# Patient Record
Sex: MALE | Race: BLACK OR AFRICAN AMERICAN | NOT HISPANIC OR LATINO | Employment: OTHER | URBAN - METROPOLITAN AREA
[De-identification: names, ages, dates, MRNs, and addresses within clinical notes are randomized per-mention and may not be internally consistent; named-entity substitution may affect disease eponyms.]

---

## 2017-02-13 ENCOUNTER — LAB VISIT (OUTPATIENT)
Dept: LAB | Facility: HOSPITAL | Age: 82
End: 2017-02-13
Attending: NURSE PRACTITIONER
Payer: MEDICARE

## 2017-02-13 ENCOUNTER — OFFICE VISIT (OUTPATIENT)
Dept: OPTOMETRY | Facility: CLINIC | Age: 82
End: 2017-02-13
Payer: MEDICARE

## 2017-02-13 DIAGNOSIS — H40.1132 PRIMARY OPEN ANGLE GLAUCOMA OF BOTH EYES, MODERATE STAGE: Primary | ICD-10-CM

## 2017-02-13 DIAGNOSIS — C61 PROSTATE CANCER: ICD-10-CM

## 2017-02-13 DIAGNOSIS — Z96.1 PSEUDOPHAKIA OF BOTH EYES: ICD-10-CM

## 2017-02-13 DIAGNOSIS — H35.62 RETINAL HEMORRHAGE OF LEFT EYE: ICD-10-CM

## 2017-02-13 LAB — COMPLEXED PSA SERPL-MCNC: 4 NG/ML

## 2017-02-13 PROCEDURE — 99499 UNLISTED E&M SERVICE: CPT | Mod: S$GLB,,, | Performed by: OPTOMETRIST

## 2017-02-13 PROCEDURE — 84153 ASSAY OF PSA TOTAL: CPT

## 2017-02-13 PROCEDURE — 92014 COMPRE OPH EXAM EST PT 1/>: CPT | Mod: S$GLB,,, | Performed by: OPTOMETRIST

## 2017-02-13 PROCEDURE — 99999 PR PBB SHADOW E&M-EST. PATIENT-LVL III: CPT | Mod: PBBFAC,,, | Performed by: OPTOMETRIST

## 2017-02-13 PROCEDURE — 36415 COLL VENOUS BLD VENIPUNCTURE: CPT

## 2017-02-13 PROCEDURE — 92250 FUNDUS PHOTOGRAPHY W/I&R: CPT | Mod: S$GLB,,, | Performed by: OPTOMETRIST

## 2017-02-13 RX ORDER — HYDROCODONE BITARTRATE AND ACETAMINOPHEN 7.5; 325 MG/1; MG/1
TABLET ORAL
COMMUNITY
Start: 2017-02-01 | End: 2017-08-21

## 2017-02-13 RX ORDER — LANCETS 33 GAUGE
EACH MISCELLANEOUS
COMMUNITY
Start: 2017-01-12 | End: 2018-04-03

## 2017-02-13 NOTE — PROGRESS NOTES
HPI     Glaucoma    Additional comments: 4 month IOP ck/DFE           Comments   Last eye exam was 10/14/16 with Dr. Jiménez.  Patient states no changes since last exam. Happy with current glasses.  Patient denies diplopia, headaches, flashes/floaters, and pain.    Latanoprost qHS OU       Last edited by Padmini Walker on 2/13/2017  8:22 AM. (History)        ROS     Negative for: Constitutional, Gastrointestinal, Neurological, Skin,   Genitourinary, Musculoskeletal, HENT, Endocrine, Cardiovascular, Eyes,   Respiratory, Psychiatric, Allergic/Imm, Heme/Lymph    Last edited by Leydi Jiménez, OD on 2/13/2017 10:23 AM. (History)        Assessment /Plan     For exam results, see Encounter Report.    Primary open angle glaucoma of both eyes, moderate stage  -     Color Fundus Photography - OU - Both Eyes    Retinal hemorrhage of left eye    Pseudophakia of both eyes            1.  Continue Latanoprost QHS OU.    DFE: 2/13/17  Photos: 2/13/17  HVF: 6/16/16  OCT: 6/16/16  Gonio:   Pachy: 526 OD, 512 OS  Initial IOPs: 22 OD, 22 OS  MHx: DM, HTN  FHx: none  2.  Diabetic retinopathy OU w/ large retinal hemorrhage inferior to optic nerve OS.  Appointment scheduled with Dr. Hall.  3.   Continue w/ current rx        RTC 4 months for iop check, hvf, and oct.

## 2017-02-20 ENCOUNTER — TELEPHONE (OUTPATIENT)
Dept: HEMATOLOGY/ONCOLOGY | Facility: CLINIC | Age: 82
End: 2017-02-20

## 2017-02-20 ENCOUNTER — OFFICE VISIT (OUTPATIENT)
Dept: UROLOGY | Facility: CLINIC | Age: 82
End: 2017-02-20
Payer: MEDICARE

## 2017-02-20 VITALS
SYSTOLIC BLOOD PRESSURE: 134 MMHG | HEART RATE: 60 BPM | DIASTOLIC BLOOD PRESSURE: 68 MMHG | BODY MASS INDEX: 26.3 KG/M2 | HEIGHT: 68 IN | WEIGHT: 173.5 LBS

## 2017-02-20 DIAGNOSIS — R97.20 ELEVATED PSA: ICD-10-CM

## 2017-02-20 DIAGNOSIS — C61 PROSTATE CANCER: Primary | ICD-10-CM

## 2017-02-20 PROCEDURE — 1159F MED LIST DOCD IN RCRD: CPT | Mod: S$GLB,,, | Performed by: NURSE PRACTITIONER

## 2017-02-20 PROCEDURE — 96402 CHEMO HORMON ANTINEOPL SQ/IM: CPT | Mod: S$GLB,,, | Performed by: NURSE PRACTITIONER

## 2017-02-20 PROCEDURE — 1126F AMNT PAIN NOTED NONE PRSNT: CPT | Mod: S$GLB,,, | Performed by: NURSE PRACTITIONER

## 2017-02-20 PROCEDURE — 99213 OFFICE O/P EST LOW 20 MIN: CPT | Mod: 25,S$GLB,, | Performed by: NURSE PRACTITIONER

## 2017-02-20 PROCEDURE — 1157F ADVNC CARE PLAN IN RCRD: CPT | Mod: S$GLB,,, | Performed by: NURSE PRACTITIONER

## 2017-02-20 PROCEDURE — 99999 PR PBB SHADOW E&M-EST. PATIENT-LVL IV: CPT | Mod: PBBFAC,,, | Performed by: NURSE PRACTITIONER

## 2017-02-20 PROCEDURE — 99499 UNLISTED E&M SERVICE: CPT | Mod: S$GLB,,, | Performed by: NURSE PRACTITIONER

## 2017-02-20 RX ORDER — BICALUTAMIDE 50 MG/1
50 TABLET, FILM COATED ORAL DAILY
Qty: 90 TABLET | Refills: 3 | Status: SHIPPED | OUTPATIENT
Start: 2017-02-20 | End: 2017-05-21

## 2017-02-20 NOTE — MR AVS SNAPSHOT
[facility] 4th Floor  [address]  [facility] [address]  Phone: [phone]                  Jeniffer Taylor   2017 8:20 AM   Office Visit    Description:  Male : 9/15/1928   Provider:  Jessa Ulloa NP   Department:  [facility] - Urolog 4th Floor           Reason for Visit     Prostate Cancer           Diagnoses this Visit        Comments    Prostate cancer    -  Primary     Elevated PSA                To Do List           Future Appointments        Provider Department Dept Phone    3/21/2017 8:30 AM GLADIS Hall MD [facility] - Ophthalmology [phone]    2017 8:15 AM Shari Chauhan MD [facility] - Internal Medicine [phone]      Goals (5 Years of Data)     None      Follow-Up and Disposition     Return in about 6 months (around 2017) for PSA.       These Medications        Disp Refills Start End    bicalutamide (CASODEX) 50 MG Tab 90 tablet 3 2017    Take 1 tablet (50 mg total) by mouth once daily. - Oral    Pharmacy: [facility] - [place] [id number] [facility] Ph #: [phone]         [facility] On Call     [facility] On Call Nurse Care Line -  Assistance  Registered nurses in the [facility] provide clinical advisement, health education, appointment booking, and other advisory services.  Call for this free service at [phone].             Medications           Message regarding Medications     Verify the changes and/or additions to your medication regime listed below are the same as discussed with your clinician today.  If any of these changes or additions are incorrect, please notify your healthcare provider.        These medications were administered today        Dose Freq    triptorelin pamoate Syrg 22.5 mg 22.5 mg Clinic/HOD 1 time    Sig: Inject 22.5 mg into the muscle one time.    Class: Normal    Route: Intramuscular           Verify that the below list of medications is an accurate  "representation of the medications you are currently taking.  If none reported, the list may be blank. If incorrect, please contact your healthcare provider. Carry this list with you in case of emergency.           Current Medications     acetaminophen-codeine 300-30mg (TYLENOL #3) 300-30 mg Tab 1 po TID prn    amlodipine (NORVASC) 10 MG tablet Take 1 tablet (10 mg total) by mouth once daily.    aspirin (ECOTRIN) 81 MG EC tablet Take 81 mg by mouth. 1 Tablet, Delayed Release (E.C.) Oral Every morning    atorvastatin (LIPITOR) 80 MG tablet Take 1 tablet (80 mg total) by mouth once daily.    BD ALCOHOL SWABS PadM     furosemide (LASIX) 40 MG tablet Take 1 tablet (40 mg total) by mouth 2 (two) times daily.    garlic 1,000 mg Cap Take 2 tablets by mouth once daily.    glipiZIDE (GLUCOTROL) 2.5 MG TR24 Take 1 tablet (2.5 mg total) by mouth once daily.    hydrocodone-acetaminophen 7.5-325mg (NORCO) 7.5-325 mg per tablet     latanoprost 0.005 % ophthalmic solution Place 1 drop into both eyes every evening.    lisinopril (PRINIVIL,ZESTRIL) 40 MG tablet Take 1 tablet (40 mg total) by mouth once daily.    metoprolol succinate (TOPROL-XL) 50 MG 24 hr tablet Take 1 tablet (50 mg total) by mouth once daily.    bicalutamide (CASODEX) 50 MG Tab Take 1 tablet (50 mg total) by mouth once daily.    blood sugar diagnostic Strp Use daily    blood-glucose meter kit Use as instructed    lancets (LANCETS,ULTRA THIN) Misc Use daily    leuprolide (ELIGARD) 45 mg injection 45 mg. 1 Syringe Subcutaneous Q 6 months    nitroGLYCERIN (NITROSTAT) 0.4 MG SL tablet Place 1 tablet (0.4 mg total) under the tongue every 5 (five) minutes as needed for Chest pain.    TRUEPLUS LANCETS 33 gauge [facility]            Clinical Reference Information           Your Vitals Were     BP Pulse Height Weight BMI    134/68 60 5' 8" (1.727 m) 78.7 kg (173 lb 8 oz) 26.38 kg/m2      Blood Pressure          Most Recent Value    BP  134/68      Allergies as of 2/20/2017     No " Known Allergies      Immunizations Administered on Date of Encounter - 2/20/2017     None      Orders Placed During Today's Visit      Normal Orders This Visit    Ambulatory consult to Hematology / Oncology     Medication Pre-Authorization     Future Labs/Procedures Expected by Expires    Prostate Specific Antigen, Diagnostic  8/20/2017 (Approximate) 3/27/2018      [facility] Sign-Up     Activating your [facility] account is as easy as 1-2-3!     1) Visit [email or web address], select Sign Up Now, enter this activation code and your date of birth, then select Next.  [email or web address]  Expires: 4/6/2017  8:25 AM      2) Create a username and password to use when you visit [facility] in the future and select a security question in case you lose your password and select Next.    3) Enter your e-mail address and click Sign Up!    Additional Information  If you have questions, please e-mail [email or web address] or call [phone] to talk to our [facility] staff. Remember, [facility] is NOT to be used for urgent needs. For medical emergencies, dial 911.         Language Assistance Services     ATTENTION: Language assistance services are available, free of charge. Please call [phone].      ATENCIÓN: Si habla giovanni, tiene a brandt disposición servicios gratuitos de asistencia lingüística. Llame al [phone].     MEKA Ý: N?u b?n nói Ti?ng Vi?t, có các d?ch v? h? tr? ngôn ng? mi?n phí dành cho b?n. G?i s? [phone].         [facility] 4th Floor complies with applicable Federal civil rights laws and does not discriminate on the basis of race, color, national origin, age, disability, or sex.

## 2017-02-20 NOTE — PROGRESS NOTES
Subjective:       Patient ID: Jeniffer Taylor is a 88 y.o. male.    Chief Complaint: Prostate Cancer    HPI Comments: Jeniffer Taylor is a 88 y.o. Male with prostate cancer.  He was has been initially treated with Dr. Bee since 1999 with Su 4.  He had been on active surveillance.  He began LHRH agonist therapy 02/07/2007.    He is currently on Casodex and Trelstar with last office visit and injection 07/01/2016.  He is here today for 6 month check.  He did not get his PSA today.  He voices no urinary complaints;   No abdominal or bone pain; some left knee discomfort.  No family history of PCa.                             PSA                  4.0                 02/13/2017                 PSA                  2.0                 07/01/2016       PSA                  1.2                 12/28/2015                 PSA                  0.71                06/15/2015                 PSA                  0.20                06/11/2014                 PSA                    0.42                12/17/2014        PSA                  0.38                12/12/2013           PSA                      0.36                06/03/2013                 PSA                      0.27                11/28/2012                 PSA                      0.22                05/25/2012                 PSA                      0.18                11/21/2011                 PSA                      0.14                05/16/2011                 PSA                      0.11                11/16/2010                 PSA                      0.12                05/27/2010                 PSA                      0.25                11/27/2009                 PSA                      0.11                08/21/2009                      Past Medical History:    Diabetes mellitus                                             Hypertension                                                  CHF (congestive heart failure)                                 Arthritis                                                     Spinal stenosis                                               Cancer                                                          Comment:Prostate    Glaucoma                                                      Cataract                                                      High cholesterol                                              PVD (peripheral vascular disease)                             Diabetic retinopathy                                          Encounter for blood transfusion                               Coronary artery disease                                       Prostate cancer                                               Anemia                                                        Chronic kidney disease                                        Past Surgical History:    HERNIA REPAIR                                                  CARDIAC CATHETERIZATION                                          Comment:15 to 20 years ago    EYE SURGERY                                                    CATARACT EXTRACTION                             Right                 Comment:Dr. Ferguson    Review of patient's family history indicates:    Diabetes                       Neg Hx                    Heart disease                  Neg Hx                    Heart attack                   Neg Hx                    Amblyopia                      Neg Hx                    Blindness                      Neg Hx                    Cataracts                      Neg Hx                    Glaucoma                       Neg Hx                    Macular degeneration           Neg Hx                    Retinal detachment             Neg Hx                    Strabismus                     Neg Hx                    Stroke                         Neg Hx                    Thyroid disease                Neg Hx                    Hypertension                   Son                        Cancer                         Father                    Breast cancer                  Sister                    Cancer                         Brother                   No Known Problems              Mother                    No Known Problems              Maternal Aunt             No Known Problems              Maternal Uncle            No Known Problems              Paternal Aunt             No Known Problems              Paternal Uncle            No Known Problems              Maternal Grandmother      No Known Problems              Maternal Grandfather      No Known Problems              Paternal Grandmother      No Known Problems              Paternal Grandfather        Social History    Marital Status:              Spouse Name:                       Years of Education:                 Number of children:               Occupational History    None on file    Social History Main Topics    Smoking Status: Never Smoker                      Smokeless Status: Former User                         Quit date: 01/18/1973    Alcohol Use: No              Drug Use: No              Sexual Activity: No                   Other Topics            Concern    None on file    Social History Narrative    None on file        Allergies:  Review of patient's allergies indicates no known allergies.    Medications:  Current outpatient prescriptions:   acetaminophen-codeine 300-30mg (TYLENOL #3) 300-30 mg Tab, 1 po TID prn, Disp: 30 tablet, Rfl: 1  amlodipine (NORVASC) 10 MG tablet, Take 1 tablet (10 mg total) by mouth once daily., Disp: 90 tablet, Rfl: 3  aspirin (ECOTRIN) 81 MG EC tablet, Take 81 mg by mouth. 1 Tablet, Delayed Release (E.C.) Oral Every morning, Disp: , Rfl:   atorvastatin (LIPITOR) 80 MG tablet, Take 1 tablet (80 mg total) by mouth once daily., Disp: 90 tablet, Rfl: 1  clopidogrel (PLAVIX) 75 mg tablet, Take 1 tablet (75 mg total) by mouth once daily., Disp: 90 tablet, Rfl: 1  furosemide  (LASIX) 40 MG tablet, Take 1 tablet (40 mg total) by mouth 2 (two) times daily., Disp: 180 tablet, Rfl: 1  garlic 1,000 mg Cap, Take 2 tablets by mouth once daily., Disp: , Rfl:   glipiZIDE (GLUCOTROL) 2.5 MG TR24, Take 1 tablet (2.5 mg total) by mouth once daily., Disp: 90 tablet, Rfl: 1  leuprolide (ELIGARD) 45 mg injection, 45 mg. 1 Syringe Subcutaneous Q 6 months, Disp: , Rfl:   lisinopril (PRINIVIL,ZESTRIL) 40 MG tablet, Take 1 tablet (40 mg total) by mouth once daily., Disp: 90 tablet, Rfl: 1  metoprolol succinate (TOPROL-XL) 50 MG 24 hr tablet, Take 1 tablet (50 mg total) by mouth once daily., Disp: 90 tablet, Rfl: 1  nitroGLYCERIN (NITROSTAT) 0.4 MG SL tablet, Place 1 tablet (0.4 mg total) under the tongue every 5 (five) minutes as needed for Chest pain., Disp: 100 tablet, Rfl: 5  Current facility-administered medications:   triptorelin pamoate Syrg 22.5 mg, 22.5 mg, Intramuscular, 1 time in Clinic/HOD, Jessa Ulloa NP          Review of Systems   Constitutional: Negative.  Negative for activity change, appetite change and fever.   HENT: Negative.  Negative for facial swelling and trouble swallowing.    Eyes: Negative.    Respiratory: Negative.  Negative for shortness of breath.    Cardiovascular: Negative.  Negative for chest pain and palpitations.   Gastrointestinal: Negative for abdominal pain, constipation, diarrhea, nausea and vomiting.   Genitourinary: Positive for nocturia. Negative for difficulty urinating, dysuria, enuresis, flank pain, frequency, genital sores, hematuria, penile pain, scrotal swelling, testicular pain and urgency.        FOS is good;  Pleased with how he urinates.     Musculoskeletal: Negative for back pain, gait problem and neck stiffness.   Skin: Negative.  Negative for wound.   Neurological: Negative for dizziness, tremors, seizures, syncope, speech difficulty, light-headedness and headaches.   Hematological: Does not bruise/bleed easily.   Psychiatric/Behavioral:  Negative for confusion and hallucinations. The patient is not nervous/anxious.        Objective:      Physical Exam   Nursing note and vitals reviewed.  Constitutional: He is oriented to person, place, and time. Vital signs are normal. He appears well-developed and well-nourished. He is active and cooperative.  Non-toxic appearance. He does not have a sickly appearance.   HENT:   Head: Normocephalic and atraumatic.   Right Ear: External ear normal.   Left Ear: External ear normal.   Nose: Nose normal.   Mouth/Throat: Mucous membranes are normal.   Eyes: Conjunctivae and lids are normal. No scleral icterus.   Neck: Trachea normal, normal range of motion and full passive range of motion without pain. Neck supple. No JVD present. No tracheal deviation present.   Cardiovascular: Normal rate, regular rhythm, S1 normal and S2 normal.    Pulmonary/Chest: Effort normal and breath sounds normal. No respiratory distress. He exhibits no tenderness.   Abdominal: Soft. Normal appearance and bowel sounds are normal. There is no hepatosplenomegaly. There is no tenderness. There is no rebound, no guarding and no CVA tenderness.   Genitourinary: Rectum normal, testes normal and penis normal. Prostate is enlarged. Prostate is not tender. Right testis shows no swelling and no tenderness. Left testis shows no swelling and no tenderness. No penile tenderness.       Musculoskeletal: Normal range of motion.   Neurological: He is alert and oriented to person, place, and time. He has normal strength.   Skin: Skin is warm, dry and intact.     Psychiatric: He has a normal mood and affect. His behavior is normal. Judgment and thought content normal.       Assessment:       1. Prostate cancer    2. Elevated PSA        Plan:         I spent 20 minutes with the patient of which more than half was spent in direct consultation with the patient in regards to our treatment and plan.    Education and recommendations of today's plan of care including  home remedies.  Discussed findings.  He late with injection; unsure if still taking Casodex.  Consult Heme/onc but he wants to wait, since late; making sure taking Casodex.  PSA in 6 month!

## 2017-02-21 ENCOUNTER — TELEPHONE (OUTPATIENT)
Dept: HEMATOLOGY/ONCOLOGY | Facility: CLINIC | Age: 82
End: 2017-02-21

## 2017-03-21 ENCOUNTER — OFFICE VISIT (OUTPATIENT)
Dept: OPHTHALMOLOGY | Facility: CLINIC | Age: 82
End: 2017-03-21
Payer: MEDICARE

## 2017-03-21 VITALS — HEART RATE: 60 BPM | DIASTOLIC BLOOD PRESSURE: 83 MMHG | SYSTOLIC BLOOD PRESSURE: 158 MMHG

## 2017-03-21 DIAGNOSIS — H35.033 HYPERTENSIVE RETINOPATHY OF BOTH EYES: ICD-10-CM

## 2017-03-21 DIAGNOSIS — E11.3213 BOTH EYES AFFECTED BY MILD NONPROLIFERATIVE DIABETIC RETINOPATHY WITH MACULAR EDEMA, ASSOCIATED WITH TYPE 2 DIABETES MELLITUS: Primary | ICD-10-CM

## 2017-03-21 PROCEDURE — 92014 COMPRE OPH EXAM EST PT 1/>: CPT | Mod: S$GLB,,, | Performed by: OPHTHALMOLOGY

## 2017-03-21 PROCEDURE — 92235 FLUORESCEIN ANGRPH MLTIFRAME: CPT | Mod: S$GLB,,, | Performed by: OPHTHALMOLOGY

## 2017-03-21 PROCEDURE — 99999 PR PBB SHADOW E&M-EST. PATIENT-LVL IV: CPT | Mod: PBBFAC,,, | Performed by: OPHTHALMOLOGY

## 2017-03-21 PROCEDURE — 92226 PR SPECIAL EYE EXAM, SUBSEQUENT: CPT | Mod: 50,S$GLB,, | Performed by: OPHTHALMOLOGY

## 2017-03-21 PROCEDURE — 99499 UNLISTED E&M SERVICE: CPT | Mod: S$GLB,,, | Performed by: OPHTHALMOLOGY

## 2017-03-21 PROCEDURE — 92134 CPTRZ OPH DX IMG PST SGM RTA: CPT | Mod: S$GLB,,, | Performed by: OPHTHALMOLOGY

## 2017-03-21 NOTE — PROGRESS NOTES
OCT - OD - No ME with small ERM  OS - No ME< small ERM    FA Today: MA's OU  GILLIAN OS - No NV OU    A/P    1. CSME OD  -Pt w/ 20/25 OU vision after cataract surgery  -Previous OCT with cystic spaces centrally & temporal to fovea; exudates temporal to mac  -S/p Avastin x 4 OD last 3/18/14  S/p focal 4/14  - mild edema  OD temporal to fovea, monitor today      2. Mild NPDR OU  -Most recent HbA1c (12/21/16): 6.8%  -Continue BS/BP/chol control.  -Monitor    3.POAG OU  -Pt using Xalatan QHS OU: hx of good response  -Being followed by Dr. Jiménez.     4. PCIOL OU  - had done with Dr Ferguson     5. GILLIAN OS  No macular threatening  SRH improving      12 months OCT

## 2017-03-21 NOTE — MR AVS SNAPSHOT
[facility] - Ophthalmology  [address]  [facility] [address]  Phone: [phone]  Fax: [phone]                  Jeniffer Taylor   3/21/2017 8:30 AM   Office Visit    Description:  Male : 9/15/1928   Provider:  GLADIS Hall MD   Department:  [facility]           Reason for Visit     Eye Problem           Diagnoses this Visit        Comments    Both eyes affected by mild nonproliferative diabetic retinopathy with macular edema, associated with type 2 diabetes mellitus    -  Primary     Hypertensive retinopathy of both eyes                To Do List           Future Appointments        Provider Department Dept Phone    2017 8:15 AM Shari Chauhan MD [facility] - Internal Medicine [phone]      Goals (5 Years of Data)     None      Follow-Up and Disposition     Return in about 1 year (around 3/21/2018).      [facility]     [facility] Care [facility] - 24/7 Assistance  Registered nurses in the [facility] provide clinical advisement, health education, appointment booking, and other advisory services.  Call for this free service at [phone].             Medications           Message regarding Medications     Verify the changes and/or additions to your medication regime listed below are the same as discussed with your clinician today.  If any of these changes or additions are incorrect, please notify your healthcare provider.             Verify that the below list of medications is an accurate representation of the medications you are currently taking.  If none reported, the list may be blank. If incorrect, please contact your healthcare provider. Carry this list with you in case of emergency.           Current Medications     acetaminophen-codeine 300-30mg (TYLENOL #3) 300-30 mg Tab 1 po TID prn    amlodipine (NORVASC) 10 MG tablet Take 1 tablet (10 mg total) by mouth once daily.    aspirin (ECOTRIN) 81 MG EC tablet Take 81 mg by mouth. 1  Tablet, Delayed Release (E.C.) Oral Every morning    atorvastatin (LIPITOR) 80 MG tablet Take 1 tablet (80 mg total) by mouth once daily.    BD ALCOHOL SWABS PadM     bicalutamide (CASODEX) 50 MG Tab Take 1 tablet (50 mg total) by mouth once daily.    blood sugar diagnostic Strp Use daily    blood-glucose meter kit Use as instructed    furosemide (LASIX) 40 MG tablet Take 1 tablet (40 mg total) by mouth 2 (two) times daily.    garlic 1,000 mg Cap Take 2 tablets by mouth once daily.    glipiZIDE (GLUCOTROL) 2.5 MG TR24 Take 1 tablet (2.5 mg total) by mouth once daily.    hydrocodone-acetaminophen 7.5-325mg (NORCO) 7.5-325 mg per tablet     lancets (LANCETS,ULTRA THIN) Misc Use daily    latanoprost 0.005 % ophthalmic solution Place 1 drop into both eyes every evening.    leuprolide (ELIGARD) 45 mg injection 45 mg. 1 Syringe Subcutaneous Q 6 months    lisinopril (PRINIVIL,ZESTRIL) 40 MG tablet Take 1 tablet (40 mg total) by mouth once daily.    metoprolol succinate (TOPROL-XL) 50 MG 24 hr tablet Take 1 tablet (50 mg total) by mouth once daily.    TRUEPLUS LANCETS 33 gauge Misc     nitroGLYCERIN (NITROSTAT) 0.4 MG SL tablet Place 1 tablet (0.4 mg total) under the tongue every 5 (five) minutes as needed for Chest pain.           Clinical Reference Information           Your Vitals Were     BP Pulse                158/83 (BP Location: Right arm, Patient Position: Sitting, BP Method: Automatic) 60          Blood Pressure          Most Recent Value    BP  (!)  158/83      Allergies as of 3/21/2017     No Known Allergies      Immunizations Administered on Date of Encounter - 3/21/2017     None      Orders Placed During Today's Visit      Normal Orders This Visit    Fluorescein Angiography - OU - Both Eyes     Posterior Segment OCT Retina-Both eyes     Future Labs/Procedures Expected by Expires    Fluorescein Angiography - OU - Both Eyes  As directed 3/21/2018      [facility] Sign-Up     Activating your [facility] account is as  easy as 1-2-3!     1) Visit [email or web address], select Sign Up Now, enter this activation code and your date of birth, then select Next.  [email or web address]  Expires: 4/6/2017  9:25 AM      2) Create a username and password to use when you visit [facility] in the future and select a security question in case you lose your password and select Next.    3) Enter your e-mail address and click Sign Up!    Additional Information  If you have questions, please e-mail [email or web address] [email or web address] or call [phone] to talk to our [facility] staff. Remember, [facility] is NOT to be used for urgent needs. For medical emergencies, dial 911.         Instructions      Fluorescein Angiogram procedure explained to pt and FA handout attached to AVS below.      Fluorescein Angiography  Fluorescein angiography is an eye test. It is done to look at the back of your eye, including:  · The blood vessels in your eye  · The layer of tissue at the back of your eye (the retina)  · The center of your retina (the macula)  · The optic nerve  This test can diagnose diseases found in these areas. It can also diagnose other conditions that affect these areas. To do this test, a dye called fluorescein is shot (injected) into your arm. The dye goes into your bloodstream and up into the blood vessels in your eyes. A special camera is then used to take images (angiograms) of your eyes.     A fluorescein angiogram of the retina   Getting ready for your test  Tell your healthcare provider if you:  · Are pregnant or think you may be pregnant  · Are breastfeeding  · Have a history of severe allergic reactions, including to X-ray dye or other medicines  · Have kidney problems  Tell your provider about any medicines you are taking. You may need to stop taking all or some of these before the test. This includes:  · All prescription medicines  · Over-the-counter medicines such as aspirin or ibuprofen  · Street drugs  · Herbs, vitamins, and other supplements  You should  arrange for an adult family member or friend to drive you home after your test. Your vision will be blurry for up to 12 hours.  Follow any other instructions from your healthcare provider.  During your test  · You are given eye drops to enlarge (dilate) your pupils.  · You then sit in front of a special camera. You place your chin on the chin rest and look into the camera.  · Images are taken of your eyes, one eye at a time.  · Fluorescein dye is then injected into your arm. The lights in the room are turned off. You may have mild nausea. You may have a warm feeling in your arm or upper body. Tell your provider if your skin feels itchy or if you are having trouble breathing. If so, you could be having an allergic reaction to the dye.  · More pictures of your eyes are taken over 15 to 30 minutes. The camera shines a bright light into your eyes. Try to keep your head still and your eyes open.  · When enough images have been taken, the test is over.  After your test  Your vision will be blurry for up to 4 to 12 hours. This is because of your dilated pupils. Your eye will be more sensitive to light for up to 12 hours. You may want to wear sunglasses during this time. Do not drive if your vision is very blurry. You may also find it uncomfortable to read. Your skin may look yellow for a few hours. This is from the dye. Your urine will be bright yellow or orange for 24 to 48 hours after the test.     Risks and possible complications  All procedures have some risks. Possible risks of fluorescein angiography include:  · Upset stomach (nausea) and vomiting  · Leaking dye around the injection site that causes pain and swelling  · Metallic taste in your mouth  · Infection at injection site  · Allergic reaction to the dye  · Dry mouth or too much saliva  · Faster heart rate  · Sweating  · Lower back pain   © 0775-8663 [employer]. [address] [place] [address], [place] [address]. All rights reserved. This information is  not intended as a substitute for professional medical care. Always follow your healthcare professional's instructions.               Language Assistance Services     ATTENTION: Language assistance services are available, free of charge. Please call [phone].      ATENCIÓN: Si femi davila, tiene a brandt disposición servicios gratuitos de asistencia lingüística. Llame al [phone].     [facility] Ý: N?u b?n nói Ti?ng Vi?t, có các d?ch v? h? tr? ngôn ng? mi?n phí dành cho b?n. G?i s? [phone].         [facility] complies with applicable Federal civil rights laws and does not discriminate on the basis of race, color, national origin, age, disability, or sex.

## 2017-03-21 NOTE — PATIENT INSTRUCTIONS
Fluorescein Angiogram procedure explained to pt and FA handout attached to AVS below.      Fluorescein Angiography  Fluorescein angiography is an eye test. It is done to look at the back of your eye, including:  · The blood vessels in your eye  · The layer of tissue at the back of your eye (the retina)  · The center of your retina (the macula)  · The optic nerve  This test can diagnose diseases found in these areas. It can also diagnose other conditions that affect these areas. To do this test, a dye called fluorescein is shot (injected) into your arm. The dye goes into your bloodstream and up into the blood vessels in your eyes. A special camera is then used to take images (angiograms) of your eyes.     A fluorescein angiogram of the retina   Getting ready for your test  Tell your healthcare provider if you:  · Are pregnant or think you may be pregnant  · Are breastfeeding  · Have a history of severe allergic reactions, including to X-ray dye or other medicines  · Have kidney problems  Tell your provider about any medicines you are taking. You may need to stop taking all or some of these before the test. This includes:  · All prescription medicines  · Over-the-counter medicines such as aspirin or ibuprofen  · Street drugs  · Herbs, vitamins, and other supplements  You should arrange for an adult family member or friend to drive you home after your test. Your vision will be blurry for up to 12 hours.  Follow any other instructions from your healthcare provider.  During your test  · You are given eye drops to enlarge (dilate) your pupils.  · You then sit in front of a special camera. You place your chin on the chin rest and look into the camera.  · Images are taken of your eyes, one eye at a time.  · Fluorescein dye is then injected into your arm. The lights in the room are turned off. You may have mild nausea. You may have a warm feeling in your arm or upper body. Tell your provider if your skin feels itchy or if  you are having trouble breathing. If so, you could be having an allergic reaction to the dye.  · More pictures of your eyes are taken over 15 to 30 minutes. The camera shines a bright light into your eyes. Try to keep your head still and your eyes open.  · When enough images have been taken, the test is over.  After your test  Your vision will be blurry for up to 4 to 12 hours. This is because of your dilated pupils. Your eye will be more sensitive to light for up to 12 hours. You may want to wear sunglasses during this time. Do not drive if your vision is very blurry. You may also find it uncomfortable to read. Your skin may look yellow for a few hours. This is from the dye. Your urine will be bright yellow or orange for 24 to 48 hours after the test.     Risks and possible complications  All procedures have some risks. Possible risks of fluorescein angiography include:  · Upset stomach (nausea) and vomiting  · Leaking dye around the injection site that causes pain and swelling  · Metallic taste in your mouth  · Infection at injection site  · Allergic reaction to the dye  · Dry mouth or too much saliva  · Faster heart rate  · Sweating  · Lower back pain   © 3207-4808 [employer]. [address] [place] [address], [place] [address]. All rights reserved. This information is not intended as a substitute for professional medical care. Always follow your healthcare professional's instructions.

## 2017-04-19 ENCOUNTER — OFFICE VISIT (OUTPATIENT)
Dept: INTERNAL MEDICINE | Facility: CLINIC | Age: 82
End: 2017-04-19
Payer: MEDICARE

## 2017-04-19 ENCOUNTER — TELEPHONE (OUTPATIENT)
Dept: HEMATOLOGY/ONCOLOGY | Facility: CLINIC | Age: 82
End: 2017-04-19

## 2017-04-19 DIAGNOSIS — I11.0 HYPERTENSIVE HEART DISEASE WITH CONGESTIVE HEART FAILURE: ICD-10-CM

## 2017-04-19 DIAGNOSIS — I25.5 ISCHEMIC CARDIOMYOPATHY: Chronic | ICD-10-CM

## 2017-04-19 DIAGNOSIS — E11.49 TYPE II DIABETES MELLITUS WITH NEUROLOGICAL MANIFESTATIONS: ICD-10-CM

## 2017-04-19 DIAGNOSIS — C61 PROSTATE CANCER: Primary | ICD-10-CM

## 2017-04-19 DIAGNOSIS — E11.9 TYPE 2 DIABETES MELLITUS WITHOUT COMPLICATION, WITHOUT LONG-TERM CURRENT USE OF INSULIN: ICD-10-CM

## 2017-04-19 PROCEDURE — 1159F MED LIST DOCD IN RCRD: CPT | Mod: S$GLB,,, | Performed by: INTERNAL MEDICINE

## 2017-04-19 PROCEDURE — 99215 OFFICE O/P EST HI 40 MIN: CPT | Mod: S$GLB,,, | Performed by: INTERNAL MEDICINE

## 2017-04-19 PROCEDURE — 99999 PR PBB SHADOW E&M-EST. PATIENT-LVL IV: CPT | Mod: PBBFAC,,, | Performed by: INTERNAL MEDICINE

## 2017-04-19 PROCEDURE — 1126F AMNT PAIN NOTED NONE PRSNT: CPT | Mod: S$GLB,,, | Performed by: INTERNAL MEDICINE

## 2017-04-19 PROCEDURE — 99499 UNLISTED E&M SERVICE: CPT | Mod: S$GLB,,, | Performed by: INTERNAL MEDICINE

## 2017-04-19 PROCEDURE — 1160F RVW MEDS BY RX/DR IN RCRD: CPT | Mod: S$GLB,,, | Performed by: INTERNAL MEDICINE

## 2017-04-19 RX ORDER — LISINOPRIL 40 MG/1
40 TABLET ORAL DAILY
Qty: 90 TABLET | Refills: 1 | Status: SHIPPED | OUTPATIENT
Start: 2017-04-19 | End: 2017-08-21 | Stop reason: SDUPTHER

## 2017-04-19 RX ORDER — GLIPIZIDE 2.5 MG/1
2.5 TABLET, EXTENDED RELEASE ORAL DAILY
Qty: 90 TABLET | Refills: 1 | Status: SHIPPED | OUTPATIENT
Start: 2017-04-19 | End: 2017-08-21 | Stop reason: SDUPTHER

## 2017-04-19 RX ORDER — ATORVASTATIN CALCIUM 80 MG/1
80 TABLET, FILM COATED ORAL DAILY
Qty: 90 TABLET | Refills: 1 | Status: SHIPPED | OUTPATIENT
Start: 2017-04-19 | End: 2017-08-21 | Stop reason: SDUPTHER

## 2017-04-19 RX ORDER — METOPROLOL SUCCINATE 50 MG/1
50 TABLET, EXTENDED RELEASE ORAL DAILY
Qty: 90 TABLET | Refills: 1 | Status: SHIPPED | OUTPATIENT
Start: 2017-04-19 | End: 2017-08-21 | Stop reason: SDUPTHER

## 2017-04-19 RX ORDER — AMLODIPINE BESYLATE 10 MG/1
10 TABLET ORAL DAILY
Qty: 90 TABLET | Refills: 1 | Status: SHIPPED | OUTPATIENT
Start: 2017-04-19 | End: 2017-08-21 | Stop reason: SDUPTHER

## 2017-04-19 RX ORDER — FUROSEMIDE 40 MG/1
40 TABLET ORAL 2 TIMES DAILY
Qty: 180 TABLET | Refills: 1 | Status: SHIPPED | OUTPATIENT
Start: 2017-04-19 | End: 2017-08-21 | Stop reason: SDUPTHER

## 2017-04-19 NOTE — MR AVS SNAPSHOT
[facility]  [address]  [facility] [address]  Phone: [phone]  Fax: [phone]                  Jeniffer Taylor   2017 8:15 AM   Office Visit    Description:  Male : 9/15/1928   Provider:  Shari Chauhan MD   Department:  [facility] - Internal Medicine           Reason for Visit     Follow-up           Diagnoses this Visit        Comments    Prostate cancer    -  Primary     Ischemic cardiomyopathy         Type 2 diabetes mellitus without complication, without long-term current use of insulin                To Do List           Goals (5 Years of Data)     None      Follow-Up and Disposition     Return for EPP 4 months.       These Medications        Disp Refills Start End    glipiZIDE (GLUCOTROL) 2.5 MG TR24 90 tablet 1 2017     Take 1 tablet (2.5 mg total) by mouth once daily. - Oral    Pharmacy: [facility] - [place] [id number] [facility] Ph #: [phone]       lisinopril (PRINIVIL,ZESTRIL) 40 MG tablet 90 tablet 1 2017    Take 1 tablet (40 mg total) by mouth once daily. - Oral    [facility] [place] [id number] [facility] Ph #: [phone]       atorvastatin (LIPITOR) 80 MG tablet 90 tablet 1 2017     Take 1 tablet (80 mg total) by mouth once daily. - Oral    [facility] [address] [facility] Ph #: [phone]       furosemide (LASIX) 40 MG tablet 180 tablet 1 2017     Take 1 tablet (40 mg total) by mouth 2 (two) times daily. - Oral    Pharmacy: [facility] - [place] [id number] [facility] Ph #: [phone]       metoprolol succinate (TOPROL-XL) 50 MG 24 hr tablet 90 tablet 1 2017     Take 1 tablet (50 mg total) by mouth once daily. - Oral    Pharmacy: [facility] - [place] [id number] [facility] Ph #: [phone]       amlodipine (NORVASC) 10 MG tablet 90 tablet 1  4/19/2017 4/19/2018    Take 1 tablet (10 mg total) by mouth once daily. - Oral    Pharmacy: [facility] - [place], [place] - [address]  Ph #: [phone]         [facility]     [facility] On [facility] Nurse Care Line - 24/7 Assistance  Unless otherwise directed by your provider, please contact [facility], our nurse care line that is available for 24/7 assistance.     Registered nurses in the [facility] provide: appointment scheduling, clinical advisement, health education, and other advisory services.  Call: [phone] (toll free)               Medications           Message regarding Medications     Verify the changes and/or additions to your medication regime listed below are the same as discussed with your clinician today.  If any of these changes or additions are incorrect, please notify your healthcare provider.             Verify that the below list of medications is an accurate representation of the medications you are currently taking.  If none reported, the list may be blank. If incorrect, please contact your healthcare provider. Carry this list with you in case of emergency.           Current Medications     acetaminophen-codeine 300-30mg (TYLENOL #3) 300-30 mg Tab 1 po TID prn    amlodipine (NORVASC) 10 MG tablet Take 1 tablet (10 mg total) by mouth once daily.    aspirin (ECOTRIN) 81 MG EC tablet Take 81 mg by mouth. 1 Tablet, Delayed Release (E.C.) Oral Every morning    atorvastatin (LIPITOR) 80 MG tablet Take 1 tablet (80 mg total) by mouth once daily.    BD ALCOHOL SWABS PadM     bicalutamide (CASODEX) 50 MG Tab Take 1 tablet (50 mg total) by mouth once daily.    blood sugar diagnostic Strp Use daily    furosemide (LASIX) 40 MG tablet Take 1 tablet (40 mg total) by mouth 2 (two) times daily.    garlic 1,000 mg Cap Take 2 tablets by mouth once daily.    glipiZIDE (GLUCOTROL) 2.5 MG TR24 Take 1 tablet (2.5 mg total) by mouth once daily.     "hydrocodone-acetaminophen 7.5-325mg (NORCO) 7.5-325 mg per tablet     lancets (LANCETS,ULTRA THIN) Misc Use daily    latanoprost 0.005 % ophthalmic solution Place 1 drop into both eyes every evening.    leuprolide (ELIGARD) 45 mg injection 45 mg. 1 Syringe Subcutaneous Q 6 months    lisinopril (PRINIVIL,ZESTRIL) 40 MG tablet Take 1 tablet (40 mg total) by mouth once daily.    metoprolol succinate (TOPROL-XL) 50 MG 24 hr tablet Take 1 tablet (50 mg total) by mouth once daily.    TRUEPLUS LANCETS 33 gauge Misc     blood-glucose meter kit Use as instructed    nitroGLYCERIN (NITROSTAT) 0.4 MG SL tablet Place 1 tablet (0.4 mg total) under the tongue every 5 (five) minutes as needed for Chest pain.           Clinical Reference Information           Your Vitals Were     BP Pulse Temp Height Weight SpO2    110/60 (BP Location: Right arm, Patient Position: Sitting, BP Method: Manual) 49 97.8 °F (36.6 °C) (Oral) 5' 8" (1.727 m) 75 kg (165 lb 4.8 oz) 98%    BMI                25.13 kg/m2          Blood Pressure          Most Recent Value    BP  110/60      Allergies as of 4/19/2017     No Known Allergies      Immunizations Administered on Date of Encounter - 4/19/2017     None      Orders Placed During Today's Visit      Normal Orders This Visit    Ambulatory Referral to Hematology / Oncology     Future Labs/Procedures Expected by Expires    Comprehensive metabolic panel  4/19/2017 4/19/2018    Hemoglobin A1c  4/19/2017 4/19/2018      [facility] Sign-Up     Activating your [facility] account is as easy as 1-2-3!     1) Visit [email or web address], select Sign Up Now, enter this activation code and your date of birth, then select Next.  [email or web address]  Expires: 6/3/2017  9:06 AM      2) Create a username and password to use when you visit [facility] in the future and select a security question in case you lose your password and select Next.    3) Enter your e-mail address and click Sign Up!    Additional Information  If you have " questions, please e-mail [email or web address] or call [phone] to talk to our [facility] staff. Remember, [facility] is NOT to be used for urgent needs. For medical emergencies, dial 911.         Language Assistance Services     ATTENTION: Language assistance services are available, free of charge. Please call [phone].      ATENCIÓN: Si habla español, tiene a brandt disposición servicios gratuitos de asistencia lingüística. Llame al [phone].     CHÚ Ý: N?u b?n nói Ti?ng Vi?t, có các d?ch v? h? tr? ngôn ng? mi?n phí dành cho b?n. G?i s? [phone].         [facility] complies with applicable Federal civil rights laws and does not discriminate on the basis of race, color, national origin, age, disability, or sex.

## 2017-04-23 VITALS
TEMPERATURE: 98 F | HEART RATE: 62 BPM | SYSTOLIC BLOOD PRESSURE: 110 MMHG | WEIGHT: 165.31 LBS | OXYGEN SATURATION: 98 % | DIASTOLIC BLOOD PRESSURE: 60 MMHG | HEIGHT: 68 IN | BODY MASS INDEX: 25.05 KG/M2

## 2017-04-23 NOTE — PROGRESS NOTES
Subjective:       Patient ID: Jeniffer Taylor is a 88 y.o. male.    Chief Complaint: Diabetes    HPI: He has diabetes.  Denies polyuria, polydipsia.  He has congestive heart failure.  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 He had a colonoscopy February 2011     MEDICATIONS: GlucotrolXL 2.5 mg daily,lisinopril 40mg daily, Eligard ,Lipitor 80 mg daily,xalatan ,aspirin one by mouth daily, Lasix 40 mg twice a day, Toprol-XL 50 mg daily, Norvasc 10 mg daily     ALLERGIES: No known drug allergies.        Review of Systems   Constitutional: Negative for chills, fatigue, fever and unexpected weight change.   Respiratory: Negative for chest tightness and shortness of breath.    Cardiovascular: Negative for chest pain and palpitations.   Gastrointestinal: Negative for abdominal pain and blood in stool.   Neurological: Negative for dizziness, syncope, numbness and headaches.       Objective:      Physical Exam   HENT:   Right Ear: External ear normal.   Left Ear: External ear normal.   Nose: Nose normal.   Mouth/Throat: Oropharynx is clear and moist.   Eyes: Pupils are equal, round, and reactive to light.   Neck: Normal range of motion.   Cardiovascular: Normal rate and regular rhythm.    Murmur heard.  Pulmonary/Chest: Breath sounds normal.   Abdominal: He exhibits no distension. There is no hepatosplenomegaly. There is no tenderness.   Lymphadenopathy:     He has no cervical adenopathy.     He has no axillary adenopathy.   Neurological: He has normal strength and normal reflexes. No cranial nerve deficit or sensory deficit.       Assessment:         assessment and plan: 1.  Diabetes: Check glycosylated hemoglobin.  Discussed podiatry appointment.  He refused  2.  Congestive heart  failure: Check CMP  3.  Prostate cancer: Follow up with oncology  4.  Discussed thyroid ultrasound.  He refused  Plan:       As above

## 2017-05-10 ENCOUNTER — TELEPHONE (OUTPATIENT)
Dept: INTERNAL MEDICINE | Facility: CLINIC | Age: 82
End: 2017-05-10

## 2017-05-10 NOTE — TELEPHONE ENCOUNTER
Spoke with pt daughter and she's wanting to know whether its a routine follow up or did Dr. Chauhan find anything new

## 2017-05-12 ENCOUNTER — LAB VISIT (OUTPATIENT)
Dept: LAB | Facility: HOSPITAL | Age: 82
End: 2017-05-12
Attending: INTERNAL MEDICINE
Payer: MEDICARE

## 2017-05-12 ENCOUNTER — INITIAL CONSULT (OUTPATIENT)
Dept: HEMATOLOGY/ONCOLOGY | Facility: CLINIC | Age: 82
End: 2017-05-12
Payer: MEDICARE

## 2017-05-12 VITALS
HEART RATE: 65 BPM | RESPIRATION RATE: 16 BRPM | DIASTOLIC BLOOD PRESSURE: 68 MMHG | BODY MASS INDEX: 25.46 KG/M2 | HEIGHT: 68 IN | OXYGEN SATURATION: 99 % | TEMPERATURE: 98 F | SYSTOLIC BLOOD PRESSURE: 130 MMHG | WEIGHT: 168 LBS

## 2017-05-12 DIAGNOSIS — C61 MALIGNANT NEOPLASM OF PROSTATE: ICD-10-CM

## 2017-05-12 DIAGNOSIS — C61 MALIGNANT NEOPLASM OF PROSTATE: Primary | ICD-10-CM

## 2017-05-12 LAB
ALBUMIN SERPL BCP-MCNC: 3.9 G/DL
ALP SERPL-CCNC: 116 U/L
ALT SERPL W/O P-5'-P-CCNC: 19 U/L
ANION GAP SERPL CALC-SCNC: 10 MMOL/L
AST SERPL-CCNC: 29 U/L
BILIRUB SERPL-MCNC: 0.7 MG/DL
BUN SERPL-MCNC: 27 MG/DL
CALCIUM SERPL-MCNC: 9.6 MG/DL
CHLORIDE SERPL-SCNC: 103 MMOL/L
CO2 SERPL-SCNC: 25 MMOL/L
COMPLEXED PSA SERPL-MCNC: 0.55 NG/ML
CREAT SERPL-MCNC: 1.4 MG/DL
ERYTHROCYTE [DISTWIDTH] IN BLOOD BY AUTOMATED COUNT: 15.5 %
EST. GFR  (AFRICAN AMERICAN): 51.5 ML/MIN/1.73 M^2
EST. GFR  (NON AFRICAN AMERICAN): 44.5 ML/MIN/1.73 M^2
GLUCOSE SERPL-MCNC: 99 MG/DL
HCT VFR BLD AUTO: 32.3 %
HGB BLD-MCNC: 11.6 G/DL
MCH RBC QN AUTO: 28 PG
MCHC RBC AUTO-ENTMCNC: 35.9 %
MCV RBC AUTO: 78 FL
NEUTROPHILS # BLD AUTO: 5 K/UL
PLATELET # BLD AUTO: 139 K/UL
PMV BLD AUTO: 11 FL
POTASSIUM SERPL-SCNC: 4.5 MMOL/L
PROT SERPL-MCNC: 7.4 G/DL
RBC # BLD AUTO: 4.15 M/UL
SODIUM SERPL-SCNC: 138 MMOL/L
TESTOST SERPL-MCNC: 23 NG/DL
WBC # BLD AUTO: 7.47 K/UL

## 2017-05-12 PROCEDURE — 1160F RVW MEDS BY RX/DR IN RCRD: CPT | Mod: S$GLB,,, | Performed by: INTERNAL MEDICINE

## 2017-05-12 PROCEDURE — 84403 ASSAY OF TOTAL TESTOSTERONE: CPT

## 2017-05-12 PROCEDURE — 99999 PR PBB SHADOW E&M-EST. PATIENT-LVL III: CPT | Mod: PBBFAC,,, | Performed by: INTERNAL MEDICINE

## 2017-05-12 PROCEDURE — 1126F AMNT PAIN NOTED NONE PRSNT: CPT | Mod: S$GLB,,, | Performed by: INTERNAL MEDICINE

## 2017-05-12 PROCEDURE — 85027 COMPLETE CBC AUTOMATED: CPT

## 2017-05-12 PROCEDURE — 80053 COMPREHEN METABOLIC PANEL: CPT

## 2017-05-12 PROCEDURE — 99499 UNLISTED E&M SERVICE: CPT | Mod: S$GLB,,, | Performed by: INTERNAL MEDICINE

## 2017-05-12 PROCEDURE — 36415 COLL VENOUS BLD VENIPUNCTURE: CPT

## 2017-05-12 PROCEDURE — 99204 OFFICE O/P NEW MOD 45 MIN: CPT | Mod: S$GLB,,, | Performed by: INTERNAL MEDICINE

## 2017-05-12 PROCEDURE — 84153 ASSAY OF PSA TOTAL: CPT

## 2017-05-12 PROCEDURE — 1159F MED LIST DOCD IN RCRD: CPT | Mod: S$GLB,,, | Performed by: INTERNAL MEDICINE

## 2017-05-12 NOTE — PROGRESS NOTES
Subjective:       Patient ID: Jeniffer Taylor is a 88 y.o. male.    Chief Complaint: Prostate Cancer    HPI   Jeniffer Taylor is a 88 y.o. Male, referred by Jessa Ulloa, for rising PSA with a history of prostate cancer. He was has been initially treated with Dr. Bee since 1999 with [place] 4. Pt states upon diagnosis he did not have a prostatectomy or radiation to the prostate. He has been on active surveillance. He began LHRH agonist therapy 02/07/2007. He is currently on Casodex and Trelstar with last Trelstar injection on 2/20/17. He denies any hot flashes or other side effects from treatment.    He denies any mouth sores, nausea, vomiting, diarrhea, constipation, abdominal pain, weight loss or loss of appetite, chest pain, shortness of breath, leg swelling, fatigue, pain, headache, dizziness, or mood changes.    His ECOG PS is zero and quality of life so far is excellent. He is accompanied alone to clinic. He lives with his son and his wife. He continues to drive himself.    Review of Systems   Constitutional: Negative for activity change, appetite change, fatigue, fever and unexpected weight change.   HENT: Negative for mouth sores, nosebleeds and trouble swallowing.    Eyes: Negative for discharge and visual disturbance.   Respiratory: Negative for cough, shortness of breath and wheezing.    Cardiovascular: Negative for chest pain and leg swelling.   Gastrointestinal: Negative for abdominal distention, abdominal pain, blood in stool, constipation, diarrhea, nausea and vomiting.   Genitourinary: Negative for decreased urine volume, difficulty urinating, frequency and hematuria.   Musculoskeletal: Negative for back pain.   Skin: Negative for color change and rash.   Neurological: Negative for weakness and headaches.   Hematological: Negative for adenopathy.   Psychiatric/Behavioral: Negative for sleep disturbance. The patient is not nervous/anxious.    All other systems reviewed and are negative.       Allergies:  Review of patient's allergies indicates:  No Known Allergies    Medications:  Current Outpatient Prescriptions   Medication Sig Dispense Refill    acetaminophen-codeine 300-30mg (TYLENOL #3) 300-30 mg Tab 1 po TID prn 30 tablet 1    amlodipine (NORVASC) 10 MG tablet Take 1 tablet (10 mg total) by mouth once daily. 90 tablet 1    aspirin (ECOTRIN) 81 MG EC tablet Take 81 mg by mouth. 1 Tablet, Delayed Release (E.C.) Oral Every morning      atorvastatin (LIPITOR) 80 MG tablet Take 1 tablet (80 mg total) by mouth once daily. 90 tablet 1    BD ALCOHOL SWABS PadM       bicalutamide (CASODEX) 50 MG Tab Take 1 tablet (50 mg total) by mouth once daily. 90 tablet 3    blood sugar diagnostic Strp Use daily 100 strip 12    blood-glucose meter kit Use as instructed 1 each 0    furosemide (LASIX) 40 MG tablet Take 1 tablet (40 mg total) by mouth 2 (two) times daily. 180 tablet 1    garlic 1,000 mg Cap Take 2 tablets by mouth once daily.      glipiZIDE (GLUCOTROL) 2.5 MG TR24 Take 1 tablet (2.5 mg total) by mouth once daily. 90 tablet 1    hydrocodone-acetaminophen 7.5-325mg (NORCO) 7.5-325 mg per tablet       lancets (LANCETS,ULTRA THIN) Misc Use daily 100 each 12    latanoprost 0.005 % ophthalmic solution Place 1 drop into both eyes every evening. 3 Bottle 3    leuprolide (ELIGARD) 45 mg injection 45 mg. 1 Syringe Subcutaneous Q 6 months      lisinopril (PRINIVIL,ZESTRIL) 40 MG tablet Take 1 tablet (40 mg total) by mouth once daily. 90 tablet 1    metoprolol succinate (TOPROL-XL) 50 MG 24 hr tablet Take 1 tablet (50 mg total) by mouth once daily. 90 tablet 1    nitroGLYCERIN (NITROSTAT) 0.4 MG SL tablet Place 1 tablet (0.4 mg total) under the tongue every 5 (five) minutes as needed for Chest pain. 100 tablet 5    TRUEPLUS LANCETS 33 gauge Misc        No current facility-administered medications for this visit.        PMH:  Past Medical History:   Diagnosis Date    Anemia     Arthritis      Cancer     Prostate    Cataract     CHF (congestive heart failure)     Chronic kidney disease     Coronary artery disease     Diabetes mellitus     Diabetic retinopathy     Disorder of kidney and ureter     ckd 2    Encounter for blood transfusion     Glaucoma     High cholesterol     Hypertension     Myocardial infarction     Prostate cancer     PVD (peripheral vascular disease)     Spinal stenosis     Status post cataract extraction and insertion of intraocular lens of left eye 11/13/2015       PSH:  Past Surgical History:   Procedure Laterality Date    CARDIAC CATHETERIZATION      15 to 20 years ago x1 stent and 4/7/2014 x1 stent    CATARACT EXTRACTION Bilateral     Dr. Ferguson    EYE SURGERY      HERNIA REPAIR         FamHx:  Family History   Problem Relation Age of Onset    Cancer Father     No Known Problems Mother     No Known Problems Son     Breast cancer Sister     Diabetes Sister     Hypertension Sister     No Known Problems Brother     No Known Problems Maternal Aunt     No Known Problems Maternal Uncle     No Known Problems Paternal Aunt     No Known Problems Paternal Uncle     No Known Problems Maternal Grandmother     No Known Problems Maternal Grandfather     No Known Problems Paternal Grandmother     No Known Problems Paternal Grandfather     No Known Problems Daughter     Diabetes Brother     Heart disease Neg Hx     Heart attack Neg Hx     Amblyopia Neg Hx     Blindness Neg Hx     Cataracts Neg Hx     Glaucoma Neg Hx     Macular degeneration Neg Hx     Retinal detachment Neg Hx     Strabismus Neg Hx     Stroke Neg Hx     Thyroid disease Neg Hx        SocHx:  Social History     Social History    Marital status:      Spouse name: N/A    Number of children: N/A    Years of education: N/A     Occupational History    Not on file.     Social History Main Topics    Smoking status: Never Smoker    Smokeless tobacco: Former User     Quit date:  1/18/1973    Alcohol use No    Drug use: No    Sexual activity: No     Other Topics Concern    Not on file     Social History Narrative       Objective:      Physical Exam   Constitutional: He is oriented to person, place, and time. He appears well-developed and well-nourished.   HENT:   Head: Normocephalic and atraumatic.   Mouth/Throat: Uvula is midline and oropharynx is clear and moist. No oropharyngeal exudate.   Eyes: Conjunctivae are normal. Pupils are equal, round, and reactive to light.   Neck: Normal range of motion. Neck supple. No tracheal deviation present.   Cardiovascular: Normal rate, regular rhythm, normal heart sounds and normal pulses.  Exam reveals no gallop and no friction rub.    Pulmonary/Chest: Effort normal and breath sounds normal. No respiratory distress. He has no wheezes. He has no rales.   Abdominal: Soft. Bowel sounds are normal. He exhibits no distension. There is no tenderness. There is no rebound and no guarding.   Musculoskeletal: He exhibits no edema or tenderness.   Lymphadenopathy:     He has no cervical adenopathy.   Neurological: He is alert and oriented to person, place, and time.   Skin: Skin is warm, dry and intact. No rash noted. He is not diaphoretic.   Psychiatric: He has a normal mood and affect. His speech is normal and behavior is normal.   Vitals reviewed.      Results for JENIFFER TERRAZAS (MRN [id number]) as of 5/12/2017 10:53   Ref. Range 12/12/2013 08:00 6/11/2014 07:10 6/15/2015 07:25 12/28/2015 09:15 7/1/2016 08:45 2/13/2017 10:06   PSA DIAGNOSTIC Latest Ref Range: 0.00 - 4.00 ng/mL 0.38 0.20 0.71 1.2 2.0 4.0     Assessment:       1. Malignant neoplasm of prostate        Plan:       1- Jeniffer Terrazas is a 88 y.o. Male, referred by Jessa Ulloa, for rising PSA with a history of prostate cancer. He was has been initially treated with Dr. Bee since 1999 with [place] 4. Pt states upon diagnosis he did not have a prostatectomy or radiation to the prostate. He  has been on active surveillance. He began LHRH agonist therapy 02/07/2007. He is currently on Casodex and Trelstar with last Trelstar injection on 2/20/17. Next due on 8/20/17. Last PSA 4.0 on 2/13/17. Will recheck PSA and testosterone today. If PSA continuing to rise, may need to get imaging to rule out metastatic disease. If not, may think about prostate radiation or clinical trial options.    Patient was also seen and examined by Dr. Mack. Patient is in agreement with the proposed treatment plan. All questions were answered to the patient's satisfaction. Pt knows to call clinic if anything is needed before the next clinic visit.    CHARLY Galarza  Hematology and Medical Oncology      I have reviewed the notes, assessments, and/or procedures performed by the nurse practitioner, as above.  I have personally interviewed the patient at the beside, and rounded with the nurse practitioner. I formulated the plan of care.  I concur with her documentation of Jeniffer Taylor.  More than 45 mins were spent during this encounter, greater than 50% was spent in direct counseling and/or coordination of care.     Foster Mack M.D., M.S., F.A.C.P.  Hematology/Oncology Attending  [facility]

## 2017-05-12 NOTE — LETTER
May 12, 2017      Shari Chauhan MD  [address]  [facility] [address]           [facility]ematology Oncology  [address]  [facility] [address]  Phone: [phone]          Patient: Jeniffer Taylor   MR Number: [id number]   YOB: 1928   Date of Visit: 5/12/2017       Dear Dr. Shari Chauhan:    Thank you for referring Jeniffer Taylor to me for evaluation. Attached you will find relevant portions of my assessment and plan of care.    If you have questions, please do not hesitate to call me. I look forward to following Jeniffer Taylor along with you.    Sincerely,    Foster Mack MD    Enclosure  CC:  No Recipients    If you would like to receive this communication electronically, please contact [email or web address] or [phone] to request more information on [employer] access.    For providers and/or their staff who would like to refer a patient to [facility], please contact us through our one-stop-shop provider referral line, [facility], at [phone].    If you feel you have received this communication in error or would no longer like to receive these types of communications, please e-mail [email or web address]

## 2017-05-12 NOTE — PROGRESS NOTES
Patient, Jeniffer Taylor (MRN #[id number]), presented with a recent Platelet count less than 150 K/uL consistent with the definition of thrombocytopenia (ICD10 - D69.6).    Platelets   Date Value Ref Range Status   05/12/2017 139 (L) 150 - 350 K/uL Final     The patient's thrombocytopenia was monitored, evaluated, addressed and/or treated. This addendum to the medical record is made on 05/12/2017.

## 2017-06-14 ENCOUNTER — TELEPHONE (OUTPATIENT)
Dept: ADMINISTRATIVE | Facility: OTHER | Age: 82
End: 2017-06-14

## 2017-06-14 NOTE — TELEPHONE ENCOUNTER
----- Message from Courtney Bullock NP sent at 6/9/2017  5:25 PM CDT -----  Pt did not show up for appt today. Please reschedule with labs (CBC,CMP,PSA) and to see Dr. chatman

## 2017-06-20 ENCOUNTER — TELEPHONE (OUTPATIENT)
Dept: HEMATOLOGY/ONCOLOGY | Facility: CLINIC | Age: 82
End: 2017-06-20

## 2017-06-20 NOTE — TELEPHONE ENCOUNTER
Attempted to call pt regarding confirming appointment no answer on neither phone and no vm to leave a msg.    --Felicitas Johnson

## 2017-07-13 ENCOUNTER — OFFICE VISIT (OUTPATIENT)
Dept: OPTOMETRY | Facility: CLINIC | Age: 82
End: 2017-07-13
Payer: MEDICARE

## 2017-07-13 ENCOUNTER — CLINICAL SUPPORT (OUTPATIENT)
Dept: OPHTHALMOLOGY | Facility: CLINIC | Age: 82
End: 2017-07-13
Payer: MEDICARE

## 2017-07-13 DIAGNOSIS — H40.1132 PRIMARY OPEN ANGLE GLAUCOMA OF BOTH EYES, MODERATE STAGE: ICD-10-CM

## 2017-07-13 DIAGNOSIS — H40.1131 PRIMARY OPEN ANGLE GLAUCOMA OF BOTH EYES, MILD STAGE: Primary | ICD-10-CM

## 2017-07-13 PROCEDURE — 92083 EXTENDED VISUAL FIELD XM: CPT | Mod: S$GLB,,, | Performed by: OPTOMETRIST

## 2017-07-13 PROCEDURE — 92012 INTRM OPH EXAM EST PATIENT: CPT | Mod: S$GLB,,, | Performed by: OPTOMETRIST

## 2017-07-13 PROCEDURE — 99999 PR PBB SHADOW E&M-EST. PATIENT-LVL II: CPT | Mod: PBBFAC,,, | Performed by: OPTOMETRIST

## 2017-07-13 PROCEDURE — 92133 CPTRZD OPH DX IMG PST SGM ON: CPT | Mod: S$GLB,,, | Performed by: OPTOMETRIST

## 2017-07-13 RX ORDER — BICALUTAMIDE 50 MG/1
TABLET, FILM COATED ORAL
COMMUNITY
Start: 2017-05-23 | End: 2017-08-25 | Stop reason: SDUPTHER

## 2017-07-13 NOTE — PROGRESS NOTES
HPI     Glaucoma    Additional comments: 4 month IOP ck/HVF/OCT           Comments   Last eye exam was 2/13/17 with Dr. Jiménez.  Patinet states no vision changes since last exam. May need refills on   drops.  Patient denies diplopia, headaches, flashes/floaters, and pain.    Latanoprost QHS OU       Last edited by Padmini Walker on 7/13/2017 10:37 AM. (History)        ROS     Positive for: Eyes    Negative for: Constitutional, Gastrointestinal, Neurological, Skin,   Genitourinary, Musculoskeletal, HENT, Endocrine, Cardiovascular,   Respiratory, Psychiatric, Allergic/Imm, Heme/Lymph    Last edited by Leydi Jiménez, OD on 7/13/2017 12:15 PM. (History)        Assessment /Plan     For exam results, see Encounter Report.    Primary open angle glaucoma of both eyes, mild stage  -     OCT - Optic Nerve            1.  Continue Latanoprost QHS OU.  All testing stable OU.    DFE: 2/13/17  Photos: 2/13/17  HVF: 7/13/17  OCT: 7/13/17  Gonio:   Pachy: 526 OD, 512 OS  Initial IOPs: 22 OD, 22 OS  MHx: DM, HTN  FHx: none          RTC 4 months for iop check.

## 2017-08-14 ENCOUNTER — OFFICE VISIT (OUTPATIENT)
Dept: INTERNAL MEDICINE | Facility: CLINIC | Age: 82
End: 2017-08-14
Payer: MEDICARE

## 2017-08-14 ENCOUNTER — LAB VISIT (OUTPATIENT)
Dept: LAB | Facility: HOSPITAL | Age: 82
End: 2017-08-14
Attending: INTERNAL MEDICINE
Payer: MEDICARE

## 2017-08-14 VITALS
SYSTOLIC BLOOD PRESSURE: 150 MMHG | HEIGHT: 68 IN | HEART RATE: 60 BPM | WEIGHT: 166.88 LBS | BODY MASS INDEX: 25.29 KG/M2 | DIASTOLIC BLOOD PRESSURE: 70 MMHG

## 2017-08-14 DIAGNOSIS — I25.5 ISCHEMIC CARDIOMYOPATHY: Chronic | ICD-10-CM

## 2017-08-14 DIAGNOSIS — N18.2 CKD (CHRONIC KIDNEY DISEASE), STAGE II: ICD-10-CM

## 2017-08-14 DIAGNOSIS — R97.20 ELEVATED PSA: ICD-10-CM

## 2017-08-14 DIAGNOSIS — E11.22 CONTROLLED TYPE 2 DIABETES MELLITUS WITH STAGE 2 CHRONIC KIDNEY DISEASE, WITHOUT LONG-TERM CURRENT USE OF INSULIN: ICD-10-CM

## 2017-08-14 DIAGNOSIS — I10 UNSPECIFIED ESSENTIAL HYPERTENSION: ICD-10-CM

## 2017-08-14 DIAGNOSIS — H35.033 HYPERTENSIVE RETINOPATHY OF BOTH EYES: ICD-10-CM

## 2017-08-14 DIAGNOSIS — E11.3213 BOTH EYES AFFECTED BY MILD NONPROLIFERATIVE DIABETIC RETINOPATHY WITH MACULAR EDEMA, ASSOCIATED WITH TYPE 2 DIABETES MELLITUS: ICD-10-CM

## 2017-08-14 DIAGNOSIS — E11.49 TYPE II DIABETES MELLITUS WITH NEUROLOGICAL MANIFESTATIONS: ICD-10-CM

## 2017-08-14 DIAGNOSIS — I05.0 MITRAL VALVE STENOSIS, UNSPECIFIED ETIOLOGY: ICD-10-CM

## 2017-08-14 DIAGNOSIS — E11.311 DIABETIC MACULAR EDEMA, RIGHT EYE: ICD-10-CM

## 2017-08-14 DIAGNOSIS — I11.0 HYPERTENSIVE HEART DISEASE WITH HEART FAILURE: ICD-10-CM

## 2017-08-14 DIAGNOSIS — H42 DIABETES MELLITUS WITH GLAUCOMA: ICD-10-CM

## 2017-08-14 DIAGNOSIS — E11.39 DIABETES MELLITUS WITH GLAUCOMA: ICD-10-CM

## 2017-08-14 DIAGNOSIS — E11.42 DIABETIC POLYNEUROPATHY ASSOCIATED WITH TYPE 2 DIABETES MELLITUS: ICD-10-CM

## 2017-08-14 DIAGNOSIS — N18.2 CONTROLLED TYPE 2 DIABETES MELLITUS WITH STAGE 2 CHRONIC KIDNEY DISEASE, WITHOUT LONG-TERM CURRENT USE OF INSULIN: ICD-10-CM

## 2017-08-14 DIAGNOSIS — I70.0 ATHEROSCLEROSIS OF AORTA: ICD-10-CM

## 2017-08-14 DIAGNOSIS — E78.5 DYSLIPIDEMIA: ICD-10-CM

## 2017-08-14 DIAGNOSIS — Z00.00 ENCOUNTER FOR PREVENTIVE HEALTH EXAMINATION: Primary | ICD-10-CM

## 2017-08-14 DIAGNOSIS — I25.10 CORONARY ARTERY DISEASE INVOLVING NATIVE CORONARY ARTERY OF NATIVE HEART WITHOUT ANGINA PECTORIS: ICD-10-CM

## 2017-08-14 DIAGNOSIS — C61 PROSTATE CANCER: ICD-10-CM

## 2017-08-14 DIAGNOSIS — I25.2 HISTORY OF MI (MYOCARDIAL INFARCTION): ICD-10-CM

## 2017-08-14 PROBLEM — E10.22: Status: ACTIVE | Noted: 2017-08-14

## 2017-08-14 LAB — COMPLEXED PSA SERPL-MCNC: 0.24 NG/ML

## 2017-08-14 PROCEDURE — 84153 ASSAY OF PSA TOTAL: CPT

## 2017-08-14 PROCEDURE — G0439 PPPS, SUBSEQ VISIT: HCPCS | Mod: S$GLB,,, | Performed by: NURSE PRACTITIONER

## 2017-08-14 PROCEDURE — 99999 PR PBB SHADOW E&M-EST. PATIENT-LVL V: CPT | Mod: PBBFAC,,, | Performed by: NURSE PRACTITIONER

## 2017-08-14 PROCEDURE — 36415 COLL VENOUS BLD VENIPUNCTURE: CPT

## 2017-08-14 PROCEDURE — 99499 UNLISTED E&M SERVICE: CPT | Mod: S$GLB,,, | Performed by: NURSE PRACTITIONER

## 2017-08-14 NOTE — PATIENT INSTRUCTIONS
Counseling and Referral of Other Preventative  (Italic type indicates deductible and co-insurance are waived)    Patient Name: Jeniffer Taylor  Today's Date: 8/14/2017      SERVICE LIMITATIONS RECOMMENDATION    Vaccines    · Pneumococcal (once after 65)    · Influenza (annually)    · Hepatitis B (if medium/high risk)    · Prevnar 13      Hepatitis B medium/high risk factors:       - End-stage renal disease       - Hemophiliacs who received Factor VII or         IX concentrates       - Clients of institutions for the mentally             retarded       - Persons who live in the same house as          a HepB carrier       - Homosexual men       - Illicit injectable drug abusers     Pneumococcal: Done, no repeat necessary     Influenza: Done, repeat in one year     Hepatitis B: N/A     Prevnar 13: Done, no repeat necessary    Prostate cancer screening (annually to age 75)     Prostate specific antigen (PSA) Shared decision making with Provider. Sometimes a co-pay may be required if the patient decides to have this test. The USPSTF no longer recommends prostate cancer screening routinely in medicine: every 1 year    Colorectal cancer screening (to age 75)    · Fecal occult blood test (annual)  · Flexible sigmoidoscopy (5y)  · Screening colonoscopy (10y)  · Barium enema   N/A    Diabetes self-management training (no USPSTF recommendations)  Requires referral by treating physician for patient with diabetes or renal disease. 10 hours of initial DSMT sessions of no less than 30 minutes each in a continuous 12-month period. 2 hours of follow-up DSMT in subsequent years.  N/A-declined well controlled    Glaucoma screening (no USPSTF recommendation)  Diabetes mellitus, family history   , age 50 or over    American, age 65 or over  Done this year, repeat every year    Medical nutrition therapy for diabetes or renal disease (no recommended schedule)  Requires referral by treating physician for patient  with diabetes or renal disease or kidney transplant within the past 3 years.  Can be provided in same year as diabetes self-management training (DSMT), and CMS recommends medical nutrition therapy take place after DSMT. Up to 3 hours for initial year and 2 hours in subsequent years.  N/A- declined well controlled    Cardiovascular screening blood tests (every 5 years)  · Fasting lipid panel  Order as a panel if possible  Done this year, repeat every year    Diabetes screening tests (at least every 3 years, Medicare covers annually or at 6-month intervals for prediabetic patients)  · Fasting blood sugar (FBS) or glucose tolerance test (GTT)  Patient must be diagnosed with one of the following:       - Hypertension       - Dyslipidemia       - Obesity (BMI 30kg/m2)       - Previous elevated impaired FBS or GTT       ... or any two of the following:       - Overweight (BMI 25 but <30)       - Family history of diabetes       - Age 65 or older       - History of gestational diabetes or birth of baby weighing more than 9 pounds  Done this year, repeat every year    Abdominal aortic aneurysm screening (once)  · Sonogram   Limited to patients who meet one of the following criteria:       - Men who are 65-75 years old and have smoked more than 100 cigarette in their lifetime       - Anyone with a family history of abdominal aortic aneurysm       - Anyone recommended for screening by the USPSTF  N/A    HIV screening (annually for increased risk patients)  · HIV-1 and HIV-2 by EIA, or KORINA, rapid antibody test or oral mucosa transudate  Patients must be at increased risk for HIV infection per USPSTF guidelines or pregnant. Tests covered annually for patient at increased risk or as requested by the patient. Pregnant patients may receive up to 3 tests during pregnancy.  Risks discussed, screening is not recommended    Smoking cessation counseling (up to 8 sessions per year)  Patients must be asymptomatic of tobacco-related  conditions to receive as a preventative service.  Non-smoker    Subsequent annual wellness visit  At least 12 months since last AWV  Return in one year     The following information is provided to all patients.  This information is to help you find resources for any of the problems found today that may be affecting your health:                Living healthy guide: www.[facility].[place].[facility]      Understanding Diabetes: www.diabetes.org      Eating healthy: www.cdc.gov/healthyweight      CDC home safety checklist: www.cdc.gov/steadi/patient.html      Agency on Aging: www.goea.[place].[facility]      Alcoholics anonymous (AA): www.aa.org      Physical Activity: www.cody.nih.gov/td1egvn      Tobacco use: www.quitwithusla.org

## 2017-08-14 NOTE — PROGRESS NOTES
"Jeniffer Taylor presented for a  Medicare AWV and comprehensive Health Risk Assessment today. The following components were reviewed and updated:    · Medical history  · Family History  · Social history  · Allergies and Current Medications  · Health Risk Assessment  · Health Maintenance  · Care Team     ** See Completed Assessments for Annual Wellness Visit within the encounter summary.**       The following assessments were completed:  · Living Situation  · CAGE  · Depression Screening  · Timed Get Up and Go  · Whisper Test  · Cognitive Function Screening  · Nutrition Screening  · ADL Screening  · PAQ Screening    Vitals:    08/14/17 0828   BP: (!) 150/70   Pulse: 60   Weight: 75.7 kg (166 lb 14.2 oz)   Height: 5' 8" (1.727 m)     Body mass index is 25.38 kg/m².  Physical Exam   Constitutional: He is oriented to person, place, and time. He appears well-developed and well-nourished.   HENT:   Head: Normocephalic and atraumatic.   Nose: Nose normal.   Eyes: Conjunctivae and EOM are normal.   Neck: Normal range of motion. Neck supple.   Cardiovascular: Normal rate, regular rhythm and intact distal pulses.    Murmur heard.  Pulmonary/Chest: Effort normal and breath sounds normal.   Abdominal: Soft. Bowel sounds are normal.   Musculoskeletal: Normal range of motion.   Neurological: He is alert and oriented to person, place, and time.   Skin: Skin is warm and dry.   Psychiatric: He has a normal mood and affect. His behavior is normal. Judgment and thought content normal.   Nursing note and vitals reviewed.        Diagnoses and health risks identified today and associated recommendations/orders:    1. Encounter for preventive health examination  Assessment performed. Health maintenance updated. Chart review completed.    2. Diabetic polyneuropathy associated with type 2 diabetes mellitus  Component      Latest Ref Rng & Units 4/19/2017   Hemoglobin A1C      4.5 - 6.2 % 6.7 (H)   Estimated Avg Glucose      68 - 131 mg/dL " 146 (H)   Controlled. Stable with current regimen. Followed by PCP.    3. Type II diabetes mellitus with neurological manifestations  Component      Latest Ref Rng & Units 4/19/2017   Hemoglobin A1C      4.5 - 6.2 % 6.7 (H)   Estimated Avg Glucose      68 - 131 mg/dL 146 (H)   Controlled. Stable with current regimen. Followed by PCP.    4. Both eyes affected by mild nonproliferative diabetic retinopathy with macular edema, associated with type 2 diabetes mellitus  Stable with current regimen. Followed by Optometry.    5. Diabetic macular edema, right eye  Stable with current regimen. Followed by Optometry.    6. Hypertensive retinopathy of both eyes  Blood pressure not at goal today. Patient did not take BP medication due to lab work today. Education provided on this. Followed by Optometry.    7. Diabetes mellitus with glaucoma  Stable with current regimen. Followed by Optometry.    8. Coronary artery disease involving native coronary artery of native heart without angina pectoris  Chronic. Stable with current regimen. Followed by Cardiology.    9. Hypertensive heart disease with heart failure  Chronic. Stable with current regimen. Followed by Cardiology.    10. Prostate cancer  Current treatment with injections. Stable. Followed by Urology.    11. Atherosclerosis of aorta  Stable. Noted on imaging. Followed by PCP.    12. Dyslipidemia  Chronic. Stable. Followed by PCP.    13. Unspecified essential hypertension  Chronic. Stable. Followed by PCP.    14. Ischemic cardiomyopathy  Chronic. Stable with current regimen. Followed by Cardiology.    15. History of MI (myocardial infarction)  Chronic. Stable. Followed by Cardiology.    16. CKD (chronic kidney disease), stage II  Chronic. Stable. Followed by PCP.    17. Controlled type 2 diabetes mellitus with stage 2 chronic kidney disease, without long-term current use of insulin  Last A1C 6.7. Followed by PCP.    18. Mitral valve stenosis, unspecified etiology  Chronic.  Stable with current regimen. Followed by Cardiology.    Provided Jeniffer with a 5-10 year written screening schedule and personal prevention plan. Recommendations were developed using the USPSTF age appropriate recommendations. Education, counseling, and referrals were provided as needed. After Visit Summary printed and given to patient which includes a list of additional screenings\tests needed.    Return for follow up with Primary Care Provider as instructed, ;sooner if problems, HRA in 1 year.    MARK Carvalho

## 2017-08-21 ENCOUNTER — LAB VISIT (OUTPATIENT)
Dept: LAB | Facility: HOSPITAL | Age: 82
End: 2017-08-21
Attending: INTERNAL MEDICINE
Payer: MEDICARE

## 2017-08-21 ENCOUNTER — OFFICE VISIT (OUTPATIENT)
Dept: INTERNAL MEDICINE | Facility: CLINIC | Age: 82
End: 2017-08-21
Payer: MEDICARE

## 2017-08-21 ENCOUNTER — TELEPHONE (OUTPATIENT)
Dept: HEMATOLOGY/ONCOLOGY | Facility: CLINIC | Age: 82
End: 2017-08-21

## 2017-08-21 DIAGNOSIS — C61 PROSTATE CANCER: ICD-10-CM

## 2017-08-21 DIAGNOSIS — E11.9 TYPE 2 DIABETES MELLITUS WITHOUT COMPLICATION, WITH LONG-TERM CURRENT USE OF INSULIN: ICD-10-CM

## 2017-08-21 DIAGNOSIS — I25.5 ISCHEMIC CARDIOMYOPATHY: Chronic | ICD-10-CM

## 2017-08-21 DIAGNOSIS — E11.9 TYPE 2 DIABETES MELLITUS WITHOUT COMPLICATION, WITH LONG-TERM CURRENT USE OF INSULIN: Primary | ICD-10-CM

## 2017-08-21 DIAGNOSIS — Z79.4 TYPE 2 DIABETES MELLITUS WITHOUT COMPLICATION, WITH LONG-TERM CURRENT USE OF INSULIN: ICD-10-CM

## 2017-08-21 DIAGNOSIS — Z79.4 TYPE 2 DIABETES MELLITUS WITHOUT COMPLICATION, WITH LONG-TERM CURRENT USE OF INSULIN: Primary | ICD-10-CM

## 2017-08-21 LAB
ALBUMIN SERPL BCP-MCNC: 4.1 G/DL
ALP SERPL-CCNC: 87 U/L
ALT SERPL W/O P-5'-P-CCNC: 22 U/L
ANION GAP SERPL CALC-SCNC: 11 MMOL/L
AST SERPL-CCNC: 32 U/L
BILIRUB SERPL-MCNC: 0.7 MG/DL
BUN SERPL-MCNC: 28 MG/DL
CALCIUM SERPL-MCNC: 9.4 MG/DL
CHLORIDE SERPL-SCNC: 105 MMOL/L
CO2 SERPL-SCNC: 26 MMOL/L
CREAT SERPL-MCNC: 1.4 MG/DL
EST. GFR  (AFRICAN AMERICAN): 51 ML/MIN/1.73 M^2
EST. GFR  (NON AFRICAN AMERICAN): 45 ML/MIN/1.73 M^2
ESTIMATED AVG GLUCOSE: 128 MG/DL
GLUCOSE SERPL-MCNC: 115 MG/DL
HBA1C MFR BLD HPLC: 6.1 %
POTASSIUM SERPL-SCNC: 4.3 MMOL/L
PROT SERPL-MCNC: 7.6 G/DL
SODIUM SERPL-SCNC: 142 MMOL/L

## 2017-08-21 PROCEDURE — 99215 OFFICE O/P EST HI 40 MIN: CPT | Mod: S$GLB,,, | Performed by: INTERNAL MEDICINE

## 2017-08-21 PROCEDURE — 83036 HEMOGLOBIN GLYCOSYLATED A1C: CPT

## 2017-08-21 PROCEDURE — 1126F AMNT PAIN NOTED NONE PRSNT: CPT | Mod: S$GLB,,, | Performed by: INTERNAL MEDICINE

## 2017-08-21 PROCEDURE — 3008F BODY MASS INDEX DOCD: CPT | Mod: S$GLB,,, | Performed by: INTERNAL MEDICINE

## 2017-08-21 PROCEDURE — 99499 UNLISTED E&M SERVICE: CPT | Mod: S$GLB,,, | Performed by: INTERNAL MEDICINE

## 2017-08-21 PROCEDURE — 1159F MED LIST DOCD IN RCRD: CPT | Mod: S$GLB,,, | Performed by: INTERNAL MEDICINE

## 2017-08-21 PROCEDURE — 99999 PR PBB SHADOW E&M-EST. PATIENT-LVL V: CPT | Mod: PBBFAC,,, | Performed by: INTERNAL MEDICINE

## 2017-08-21 PROCEDURE — 36415 COLL VENOUS BLD VENIPUNCTURE: CPT

## 2017-08-21 PROCEDURE — 80053 COMPREHEN METABOLIC PANEL: CPT

## 2017-08-21 RX ORDER — FUROSEMIDE 40 MG/1
40 TABLET ORAL 2 TIMES DAILY
Qty: 180 TABLET | Refills: 1 | Status: SHIPPED | OUTPATIENT
Start: 2017-08-21 | End: 2017-12-01 | Stop reason: SDUPTHER

## 2017-08-21 RX ORDER — ATORVASTATIN CALCIUM 80 MG/1
80 TABLET, FILM COATED ORAL DAILY
Qty: 90 TABLET | Refills: 1 | Status: SHIPPED | OUTPATIENT
Start: 2017-08-21 | End: 2017-12-01 | Stop reason: SDUPTHER

## 2017-08-21 RX ORDER — GLIPIZIDE 2.5 MG/1
2.5 TABLET, EXTENDED RELEASE ORAL DAILY
Qty: 90 TABLET | Refills: 1 | Status: SHIPPED | OUTPATIENT
Start: 2017-08-21 | End: 2017-12-01 | Stop reason: SDUPTHER

## 2017-08-21 RX ORDER — LANCETS
EACH MISCELLANEOUS
Qty: 100 EACH | Refills: 12 | Status: SHIPPED | OUTPATIENT
Start: 2017-08-21 | End: 2018-04-03 | Stop reason: SDUPTHER

## 2017-08-21 RX ORDER — AMLODIPINE BESYLATE 10 MG/1
10 TABLET ORAL DAILY
Qty: 90 TABLET | Refills: 1 | Status: SHIPPED | OUTPATIENT
Start: 2017-08-21 | End: 2017-12-01 | Stop reason: SDUPTHER

## 2017-08-21 RX ORDER — METOPROLOL SUCCINATE 50 MG/1
50 TABLET, EXTENDED RELEASE ORAL DAILY
Qty: 90 TABLET | Refills: 1 | Status: SHIPPED | OUTPATIENT
Start: 2017-08-21 | End: 2017-12-01 | Stop reason: SDUPTHER

## 2017-08-21 RX ORDER — LISINOPRIL 40 MG/1
40 TABLET ORAL DAILY
Qty: 90 TABLET | Refills: 1 | Status: SHIPPED | OUTPATIENT
Start: 2017-08-21 | End: 2017-12-01 | Stop reason: SDUPTHER

## 2017-08-21 NOTE — TELEPHONE ENCOUNTER
Attempted to call patient to schedule appointment. No answer. Patient needs to see Dr. Mack before December.

## 2017-08-22 ENCOUNTER — TELEPHONE (OUTPATIENT)
Dept: HEMATOLOGY/ONCOLOGY | Facility: CLINIC | Age: 82
End: 2017-08-22

## 2017-08-22 NOTE — TELEPHONE ENCOUNTER
----- Message from Barrie Moreno RN sent at 8/21/2017  5:02 PM CDT -----  Attempted to call patient to schedule appointment. No answer. Patient needs to see Dr. Mack before December

## 2017-08-22 NOTE — TELEPHONE ENCOUNTER
Called patient, no answer, unable to leave message, will try to call and schedule patient tomorrow

## 2017-08-23 ENCOUNTER — TELEPHONE (OUTPATIENT)
Dept: HEMATOLOGY/ONCOLOGY | Facility: CLINIC | Age: 82
End: 2017-08-23

## 2017-08-25 ENCOUNTER — OFFICE VISIT (OUTPATIENT)
Dept: UROLOGY | Facility: CLINIC | Age: 82
End: 2017-08-25
Payer: MEDICARE

## 2017-08-25 VITALS
DIASTOLIC BLOOD PRESSURE: 61 MMHG | BODY MASS INDEX: 25.14 KG/M2 | HEART RATE: 47 BPM | WEIGHT: 165.38 LBS | SYSTOLIC BLOOD PRESSURE: 148 MMHG

## 2017-08-25 DIAGNOSIS — C61 PROSTATE CANCER: Primary | ICD-10-CM

## 2017-08-25 PROCEDURE — 1126F AMNT PAIN NOTED NONE PRSNT: CPT | Mod: S$GLB,,, | Performed by: NURSE PRACTITIONER

## 2017-08-25 PROCEDURE — 99499 UNLISTED E&M SERVICE: CPT | Mod: S$GLB,,, | Performed by: NURSE PRACTITIONER

## 2017-08-25 PROCEDURE — 96402 CHEMO HORMON ANTINEOPL SQ/IM: CPT | Mod: S$GLB,,, | Performed by: NURSE PRACTITIONER

## 2017-08-25 PROCEDURE — 3008F BODY MASS INDEX DOCD: CPT | Mod: S$GLB,,, | Performed by: NURSE PRACTITIONER

## 2017-08-25 PROCEDURE — 99999 PR PBB SHADOW E&M-EST. PATIENT-LVL IV: CPT | Mod: PBBFAC,,, | Performed by: NURSE PRACTITIONER

## 2017-08-25 PROCEDURE — 99213 OFFICE O/P EST LOW 20 MIN: CPT | Mod: 25,S$GLB,, | Performed by: NURSE PRACTITIONER

## 2017-08-25 PROCEDURE — 1159F MED LIST DOCD IN RCRD: CPT | Mod: S$GLB,,, | Performed by: NURSE PRACTITIONER

## 2017-08-25 RX ORDER — BICALUTAMIDE 50 MG/1
50 TABLET, FILM COATED ORAL DAILY
Qty: 90 TABLET | Refills: 3 | Status: SHIPPED | OUTPATIENT
Start: 2017-08-25 | End: 2018-02-05 | Stop reason: SDUPTHER

## 2017-08-25 NOTE — PROGRESS NOTES
Subjective:       Patient ID: Jeniffer Taylor is a 88 y.o. male.    Chief Complaint: Prostate Cancer (6 month c psa and trlstar. no complaints. )    Jeniffer Taylor is a 88 y.o. Male with prostate cancer.  He was has been initially treated with Dr. Bee since 1999 with Su 4.  He had been on active surveillance.  He began LHRH agonist therapy 02/07/2007.    He is currently on Casodex and Trelstar with last office visit and injection 02/20/2017.  He is here today for 6 month check.    He voices no urinary complaints;   No abdominal or bone pain; some left knee discomfort.  No family history of PCa.                                          PSA                  0.24                08/14/2017                 PSA                  0.55                05/12/2017        PSA                  4.0                 02/13/2017                 PSA                  2.0                 07/01/2016       PSA                  1.2                 12/28/2015                 PSA                  0.71                06/15/2015                 PSA                  0.20                06/11/2014                 PSA                    0.42                12/17/2014        PSA                  0.38                12/12/2013           PSA                      0.36                06/03/2013                 PSA                      0.27                11/28/2012                 PSA                      0.22                05/25/2012                 PSA                      0.18                11/21/2011                 PSA                      0.14                05/16/2011                 PSA                      0.11                11/16/2010                 PSA                      0.12                05/27/2010                 PSA                      0.25                11/27/2009                 PSA                      0.11                08/21/2009                      Past Medical History:    Diabetes mellitus                                              Hypertension                                                  CHF (congestive heart failure)                                Arthritis                                                     Spinal stenosis                                               Cancer                                                          Comment:Prostate    Glaucoma                                                      Cataract                                                      High cholesterol                                              PVD (peripheral vascular disease)                             Diabetic retinopathy                                          Encounter for blood transfusion                               Coronary artery disease                                       Prostate cancer                                               Anemia                                                        Chronic kidney disease                                        Past Surgical History:    HERNIA REPAIR                                                  CARDIAC CATHETERIZATION                                          Comment:15 to 20 years ago    EYE SURGERY                                                    CATARACT EXTRACTION                             Right                 Comment:Dr. Ferguson    Review of patient's family history indicates:    Diabetes                       Neg Hx                    Heart disease                  Neg Hx                    Heart attack                   Neg Hx                    Amblyopia                      Neg Hx                    Blindness                      Neg Hx                    Cataracts                      Neg Hx                    Glaucoma                       Neg Hx                    Macular degeneration           Neg Hx                    Retinal detachment             Neg Hx                    Strabismus                     Neg Hx                    Stroke                          Neg Hx                    Thyroid disease                Neg Hx                    Hypertension                   Son                       Cancer                         Father                    Breast cancer                  Sister                    Cancer                         Brother                   No Known Problems              Mother                    No Known Problems              Maternal Aunt             No Known Problems              Maternal Uncle            No Known Problems              Paternal Aunt             No Known Problems              Paternal Uncle            No Known Problems              Maternal Grandmother      No Known Problems              Maternal Grandfather      No Known Problems              Paternal Grandmother      No Known Problems              Paternal Grandfather        Social History    Marital Status:              Spouse Name:                       Years of Education:                 Number of children:               Occupational History    None on file    Social History Main Topics    Smoking Status: Never Smoker                      Smokeless Status: Former User                         Quit date: 01/18/1973    Alcohol Use: No              Drug Use: No              Sexual Activity: No                   Other Topics            Concern    None on file    Social History Narrative    None on file        Allergies:  Review of patient's allergies indicates no known allergies.    Medications:  Current outpatient prescriptions:   acetaminophen-codeine 300-30mg (TYLENOL #3) 300-30 mg Tab, 1 po TID prn, Disp: 30 tablet, Rfl: 1  amlodipine (NORVASC) 10 MG tablet, Take 1 tablet (10 mg total) by mouth once daily., Disp: 90 tablet, Rfl: 3  aspirin (ECOTRIN) 81 MG EC tablet, Take 81 mg by mouth. 1 Tablet, Delayed Release (E.C.) Oral Every morning, Disp: , Rfl:   atorvastatin (LIPITOR) 80 MG tablet, Take 1 tablet (80 mg total) by mouth once daily., Disp: 90  tablet, Rfl: 1  clopidogrel (PLAVIX) 75 mg tablet, Take 1 tablet (75 mg total) by mouth once daily., Disp: 90 tablet, Rfl: 1  furosemide (LASIX) 40 MG tablet, Take 1 tablet (40 mg total) by mouth 2 (two) times daily., Disp: 180 tablet, Rfl: 1  garlic 1,000 mg Cap, Take 2 tablets by mouth once daily., Disp: , Rfl:   glipiZIDE (GLUCOTROL) 2.5 MG TR24, Take 1 tablet (2.5 mg total) by mouth once daily., Disp: 90 tablet, Rfl: 1  leuprolide (ELIGARD) 45 mg injection, 45 mg. 1 Syringe Subcutaneous Q 6 months, Disp: , Rfl:   lisinopril (PRINIVIL,ZESTRIL) 40 MG tablet, Take 1 tablet (40 mg total) by mouth once daily., Disp: 90 tablet, Rfl: 1  metoprolol succinate (TOPROL-XL) 50 MG 24 hr tablet, Take 1 tablet (50 mg total) by mouth once daily., Disp: 90 tablet, Rfl: 1  nitroGLYCERIN (NITROSTAT) 0.4 MG SL tablet, Place 1 tablet (0.4 mg total) under the tongue every 5 (five) minutes as needed for Chest pain., Disp: 100 tablet, Rfl: 5  Current facility-administered medications:   triptorelin pamoate Syrg 22.5 mg, 22.5 mg, Intramuscular, 1 time in Clinic/HOD, Jessa Ulloa NP            Review of Systems   Constitutional: Negative.  Negative for activity change, appetite change and fever.   HENT: Negative.  Negative for facial swelling and trouble swallowing.    Eyes: Negative.    Respiratory: Negative.  Negative for shortness of breath.    Cardiovascular: Negative.  Negative for chest pain and palpitations.   Gastrointestinal: Negative for abdominal pain, constipation, diarrhea, nausea and vomiting.   Genitourinary: Positive for frequency and nocturia. Negative for difficulty urinating, dysuria, enuresis, flank pain, genital sores, hematuria, penile pain, scrotal swelling, testicular pain and urgency.        FOS is good;  Nocturia 1-2x  Pleased with how he urinates     Musculoskeletal: Negative for back pain, gait problem and neck stiffness.   Skin: Negative.  Negative for wound.   Neurological: Negative for  dizziness, tremors, seizures, syncope, speech difficulty, light-headedness and headaches.   Hematological: Does not bruise/bleed easily.   Psychiatric/Behavioral: Negative for confusion and hallucinations. The patient is not nervous/anxious.        Objective:      Physical Exam   Nursing note and vitals reviewed.  Constitutional: He is oriented to person, place, and time. He appears well-developed and well-nourished.  Non-toxic appearance. He does not have a sickly appearance.   HENT:   Head: Normocephalic and atraumatic.   Right Ear: External ear normal.   Left Ear: External ear normal.   Nose: Nose normal.   Mouth/Throat: Mucous membranes are normal.   Eyes: Conjunctivae and lids are normal. No scleral icterus.   Neck: Trachea normal, normal range of motion and full passive range of motion without pain. Neck supple. No JVD present. No tracheal deviation present.   Cardiovascular: Normal rate, regular rhythm, S1 normal and S2 normal.    Pulmonary/Chest: Effort normal and breath sounds normal. No respiratory distress. He exhibits no tenderness.   Abdominal: Soft. Normal appearance and bowel sounds are normal. There is no hepatosplenomegaly. There is no tenderness. There is no rebound, no guarding and no CVA tenderness.   Genitourinary: Testes normal and penis normal.   Musculoskeletal: Normal range of motion.   Neurological: He is alert and oriented to person, place, and time. He has normal strength.   Skin: Skin is warm, dry and intact.     Psychiatric: He has a normal mood and affect. His behavior is normal. Judgment and thought content normal.       Assessment:       1. Prostate cancer        Plan:         I spent 20 minutes with the patient of which more than half was spent in direct consultation with the patient in regards to our treatment and plan.    Education and recommendations of today's plan of care including home remedies.  Diet modifications;   Recommend daily exercise.  Refill Casodex  RTC 6 months  with PSA and Trelstar

## 2017-08-25 NOTE — PATIENT INSTRUCTIONS
What Is Prostate Cancer?    Cancer is when cells in the body change and grow out of control. Cancer cells can form lumps of tissue called tumors. Cancer that starts in the prostate is called prostate cancer. It can grow and spread beyond the prostate. Cancer that spreads is harder to treat.  Understanding the prostate  The prostate is a gland in men about the size and shape of a walnut. It surrounds the upper part of the urethra. This is the tube that carries urine from the bladder. The prostate makes some of the fluid thats part of semen. During orgasm, semen leaves the body through the urethra.  When prostate cancer forms  As a man ages, the cells of his prostate may change to form tumors or other growths. The types of growths include:  · Noncancerous growths. As a man ages, the prostate may grow larger. This is called benign prostatic hyperplasia (BPH). With BPH, extra prostate tissue often squeezes the urethra, causing symptoms such as trouble urinating. But BPH is not cancer and does not lead to cancer.  · Atypical cells. These are also called prostatic intraepithelial neoplasia, or PIN. Some cells dont look like normal (typical) prostate cells. Although they are not cancer cells, they may be a sign that cancer is likely to form.  · Cancer. When abnormal prostate cells grow out of control and start to invade other tissues, they are called cancer cells. These cells may or may not lead to symptoms. Some tumors can be felt during a physical exam, and some cant.  · Metastatic cancer. Prostate cancer may grow into nearby organs or spread to nearby lymph nodes. Lymph nodes are small organs around the body that are part of the immune system. In some cases, the cancer spreads to bones or organs in distant parts of the body. This is called metastasis.  Diagnosing prostate cancer  Prostate cancer may not cause symptoms at first. Urinary problems are often not a sign of cancer, but of another condition, such as BPH.  To find out if you have prostate cancer, your health care provider must examine you and order tests. Tests help confirm a diagnosis of cancer. They also help give more information about a cancerous tumor. Tests might include:  · Prostate specific antigen (PSA) testing. PSA is a chemical made by prostate tissue. The amount of PSA in the blood (PSA level) is tested to assess a mans risk for prostate cancer. In general, a high or rising PSA level may mean an increased cancer risk. PSA testing is also used to assess the success of cancer treatments.  · Core needle biopsy. This test uses a hollow needle to take small pieces of tissue from the prostate. This helps give more information about the cells. Before the test, pain medicine is used to prevent pain. During the test, a small probe is inserted into the rectum. The probe sends an image of the prostate to a video monitor. With this image as a guide, the health care provider uses a thin, hollow needle to remove tiny tissue samples from the prostate.  Date Last Reviewed: 3/24/2015  © 3616-4757 [employer]. [address] [place] [address]. All rights reserved. This information is not intended as a substitute for professional medical care. Always follow your healthcare professional's instructions.        Hormone Therapy for Prostate Cancer  Androgens are male hormones. Androgens such as testosterone are made in the testicles. Prostate cancer cells need androgens to grow. Reducing the amount of androgens in the body or blocking prostate cancer cells from using them can help treat prostate cancer. This therapy does not cure the cancer, but it can help control it. It may be used alone. Or it may be used with radiation therapy to help make this treatment more effective. Read below to learn more about this treatment.  How the therapy is done  Hormone therapy can be done with:  · LHRH (GnRH) agonists or antagonists. These are medicines that stop the  testicles from making androgens. These are injected into a muscle or just under the skin. This is done every few weeks or months. Or they may be given with a small device put under the skin on the inside of the arm. This implant gives a steady dose of medicine over time. LHRH agonists and antagonists are often used with anti-androgens (see below).  · Anti-androgens. These are medicines that stop cancer cells from using androgens as a way to grow. These come in pill form and are taken by mouth. They are often used along with other forms of hormone therapy.  · CYP17 inhibitors. These slow the amount of hormones made in prostate cancer cells and other body cells. They are given as pills. They are often used along with other forms of hormone therapy.  · Other medicines. These may include corticosteroids, estrogens, or anti-fungal medicines. These can also help lower the levels of androgens in the body. They are used less often than the medicines listed above.  · Orchiectomy. This is surgery to remove the testicles. This stops the body from making most androgens. Artificial (prosthetic) testicles can be placed afterward to give the look of real testicles.  Possible side effects   Side effects are similar for most types of hormone therapy, but they can vary a bit between medicines. Possible side effects can include:  · Sudden increase in body heat (hot flashes)  · Tiredness  · Less interest in sex  · Inability to get or keep an erection  · Decrease in size of penis and testicles  · Changes in facial hair  · Mood changes, such as depression, irritability, or anxiety  · Trouble with memory and concentration  · Loss of muscle  · Diarrhea  · Nausea  · Weight gain  · Breast-area tenderness or growth  · Low red blood cell count (anemia)  · Hair loss  · Bone thinning (osteoporosis)  · Increased risk of heart disease, stroke, and diabetes  Coping with side effects  Some of the side effects are temporary. Others are more  long-lasting. This depends on the type of hormone therapy used, and how it affects your body. Most side effects of orchiectomy are permanent. Your health care provider can tell you more. To help cope with side effects, try the tips below.  · Talk to your health care provider about your symptoms. He or she may prescribe medicines that can help you feel better and reduce problems.  · If you have hot flashes, dont take hot showers. Dont use hot tubs or saunas.  · Dont eat spicy food or drink alcohol. Dont have caffeine.  · Get regular physical activity.  · Eat a healthy diet.  · Keep mentally active.  · Work with your partner to manage sexual changes.  · Try counseling or support groups.  Follow-up care  During the course of your treatment, youll have regular visits with your health care provider. You may also have tests. These let your health care provider check your health and see how well the treatment is working. After treatment ends, you and your health care provider will discuss the results. Youll also discuss whether you need additional cancer treatments.  Resources  For more information about cancer and its treatment, visit the websites listed below:  · American Cancer Society   · National [employer] [place]   · [employer]   Date Last Reviewed: 3/30/2015  © 6739-8905 [employer]. [address] [place] [address], [place] [address]. All rights reserved. This information is not intended as a substitute for professional medical care. Always follow your healthcare professional's instructions.        Triptorelin injection  What is this medicine?  TRIPTORELIN (TRIP toe rel in) decreases testosterone in men and estrogen in women. It is used to treat advanced prostate cancer and endometriosis.  How should I use this medicine?  This medicine is for injection into a muscle. It is given by a health care professional in a hospital or clinic setting.  Talk to your pediatrician regarding the use of this medicine in  children. This medicine is not approved for use in children.  What side effects may I notice from receiving this medicine?  Side effects that you should report to your doctor or health care professional as soon as possible:  · allergic reactions like skin rash, itching or hives, swelling of the face, lips, or tongue  · breast enlargement in both males and females  · breathing problems  · changes in vision  · confused, not alert, other mental change  · dark urine  · new or worsening pain  · pain, tingling, numbness in the hands or feet  · swelling of the ankles, feet, hands  · trouble passing urine or change in the amount of urine  · vomiting  · weakness or paralysis  Side effects that usually do not require medical attention (report to your doctor or health care professional if they continue or are bothersome):  · change in sex drive or performance  · constipation or diarrhea  · dizziness  · headache  · hot flashes  · nausea, stomach upset  · pain at site where injected  What may interact with this medicine?  Do not take this medicine with any of the following medications:  · chasteberry supplements  This medicine may also interact with the following medications:  · cimetidine  · herbal or dietary supplements, like black cohosh, DHEA  · female hormones, like estrogen  · male hormones, like testosterone  · medicines for depression, anxiety, or psychotic disturbances  · methyldopa  · metoclopramide  · phenothiazines like chlorpromazine, mesoridazine, prochlorperazine, thioridazine  · prasterone  · reserpine  What if I miss a dose?  It is important not to miss your dose. Call your doctor or health care professional if you are unable to keep an appointment.  Where should I keep my medicine?  This drug is given in a hospital or clinic and will not be stored at home.  What should I tell my health care provider before I take this medicine?  They need to know if you have any of these conditions:  · diabetes  · heart disease  or previous heart attack  · high blood pressure  · high cholesterol  · pain or difficulty passing urine  · spinal cord metastasis  · stroke  · tobacco smoker  · an unusual or allergic reaction to triptorelin, other medicines, foods, dyes, or preservatives  · pregnant or trying to get pregnant  · breast-feeding  What should I watch for while using this medicine?  Your condition will be monitored carefully while you are receiving this medicine. You will need important blood work done while you are taking this medicine.  During the first weeks of treatment your symptoms may get worse. These should get better as you continue your treatment. Tell your doctor or healthcare professional if your symptoms do not start to get better or if they continue to get worse.  Women should inform their doctor if they wish to become pregnant or think they might be pregnant. There is a potential for serious side effects to an unborn child. Talk to your health care professional or pharmacist for more information.  Date Last Reviewed:   NOTE:This sheet is a summary. It may not cover all possible information. If you have questions about this medicine, talk to your doctor, pharmacist, or health care provider. Copyright© 2016 [employer]

## 2017-08-26 VITALS
DIASTOLIC BLOOD PRESSURE: 80 MMHG | HEART RATE: 62 BPM | SYSTOLIC BLOOD PRESSURE: 124 MMHG | OXYGEN SATURATION: 98 % | WEIGHT: 167 LBS | HEIGHT: 68 IN | TEMPERATURE: 98 F | BODY MASS INDEX: 25.31 KG/M2

## 2017-08-26 NOTE — PROGRESS NOTES
Subjective:       Patient ID: Jeniffer Taylor is a 88 y.o. male.    Chief Complaint: Diabetes    HPI: He has diabetes.  Denies polyuria, polydipsia  Review of Systems   Constitutional: Negative for chills, fatigue, fever and unexpected weight change.   Respiratory: Negative for chest tightness and shortness of breath.    Cardiovascular: Negative for chest pain and palpitations.   Gastrointestinal: Negative for abdominal pain and blood in stool.   Neurological: Negative for dizziness, syncope, numbness and headaches.       Objective:      Physical Exam   HENT:   Right Ear: External ear normal.   Left Ear: External ear normal.   Nose: Nose normal.   Mouth/Throat: Oropharynx is clear and moist.   Eyes: Pupils are equal, round, and reactive to light.   Neck: Normal range of motion.   Cardiovascular: Normal rate and regular rhythm.    Murmur heard.  Pulmonary/Chest: Breath sounds normal.   Abdominal: He exhibits no distension. There is no hepatosplenomegaly. There is no tenderness.   Lymphadenopathy:     He has no cervical adenopathy.     He has no axillary adenopathy.   Neurological: He has normal strength and normal reflexes. No cranial nerve deficit or sensory deficit.       Assessment:         assessment and plan: Diabetes: Check CMP, glycosylated hemoglobin, urine microalbumin  Plan:       As above

## 2017-09-13 ENCOUNTER — OFFICE VISIT (OUTPATIENT)
Dept: CARDIOLOGY | Facility: CLINIC | Age: 82
End: 2017-09-13
Payer: MEDICARE

## 2017-09-13 VITALS
HEIGHT: 68 IN | WEIGHT: 164.69 LBS | SYSTOLIC BLOOD PRESSURE: 117 MMHG | BODY MASS INDEX: 24.96 KG/M2 | HEART RATE: 64 BPM | DIASTOLIC BLOOD PRESSURE: 62 MMHG

## 2017-09-13 DIAGNOSIS — I25.5 ISCHEMIC CARDIOMYOPATHY: Primary | Chronic | ICD-10-CM

## 2017-09-13 DIAGNOSIS — E78.5 DYSLIPIDEMIA: ICD-10-CM

## 2017-09-13 DIAGNOSIS — I25.10 CORONARY ARTERY DISEASE INVOLVING NATIVE CORONARY ARTERY OF NATIVE HEART WITHOUT ANGINA PECTORIS: ICD-10-CM

## 2017-09-13 DIAGNOSIS — I35.0 NONRHEUMATIC AORTIC VALVE STENOSIS: ICD-10-CM

## 2017-09-13 DIAGNOSIS — E11.49 TYPE II DIABETES MELLITUS WITH NEUROLOGICAL MANIFESTATIONS: ICD-10-CM

## 2017-09-13 DIAGNOSIS — I11.0 HYPERTENSIVE HEART DISEASE WITH HEART FAILURE: ICD-10-CM

## 2017-09-13 PROCEDURE — 3008F BODY MASS INDEX DOCD: CPT | Mod: S$GLB,,, | Performed by: INTERNAL MEDICINE

## 2017-09-13 PROCEDURE — 1126F AMNT PAIN NOTED NONE PRSNT: CPT | Mod: S$GLB,,, | Performed by: INTERNAL MEDICINE

## 2017-09-13 PROCEDURE — 99999 PR PBB SHADOW E&M-EST. PATIENT-LVL III: CPT | Mod: PBBFAC,,, | Performed by: INTERNAL MEDICINE

## 2017-09-13 PROCEDURE — 1159F MED LIST DOCD IN RCRD: CPT | Mod: S$GLB,,, | Performed by: INTERNAL MEDICINE

## 2017-09-13 PROCEDURE — 99499 UNLISTED E&M SERVICE: CPT | Mod: S$GLB,,, | Performed by: INTERNAL MEDICINE

## 2017-09-13 PROCEDURE — 99214 OFFICE O/P EST MOD 30 MIN: CPT | Mod: S$GLB,,, | Performed by: INTERNAL MEDICINE

## 2017-09-13 NOTE — PROGRESS NOTES
Subjective:   Patient ID:  Jeniffer Taylor is a 88 y.o. male who presents for follow up of Annual Exam      HPI: Back for routine f/u and doing very well.  Doesn't have a complaint in the world, which appears to be stable since May 2016.  Same level of dyspnea with climbing stairs.    No angina or lightheadedness/syncope.  No PND/orthopnea.  No claudication, stroke symptoms, or bleeding.    May 2016 HPI: Back for yearly f/u and doing well.  Not a complaint in the world.  He denies chest discomfort, new PRATT, palpitations, PND/orthopnea, lightheadedness and syncope.  He's out of breath at the top of a flight of stairs and this is stable for him.     No claudication.  No stroke symptoms.     He states that he doesn't take Plavix (and can't say why he stopped).    Patient Active Problem List   Diagnosis    Diabetic polyneuropathy associated with type 2 diabetes mellitus    Unspecified essential hypertension    Dyslipidemia    CAD (coronary artery disease)    Corns and callosities    Prostate cancer    Hypertensive heart disease with heart failure    Type II diabetes mellitus with neurological manifestations    Ischemic cardiomyopathy    Diabetic macular edema, right eye    Aortic stenosis    MS (mitral stenosis)    Anemia    Senile nuclear sclerosis    History of MI (myocardial infarction)    Both eyes affected by mild nonproliferative diabetic retinopathy with macular edema, associated with type 2 diabetes mellitus    Hypertensive retinopathy of both eyes    CKD (chronic kidney disease), stage II    Controlled type 2 diabetes mellitus with stage 2 chronic kidney disease, without long-term current use of insulin    Diabetes mellitus with glaucoma       Current Outpatient Prescriptions   Medication Sig    acetaminophen-codeine 300-30mg (TYLENOL #3) 300-30 mg Tab 1 po TID prn    amlodipine (NORVASC) 10 MG tablet Take 1 tablet (10 mg total) by mouth once daily.    aspirin (ECOTRIN) 81 MG EC tablet  Take 81 mg by mouth. 1 Tablet, Delayed Release (E.C.) Oral Every morning    atorvastatin (LIPITOR) 80 MG tablet Take 1 tablet (80 mg total) by mouth once daily.    BD ALCOHOL SWABS PadM     bicalutamide (CASODEX) 50 MG Tab Take 1 tablet (50 mg total) by mouth once daily.    blood sugar diagnostic Strp Use daily    furosemide (LASIX) 40 MG tablet Take 1 tablet (40 mg total) by mouth 2 (two) times daily.    garlic 1,000 mg Cap Take 2 tablets by mouth once daily.    glipiZIDE (GLUCOTROL) 2.5 MG TR24 Take 1 tablet (2.5 mg total) by mouth once daily.    lancets (LANCETS,ULTRA THIN) Misc Use daily    latanoprost 0.005 % ophthalmic solution Place 1 drop into both eyes every evening.    leuprolide (ELIGARD) 45 mg injection 45 mg. 1 Syringe Subcutaneous Q 6 months    lisinopril (PRINIVIL,ZESTRIL) 40 MG tablet Take 1 tablet (40 mg total) by mouth once daily.    metoprolol succinate (TOPROL-XL) 50 MG 24 hr tablet Take 1 tablet (50 mg total) by mouth once daily.    TRUEPLUS LANCETS 33 gauge Misc     blood-glucose meter kit Use as instructed    nitroGLYCERIN (NITROSTAT) 0.4 MG SL tablet Place 1 tablet (0.4 mg total) under the tongue every 5 (five) minutes as needed for Chest pain.     No current facility-administered medications for this visit.        Review of Systems   Constitution: Negative.   HENT: Negative.    Eyes: Negative.    Cardiovascular: Negative.  Negative for chest pain, dyspnea on exertion, near-syncope, orthopnea and palpitations.   Respiratory: Negative.  Negative for cough, hemoptysis and shortness of breath.    Endocrine: Negative.    Hematologic/Lymphatic: Negative.    Skin: Negative.    Musculoskeletal: Negative.    Gastrointestinal: Negative.    Genitourinary: Negative.    Neurological: Negative.    Psychiatric/Behavioral: Negative.      Objective:   Physical Exam   Constitutional: He is oriented to person, place, and time. He appears well-developed and well-nourished.   HENT:   Head:  Normocephalic and atraumatic.   Mouth/Throat: Oropharynx is clear and moist.   Eyes: Conjunctivae and EOM are normal. No scleral icterus.   Neck: Normal range of motion. Neck supple. No JVD present.   Cardiovascular: Normal rate, regular rhythm and intact distal pulses.  Exam reveals no gallop and no friction rub.    Murmur (II/VI early systolic murmur at the base) heard.  Pulmonary/Chest: Effort normal and breath sounds normal. He has no wheezes. He has no rales.   Abdominal: Soft. Bowel sounds are normal. He exhibits no distension. There is no tenderness.   Musculoskeletal: Normal range of motion. He exhibits no edema.   Neurological: He is alert and oriented to person, place, and time.   Skin: Skin is warm and dry. No rash noted. No erythema.   Psychiatric: He has a normal mood and affect. His behavior is normal. Judgment and thought content normal.   Vitals reviewed.      Lab Results   Component Value Date    WBC 7.47 05/12/2017    HGB 11.6 (L) 05/12/2017    HCT 32.3 (L) 05/12/2017    MCV 78 (L) 05/12/2017     (L) 05/12/2017     Lab Results   Component Value Date    IRON 68 12/21/2016    TIBC 268 12/21/2016    FERRITIN 215 12/21/2016           Chemistry        Component Value Date/Time     08/21/2017 0907    K 4.3 08/21/2017 0907     08/21/2017 0907    CO2 26 08/21/2017 0907    BUN 28 (H) 08/21/2017 0907    CREATININE 1.4 08/21/2017 0907     (H) 08/21/2017 0907        Component Value Date/Time    CALCIUM 9.4 08/21/2017 0907    ALKPHOS 87 08/21/2017 0907    AST 32 08/21/2017 0907    ALT 22 08/21/2017 0907    BILITOT 0.7 08/21/2017 0907    ESTGFRAFRICA 51 (A) 08/21/2017 0907    EGFRNONAA 45 (A) 08/21/2017 0907            Lab Results   Component Value Date    CHOL 143 12/21/2016    CHOL 147 08/18/2015    CHOL 125 04/17/2014     Lab Results   Component Value Date    HDL 43 12/21/2016    HDL 42 08/18/2015    HDL 40 04/17/2014     Lab Results   Component Value Date    LDLCALC 81.4 12/21/2016     LDLCALC 82.2 08/18/2015    LDLCALC 71.4 04/17/2014     Lab Results   Component Value Date    TRIG 93 12/21/2016    TRIG 114 08/18/2015    TRIG 68 04/17/2014     Lab Results   Component Value Date    CHOLHDL 30.1 12/21/2016    CHOLHDL 28.6 08/18/2015    CHOLHDL 32.0 04/17/2014       Lab Results   Component Value Date    TSH 1.589 12/21/2016       Lab Results   Component Value Date    HGBA1C 6.1 (H) 08/21/2017       Assessment:     1. Ischemic cardiomyopathy    2. Coronary artery disease involving native coronary artery of native heart without angina pectoris    3. Hypertensive heart disease with heart failure    4. Nonrheumatic aortic valve stenosis    5. Type II diabetes mellitus with neurological manifestations    6. Dyslipidemia        Plan:     Continue current medicines.    Diet/exercise goals reinforced.    F/U 12 months

## 2017-11-13 ENCOUNTER — OFFICE VISIT (OUTPATIENT)
Dept: OPTOMETRY | Facility: CLINIC | Age: 82
End: 2017-11-13
Payer: MEDICARE

## 2017-11-13 DIAGNOSIS — H40.1132 PRIMARY OPEN ANGLE GLAUCOMA OF BOTH EYES, MODERATE STAGE: Primary | ICD-10-CM

## 2017-11-13 PROCEDURE — 99499 UNLISTED E&M SERVICE: CPT | Mod: S$GLB,,, | Performed by: OPTOMETRIST

## 2017-11-13 PROCEDURE — 99999 PR PBB SHADOW E&M-EST. PATIENT-LVL II: CPT | Mod: PBBFAC,,, | Performed by: OPTOMETRIST

## 2017-11-13 PROCEDURE — 92012 INTRM OPH EXAM EST PATIENT: CPT | Mod: S$GLB,,, | Performed by: OPTOMETRIST

## 2017-11-13 RX ORDER — LATANOPROST 50 UG/ML
1 SOLUTION/ DROPS OPHTHALMIC NIGHTLY
Qty: 3 BOTTLE | Refills: 3 | Status: SHIPPED | OUTPATIENT
Start: 2017-11-13 | End: 2018-03-26 | Stop reason: SDUPTHER

## 2017-11-13 NOTE — PROGRESS NOTES
Assessment HPI     Glaucoma    Additional comments: 4 month IOP ck           Comments   Last eye exam was 7/13/17 with Dr. Jiménez.  Patient states no vision changes since last exam. Needs refills on eye   drops.  Patient denies diplopia, headaches, flashes/floaters, and pain.    Latanoprost QHS OU       Last edited by Padmini Walker on 11/13/2017  8:14 AM. (History)            Assessment /Plan     For exam results, see Encounter Report.    Primary open angle glaucoma of both eyes, moderate stage            1.  Continue Latanoprost QHS OU-good response.  Refills sent to pharmacy.      DFE: 2/13/17  Photos: 2/13/17  HVF: 7/13/17  OCT: 7/13/17  Gonio:   Pachy: 526 OD, 512 OS  Initial IOPs: 22 OD, 22 OS  MHx: DM, HTN  FHx: none          RTC 4 months for iop check and dfe.

## 2017-12-01 ENCOUNTER — OFFICE VISIT (OUTPATIENT)
Dept: INTERNAL MEDICINE | Facility: CLINIC | Age: 82
End: 2017-12-01
Payer: MEDICARE

## 2017-12-01 ENCOUNTER — TELEPHONE (OUTPATIENT)
Dept: HEMATOLOGY/ONCOLOGY | Facility: CLINIC | Age: 82
End: 2017-12-01

## 2017-12-01 DIAGNOSIS — C61 PROSTATE CANCER: Primary | ICD-10-CM

## 2017-12-01 DIAGNOSIS — M89.9 DISORDER OF BONE: ICD-10-CM

## 2017-12-01 DIAGNOSIS — I25.5 ISCHEMIC CARDIOMYOPATHY: Chronic | ICD-10-CM

## 2017-12-01 DIAGNOSIS — E11.9 TYPE 2 DIABETES MELLITUS WITHOUT COMPLICATION, WITHOUT LONG-TERM CURRENT USE OF INSULIN: ICD-10-CM

## 2017-12-01 PROCEDURE — 99215 OFFICE O/P EST HI 40 MIN: CPT | Mod: S$GLB,,, | Performed by: INTERNAL MEDICINE

## 2017-12-01 PROCEDURE — 99999 PR PBB SHADOW E&M-EST. PATIENT-LVL V: CPT | Mod: PBBFAC,,, | Performed by: INTERNAL MEDICINE

## 2017-12-01 PROCEDURE — 99499 UNLISTED E&M SERVICE: CPT | Mod: S$GLB,,, | Performed by: INTERNAL MEDICINE

## 2017-12-01 RX ORDER — GLIPIZIDE 2.5 MG/1
2.5 TABLET, EXTENDED RELEASE ORAL DAILY
Qty: 90 TABLET | Refills: 1 | Status: SHIPPED | OUTPATIENT
Start: 2017-12-01 | End: 2018-04-03 | Stop reason: SDUPTHER

## 2017-12-01 RX ORDER — FUROSEMIDE 40 MG/1
40 TABLET ORAL 2 TIMES DAILY
Qty: 180 TABLET | Refills: 1 | Status: SHIPPED | OUTPATIENT
Start: 2017-12-01 | End: 2018-04-03 | Stop reason: SDUPTHER

## 2017-12-01 RX ORDER — ATORVASTATIN CALCIUM 80 MG/1
80 TABLET, FILM COATED ORAL DAILY
Qty: 90 TABLET | Refills: 1 | Status: SHIPPED | OUTPATIENT
Start: 2017-12-01 | End: 2018-04-03 | Stop reason: SDUPTHER

## 2017-12-01 RX ORDER — METOPROLOL SUCCINATE 50 MG/1
50 TABLET, EXTENDED RELEASE ORAL DAILY
Qty: 90 TABLET | Refills: 1 | Status: SHIPPED | OUTPATIENT
Start: 2017-12-01 | End: 2018-04-03 | Stop reason: SDUPTHER

## 2017-12-01 RX ORDER — AMLODIPINE BESYLATE 10 MG/1
10 TABLET ORAL DAILY
Qty: 90 TABLET | Refills: 1 | Status: SHIPPED | OUTPATIENT
Start: 2017-12-01 | End: 2018-04-03 | Stop reason: SDUPTHER

## 2017-12-01 RX ORDER — LISINOPRIL 40 MG/1
40 TABLET ORAL DAILY
Qty: 90 TABLET | Refills: 1 | Status: SHIPPED | OUTPATIENT
Start: 2017-12-01 | End: 2018-04-03 | Stop reason: SDUPTHER

## 2017-12-01 NOTE — TELEPHONE ENCOUNTER
----- Message from Kenya Gomez RN sent at 12/1/2017  3:30 PM CST -----  Contact: self  To All:    Good afternoon. The above listed patient is already established w/ Dr. Mack. He is in need of a f/u appt.    Thank you,  Kenya BURNS RN   ----- Message -----  From: Araceli Vasquez RN  Sent: 12/1/2017   3:21 PM  To: Kenya Gomez RN        ----- Message -----  From: Brennen Higuera  Sent: 12/1/2017   8:58 AM  To: Araceli Vasquez RN    Pt has a referral per Dr Chauhan with the diagnosis Prostate cancer [C61]. Please call pt to schedule. Thanks!

## 2017-12-03 VITALS
HEART RATE: 62 BPM | SYSTOLIC BLOOD PRESSURE: 118 MMHG | WEIGHT: 165.56 LBS | BODY MASS INDEX: 25.09 KG/M2 | OXYGEN SATURATION: 97 % | HEIGHT: 68 IN | DIASTOLIC BLOOD PRESSURE: 60 MMHG

## 2017-12-04 NOTE — PROGRESS NOTES
Subjective:       Patient ID: Jeniffer Taylor is a 89 y.o. male.    Chief Complaint: Diabetes    HPI: He has diabetes.  Denies polyuria, polydipsia    PAST MEDICAL HISTORY: Hypertension, diabetes with retinopathy and neuropathy ,  hyperlipidemia, congestive heart failure, prostate cancer, aortic stenosis, anemia, glaucoma, lumbar spinal stenosis, thyroid nodule,  coronary artery disease. He had a colonoscopy February 2011     MEDICATIONS: GlucotrolXL 2.5 mg daily,lisinopril 40mg daily, Eligard ,Lipitor 80 mg daily,xalatan ,aspirin one by mouth daily, Lasix 40 mg twice a day, Toprol-XL 50 mg daily, Norvasc 10 mg daily ,casodex    ALLERGIES: No known drug allergies.      Review of Systems   Constitutional: Negative for chills, fatigue, fever and unexpected weight change.   Respiratory: Negative for chest tightness and shortness of breath.    Cardiovascular: Negative for chest pain and palpitations.   Gastrointestinal: Negative for abdominal pain and blood in stool.   Neurological: Negative for dizziness, syncope, numbness and headaches.       Objective:      Physical Exam   HENT:   Right Ear: External ear normal.   Left Ear: External ear normal.   Nose: Nose normal.   Mouth/Throat: Oropharynx is clear and moist.   Eyes: Pupils are equal, round, and reactive to light.   Neck: Normal range of motion.   Cardiovascular: Normal rate and regular rhythm.    Murmur heard.  Pulmonary/Chest: Breath sounds normal.   Abdominal: He exhibits no distension. There is no hepatosplenomegaly. There is no tenderness.   Lymphadenopathy:     He has no cervical adenopathy.     He has no axillary adenopathy.   Neurological: He has normal strength and normal reflexes. No cranial nerve deficit or sensory deficit.       Assessment:     assessment and plan: Diabetes: Check CMP, lipid panel, glycosylated hemoglobin, TSH.  Schedule bone density      Plan:       As above

## 2017-12-06 ENCOUNTER — HOSPITAL ENCOUNTER (OUTPATIENT)
Dept: RADIOLOGY | Facility: CLINIC | Age: 82
Discharge: HOME | End: 2017-12-06
Attending: INTERNAL MEDICINE
Payer: MEDICARE

## 2017-12-06 DIAGNOSIS — C61 PROSTATE CANCER: ICD-10-CM

## 2017-12-06 DIAGNOSIS — M89.9 DISORDER OF BONE: ICD-10-CM

## 2017-12-06 PROCEDURE — 77080 DXA BONE DENSITY AXIAL: CPT | Mod: 26,,, | Performed by: INTERNAL MEDICINE

## 2017-12-06 PROCEDURE — 77080 DXA BONE DENSITY AXIAL: CPT | Mod: TC

## 2017-12-21 NOTE — PROGRESS NOTES
Subjective:       Patient ID: Jeniffer Taylor is a 89 y.o. male.    Chief Complaint: Prostate Cancer    HPI     89 year old male to clinic for follow up for prostate cancer. Patient is feeling well and has no new issues.    He denies any mouth sores, nausea, vomiting, diarrhea, constipation, abdominal pain, weight loss or loss of appetite, chest pain, shortness of breath, leg swelling, fatigue, pain, headache, dizziness, or mood changes.    His ECOG PS is zero and quality of life so far is excellent. He is accompanied alone to clinic. He lives with his son and his wife. He continues to drive himself.    Oncologic History:  Jeniffer Taylor is a 88 y.o. Male, referred by Jessa Ulloa, for rising PSA with a history of prostate cancer. He was has been initially treated with Dr. Bee since 1999 with Su 4. Pt states upon diagnosis he did not have a prostatectomy or radiation to the prostate. He has been on active surveillance. He began LHRH agonist therapy 02/07/2007. He is currently on Casodex and Trelstar with last Trelstar injection on 2/20/17. He denies any hot flashes or other side effects from treatment.    Review of Systems   Constitutional: Negative for activity change, appetite change, fatigue, fever and unexpected weight change.   HENT: Negative for mouth sores, nosebleeds and trouble swallowing.    Eyes: Negative for discharge and visual disturbance.   Respiratory: Negative for cough, shortness of breath and wheezing.    Cardiovascular: Negative for chest pain and leg swelling.   Gastrointestinal: Negative for abdominal distention, abdominal pain, blood in stool, constipation, diarrhea, nausea and vomiting.   Genitourinary: Negative for decreased urine volume, difficulty urinating, frequency and hematuria.   Musculoskeletal: Negative for back pain.   Skin: Negative for color change and rash.   Neurological: Negative for weakness and headaches.   Hematological: Negative for adenopathy.    Psychiatric/Behavioral: Negative for sleep disturbance. The patient is not nervous/anxious.    All other systems reviewed and are negative.      Allergies:  Review of patient's allergies indicates:  No Known Allergies    Medications:  Current Outpatient Prescriptions   Medication Sig Dispense Refill    acetaminophen-codeine 300-30mg (TYLENOL #3) 300-30 mg Tab 1 po TID prn 30 tablet 1    amLODIPine (NORVASC) 10 MG tablet Take 1 tablet (10 mg total) by mouth once daily. 90 tablet 1    aspirin (ECOTRIN) 81 MG EC tablet Take 81 mg by mouth. 1 Tablet, Delayed Release (E.C.) Oral Every morning      atorvastatin (LIPITOR) 80 MG tablet Take 1 tablet (80 mg total) by mouth once daily. 90 tablet 1    BD ALCOHOL SWABS PadM       bicalutamide (CASODEX) 50 MG Tab Take 1 tablet (50 mg total) by mouth once daily. 90 tablet 3    blood sugar diagnostic Strp Use daily 100 strip 12    blood-glucose meter kit Use as instructed 1 each 0    furosemide (LASIX) 40 MG tablet Take 1 tablet (40 mg total) by mouth 2 (two) times daily. 180 tablet 1    garlic 1,000 mg Cap Take 2 tablets by mouth once daily.      glipiZIDE (GLUCOTROL) 2.5 MG TR24 Take 1 tablet (2.5 mg total) by mouth once daily. 90 tablet 1    lancets (LANCETS,ULTRA THIN) Misc Use daily 100 each 12    latanoprost 0.005 % ophthalmic solution Place 1 drop into both eyes every evening. 3 Bottle 3    leuprolide (ELIGARD) 45 mg injection 45 mg. 1 Syringe Subcutaneous Q 6 months      lisinopril (PRINIVIL,ZESTRIL) 40 MG tablet Take 1 tablet (40 mg total) by mouth once daily. 90 tablet 1    metoprolol succinate (TOPROL-XL) 50 MG 24 hr tablet Take 1 tablet (50 mg total) by mouth once daily. 90 tablet 1    nitroGLYCERIN (NITROSTAT) 0.4 MG SL tablet Place 1 tablet (0.4 mg total) under the tongue every 5 (five) minutes as needed for Chest pain. 100 tablet 5    TRUEPLUS LANCETS 33 gauge Misc        No current facility-administered medications for this visit.         PMH:  Past Medical History:   Diagnosis Date    Anemia     Arthritis     Cancer     Prostate    Cataract     CHF (congestive heart failure)     Chronic kidney disease     Coronary artery disease     Diabetes mellitus     Diabetic retinopathy     Disorder of kidney and ureter     ckd 2    Encounter for blood transfusion     Glaucoma     High cholesterol     Hypertension     Myocardial infarction     Prostate cancer     PVD (peripheral vascular disease)     Spinal stenosis     Status post cataract extraction and insertion of intraocular lens of left eye 11/13/2015       PSH:  Past Surgical History:   Procedure Laterality Date    CARDIAC CATHETERIZATION      15 to 20 years ago x1 stent and 4/7/2014 x1 stent    CATARACT EXTRACTION Bilateral     Dr. Ferguson    EYE SURGERY      HERNIA REPAIR         FamHx:  Family History   Problem Relation Age of Onset    Cancer Father     No Known Problems Mother     No Known Problems Son     Breast cancer Sister     Diabetes Sister     Hypertension Sister     No Known Problems Brother     No Known Problems Maternal Aunt     No Known Problems Maternal Uncle     No Known Problems Paternal Aunt     No Known Problems Paternal Uncle     No Known Problems Maternal Grandmother     No Known Problems Maternal Grandfather     No Known Problems Paternal Grandmother     No Known Problems Paternal Grandfather     No Known Problems Daughter     Diabetes Brother     Heart disease Neg Hx     Heart attack Neg Hx     Amblyopia Neg Hx     Blindness Neg Hx     Cataracts Neg Hx     Glaucoma Neg Hx     Macular degeneration Neg Hx     Retinal detachment Neg Hx     Strabismus Neg Hx     Stroke Neg Hx     Thyroid disease Neg Hx        SocHx:  Social History     Social History    Marital status:      Spouse name: N/A    Number of children: N/A    Years of education: N/A     Occupational History    Not on file.     Social History Main Topics     Smoking status: Never Smoker    Smokeless tobacco: Former User     Quit date: 1/18/1973    Alcohol use No    Drug use: No    Sexual activity: No     Other Topics Concern    Not on file     Social History Narrative    No narrative on file       Objective:      Physical Exam   Constitutional: He is oriented to person, place, and time. He appears well-developed and well-nourished.   HENT:   Head: Normocephalic and atraumatic.   Mouth/Throat: Uvula is midline and oropharynx is clear and moist. No oropharyngeal exudate.   Eyes: Conjunctivae are normal. Pupils are equal, round, and reactive to light.   Neck: Normal range of motion. Neck supple. No tracheal deviation present.   Cardiovascular: Normal rate, regular rhythm, normal heart sounds and normal pulses.  Exam reveals no gallop and no friction rub.    Pulmonary/Chest: Effort normal and breath sounds normal. No respiratory distress. He has no wheezes. He has no rales.   Abdominal: Soft. Bowel sounds are normal. He exhibits no distension. There is no tenderness. There is no rebound and no guarding.   Musculoskeletal: He exhibits no edema or tenderness.   Lymphadenopathy:     He has no cervical adenopathy.   Neurological: He is alert and oriented to person, place, and time.   Skin: Skin is warm, dry and intact. No rash noted. He is not diaphoretic.   Psychiatric: He has a normal mood and affect. His speech is normal and behavior is normal.   Vitals reviewed.      LABS:  WBC   Date Value Ref Range Status   12/22/2017 7.89 3.90 - 12.70 K/uL Final     Hemoglobin   Date Value Ref Range Status   12/22/2017 11.4 (L) 14.0 - 18.0 g/dL Final     Hematocrit   Date Value Ref Range Status   12/22/2017 31.8 (L) 40.0 - 54.0 % Final     Platelets   Date Value Ref Range Status   12/22/2017 153 150 - 350 K/uL Final       Chemistry        Component Value Date/Time     12/22/2017 1250    K 4.2 12/22/2017 1250     12/22/2017 1250    CO2 26 12/22/2017 1250    BUN 25 (H)  12/22/2017 1250    CREATININE 1.6 (H) 12/22/2017 1250     (H) 12/22/2017 1250        Component Value Date/Time    CALCIUM 9.7 12/22/2017 1250    ALKPHOS 112 12/22/2017 1250    AST 37 12/22/2017 1250    ALT 23 12/22/2017 1250    BILITOT 0.7 12/22/2017 1250    ESTGFRAFRICA 43.5 (A) 12/22/2017 1250    EGFRNONAA 37.6 (A) 12/22/2017 1250        PSA-0.11    Assessment:       1. Prostate cancer    2. Encounter for monitoring androgen deprivation therapy        Plan:       1,2- Jeniffer Taylor is a 88 y.o. Male, referred by Jessa Ulloa, for rising PSA with a history of prostate cancer. He was has been initially treated with Dr. Bee since 1999 with [place] 4. Pt states upon diagnosis he did not have a prostatectomy or radiation to the prostate. He has been on active surveillance. He began LHRH agonist therapy 02/07/2007. He is currently on Casodex and Trelstar with last Trelstar injection on 8/25/17. Next due on 2/2018. PSA stable. Continue follow-up in urology. Should PSA rise or develop metastatic disease, we would be more than happy to see him back in clinic.      RTC PRN.    Patient was also seen and examined by Dr. Mack. Patient is in agreement with the proposed treatment plan. All questions were answered to the patient's satisfaction. Pt knows to call clinic if anything is needed before the next clinic visit.    MARK Galarza-LESLIE  Hematology and Medical Oncology      I have reviewed the notes, assessments, and/or procedures performed by the nurse practitioner, as above.  I have personally interviewed the patient at the beside, and rounded with the nurse practitioner. I formulated the plan of care.  I concur with her documentation of Jeniffer Taylor.  I, Dr. Foster Mack, personally spent more than 25 mins during this encounter, greater than 50% was spent in direct counseling and/or coordination of care.     Foster Mack M.D., M.S., F.A.C.P.  Hematology/Oncology Attending  [facility]

## 2017-12-22 ENCOUNTER — OFFICE VISIT (OUTPATIENT)
Dept: HEMATOLOGY/ONCOLOGY | Facility: CLINIC | Age: 82
End: 2017-12-22
Payer: MEDICARE

## 2017-12-22 ENCOUNTER — LAB VISIT (OUTPATIENT)
Dept: LAB | Facility: HOSPITAL | Age: 82
End: 2017-12-22
Payer: MEDICARE

## 2017-12-22 VITALS
RESPIRATION RATE: 15 BRPM | BODY MASS INDEX: 25.56 KG/M2 | TEMPERATURE: 98 F | DIASTOLIC BLOOD PRESSURE: 74 MMHG | HEART RATE: 49 BPM | HEIGHT: 68 IN | WEIGHT: 168.63 LBS | SYSTOLIC BLOOD PRESSURE: 168 MMHG | OXYGEN SATURATION: 97 %

## 2017-12-22 DIAGNOSIS — C61 PROSTATE CANCER: ICD-10-CM

## 2017-12-22 DIAGNOSIS — Z79.818 ENCOUNTER FOR MONITORING ANDROGEN DEPRIVATION THERAPY: ICD-10-CM

## 2017-12-22 DIAGNOSIS — C61 PROSTATE CANCER: Primary | ICD-10-CM

## 2017-12-22 LAB
ALBUMIN SERPL BCP-MCNC: 4.1 G/DL
ALP SERPL-CCNC: 112 U/L
ALT SERPL W/O P-5'-P-CCNC: 23 U/L
ANION GAP SERPL CALC-SCNC: 10 MMOL/L
AST SERPL-CCNC: 37 U/L
BASOPHILS # BLD AUTO: 0.04 K/UL
BASOPHILS NFR BLD: 0.5 %
BILIRUB SERPL-MCNC: 0.7 MG/DL
BUN SERPL-MCNC: 25 MG/DL
CALCIUM SERPL-MCNC: 9.7 MG/DL
CHLORIDE SERPL-SCNC: 104 MMOL/L
CO2 SERPL-SCNC: 26 MMOL/L
COMPLEXED PSA SERPL-MCNC: 0.11 NG/ML
CREAT SERPL-MCNC: 1.6 MG/DL
DIFFERENTIAL METHOD: ABNORMAL
EOSINOPHIL # BLD AUTO: 0.2 K/UL
EOSINOPHIL NFR BLD: 2.5 %
ERYTHROCYTE [DISTWIDTH] IN BLOOD BY AUTOMATED COUNT: 14.2 %
EST. GFR  (AFRICAN AMERICAN): 43.5 ML/MIN/1.73 M^2
EST. GFR  (NON AFRICAN AMERICAN): 37.6 ML/MIN/1.73 M^2
GLUCOSE SERPL-MCNC: 174 MG/DL
HCT VFR BLD AUTO: 31.8 %
HGB BLD-MCNC: 11.4 G/DL
IMM GRANULOCYTES # BLD AUTO: 0.02 K/UL
IMM GRANULOCYTES NFR BLD AUTO: 0.3 %
LYMPHOCYTES # BLD AUTO: 2 K/UL
LYMPHOCYTES NFR BLD: 25.1 %
MCH RBC QN AUTO: 28.3 PG
MCHC RBC AUTO-ENTMCNC: 35.8 G/DL
MCV RBC AUTO: 79 FL
MONOCYTES # BLD AUTO: 0.9 K/UL
MONOCYTES NFR BLD: 11.2 %
NEUTROPHILS # BLD AUTO: 4.8 K/UL
NEUTROPHILS NFR BLD: 60.4 %
NRBC BLD-RTO: 0 /100 WBC
PLATELET # BLD AUTO: 153 K/UL
PMV BLD AUTO: 11.8 FL
POTASSIUM SERPL-SCNC: 4.2 MMOL/L
PROT SERPL-MCNC: 8.1 G/DL
RBC # BLD AUTO: 4.03 M/UL
SODIUM SERPL-SCNC: 140 MMOL/L
WBC # BLD AUTO: 7.89 K/UL

## 2017-12-22 PROCEDURE — 99999 PR PBB SHADOW E&M-EST. PATIENT-LVL III: CPT | Mod: PBBFAC,,, | Performed by: INTERNAL MEDICINE

## 2017-12-22 PROCEDURE — 80053 COMPREHEN METABOLIC PANEL: CPT

## 2017-12-22 PROCEDURE — 99214 OFFICE O/P EST MOD 30 MIN: CPT | Mod: S$GLB,,, | Performed by: INTERNAL MEDICINE

## 2017-12-22 PROCEDURE — 84153 ASSAY OF PSA TOTAL: CPT

## 2017-12-22 PROCEDURE — 99499 UNLISTED E&M SERVICE: CPT | Mod: S$GLB,,, | Performed by: INTERNAL MEDICINE

## 2017-12-22 PROCEDURE — 36415 COLL VENOUS BLD VENIPUNCTURE: CPT

## 2017-12-22 PROCEDURE — 85025 COMPLETE CBC W/AUTO DIFF WBC: CPT

## 2018-01-24 ENCOUNTER — TELEPHONE (OUTPATIENT)
Dept: INTERNAL MEDICINE | Facility: CLINIC | Age: 83
End: 2018-01-24

## 2018-01-24 NOTE — TELEPHONE ENCOUNTER
----- Message from Luly Cintron sent at 1/24/2018  4:12 PM CST -----  Contact: [phone]  Pt's son is calling to speak with someone in the office about getting a wheelchair and a walker for the pt. He states that he needs papers faxed over to [phone] ([employer]) Home medical equipment. Please call and advise.    Thank You

## 2018-01-25 NOTE — TELEPHONE ENCOUNTER
[employer] requesting visit notes within 6 mo range and signed by provider  Is it good to use notes from 8/21/17?

## 2018-01-26 ENCOUNTER — HOSPITAL ENCOUNTER (OUTPATIENT)
Dept: RADIOLOGY | Facility: HOSPITAL | Age: 83
Discharge: HOME | End: 2018-01-26
Attending: INTERNAL MEDICINE
Payer: MEDICARE

## 2018-01-26 ENCOUNTER — OFFICE VISIT (OUTPATIENT)
Dept: ORTHOPEDICS | Facility: CLINIC | Age: 83
End: 2018-01-26
Payer: MEDICARE

## 2018-01-26 ENCOUNTER — TELEPHONE (OUTPATIENT)
Dept: ORTHOPEDICS | Facility: CLINIC | Age: 83
End: 2018-01-26

## 2018-01-26 ENCOUNTER — OFFICE VISIT (OUTPATIENT)
Dept: INTERNAL MEDICINE | Facility: CLINIC | Age: 83
End: 2018-01-26
Payer: MEDICARE

## 2018-01-26 DIAGNOSIS — S82.891A CLOSED RIGHT ANKLE FRACTURE, INITIAL ENCOUNTER: Primary | ICD-10-CM

## 2018-01-26 DIAGNOSIS — S82.891A CLOSED FRACTURE OF RIGHT ANKLE, INITIAL ENCOUNTER: ICD-10-CM
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
## 2018-01-26 PROCEDURE — 73610 X-RAY EXAM OF ANKLE: CPT | Mod: 26,RT,, | Performed by: RADIOLOGY

## 2018-01-26 PROCEDURE — 1126F AMNT PAIN NOTED NONE PRSNT: CPT | Mod: S$GLB,,, | Performed by: INTERNAL MEDICINE

## 2018-01-26 PROCEDURE — 99999 PR PBB SHADOW E&M-EST. PATIENT-LVL V: CPT | Mod: PBBFAC,,, | Performed by: INTERNAL MEDICINE

## 2018-01-26 PROCEDURE — 99215 OFFICE O/P EST HI 40 MIN: CPT | Mod: S$GLB,,, | Performed by: INTERNAL MEDICINE

## 2018-01-26 PROCEDURE — 3008F BODY MASS INDEX DOCD: CPT | Mod: S$GLB,,, | Performed by: INTERNAL MEDICINE

## 2018-01-26 PROCEDURE — 1159F MED LIST DOCD IN RCRD: CPT | Mod: S$GLB,,, | Performed by: INTERNAL MEDICINE

## 2018-01-26 PROCEDURE — 99203 OFFICE O/P NEW LOW 30 MIN: CPT | Mod: S$GLB,,, | Performed by: PHYSICIAN ASSISTANT

## 2018-01-26 PROCEDURE — 73610 X-RAY EXAM OF ANKLE: CPT | Mod: TC,RT

## 2018-01-26 NOTE — LETTER
January 28, 2018      Shari Chauhan MD  [address]  [facility] [address]           [facility] - Orthoped[facility]  [address], [address]  [facility] [address]  Phone: [phone]          Patient: Jeniffer Taylor   MR Number: [id number]   YOB: 1928   Date of Visit: 1/26/2018       Dear Dr. Shari Chauhan:    Thank you for referring Jeniffer Tayolr to me for evaluation. Attached you will find relevant portions of my assessment and plan of care.    If you have questions, please do not hesitate to call me. I look forward to following Jeniffer Taylor along with you.    Sincerely,    SENG Lopez  CC:  No Recipients    If you would like to receive this communication electronically, please contact [email or web address] or [phone] to request more information on [employer] access.    For providers and/or their staff who would like to refer a patient to [facility], please contact us through our one-stop-shop provider referral line, [facility] , at [phone].    If you feel you have received this communication in error or would no longer like to receive these types of communications, please e-mail [email or web address]
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
## 2018-01-26 NOTE — PROGRESS NOTES
Orders given per Dr Chauhan to transport patient to [facility] and to ask for Araceli on his arrival. Transport was notified. Patient tolerated transport well without event. Female family member present.
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
## 2018-02-01 VITALS
HEIGHT: 68 IN | HEART RATE: 64 BPM | TEMPERATURE: 99 F | DIASTOLIC BLOOD PRESSURE: 68 MMHG | SYSTOLIC BLOOD PRESSURE: 124 MMHG | BODY MASS INDEX: 25.36 KG/M2 | WEIGHT: 167.31 LBS | OXYGEN SATURATION: 98 %

## 2018-02-01 NOTE — PROGRESS NOTES
Subjective:       Patient ID: Jeniffer Taylor is a 89 y.o. male.    Chief Complaint: Ankle Pain (Rt Ankle swelling)    HPI  He tripped and fell 3 days ago.  Complains of right ankle pain  Review of Systems   Constitutional: Negative for chills, fatigue, fever and unexpected weight change.   Respiratory: Negative for chest tightness and shortness of breath.    Cardiovascular: Negative for chest pain and palpitations.   Gastrointestinal: Negative for abdominal pain and blood in stool.   Neurological: Negative for dizziness, syncope, numbness and headaches.       Objective:      Physical Exam   HENT:   Right Ear: External ear normal.   Left Ear: External ear normal.   Nose: Nose normal.   Mouth/Throat: Oropharynx is clear and moist.   Eyes: Pupils are equal, round, and reactive to light.   Neck: Normal range of motion.   Cardiovascular: Normal rate and regular rhythm.    Murmur heard.  Pulmonary/Chest: Breath sounds normal.   Abdominal: He exhibits no distension. There is no hepatomegaly. There is no tenderness.   Lymphadenopathy:     He has no cervical adenopathy.     He has no axillary adenopathy.   Neurological: He has normal strength and normal reflexes. No cranial nerve deficit or sensory deficit.       Assessment/Plan       Assessment and plan: Ankle fracture: Patient to be seen in orthopedics clinic today.  He was here for an urgent care only appointment.  He will return to clinic for a physical

## 2018-02-05 DIAGNOSIS — C61 PROSTATE CANCER: ICD-10-CM

## 2018-02-06 DIAGNOSIS — C61 PROSTATE CANCER: Primary | ICD-10-CM

## 2018-02-06 RX ORDER — BICALUTAMIDE 50 MG/1
TABLET, FILM COATED ORAL
Qty: 90 TABLET | Refills: 3 | Status: SHIPPED | OUTPATIENT
Start: 2018-02-06 | End: 2019-01-28 | Stop reason: SDUPTHER

## 2018-02-27 ENCOUNTER — OFFICE VISIT (OUTPATIENT)
Dept: ORTHOPEDICS | Facility: CLINIC | Age: 83
End: 2018-02-27
Payer: MEDICARE

## 2018-02-27 ENCOUNTER — HOSPITAL ENCOUNTER (OUTPATIENT)
Dept: RADIOLOGY | Facility: HOSPITAL | Age: 83
Discharge: HOME | End: 2018-02-27
Attending: PHYSICIAN ASSISTANT
Payer: MEDICARE

## 2018-02-27 ENCOUNTER — OFFICE VISIT (OUTPATIENT)
Dept: UROLOGY | Facility: CLINIC | Age: 83
End: 2018-02-27
Payer: MEDICARE

## 2018-02-27 VITALS
BODY MASS INDEX: 23.72 KG/M2 | WEIGHT: 156.5 LBS | HEART RATE: 66 BPM | HEIGHT: 68 IN | DIASTOLIC BLOOD PRESSURE: 69 MMHG | SYSTOLIC BLOOD PRESSURE: 133 MMHG

## 2018-02-27 DIAGNOSIS — M25.571 ACUTE RIGHT ANKLE PAIN: ICD-10-CM

## 2018-02-27 DIAGNOSIS — C61 PROSTATE CANCER: Primary | ICD-10-CM

## 2018-02-27 DIAGNOSIS — S82.891A CLOSED RIGHT ANKLE FRACTURE, INITIAL ENCOUNTER: ICD-10-CM

## 2018-02-27 DIAGNOSIS — T14.8XXA FRACTURE: Primary | ICD-10-CM

## 2018-02-27 PROCEDURE — 96402 CHEMO HORMON ANTINEOPL SQ/IM: CPT | Mod: JG,S$GLB,, | Performed by: NURSE PRACTITIONER

## 2018-02-27 PROCEDURE — 73610 X-RAY EXAM OF ANKLE: CPT | Mod: 26,RT,, | Performed by: RADIOLOGY

## 2018-02-27 PROCEDURE — 1126F AMNT PAIN NOTED NONE PRSNT: CPT | Mod: S$GLB,,, | Performed by: NURSE PRACTITIONER

## 2018-02-27 PROCEDURE — 99214 OFFICE O/P EST MOD 30 MIN: CPT | Mod: 25,S$GLB,, | Performed by: NURSE PRACTITIONER

## 2018-02-27 PROCEDURE — 99999 PR PBB SHADOW E&M-EST. PATIENT-LVL III: CPT | Mod: PBBFAC,,, | Performed by: PHYSICIAN ASSISTANT

## 2018-02-27 PROCEDURE — 1159F MED LIST DOCD IN RCRD: CPT | Mod: S$GLB,,, | Performed by: NURSE PRACTITIONER

## 2018-02-27 PROCEDURE — 99499 UNLISTED E&M SERVICE: CPT | Mod: S$GLB,,, | Performed by: NURSE PRACTITIONER

## 2018-02-27 PROCEDURE — 99999 PR PBB SHADOW E&M-EST. PATIENT-LVL IV: CPT | Mod: PBBFAC,,, | Performed by: NURSE PRACTITIONER

## 2018-02-27 PROCEDURE — 3008F BODY MASS INDEX DOCD: CPT | Mod: S$GLB,,, | Performed by: NURSE PRACTITIONER

## 2018-02-27 PROCEDURE — 99213 OFFICE O/P EST LOW 20 MIN: CPT | Mod: S$GLB,,, | Performed by: PHYSICIAN ASSISTANT

## 2018-02-27 PROCEDURE — 73610 X-RAY EXAM OF ANKLE: CPT | Mod: TC,RT
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
## 2018-02-27 NOTE — PATIENT INSTRUCTIONS
Triptorelin injection  What is this medicine?  TRIPTORELIN (TRIP toe rel in) decreases testosterone in men and estrogen in women. It is used to treat advanced prostate cancer and endometriosis.  How should I use this medicine?  This medicine is for injection into a muscle. It is given by a health care professional in a hospital or clinic setting.  Talk to your pediatrician regarding the use of this medicine in children. This medicine is not approved for use in children.  What side effects may I notice from receiving this medicine?  Side effects that you should report to your doctor or health care professional as soon as possible:  · allergic reactions like skin rash, itching or hives, swelling of the face, lips, or tongue  · breast enlargement in both males and females  · breathing problems  · changes in vision  · confused, not alert, other mental change  · dark urine  · new or worsening pain  · pain, tingling, numbness in the hands or feet  · swelling of the ankles, feet, hands  · trouble passing urine or change in the amount of urine  · vomiting  · weakness or paralysis  Side effects that usually do not require medical attention (report to your doctor or health care professional if they continue or are bothersome):  · change in sex drive or performance  · constipation or diarrhea  · dizziness  · headache  · hot flashes  · nausea, stomach upset  · pain at site where injected  What may interact with this medicine?  Do not take this medicine with any of the following medications:  · chasteberry supplements  This medicine may also interact with the following medications:  · cimetidine  · herbal or dietary supplements, like black cohosh, DHEA  · female hormones, like estrogen  · male hormones, like testosterone  · medicines for depression, anxiety, or psychotic disturbances  · methyldopa  · metoclopramide  · phenothiazines like chlorpromazine, mesoridazine, prochlorperazine,  thioridazine  · prasterone  · reserpine  What if I miss a dose?  It is important not to miss your dose. Call your doctor or health care professional if you are unable to keep an appointment.  Where should I keep my medicine?  This drug is given in a hospital or clinic and will not be stored at home.  What should I tell my health care provider before I take this medicine?  They need to know if you have any of these conditions:  · diabetes  · heart disease or previous heart attack  · high blood pressure  · high cholesterol  · pain or difficulty passing urine  · spinal cord metastasis  · stroke  · tobacco smoker  · an unusual or allergic reaction to triptorelin, other medicines, foods, dyes, or preservatives  · pregnant or trying to get pregnant  · breast-feeding  What should I watch for while using this medicine?  Your condition will be monitored carefully while you are receiving this medicine. You will need important blood work done while you are taking this medicine.  During the first weeks of treatment your symptoms may get worse. These should get better as you continue your treatment. Tell your doctor or healthcare professional if your symptoms do not start to get better or if they continue to get worse.  Women should inform their doctor if they wish to become pregnant or think they might be pregnant. There is a potential for serious side effects to an unborn child. Talk to your health care professional or pharmacist for more information.  NOTE:This sheet is a summary. It may not cover all possible information. If you have questions about this medicine, talk to your doctor, pharmacist, or health care provider. Copyright© 2017 [employer]
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
## 2018-03-26 ENCOUNTER — OFFICE VISIT (OUTPATIENT)
Dept: OPHTHALMOLOGY | Facility: CLINIC | Age: 83
End: 2018-03-26
Payer: MEDICARE

## 2018-03-26 DIAGNOSIS — H40.1132 PRIMARY OPEN ANGLE GLAUCOMA (POAG) OF BOTH EYES, MODERATE STAGE: ICD-10-CM

## 2018-03-26 DIAGNOSIS — E11.3213 CONTROLLED TYPE 2 DIABETES MELLITUS WITH BOTH EYES AFFECTED BY MILD NONPROLIFERATIVE RETINOPATHY AND MACULAR EDEMA, WITHOUT LONG-TERM CURRENT USE OF INSULIN: Primary | ICD-10-CM

## 2018-03-26 DIAGNOSIS — E11.3213 BOTH EYES AFFECTED BY MILD NONPROLIFERATIVE DIABETIC RETINOPATHY WITH MACULAR EDEMA, ASSOCIATED WITH TYPE 2 DIABETES MELLITUS: ICD-10-CM

## 2018-03-26 DIAGNOSIS — H35.012 RETINAL MACROANEURYSM OF LEFT EYE: ICD-10-CM

## 2018-03-26 DIAGNOSIS — H35.033 HYPERTENSIVE RETINOPATHY OF BOTH EYES: ICD-10-CM

## 2018-03-26 DIAGNOSIS — H40.1132 PRIMARY OPEN ANGLE GLAUCOMA OF BOTH EYES, MODERATE STAGE: ICD-10-CM

## 2018-03-26 PROBLEM — H40.1130 PRIMARY OPEN ANGLE GLAUCOMA (POAG) OF BOTH EYES: Status: ACTIVE | Noted: 2018-03-26

## 2018-03-26 PROCEDURE — 92014 COMPRE OPH EXAM EST PT 1/>: CPT | Mod: S$GLB,,, | Performed by: OPHTHALMOLOGY

## 2018-03-26 PROCEDURE — 99499 UNLISTED E&M SERVICE: CPT | Mod: S$GLB,,, | Performed by: OPHTHALMOLOGY

## 2018-03-26 PROCEDURE — 92134 CPTRZ OPH DX IMG PST SGM RTA: CPT | Mod: S$GLB,,, | Performed by: OPHTHALMOLOGY

## 2018-03-26 PROCEDURE — 92226 PR SPECIAL EYE EXAM, SUBSEQUENT: CPT | Mod: 50,S$GLB,, | Performed by: OPHTHALMOLOGY

## 2018-03-26 PROCEDURE — 99999 PR PBB SHADOW E&M-EST. PATIENT-LVL II: CPT | Mod: PBBFAC,,, | Performed by: OPHTHALMOLOGY

## 2018-03-26 RX ORDER — LATANOPROST 50 UG/ML
1 SOLUTION/ DROPS OPHTHALMIC NIGHTLY
Qty: 3 BOTTLE | Refills: 3 | Status: SHIPPED | OUTPATIENT
Start: 2018-03-26 | End: 2018-11-13 | Stop reason: SDUPTHER

## 2018-03-26 NOTE — PROGRESS NOTES
HPI     DLS:           3/21/17 ----Dr Hall     Patient states that VA is stable OU---denies flashes or floaters     S/P phaco w/IOL OS 11/12/15  S/P phaco w/IOL OD 10/01/15      LBS: this morning 118 am      OCT - OD- stable perifoveal OD  OS - No ME< small ERM    Prior FA Today: MA's OU  GILLIAN OS - No NV OU    A/P    1. DME OD  -Pt w/ 20/25 OU vision after cataract surgery  -Previous OCT with cystic spaces centrally & temporal to fovea; exudates temporal to mac  -S/p Avastin x 4 OD last 3/18/14  S/p focal 4/14  - mild edema  OD temporal to fovea, monitor today      2. Mild NPDR OU  -Most recent HbA1c (12/21/16): 6.8%  T2 controlled, no insulin  -Continue BS/BP/chol control.  -Monitor    3.POAG OU  -Pt using Xalatan QHS OU: hx of good response  -Being followed by Dr. Jiménez.     4. PCIOL OU  - had done with Dr Ferguson     5. GILLIAN OS  No macular threatening  SRH improving      12 months OCT

## 2018-04-03 ENCOUNTER — OFFICE VISIT (OUTPATIENT)
Dept: INTERNAL MEDICINE | Facility: CLINIC | Age: 83
End: 2018-04-03
Payer: MEDICARE

## 2018-04-03 DIAGNOSIS — I25.5 ISCHEMIC CARDIOMYOPATHY: Chronic | ICD-10-CM

## 2018-04-03 DIAGNOSIS — E11.40 DIABETIC NEUROPATHY WITH NEUROLOGIC COMPLICATION: ICD-10-CM

## 2018-04-03 DIAGNOSIS — E11.49 DIABETIC NEUROPATHY WITH NEUROLOGIC COMPLICATION: ICD-10-CM

## 2018-04-03 DIAGNOSIS — E11.9 DIABETES MELLITUS WITHOUT COMPLICATION: Primary | ICD-10-CM

## 2018-04-03 DIAGNOSIS — I11.0 HYPERTENSIVE HEART DISEASE WITH CONGESTIVE HEART FAILURE: ICD-10-CM

## 2018-04-03 PROCEDURE — 99999 PR PBB SHADOW E&M-EST. PATIENT-LVL IV: CPT | Mod: PBBFAC,,, | Performed by: INTERNAL MEDICINE

## 2018-04-03 PROCEDURE — 99215 OFFICE O/P EST HI 40 MIN: CPT | Mod: S$GLB,,, | Performed by: INTERNAL MEDICINE

## 2018-04-03 PROCEDURE — 99499 UNLISTED E&M SERVICE: CPT | Mod: S$GLB,,, | Performed by: INTERNAL MEDICINE

## 2018-04-03 RX ORDER — METOPROLOL SUCCINATE 50 MG/1
50 TABLET, EXTENDED RELEASE ORAL DAILY
Qty: 90 TABLET | Refills: 1 | Status: SHIPPED | OUTPATIENT
Start: 2018-04-03 | End: 2018-08-03 | Stop reason: SDUPTHER

## 2018-04-03 RX ORDER — FUROSEMIDE 40 MG/1
40 TABLET ORAL 2 TIMES DAILY
Qty: 180 TABLET | Refills: 1 | Status: SHIPPED | OUTPATIENT
Start: 2018-04-03 | End: 2018-08-03 | Stop reason: SDUPTHER

## 2018-04-03 RX ORDER — GLIPIZIDE 2.5 MG/1
2.5 TABLET, EXTENDED RELEASE ORAL DAILY
Qty: 90 TABLET | Refills: 1 | Status: SHIPPED | OUTPATIENT
Start: 2018-04-03 | End: 2018-08-03 | Stop reason: SDUPTHER

## 2018-04-03 RX ORDER — LISINOPRIL 40 MG/1
40 TABLET ORAL DAILY
Qty: 90 TABLET | Refills: 1 | Status: SHIPPED | OUTPATIENT
Start: 2018-04-03 | End: 2018-08-03 | Stop reason: SDUPTHER

## 2018-04-03 RX ORDER — LANCETS
EACH MISCELLANEOUS
Qty: 100 EACH | Refills: 12 | Status: SHIPPED | OUTPATIENT
Start: 2018-04-03 | End: 2018-08-28 | Stop reason: SDUPTHER

## 2018-04-03 RX ORDER — AMLODIPINE BESYLATE 10 MG/1
10 TABLET ORAL DAILY
Qty: 90 TABLET | Refills: 1 | Status: SHIPPED | OUTPATIENT
Start: 2018-04-03 | End: 2018-08-03 | Stop reason: SDUPTHER

## 2018-04-03 RX ORDER — ATORVASTATIN CALCIUM 80 MG/1
80 TABLET, FILM COATED ORAL DAILY
Qty: 90 TABLET | Refills: 1 | Status: SHIPPED | OUTPATIENT
Start: 2018-04-03 | End: 2018-08-03 | Stop reason: SDUPTHER

## 2018-04-08 VITALS
DIASTOLIC BLOOD PRESSURE: 76 MMHG | SYSTOLIC BLOOD PRESSURE: 138 MMHG | BODY MASS INDEX: 24.55 KG/M2 | TEMPERATURE: 98 F | HEIGHT: 68 IN | HEART RATE: 62 BPM | WEIGHT: 162 LBS | OXYGEN SATURATION: 98 %

## 2018-04-08 NOTE — PROGRESS NOTES
Subjective:       Patient ID: Jeniffer Taylor is a 89 y.o. male.    Chief Complaint: Diabetes    HPI   He has diabetes.  Denies polyuria, polydipsia    PAST MEDICAL HISTORY: Hypertension, diabetes with retinopathy and neuropathy ,  hyperlipidemia, congestive heart failure, prostate cancer, aortic stenosis, anemia, glaucoma, lumbar spinal stenosis, thyroid nodule,  coronary artery disease. He had a colonoscopy February 2011     MEDICATIONS: GlucotrolXL 2.5 mg daily,lisinopril 40mg daily ,Lipitor 80 mg daily,xalatan ,aspirin one by mouth daily, Lasix 40 mg twice a day, Toprol-XL 50 mg daily, Norvasc 10 mg daily ,casodex    ALLERGIES: No known drug allergies.        Review of Systems   Constitutional: Negative for chills, fatigue, fever and unexpected weight change.   Respiratory: Negative for chest tightness and shortness of breath.    Cardiovascular: Negative for chest pain and palpitations.   Gastrointestinal: Negative for abdominal pain and blood in stool.   Neurological: Negative for dizziness, syncope, numbness and headaches.       Objective:      Physical Exam   HENT:   Right Ear: External ear normal.   Left Ear: External ear normal.   Nose: Nose normal.   Mouth/Throat: Oropharynx is clear and moist.   Eyes: Pupils are equal, round, and reactive to light.   Neck: Normal range of motion.   Cardiovascular: Normal rate and regular rhythm.    Murmur heard.  Pulmonary/Chest: Breath sounds normal.   Abdominal: He exhibits no distension. There is no hepatomegaly. There is no tenderness.   Lymphadenopathy:     He has no cervical adenopathy.     He has no axillary adenopathy.   Neurological: He has normal strength and normal reflexes. No cranial nerve deficit or sensory deficit.       Assessment/Plan       Assessment and plan: 1.  Diabetes: Check glycosylated hemoglobin.  Discussed podiatry appointment, he declined  2.  Congestive heart failure: Check CMP

## 2018-06-07 ENCOUNTER — PES CALL (OUTPATIENT)
Dept: ADMINISTRATIVE | Facility: CLINIC | Age: 83
End: 2018-06-07

## 2018-06-07 ENCOUNTER — TELEPHONE (OUTPATIENT)
Dept: INTERNAL MEDICINE | Facility: CLINIC | Age: 83
End: 2018-06-07

## 2018-06-07 ENCOUNTER — OFFICE VISIT (OUTPATIENT)
Dept: INTERNAL MEDICINE | Facility: CLINIC | Age: 83
End: 2018-06-07
Payer: MEDICARE

## 2018-06-07 VITALS
HEIGHT: 68 IN | OXYGEN SATURATION: 98 % | BODY MASS INDEX: 24.1 KG/M2 | WEIGHT: 159 LBS | TEMPERATURE: 98 F | SYSTOLIC BLOOD PRESSURE: 140 MMHG | DIASTOLIC BLOOD PRESSURE: 80 MMHG | HEART RATE: 64 BPM

## 2018-06-07 DIAGNOSIS — R29.6 FREQUENT FALLS: ICD-10-CM

## 2018-06-07 DIAGNOSIS — I50.9 CONGESTIVE HEART FAILURE, UNSPECIFIED HF CHRONICITY, UNSPECIFIED HEART FAILURE TYPE: Primary | ICD-10-CM

## 2018-06-07 DIAGNOSIS — R29.898 WEAKNESS OF BOTH LOWER EXTREMITIES: Primary | ICD-10-CM

## 2018-06-07 LAB
BILIRUB SERPL-MCNC: NORMAL MG/DL
BLOOD URINE, POC: NORMAL
COLOR, POC UA: YELLOW
GLUCOSE UR QL STRIP: NORMAL
KETONES UR QL STRIP: NORMAL
LEUKOCYTE ESTERASE URINE, POC: NORMAL
NITRITE, POC UA: NORMAL
PH, POC UA: 5
PROTEIN, POC: NORMAL
SPECIFIC GRAVITY, POC UA: 1.01
UROBILINOGEN, POC UA: NORMAL

## 2018-06-07 PROCEDURE — 99999 PR PBB SHADOW E&M-EST. PATIENT-LVL IV: CPT | Mod: PBBFAC,,, | Performed by: INTERNAL MEDICINE

## 2018-06-07 PROCEDURE — 81002 URINALYSIS NONAUTO W/O SCOPE: CPT | Mod: S$GLB,,, | Performed by: INTERNAL MEDICINE

## 2018-06-07 PROCEDURE — 99213 OFFICE O/P EST LOW 20 MIN: CPT | Mod: 25,S$GLB,, | Performed by: INTERNAL MEDICINE

## 2018-06-07 NOTE — TELEPHONE ENCOUNTER
----- Message from Ananya Cuadra sent at 6/7/2018  9:25 AM CDT -----  Contact: Temi daughter in law [phone]  Pt's daughter in law called to advise you that patient fell twice this morning . He was feeling weak and his legs gave out. Please call about fitting him in today and also for advice. I offered an appt with another doctor and pt refused. She will be waiting for your call.

## 2018-06-07 NOTE — TELEPHONE ENCOUNTER
SPOKE WITH PT DAUGHTER IN LAW NOTIFIED HER DR GARCIA WAS BOOKED [facility] APPT SCHEDULED WITH DIFFERENT PROVIDER

## 2018-06-07 NOTE — PROGRESS NOTES
Subjective:       Patient ID: Jeniffer Taylor is a 89 y.o. male.    Chief Complaint: Extremity Weakness    Extremity Weakness    The pain is present in the left lower leg and right lower leg. This is a chronic problem. The current episode started 1 to 4 weeks ago. The problem occurs 2 to 4 times per day. The problem has been gradually worsening. The quality of the pain is described as aching. The pain is at a severity of 3/10. The pain is mild. Associated symptoms include numbness and tingling. Pertinent negatives include no fever or limited range of motion. Associated symptoms comments: Can only stand for about 2-3 minutes and then must sit down or fall. He has tried nothing for the symptoms. The treatment provided no relief. His past medical history is significant for diabetes and osteoarthritis.     Review of Systems   Constitutional: Negative for activity change, appetite change and fever.   HENT: Negative for congestion, postnasal drip and sore throat.    Respiratory: Negative for cough, shortness of breath and wheezing.    Cardiovascular: Negative for chest pain and palpitations.   Gastrointestinal: Negative for abdominal pain, blood in stool, constipation, diarrhea, nausea and vomiting.   Genitourinary: Negative for decreased urine volume, difficulty urinating, flank pain and frequency.   Musculoskeletal: Positive for extremity weakness. Negative for arthralgias.   Neurological: Positive for tingling and numbness. Negative for dizziness, weakness and headaches.       Objective:      Physical Exam   Constitutional: He appears well-developed and well-nourished. He is cooperative.   Musculoskeletal:        Legs:  Neurological: He is alert.       Assessment:       1. Weakness of both lower extremities    2. Frequent falls        Plan:   Jeniffer was seen today for extremity weakness.    Diagnoses and all orders for this visit:    Weakness of both lower extremities  -     POCT urine dipstick without microscope  -      Ambulatory consult to Physical Therapy    Frequent falls  -     Ambulatory consult to Physical Therapy

## 2018-06-08 NOTE — TELEPHONE ENCOUNTER
atempted to reach out to case management twice unable to leave a voicemail   pls enter order through [facility] per peg bpard

## 2018-06-08 NOTE — TELEPHONE ENCOUNTER
----- Message from Cyrus Pantoja sent at 6/8/2018  1:00 PM CDT -----  Contact: Daughter Marisela Carbajal [phone]  Daughter of patient requesting a call back from Nurse, stating would like to set patient up with several different service but need to speak with Nurse first. Benoit Carbajal [phone].    Please call and advise  Thank you

## 2018-06-08 NOTE — TELEPHONE ENCOUNTER
Spoke with pt daughter and she stated that pt had a visit on yesterday with dr salvador who recommended physical therapy to the pt but no order was placed  Daughter stated she's working with the pt on getting an appointment but need to know the next steps  She is requesting a call from dr hernandez she stated you guys met on previous occasions and would like to discuss a couple things with you   CASEY CANADA [phone]

## 2018-06-11 ENCOUNTER — OUTPATIENT CASE MANAGEMENT (OUTPATIENT)
Dept: ADMINISTRATIVE | Facility: OTHER | Age: 83
End: 2018-06-11

## 2018-06-11 ENCOUNTER — TELEPHONE (OUTPATIENT)
Dept: INTERNAL MEDICINE | Facility: CLINIC | Age: 83
End: 2018-06-11

## 2018-06-11 NOTE — TELEPHONE ENCOUNTER
----- Message from Megha Pena LMSW sent at 6/11/2018  8:15 AM CDT -----  Thank you for the referral.  Patient has been assigned to Gifty Le LMSW for low risk screening for Outpatient Case Management.     Reason for referral: Congestive heart failure, unspecified HF chronicity, unspecified heart failure type    Please contact [facility] at ext. [phone] with any questions.    Thank you,    Megha Pena LMSW

## 2018-06-11 NOTE — PROGRESS NOTES
Thank you for the referral.  Patient has been assigned to Gifty Le LMSW for low risk screening for Outpatient Case Management.     Reason for referral: Congestive heart failure, unspecified HF chronicity, unspecified heart failure type    Please contact [facility] at ext. [phone] with any questions.    Thank you,    Megha Pena LMSW

## 2018-06-11 NOTE — TELEPHONE ENCOUNTER
----- Message from Gifty Le LMSW sent at 6/11/2018  3:15 PM CDT -----  This LMSW received a referral on the above patient. This LMSW provided patient/caregiver with the following resource: Emergency Alert System, NOCOA, Advance Directives, Via Link, Private Hire Aids, [employer] Transportation, [employer] Silver Sneaker, Senior Centers, Senior Resource Guide, [facility], [facility] Chairties RTA application , Rebuild Together , [facility]. The resource can be located within [facility] under the Health , Care, Transit, Goods category.    Thank you for the referral,    Gifty Le LMSW

## 2018-06-11 NOTE — PROGRESS NOTES
Attempt #:  1  This LMSW attempted to reach patient/caregiver to provide resource. Patient ask that this LMSW complete assessment with his daughter Mari. LMSW attempt to reach Marisela . No answer. LMSW  left a message requesting a return call.        This LMSW received a referral on the above patient.   Reason for referral:Congestive heart failure, unspecified HF chronicity, unspecified heart failure type   Name of the community resource that was provided:Emergency Alert System, NOCOA, Advance Directives, Via Link, Private Hire Aids, [employer] Transportation, [employer] Silver Sneaker, Senior Centers, Senior Resource Guide, [facility]  Services, [employer] Chairties RTA application , Rebuild Together , [facility]  Resource given to: Patient daughter via US mal and Telephone    LMSW received a return call from patient daughter. LMSW completed  assessment with patient daughter. Daughter reports patient resides alone. Daughter  seeking resources that assist with medical alert button. LMSW provided resources for Emergency Alert . Daughter reports patient ambulates with a cane. Daughter would like a new cane for patient, however does not want it to be billed through insurance.  LMSW provided daughter with information for [facility] to assist with DME .   Daughter reports patient is able to complete ADL's. Daughter denied patient needs assistance with food, shelter, medication or medical. Daughter reports patient has all his diabetic material. Daughter denied a referral to Diabetes Educator. Daughter reports blood sugars are controlled. Daughter seeking additional assistance to assist with meal preparation. LMSW provided resources through [facility]  [facility]. Daughter seeking a volunteer group to assist with the up keeps of patient garden. LMW encouraged daughter to contact [employer] or [employer] to assist. Daughter reports she provides transportation to and from  appointments, however seeking additional transit resources. [facility] sent an RTA application to assist with transit resources. [facility] also provided [employer] transportation resources to assist as well. Daughter reports patient does not have an Advance Directives in place , however would like information on Advance Directive planning. [facility] contacted the physical therapy department on [address] with patient daughter to assist with scheduling consult. Patient is schedule with Physical Therapy on 06/15/2018. [facility] mailed all resources to daughter address as instructed to [address] [place], [address]

## 2018-06-15 ENCOUNTER — CLINICAL SUPPORT (OUTPATIENT)
Dept: REHABILITATION | Facility: HOSPITAL | Age: 83
End: 2018-06-15
Payer: MEDICARE

## 2018-06-15 DIAGNOSIS — R29.6 FREQUENT FALLS: ICD-10-CM

## 2018-06-15 PROCEDURE — G8979 MOBILITY GOAL STATUS: HCPCS | Mod: CJ,PO | Performed by: PHYSICAL THERAPIST

## 2018-06-15 PROCEDURE — G8978 MOBILITY CURRENT STATUS: HCPCS | Mod: CK,PO | Performed by: PHYSICAL THERAPIST

## 2018-06-15 PROCEDURE — 97162 PT EVAL MOD COMPLEX 30 MIN: CPT | Mod: PO | Performed by: PHYSICAL THERAPIST

## 2018-06-27 ENCOUNTER — CLINICAL SUPPORT (OUTPATIENT)
Dept: REHABILITATION | Facility: HOSPITAL | Age: 83
End: 2018-06-27
Payer: MEDICARE

## 2018-06-27 DIAGNOSIS — R29.6 FREQUENT FALLS: Primary | ICD-10-CM

## 2018-06-27 PROCEDURE — 97110 THERAPEUTIC EXERCISES: CPT | Mod: PO

## 2018-06-27 PROCEDURE — 97112 NEUROMUSCULAR REEDUCATION: CPT | Mod: PO

## 2018-06-27 NOTE — PROGRESS NOTES
"  Physical Therapy Daily Treatment Note     Name: Jeniffer Taylor  Clinic Number: [id number]    Therapy Diagnosis:   Encounter Diagnosis   Name Primary?    Frequent falls Yes     Physician: Jacy Ocampo MD    Visit Date: 6/27/2018  Evaluation Date: 6/15/18  Visit # authorized: 2/20  Authorization period: 6/15/18-12/31/18  Plan of care Expiration: 8/15/18  MD referral: yes  Time In: 8:45  Time Out: 9:45  Treatment time: 55     Precautions: Fall    Subjective      Pt reports: mild lower leg/ankle discomfort. He c/o generalized fatigue/weakness and decreased balance. States that he uses both the walker nad cane to walk around his house.     Pain level : 1/10 to lower legs/ankles       Objective    Patient to clinic ambulating Modified independently with a rollator walker with his daughter present.    Bp prior to Tx = 121/70 HR = 48  BP post Tx = 121/63 Hr = 50    Jeniffer received therapeutic exercises to develop LE strength and endurance for 40minutes including:    Recumbent bike L3 x 5 min      Supine ex's:   · Bridging 2 x 10  · Hooklying hip abd with GTB 2 x 10  · Hooklying hip add iso 2 x 10    Seated ex's:   · LAQ 2 x 10  · Repeated sit<->stand from mat @ 20" height  With min use of UE's 2 x 5 trials ( cuse for eccentric control)  · Heel/toe raises x 20     Standing ex's:   · Heel raises x 10 ( Minimal lift off)   · Hip flex/ext and abd x 10 each      Jeniffer participated in neuromuscular re-education activities to improve: Balance, Coordination, Kinesthetic, Sense and Proprioception for 15 minutes. The following activities were included:  Rocker board weight shifts A/P and laterally x 20 each  Tandem stance 2 x 20 sec each ( CGA)  Alt heel tapping onto 4" step x 10 each (CGA)  Lateral band walk with YTB 2 laps of 10 feet with light UE support      Written Home Exercises Provided: Patient educated in and provided with an updated copy of HEP . See patient instructions.  Ex's were also reviewed with pt's daughter " with good understanding.   Exercises were reviewed and Jeniffer was able to demonstrate them prior to the end of the session.  Jeniffer demonstrated good  understanding of the education provided.    Jeniffer demonstrated good  understanding of the education provided.     Assessment     Patient rhoda Tx fairly well.  He was able to perform the above ex's/activities within tolerable muscular fatigue and without increased pain.Intermittent rest breaks provided due to fatigue.  Minimal  difficulty with eccentric control for stand-->sit transition. Some gastroc weakness in standing and was unable to complete full ROM.. Min unsteadiness for neuromuscular balance activities. Continues to benefit from use of his walker to reduce his fall risk. WIll monitor and attempt to progress as tolerated.    Pt will continue to benefit from skilled outpatient physical therapy to address the deficits listed in the problem list box on initial evaluation, provide pt/family education and to maximize pt's level of independence in the home and community environment.     Short Term GOALS: 4 weeks. Pt agrees with goals set.  1. Improve LE MMT by 1/2 grade  2. Improve balance strategies to good  3. Pt I with HEP     Long Term GOALS: 8  weeks. Pt agrees with goals set.  1. Pt able to tolerate 45 min of gym exercise  2. Pt to report no falls during episode of care due to implementation of balance strategies   3. Pt to improve gait endurance to 200' with least restrictive AD    Plan     Continue PT towards established plan of care and goals.     Raz Fernando, PTA

## 2018-06-29 ENCOUNTER — CLINICAL SUPPORT (OUTPATIENT)
Dept: REHABILITATION | Facility: HOSPITAL | Age: 83
End: 2018-06-29
Payer: MEDICARE

## 2018-06-29 DIAGNOSIS — R29.6 FREQUENT FALLS: ICD-10-CM

## 2018-06-29 PROCEDURE — 97110 THERAPEUTIC EXERCISES: CPT | Mod: PO

## 2018-06-29 PROCEDURE — 97112 NEUROMUSCULAR REEDUCATION: CPT | Mod: PO

## 2018-06-29 NOTE — PROGRESS NOTES
"  Physical Therapy Daily Treatment Note     Name: Jeniffer Alvaradoland  Clinic Number: [id number]    Therapy Diagnosis:   Encounter Diagnosis   Name Primary?    Frequent falls      Physician: Jacy Ocampo MD    Visit Date: 6/29/2018  Evaluation Date: 6/15/18  Visit # authorized: 2/20  Authorization period: 6/15/18-12/31/18  Plan of care Expiration: 8/15/18  MD referral: yes  Time In: 8:15 ( Patient with late arrival)   Time Out: 9:00  Treatment time: 45  1:1 time: 25     Precautions: Fall    Subjective      Pt reports: mild lower leg/ankle discomfort.  He reports no problems after last session and reports min generalized stiffness this morning.     Pain level : 1/10 to lower legs/ankles       Objective    Patient to clinic ambulating Modified independently with a rollator walker with his daughter present.      BP post TX = 119/64 Hr = 67    Jeniffer received therapeutic exercises to develop LE strength and endurance for 40minutes including:    Recumbent bike L3 x 5 min      Supine ex's:   · Bridging 2 x 10--NP  · Hooklying hip abd with GTB 2 x 10--NP  · Hooklying hip add iso 2 x 10--NP    Seated ex's:   · LAQ with 1# 2 x 10  · Repeated sit<->stand from mat @ 20" height  With min use of UE's 2 x 5 trials ( cuse for eccentric control)  · Heel/toe raises x 20     Standing ex's:   · Heel raises x 12 ( Minimal lift off)   · Hip flex/ext and abd x 12 each      Jeniffer participated in neuromuscular re-education activities to improve: Balance, Coordination, Kinesthetic, Sense and Proprioception for 15 minutes. The following activities were included:  Rocker board weight shifts A/P and laterally x 20 each  Tandem stance 2 x 20 sec each ( CGA)  Alt heel tapping onto 4" step x 10 each (CGA)  Lateral band walk with OTB 2 laps of 10 feet with light UE support      Written Home Exercises Provided: Patient educated to continue with previously issued HEP to tolerance. Exercises were reviewed and Jeniffer was able to demonstrate them prior to " the end of the session.  Jeniffer demonstrated good  understanding of the education provided.    Jeniffer demonstrated good  understanding of the education provided.     Assessment      Patient rhoda Tx fairly well.  Some limitations with full session due to pt's late arrival today. He was able to perform and progress slightly with some of tthe above ex's/activities within tolerable muscular fatigue and without increased pain.Brea has some difficulty with eccentric control for stand-->sit transition. .. Min unsteadiness for neuromuscular balance activities. Continues to benefit from use of his walker to reduce his fall risk. WIll monitor and attempt to progress as tolerated.    Pt will continue to benefit from skilled outpatient physical therapy to address the deficits listed in the problem list box on initial evaluation, provide pt/family education and to maximize pt's level of independence in the home and community environment.     Short Term GOALS: 4 weeks. Pt agrees with goals set.  1. Improve LE MMT by 1/2 grade  2. Improve balance strategies to good  3. Pt I with HEP     Long Term GOALS: 8  weeks. Pt agrees with goals set.  1. Pt able to tolerate 45 min of gym exercise  2. Pt to report no falls during episode of care due to implementation of balance strategies   3. Pt to improve gait endurance to 200' with least restrictive AD    Plan     Continue PT towards established plan of care and goals.     Raz Fernando, PTA

## 2018-07-02 ENCOUNTER — CLINICAL SUPPORT (OUTPATIENT)
Dept: REHABILITATION | Facility: HOSPITAL | Age: 83
End: 2018-07-02
Payer: MEDICARE

## 2018-07-02 DIAGNOSIS — R29.6 FREQUENT FALLS: ICD-10-CM

## 2018-07-02 PROCEDURE — 97112 NEUROMUSCULAR REEDUCATION: CPT | Mod: PO

## 2018-07-02 PROCEDURE — 97110 THERAPEUTIC EXERCISES: CPT | Mod: PO

## 2018-07-02 NOTE — PROGRESS NOTES
"  Physical Therapy Daily Treatment Note     Name: Jeniffer Alvaradoland  Clinic Number: [id number]    Therapy Diagnosis:   No diagnosis found.  Physician: Jacy Ocampo MD    Visit Date: 7/2/2018  Evaluation Date: 6/15/18  Visit # authorized: 2/20  Authorization period: 6/15/18-12/31/18  Plan of care Expiration: 8/15/18  MD referral: yes  Time In: 8:45 ( Patient with  Earlyarrival)   Time Out: 9:45  Treatment time: 60  1:1 time: 60     Precautions: Fall    Subjective      Pt reports: he notices increased LE stiffness after extended periods of sitting. Reports he is generally pain free however he does notice some slight soreness in bilat ankles after extended periods of walking.  Pt reports he feels his R LE is weaker compared to the L and at times feels as if it is going to give out.    Pain level : 1/10 to lower legs/ankles       Objective    Patient to clinic ambulating Modified independently with a rollator walker with his daughter present.    Jeniffer received therapeutic exercises to develop LE strength and endurance for 30 minutes including:    Recumbent bike L3 x 5 min      Supine ex's:   · Bridging 2 x 10--NP  · Hooklying hip abd with GTB 2 x 10  · Hooklying hip add iso 2 x 10    Seated ex's:   · LAQ with 1# 2 x 10  · Repeated sit<->stand from mat @ 20" height  With min use of UE's 2 x 5 trials ( cuse for eccentric control)  · Heel/toe raises x 20     Standing ex's  · Heel raises x 12 (minimal lift off) --NP  · Hip flex/ext and abd x 12 each--NP        Jeniffer participated in neuromuscular re-education activities to improve: Balance, Coordination, Kinesthetic, Sense and Proprioception for 30 minutes. The following activities were included:  Rocker board weight shifts A/P and laterally x 20 each  Tandem stance 2 x 20 sec each ( CGA)  Alt heel tapping onto 4" step x 10 each (CGA)  Lateral band walk with OTB 2 laps of 10 feet with light UE support  Seated on stability ball:   Pt performed 2 x 10 toe taps on 2" step  " (utiilzed 1 UE support)  Pt maintained seated balance on stability ball for 20 sec with manual perturbations from PT (no UE support)  5min of amb with single point cane (requires cues for sequencing)      Written Home Exercises Provided: Patient educated to continue with previously issued HEP to tolerance. Exercises were reviewed and Jeniffer was able to demonstrate them prior to the end of the session.  Jeniffer demonstrated good  understanding of the education provided. Pt and daughter were educated on proper AD use and strategies to reduce fall risk during transfers and while navigating his house    Assessment      Pt demonstrates good tolerance to all familiar and new exercises. Requires sit breaks in between exercises that involve standing/ walking. While utilizing single point cane pt reports he feels more comfortable with it in his R hand. Pt and pt's daughter were educated it would be safest to utilize the cane in his L hand due to R LE weakness, however discussed with pt not making any changes to AD use until he felt comfortable doing so. Pt will continue to benefit from balance progressions with focus on balance pertaining to ADLs. Will continue to progress strengthening/ endurance training to pt tolerance.      Short Term GOALS: 4 weeks. Pt agrees with goals set.  1. Improve LE MMT by 1/2 grade  2. Improve balance strategies to good  3. Pt I with HEP     Long Term GOALS: 8  weeks. Pt agrees with goals set.  1. Pt able to tolerate 45 min of gym exercise  2. Pt to report no falls during episode of care due to implementation of balance strategies   3. Pt to improve gait endurance to 200' with least restrictive AD    Plan     Continue PT towards established plan of care and goals. Add exercises to help pt navigate getting in and out of shower, revisit single point cane gait training    Francois Larios, PT

## 2018-07-06 ENCOUNTER — CLINICAL SUPPORT (OUTPATIENT)
Dept: REHABILITATION | Facility: HOSPITAL | Age: 83
End: 2018-07-06
Payer: MEDICARE

## 2018-07-06 DIAGNOSIS — R29.6 FREQUENT FALLS: ICD-10-CM

## 2018-07-06 PROCEDURE — 97112 NEUROMUSCULAR REEDUCATION: CPT | Mod: PO

## 2018-07-06 PROCEDURE — 97110 THERAPEUTIC EXERCISES: CPT | Mod: PO

## 2018-07-06 NOTE — PROGRESS NOTES
"  Physical Therapy Daily Treatment Note     Name: Jeniffer Alvaradoland  Clinic Number: [id number]    Therapy Diagnosis:   Encounter Diagnosis   Name Primary?    Frequent falls      Physician: Jacy Ocampo MD    Visit Date: 7/6/2018  Evaluation Date: 6/15/18  Visit # authorized: 4/20  Authorization period: 6/15/18-12/31/18  Plan of care Expiration: 8/15/18  MD referral: yes  Time In:9:00   Time Out: 10:00  Treatment time: 55  1:1 time: 30     Precautions: Fall    Subjective      Pt reports: mild lower leg/ankle discomfort. Min c/o fatigue this morning.     Pain level : 1/10 to lower legs/ankles       Objective    Patient to clinic ambulating Modified independently with a rollator walker with his daughter present.       Jeniffer received therapeutic exercises to develop LE strength and endurance for 30 minutes including:    Recumbent bike L3 x 8 min      Supine ex's:   · Bridging 2 x 10   · Hooklying hip abd with BTB 2 x 10-   · Hooklying hip add iso 2 x 10     Seated ex's:   · LAQ with 2# 2 x 10  · Repeated sit<->stand from mat @ 20" height  With min use of UE's 2 x 5 trials ( cuse for eccentric control)  · Heel/toe raises x 20     Standing ex's:   · Heel raises x 15( Minimal lift off)   · Hip flex/ext and abd x 15 each  · Forward step ups (6") x 10 each      Jeniffer participated in neuromuscular re-education activities to improve: Balance, Coordination, Kinesthetic, Sense and Proprioception for 15 minutes. The following activities were included:  Rocker board weight shifts A/P and laterally x 20 each  Tandem stance 2 x 20 sec each ( CGA)  Alt heel tapping onto 6" step x 10 each (CGA)--NP  Lateral band walk with OTB 2 laps of 10 feet with light UE support--NP  Lateral and forward step over hurdles with light UE support  X 4 laps (SBA)    Patient instructed in and perform Gait training with a SC on (R) ~ 160 feet x 2 on level surfaces with supervision and  SBA for gait UP/down 4 steps with cane and rail ( step-to pattern)    "   Written Home Exercises Provided: Patient educated to continue with previously issued HEP to tolerance. Exercises were reviewed and Jeniffer was able to demonstrate them prior to the end of the session.  Jeniffer demonstrated good  understanding of the education provided.    Jeniffer demonstrated good  understanding of the education provided.     Assessment      Patient rhoda Tx fairly well.   He was able to perform and progress slightly with some of the above ex's/activities within tolerable muscular fatigue and without increased pain.  He was able to demonstrate some functional progress with his ability to ambulate with his cane requiring supervision on level surfaces but remains safer using his walker at this time to reduce his fall risk.  Still has some difficulty with eccentric control for stand-->sit transition but appeared slightly improved from previous visits..   Mild unsteadiness for neuromuscular balance activities including the addition of lateral and forward stepping over hurdles today.. WIll monitor and attempt to progress as tolerated.    Pt will continue to benefit from skilled outpatient physical therapy to address the deficits listed in the problem list box on initial evaluation, provide pt/family education and to maximize pt's level of independence in the home and community environment.     Short Term GOALS: 4 weeks. Pt agrees with goals set.  1. Improve LE MMT by 1/2 grade  2. Improve balance strategies to good  3. Pt I with HEP     Long Term GOALS: 8  weeks. Pt agrees with goals set.  1. Pt able to tolerate 45 min of gym exercise  2. Pt to report no falls during episode of care due to implementation of balance strategies   3. Pt to improve gait endurance to 200' with least restrictive AD    Plan     Continue PT towards established plan of care and goals.     Raz Fernando, PTA

## 2018-07-09 ENCOUNTER — CLINICAL SUPPORT (OUTPATIENT)
Dept: REHABILITATION | Facility: HOSPITAL | Age: 83
End: 2018-07-09
Payer: MEDICARE

## 2018-07-09 DIAGNOSIS — R29.6 FREQUENT FALLS: ICD-10-CM

## 2018-07-09 PROCEDURE — 97112 NEUROMUSCULAR REEDUCATION: CPT | Mod: PO

## 2018-07-09 PROCEDURE — 97110 THERAPEUTIC EXERCISES: CPT | Mod: PO

## 2018-07-09 NOTE — PROGRESS NOTES
"  Physical Therapy Daily Treatment Note     Name: Jeniffer Taylor  Clinic Number: [id number]    Therapy Diagnosis:   No diagnosis found.  Physician: Jacy Ocampo MD    Visit Date: 7/9/2018  Evaluation Date: 6/15/18  Visit # authorized: 5/20  Authorization period: 6/15/18-12/31/18  Plan of care Expiration: 8/15/18  MD referral: yes  Time In:9:52  Time Out: 10:52  Treatment time: 60  1:1 time: 60     Precautions: Fall    Subjective      Pt reports: Pt reports B ankle discomfort but no pain.  Pt reports fatigue, but no soreness following treatment.     Pain level : 1/10 to lower legs/ankles       Objective      Patient to clinic ambulating Modified independently with a rollator walker with his daughter present.     Jeniffer received therapeutic exercises to develop LE strength and endurance for 40 minutes including:    Recumbent bike L3 x 8 min      Supine ex's:   · Bridging 2 x 10   · Hooklying hip abd with BTB 3 x 10  · Hooklying hip add iso 2 x 10     Seated ex's:   · LAQ with 2# 2 x 10  · Repeated sit<->stand from mat @ 20" height  With min use of UE's 2 x 5 trials ( cuse for eccentric control)  · Heel/toe raises x 20     Standing ex's:   · Heel raises x 15( Minimal lift off)   · Hip flex/ext and abd x 15 each        Jeniffer participated in neuromuscular re-education activities to improve: Balance, Coordination, Kinesthetic, Sense and Proprioception for 20 minutes. The following activities were included:    Rocker board weight shifts A/P and laterally x 20 each  Tandem stance 2 x 30 sec each ( CGA)  Alt heel tapping onto 6" step x 10 each (CGA)  Forward step ups (6") x 10 each  Lateral band walk with OTB 2 laps of 10 feet with light UE support--NP  Lateral and forward step over hurdles with light UE support  X 4 laps (SBA)- NP  Amb x 100 ft with SC in L hand- pt able to perform correct sequencing without verbal cueing      Written Home Exercises Provided: Patient educated to continue with previously issued HEP to " tolerance. Exercises were reviewed and Jeniffer was able to demonstrate them prior to the end of the session.  Jeniffer demonstrated good  understanding of the education provided.    Jeniffer demonstrated good  understanding of the education provided.     Assessment      Patient rhoda Tx well with increased fatigue following treatment.  Pt required verbal cueing to slowly control his descent from standing to sitting.  Pt required multiple seated breaks throughout standing exercises.  Pt was able to ambulate with SC without cueing today; however, patient is much more safe with his walker.  WIll monitor and attempt to progress as tolerated.    Pt will continue to benefit from skilled outpatient physical therapy to address the deficits listed in the problem list box on initial evaluation, provide pt/family education and to maximize pt's level of independence in the home and community environment.     Short Term GOALS: 4 weeks. Pt agrees with goals set.  1. Improve LE MMT by 1/2 grade  2. Improve balance strategies to good  3. Pt I with HEP     Long Term GOALS: 8  weeks. Pt agrees with goals set.  1. Pt able to tolerate 45 min of gym exercise  2. Pt to report no falls during episode of care due to implementation of balance strategies   3. Pt to improve gait endurance to 200' with least restrictive AD    Plan     Continue PT towards established plan of care and goals.     Caryn Swift, PTA

## 2018-07-11 ENCOUNTER — CLINICAL SUPPORT (OUTPATIENT)
Dept: REHABILITATION | Facility: HOSPITAL | Age: 83
End: 2018-07-11
Payer: MEDICARE

## 2018-07-11 DIAGNOSIS — R29.6 FREQUENT FALLS: ICD-10-CM

## 2018-07-11 PROCEDURE — 97112 NEUROMUSCULAR REEDUCATION: CPT | Mod: PO | Performed by: PHYSICAL THERAPIST

## 2018-07-11 PROCEDURE — 97110 THERAPEUTIC EXERCISES: CPT | Mod: PO | Performed by: PHYSICAL THERAPIST

## 2018-07-16 ENCOUNTER — CLINICAL SUPPORT (OUTPATIENT)
Dept: REHABILITATION | Facility: HOSPITAL | Age: 83
End: 2018-07-16
Payer: MEDICARE

## 2018-07-16 DIAGNOSIS — R29.6 FREQUENT FALLS: ICD-10-CM

## 2018-07-16 PROCEDURE — 97112 NEUROMUSCULAR REEDUCATION: CPT | Mod: PO

## 2018-07-16 PROCEDURE — 97110 THERAPEUTIC EXERCISES: CPT | Mod: PO

## 2018-07-16 NOTE — PROGRESS NOTES
"  Physical Therapy Daily Treatment Note     Name: Jeniffer Alvaradoland  Clinic Number: [id number]    Therapy Diagnosis:   Encounter Diagnosis   Name Primary?    Frequent falls      Physician: Jacy Ocampo MD    Visit Date: 7/16/2018  Evaluation Date: 6/15/18  Visit # authorized: 8/20  Authorization period: 6/15/18-12/31/18  Plan of care Expiration: 8/15/18  MD referral: yes  Time In:9:00   Time Out: 10:00  Treatment time: 55      Precautions: Fall    Subjective      Pt reports: mild lower leg/ankle discomfort.     Pain level : 1/10 to lower legs/ankles       Objective    Patient to clinic ambulating Modified independently with a rollator walker with his daughter present.     Jeniffer received therapeutic exercises to develop LE strength and endurance for 3 minutes including:    Recumbent bike L3 x 10 min      Supine ex's:   · Bridging + iso hip abd BTB 2 x 10   · Hooklying hip abd with BTB 2 x 10-   · Hooklying hip add iso 2 x 10     Seated ex's:   · LAQ with 2.5# 2 x 10  · Repeated sit<->stand from mat @ 20" height  With min use of UE's x 10 trials ( cuse for eccentric control)  · Heel/toe raises x 20 --NP    Standing ex's:   · Heel raises x 15( Minimal lift off)   · Hip flex/ext and abd with 1# x 15 each  · Forward step ups (4") + blue oval foam pad x 10 each      Jeniffer participated in neuromuscular re-education activities to improve: Balance, Coordination, Kinesthetic, Sense and Proprioception for 25 minutes. The following activities were included:  Rocker board weight shifts A/P and laterally x 20 each  Tandem stance 2 x 20 sec each ( CGA)  Alt heel tapping onto 6" step x 10 each (CGA)--NP  Lateral band walk with OTB 2 laps of 10 feet with light UE support--NP  Lateral and forward step over hurdles with light UE support x 4 laps (SBA)  Patient instructed in and perform Gait training with a SC on (R) ~ 160 feet x 2 on level surfaces with supervision and  SBA for gait UP/down 4 steps with cane and rail ( step-to " pattern), Gait weaving in and out of cones (CGA)      Written Home Exercises Provided: Patient educated to continue with previously issued HEP to tolerance. Exercises were reviewed and Jeniffer was able to demonstrate them prior to the end of the session.  Jeniffer demonstrated good  understanding of the education provided.    Jeniffer demonstrated good  understanding of the education provided.     Face to face meeting confirmed with primary therapist on 7/11/18.   signed by Raz Fernando PTA    Assessment      Patient rhoda Tx fairly well.  Some unsteadiness noted for gait weaving in/out and around cones with SC and remains safer overall using his cane for gait activities.. He was able to perform and progress slightly with some of the above ex's/activities within tolerable muscular fatigue and without increased pain.   Still has some difficulty with eccentric control for stand-->sit transition requiring cues for technique/control.  Min unsteadiness for neuromuscular balance activities  . WIll monitor and attempt to progress as tolerated.    Pt will continue to benefit from skilled outpatient physical therapy to address the deficits listed in the problem list box on initial evaluation, provide pt/family education and to maximize pt's level of independence in the home and community environment.     Short Term GOALS: 4 weeks. Pt agrees with goals set.  1. Improve LE MMT by 1/2 grade MET  2. Improve balance strategies to good ongoing  3. Pt I with HEP MET     Long Term GOALS: 8  weeks. Pt agrees with goals set.  1. Pt able to tolerate 45 min of gym exercise MET  2. Pt to report no falls during episode of care due to implementation of balance strategies   3. Pt to improve gait endurance to 200' with least restrictive AD    Plan     Continue PT towards established plan of care and goals.     Raz Fernando PTA

## 2018-07-18 ENCOUNTER — CLINICAL SUPPORT (OUTPATIENT)
Dept: REHABILITATION | Facility: HOSPITAL | Age: 83
End: 2018-07-18
Payer: MEDICARE

## 2018-07-18 DIAGNOSIS — R29.6 FREQUENT FALLS: ICD-10-CM

## 2018-07-18 PROCEDURE — 97110 THERAPEUTIC EXERCISES: CPT | Mod: PO

## 2018-07-18 PROCEDURE — 97112 NEUROMUSCULAR REEDUCATION: CPT | Mod: PO

## 2018-07-18 NOTE — PROGRESS NOTES
"  Physical Therapy Daily Treatment Note     Name: Jeniffer Taylor  Clinic Number: [id number]    Therapy Diagnosis:   Encounter Diagnosis   Name Primary?    Frequent falls      Physician: aJcy Ocampo MD    Visit Date: 7/18/2018  Evaluation Date: 6/15/18  Visit # authorized: 9/20  Authorization period: 6/15/18-12/31/18  Plan of care Expiration: 8/15/18  MD referral: yes  Time In:9:00   Time Out: 10:00  Treatment time: 55      Precautions: Fall    Subjective      Pt reports: mild lower leg/ankle discomfort. States that he is trying to use his cane a little more around the house.     Pain level : 1/10 to lower legs/ankles       Objective    Patient to clinic ambulating Modified independently with a rollator walker with his daughter present.     BP post TX = 101/63 Hr 68    Jeniffer received therapeutic exercises to develop LE strength and endurance for 30 minutes including:    Recumbent bike L3 x 10 min      Supine ex's:   · Bridging + iso hip abd BTB 2 x 10   · Hooklying hip abd with BTB 2 x 10-   · Hooklying hip add iso 2 x 10     Seated ex's:   · LAQ with 2.5# 2 x 10  · Repeated sit<->stand from mat @ 20" height  With min use of UE's x 10 trials ( cuse for eccentric control)  · Heel/toe raises x 20 --NP    Standing ex's:   · Heel raises x 15( Minimal lift off)   · Hip flex/ext and abd with 1# x 15 each  · Forward step ups (4") + blue oval foam pad x 12 each      Jeniffer participated in neuromuscular re-education activities to improve: Balance, Coordination, Kinesthetic, Sense and Proprioception for 25 minutes. The following activities were included:  Rocker board weight shifts A/P and laterally x 20 each with UE support   Tandem stance on foam 2 x 20 sec each ( CGA) Intermittent light UE support  Alt heel tapping onto 6" step x 10 each (CGA)--NP  Lateral band walk with OTB 2 laps of 10 feet with light UE support--NP  Lateral and forward step over hurdles with light UE support x 4 laps (SBA)  Patient instructed in and " perform Gait training with a SC on (R) ~ 160 feet x 2 on level surfaces with supervision and  SBA for gait UP/down 4 steps with cane and rail ( step-to pattern), Gait weaving in and out of cones (CGA)      Written Home Exercises Provided: Patient educated to continue with previously issued HEP to tolerance. Exercises were reviewed and Jeniffer was able to demonstrate them prior to the end of the session.  Jeniffer demonstrated good  understanding of the education provided.    Jeniffer demonstrated good  understanding of the education provided.        Assessment      Patient rhoda Tx fairly well.  Still some unsteadiness noted for gait weaving in/out and around cones with SC and remains safer overall using his cane for gait activities.. He was able to perform and progress slightly with some of the above ex's/activities within tolerable muscular fatigue and without increased pain. Intermittent rest breaks required due to fatigue.  Still has some difficulty with eccentric control for stand-->sit transition requiring cues for technique/control.  Min unsteadiness for neuromuscular balance activities  . WIll monitor and attempt to progress as tolerated.    Pt will continue to benefit from skilled outpatient physical therapy to address the deficits listed in the problem list box on initial evaluation, provide pt/family education and to maximize pt's level of independence in the home and community environment.     Short Term GOALS: 4 weeks. Pt agrees with goals set.  1. Improve LE MMT by 1/2 grade MET  2. Improve balance strategies to good ongoing  3. Pt I with HEP MET     Long Term GOALS: 8  weeks. Pt agrees with goals set.  1. Pt able to tolerate 45 min of gym exercise MET  2. Pt to report no falls during episode of care due to implementation of balance strategies   3. Pt to improve gait endurance to 200' with least restrictive AD    Plan     Continue PT towards established plan of care and goals.     Raz Fernando, PTA

## 2018-07-23 ENCOUNTER — CLINICAL SUPPORT (OUTPATIENT)
Dept: REHABILITATION | Facility: HOSPITAL | Age: 83
End: 2018-07-23
Payer: MEDICARE

## 2018-07-23 DIAGNOSIS — R29.6 FREQUENT FALLS: ICD-10-CM

## 2018-07-23 PROCEDURE — 97112 NEUROMUSCULAR REEDUCATION: CPT | Mod: PO

## 2018-07-23 PROCEDURE — 97110 THERAPEUTIC EXERCISES: CPT | Mod: PO

## 2018-07-23 NOTE — PROGRESS NOTES
"  Physical Therapy Daily Treatment Note     Name: Jeniffer Taylor  Clinic Number: [id number]    Therapy Diagnosis:   Encounter Diagnosis   Name Primary?    Frequent falls      Physician: Jacy Ocampo MD    Visit Date: 7/23/2018  Evaluation Date: 6/15/18  Visit # authorized: 10/20  Authorization period: 6/15/18-12/31/18  Plan of care Expiration: 8/15/18  MD referral: yes  Time In:10;00  Time Out: 11:00  Treatment time: 55      Precautions: Fall    Subjective      Pt reports: mild lower leg/ankle discomfort. States that he is trying to use his cane a little more around the house.     Pain level : 1/10 to lower legs/ankles       Objective    Patient to clinic ambulating Modified independently with a rollator walker with his daughter present.     BP post TX = 112/65 Hr62    Jeniffer received therapeutic exercises to develop LE strength and endurance for 30 minutes including:    Recumbent bike L3 x 10 min      Supine ex's:   · Bridging + iso hip abd BTB 2 x 10   · Hooklying hip abd with BTB 2 x 10-   · Hooklying hip add iso x 20     Seated ex's:   · LAQ with 2.5# 2 x 10  · Repeated sit<->stand from mat @ 20" height  With min use of UE's x 10 trials ( cuse for eccentric control)  · Heel/toe raises x 20 --NP    Standing ex's:   · Heel raises on foam  x 15( Minimal lift off)   · Hip flex/ext and abd with 1.5# x 15 each  · Forward step ups (4") + blue oval foam pad x 12 each      Jeniffer participated in neuromuscular re-education activities to improve: Balance, Coordination, Kinesthetic, Sense and Proprioception for 25 minutes. The following activities were included:  Rocker board weight shifts A/P and laterally x 20 each with UE support   Tandem stance on foam 2 x 20 sec each ( CGA) Intermittent light UE support  Alt heel tapping onto 6" step x 10 each (CGA)--NP  Lateral band walk with OTB around his knees  2 laps of 10 feet with light UE support-   Lateral and forward step over hurdles with light UE support x 4 laps " (SBA)  Patient instructed in and perform Gait training with a SC on (R) ~ 160 feet x 2 on level surfaces with supervision and  SBA for gait UP/down 4 steps with cane and rail ( step-to pattern),        Written Home Exercises Provided: Patient educated to continue with previously issued HEP to tolerance. Exercises were reviewed and Jeniffer was able to demonstrate them prior to the end of the session.  Jeniffer demonstrated good  understanding of the education provided.    Jeniffer demonstrated good  understanding of the education provided.        Assessment      Patient rhoda Tx fairly well.   He was able to perform and progress slightly with some of the above ex's/activities within tolerable muscular fatigue and without increased pain. Intermittent rest breaks required due to fatigue.  Still has some difficulty with eccentric control for stand-->sit transition requiring cues for technique/control but slightly improved from previous.  Min unsteadiness for neuromuscular balance activities and sttill some unsteadiness noted for gait   with SC and remains safer overall using his cane to reduce his fall risk. WIll monitor and attempt to progress as tolerated.    Pt will continue to benefit from skilled outpatient physical therapy to address the deficits listed in the problem list box on initial evaluation, provide pt/family education and to maximize pt's level of independence in the home and community environment.     Short Term GOALS: 4 weeks. Pt agrees with goals set.  1. Improve LE MMT by 1/2 grade MET  2. Improve balance strategies to good ongoing  3. Pt I with HEP MET     Long Term GOALS: 8  weeks. Pt agrees with goals set.  1. Pt able to tolerate 45 min of gym exercise MET  2. Pt to report no falls during episode of care due to implementation of balance strategies   3. Pt to improve gait endurance to 200' with least restrictive AD    Plan     Continue PT towards established plan of care and goals.     Raz Fernando, PTA

## 2018-07-24 ENCOUNTER — TELEPHONE (OUTPATIENT)
Dept: INTERNAL MEDICINE | Facility: CLINIC | Age: 83
End: 2018-07-24

## 2018-07-24 NOTE — TELEPHONE ENCOUNTER
----- Message from Amanda Gutiérrez sent at 7/24/2018  1:06 PM CDT -----  2nd Request    I received and printed paperwork to your printer from [employer]. Paperwork that needs to be completed, signed and faxed back.    Please check your printer and note the chart with the status of this paperwork.    Thank You

## 2018-07-25 ENCOUNTER — CLINICAL SUPPORT (OUTPATIENT)
Dept: REHABILITATION | Facility: HOSPITAL | Age: 83
End: 2018-07-25
Payer: MEDICARE

## 2018-07-25 DIAGNOSIS — R29.6 FREQUENT FALLS: ICD-10-CM

## 2018-07-25 PROCEDURE — 97110 THERAPEUTIC EXERCISES: CPT | Mod: PO

## 2018-07-25 NOTE — PROGRESS NOTES
"  Physical Therapy Daily Treatment Note     Name: Jeniffer Alvaradoland  Clinic Number: [id number]    Therapy Diagnosis:   Encounter Diagnosis   Name Primary?    Frequent falls      Physician: Jacy Ocampo MD    Visit Date: 7/25/2018  Evaluation Date: 6/15/18  Visit # authorized: 10/20  Authorization period: 6/15/18-12/31/18  Plan of care Expiration: 8/15/18  MD referral: yes  Time In:10;00  Time Out: 11:00  Treatment time: 55   1:1 time: 25     Precautions: Fall    Subjective      Pt reports: mild lower leg/ankle discomfort. States that he is trying to use his cane a little more around the house.     Pain level : 1/10 to lower legs/ankles       Objective    Patient to clinic ambulating Modified independently with a rollator walker with his daughter present.        Jeniffer received therapeutic exercises to develop LE strength and endurance for 30 minutes including:    Recumbent bike L3 x 10 min      Supine ex's:   · Bridging + iso hip abd BTB 2 x 10   · Hooklying hip abd with BTB 2 x 10-   · Hooklying hip add iso x 20     Seated ex's:   · LAQ with 2.5# 2 x 10  · Repeated sit<->stand from mat @ 20" height  With min use of UE's x 10 trials ( cuse for eccentric control)  · Heel/toe raises x 20 --NP    Standing ex's:   · Heel raises on foam  x 15( Minimal lift off)   · Hip flex/ext and abd with 1.5# x 15 each  · Forward step ups (4") + blue oval foam pad x 12 each      Jeniffer participated in neuromuscular re-education activities to improve: Balance, Coordination, Kinesthetic, Sense and Proprioception for 25 minutes. The following activities were included:  Rocker board weight shifts A/P and laterally x 20 each with UE support   Tandem stance on foam 2 x 20 sec each ( CGA) Intermittent light UE support  Alt heel tapping onto 6" step x 10 each (CGA)--NP  Lateral band walk with OTB around his knees  2 laps of 10 feet with light UE support-   Lateral and forward step over hurdles with light UE support x 4 laps (SBA)  Patient " instructed in and perform Gait training with a SC on (R) ~ 160 feet  on level surfaces with SBA..  SBA for gait UP/down 4 steps with cane and rail ( step-to pattern),  ( Gait on steps not performed today)       Written Home Exercises Provided: Patient educated to continue with previously issued HEP to tolerance. .  Jeniffer demonstrated good  understanding of the education provided.    Jeniffer demonstrated good  understanding of the education provided.        Assessment      Patient rhoda Tx fairly well. He was able to perform  the above ex's/activities within tolerable muscular fatigue and without increased pain. Intermittent rest breaks required due to fatigue.  Still has some difficulty with eccentric control for stand-->sit transition requiring cues for technique/control but slightly improved from previous.  Min unsteadiness for neuromuscular balance activities and sttill some unsteadiness noted for gait  with SC and remains safer overall using his cane to reduce his fall risk.  Spoke with patient and his daughter stating that walker provides safer option in the community to reduce his fall risk. Patient and daughter voice understanding. WIll monitor and attempt to progress as tolerated.    Pt will continue to benefit from skilled outpatient physical therapy to address the deficits listed in the problem list box on initial evaluation, provide pt/family education and to maximize pt's level of independence in the home and community environment.     Short Term GOALS: 4 weeks. Pt agrees with goals set.  1. Improve LE MMT by 1/2 grade MET  2. Improve balance strategies to good ongoing  3. Pt I with HEP MET     Long Term GOALS: 8  weeks. Pt agrees with goals set.  1. Pt able to tolerate 45 min of gym exercise MET  2. Pt to report no falls during episode of care due to implementation of balance strategies   3. Pt to improve gait endurance to 200' with least restrictive AD    Plan     Continue PT towards established plan of  care and goals.     Raz Fernando, PTA

## 2018-07-30 ENCOUNTER — CLINICAL SUPPORT (OUTPATIENT)
Dept: REHABILITATION | Facility: HOSPITAL | Age: 83
End: 2018-07-30
Payer: MEDICARE

## 2018-07-30 DIAGNOSIS — R29.6 FREQUENT FALLS: ICD-10-CM

## 2018-07-30 PROCEDURE — 97110 THERAPEUTIC EXERCISES: CPT | Mod: PO | Performed by: PHYSICAL THERAPIST

## 2018-07-30 PROCEDURE — G8980 MOBILITY D/C STATUS: HCPCS | Mod: CJ,PO | Performed by: PHYSICAL THERAPIST

## 2018-07-30 PROCEDURE — G8979 MOBILITY GOAL STATUS: HCPCS | Mod: CJ,PO | Performed by: PHYSICAL THERAPIST

## 2018-07-30 NOTE — PROGRESS NOTES
Physical Therapy Discharge Note     Name: Jeniffer Alvaradoland  Clinic Number: [id number]    Therapy Diagnosis:   Encounter Diagnosis   Name Primary?    Frequent falls      Physician: Jacy Ocampo MD    Visit Date: 7/30/2018  Evaluation Date: 6/15/18  Visit # authorized: 12/20  Authorization period: 6/15/18-12/31/18  Plan of care Expiration: 8/15/18  MD referral: yes  Time In:9:15  Time Out: 9:45  Treatment time: 30 min   1:1 time: 30     Precautions: Fall    Subjective      Pt reports: he is almost back to baseline. Pt requests DC today, PT in agreement. Pt request no further exs following bicycle secondary to toothache. Pt  Going to dentist later today. PT notified of toothache at end of cycling time.   Pain level : 1/10 to lower legs/ankles       Objective    Patient to clinic ambulating Modified independently with a rollator walker with his daughter present.     Jeniffer received reasessment today for 25 minutes including:    Recumbent bike L2 x 8 min  For endurance/coordination    Reassessment performed as follows:  LE MMT: 4-/5  Gait: pt ambulated MI with rollator with good balance strategies to prevent falls. Pt able to ambulate 400' before fatiguing.   SIT to stand 30 test: 7 reps G code: CJ      Written Home Exercises Provided: Patient educated to continue with previously issued HEP to tolerance. .  Jeniffer demonstrated good  understanding of the education provided.    Jeniffer demonstrated good  understanding of the education provided.     PT/PTA met face to face to discuss discharge, in agreement with plan       Assessment      Patient rhoda reassessment well. Pt is back to baseline status, he has progressed with sit to stand test, with good balance strategies, improved endurance and LE muscle strength.  Spoke with patient and his daughter stating that walker provides safer option in the community to reduce his fall risk. Patient and daughter voice understanding.     Pt/family in agreement with DC today    Short  Term GOALS: 4 weeks. Pt agrees with goals set.  1. Improve LE MMT by 1/2 grade MET  2. Improve balance strategies to good MET  3. Pt I with HEP MET     Long Term GOALS: 8  weeks. Pt agrees with goals set.  1. Pt able to tolerate 45 min of gym exercise MET  2. Pt to report no falls during episode of care due to implementation of balance strategies MET  3. Pt to improve gait endurance to 200' with least restrictive AD MET    Plan     Pt is discharged at this time with all goals established met.     Karyn Freed, PT

## 2018-08-03 ENCOUNTER — LAB VISIT (OUTPATIENT)
Dept: LAB | Facility: HOSPITAL | Age: 83
End: 2018-08-03
Attending: INTERNAL MEDICINE
Payer: MEDICARE

## 2018-08-03 ENCOUNTER — OFFICE VISIT (OUTPATIENT)
Dept: INTERNAL MEDICINE | Facility: CLINIC | Age: 83
End: 2018-08-03
Payer: MEDICARE

## 2018-08-03 DIAGNOSIS — I25.5 ISCHEMIC CARDIOMYOPATHY: Chronic | ICD-10-CM

## 2018-08-03 DIAGNOSIS — E11.9 DIABETES MELLITUS WITHOUT COMPLICATION: ICD-10-CM

## 2018-08-03 DIAGNOSIS — E11.9 DIABETES MELLITUS WITHOUT COMPLICATION: Primary | ICD-10-CM

## 2018-08-03 LAB
ALBUMIN SERPL BCP-MCNC: 4.2 G/DL
ALP SERPL-CCNC: 105 U/L
ALT SERPL W/O P-5'-P-CCNC: 18 U/L
ANION GAP SERPL CALC-SCNC: 11 MMOL/L
AST SERPL-CCNC: 27 U/L
BILIRUB SERPL-MCNC: 0.5 MG/DL
BUN SERPL-MCNC: 24 MG/DL
CALCIUM SERPL-MCNC: 9.9 MG/DL
CHLORIDE SERPL-SCNC: 101 MMOL/L
CO2 SERPL-SCNC: 27 MMOL/L
CREAT SERPL-MCNC: 1.5 MG/DL
EST. GFR  (AFRICAN AMERICAN): 47 ML/MIN/1.73 M^2
EST. GFR  (NON AFRICAN AMERICAN): 41 ML/MIN/1.73 M^2
ESTIMATED AVG GLUCOSE: 128 MG/DL
GLUCOSE SERPL-MCNC: 117 MG/DL
HBA1C MFR BLD HPLC: 6.1 %
POTASSIUM SERPL-SCNC: 4.8 MMOL/L
PROT SERPL-MCNC: 7.8 G/DL
SODIUM SERPL-SCNC: 139 MMOL/L

## 2018-08-03 PROCEDURE — 99215 OFFICE O/P EST HI 40 MIN: CPT | Mod: S$GLB,,, | Performed by: INTERNAL MEDICINE

## 2018-08-03 PROCEDURE — 99999 PR PBB SHADOW E&M-EST. PATIENT-LVL IV: CPT | Mod: PBBFAC,,, | Performed by: INTERNAL MEDICINE

## 2018-08-03 PROCEDURE — 83036 HEMOGLOBIN GLYCOSYLATED A1C: CPT

## 2018-08-03 PROCEDURE — 80053 COMPREHEN METABOLIC PANEL: CPT

## 2018-08-03 PROCEDURE — 36415 COLL VENOUS BLD VENIPUNCTURE: CPT

## 2018-08-03 RX ORDER — GLIPIZIDE 2.5 MG/1
2.5 TABLET, EXTENDED RELEASE ORAL DAILY
Qty: 90 TABLET | Refills: 1 | Status: SHIPPED | OUTPATIENT
Start: 2018-08-03 | End: 2019-01-10 | Stop reason: SDUPTHER

## 2018-08-03 RX ORDER — METOPROLOL SUCCINATE 50 MG/1
50 TABLET, EXTENDED RELEASE ORAL DAILY
Qty: 90 TABLET | Refills: 1 | Status: SHIPPED | OUTPATIENT
Start: 2018-08-03 | End: 2018-09-25

## 2018-08-03 RX ORDER — PENICILLIN V POTASSIUM 500 MG/1
TABLET, FILM COATED ORAL
COMMUNITY
Start: 2018-07-30 | End: 2018-09-25

## 2018-08-03 RX ORDER — AMLODIPINE BESYLATE 10 MG/1
10 TABLET ORAL DAILY
Qty: 90 TABLET | Refills: 1 | Status: SHIPPED | OUTPATIENT
Start: 2018-08-03 | End: 2019-06-13 | Stop reason: SDUPTHER

## 2018-08-03 RX ORDER — HYDROCODONE BITARTRATE AND ACETAMINOPHEN 7.5; 325 MG/1; MG/1
TABLET ORAL
COMMUNITY
Start: 2018-07-30 | End: 2018-09-25

## 2018-08-03 RX ORDER — LISINOPRIL 40 MG/1
40 TABLET ORAL DAILY
Qty: 90 TABLET | Refills: 1 | Status: SHIPPED | OUTPATIENT
Start: 2018-08-03 | End: 2019-01-25 | Stop reason: SDUPTHER

## 2018-08-03 RX ORDER — FUROSEMIDE 40 MG/1
40 TABLET ORAL 2 TIMES DAILY
Qty: 180 TABLET | Refills: 1 | Status: SHIPPED | OUTPATIENT
Start: 2018-08-03 | End: 2019-01-11 | Stop reason: SDUPTHER

## 2018-08-03 RX ORDER — ATORVASTATIN CALCIUM 80 MG/1
80 TABLET, FILM COATED ORAL DAILY
Qty: 90 TABLET | Refills: 1 | Status: SHIPPED | OUTPATIENT
Start: 2018-08-03 | End: 2019-03-07 | Stop reason: SDUPTHER

## 2018-08-08 VITALS
HEIGHT: 68 IN | WEIGHT: 157.44 LBS | SYSTOLIC BLOOD PRESSURE: 138 MMHG | BODY MASS INDEX: 23.86 KG/M2 | HEART RATE: 62 BPM | DIASTOLIC BLOOD PRESSURE: 68 MMHG

## 2018-08-09 NOTE — PROGRESS NOTES
Subjective:       Patient ID: Jeniffer Taylor is a 89 y.o. male.    Chief Complaint: Diabetes    HPI  He has diabetes.  Denies polyuria, polydipsia    PAST MEDICAL HISTORY: Hypertension, diabetes with retinopathy and neuropathy ,  hyperlipidemia, congestive heart failure, prostate cancer, aortic stenosis, anemia, glaucoma, lumbar spinal stenosis, thyroid nodule,  coronary artery disease. He had a colonoscopy February 2011     MEDICATIONS: GlucotrolXL 2.5 mg daily,lisinopril 40mg daily ,Lipitor 80 mg daily,xalatan ,aspirin one by mouth daily, Lasix 40 mg twice a day, Toprol-XL 50 mg daily, Norvasc 10 mg daily ,casodex    ALLERGIES: No known drug allergies.        Review of Systems   Constitutional: Negative for chills, fatigue, fever and unexpected weight change.   Respiratory: Negative for chest tightness and shortness of breath.    Cardiovascular: Negative for chest pain and palpitations.   Gastrointestinal: Negative for abdominal pain and blood in stool.   Neurological: Negative for dizziness, syncope, numbness and headaches.       Objective:      Physical Exam   HENT:   Right Ear: External ear normal.   Left Ear: External ear normal.   Nose: Nose normal.   Mouth/Throat: Oropharynx is clear and moist.   Eyes: Pupils are equal, round, and reactive to light.   Neck: Normal range of motion.   Cardiovascular: Normal rate and regular rhythm.    Murmur heard.  Pulmonary/Chest: Breath sounds normal.   Abdominal: He exhibits no distension. There is no hepatomegaly. There is no tenderness.   Lymphadenopathy:     He has no cervical adenopathy.     He has no axillary adenopathy.   Neurological: He has normal strength and normal reflexes. No cranial nerve deficit or sensory deficit.       Assessment/Plan       Assessment and plan:  Diabetes:  Check CMP, A1c, urine microalbumin

## 2018-08-10 ENCOUNTER — PES CALL (OUTPATIENT)
Dept: ADMINISTRATIVE | Facility: CLINIC | Age: 83
End: 2018-08-10

## 2018-08-16 ENCOUNTER — TELEPHONE (OUTPATIENT)
Dept: INTERNAL MEDICINE | Facility: CLINIC | Age: 83
End: 2018-08-16

## 2018-08-16 NOTE — TELEPHONE ENCOUNTER
----- Message from Cyrus Pantoja sent at 8/16/2018  4:13 PM CDT -----  Contact: [facility] [employer] [facility] [phone] Ext [phone]   Still awaiting the patient Chart notes, but orders has reached their office, would like a response so patient can received the diabetic shoes. Contact [phone] Ext [phone]   Fax [phone]    Please call an advise  Thank you

## 2018-08-24 DIAGNOSIS — C61 PROSTATE CANCER: Primary | ICD-10-CM

## 2018-08-28 ENCOUNTER — LAB VISIT (OUTPATIENT)
Dept: LAB | Facility: HOSPITAL | Age: 83
End: 2018-08-28
Payer: MEDICARE

## 2018-08-28 ENCOUNTER — OFFICE VISIT (OUTPATIENT)
Dept: UROLOGY | Facility: CLINIC | Age: 83
End: 2018-08-28
Payer: MEDICARE

## 2018-08-28 VITALS
SYSTOLIC BLOOD PRESSURE: 158 MMHG | WEIGHT: 154.31 LBS | HEIGHT: 68 IN | HEART RATE: 60 BPM | BODY MASS INDEX: 23.39 KG/M2 | DIASTOLIC BLOOD PRESSURE: 70 MMHG

## 2018-08-28 DIAGNOSIS — C61 PROSTATE CANCER: Primary | ICD-10-CM

## 2018-08-28 DIAGNOSIS — C61 PROSTATE CANCER: ICD-10-CM

## 2018-08-28 LAB — COMPLEXED PSA SERPL-MCNC: 0.17 NG/ML

## 2018-08-28 PROCEDURE — 96402 CHEMO HORMON ANTINEOPL SQ/IM: CPT | Mod: S$GLB,,, | Performed by: NURSE PRACTITIONER

## 2018-08-28 PROCEDURE — 99214 OFFICE O/P EST MOD 30 MIN: CPT | Mod: 25,S$GLB,, | Performed by: NURSE PRACTITIONER

## 2018-08-28 PROCEDURE — 36415 COLL VENOUS BLD VENIPUNCTURE: CPT

## 2018-08-28 PROCEDURE — 84153 ASSAY OF PSA TOTAL: CPT

## 2018-08-28 PROCEDURE — 99999 PR PBB SHADOW E&M-EST. PATIENT-LVL V: CPT | Mod: PBBFAC,,, | Performed by: NURSE PRACTITIONER

## 2018-08-28 RX ORDER — LANCETS
EACH MISCELLANEOUS
Qty: 100 EACH | Refills: 12 | Status: ON HOLD | OUTPATIENT
Start: 2018-08-28 | End: 2019-10-02 | Stop reason: HOSPADM

## 2018-08-28 NOTE — TELEPHONE ENCOUNTER
----- Message from Amanda Gutiérrez sent at 8/28/2018  8:01 AM CDT -----  Contact: [facility] [phone]  Prescription Request:     Name of medication: Trueplus 33g lancets    Reason for request: Refill    Pharmacy: [facility] - [facility] [id number]    Please advise.    Thank You

## 2018-08-28 NOTE — PATIENT INSTRUCTIONS
PSA                  0.11                12/22/2017                 PSA                  0.24                08/14/2017                 PSA                  0.55                05/12/2017        PSA                  4.0                 02/13/2017                 PSA                  2.0                 07/01/2016       PSA                  1.2                 12/28/2015                 PSA                  0.71                06/15/2015                 PSA                  0.20                06/11/2014                 PSA                    0.42                12/17/2014        PSA                  0.38                12/12/2013           PSA                      0.36                06/03/2013                 PSA                      0.27                11/28/2012                 PSA                      0.22                05/25/2012                 PSA                      0.18                11/21/2011                 PSA                      0.14                05/16/2011                 PSA                      0.11                11/16/2010                 PSA                      0.12                05/27/2010                 PSA                      0.25                11/27/2009                 PSA                      0.11                08/21/2009                  What Is Prostate Cancer?    Cancer is when cells in the body change and grow out of control. Cancer cells can form lumps of tissue called tumors. Cancer that starts in the prostate is called prostate cancer. It can grow and spread beyond the prostate. Cancer that spreads is harder to treat.  Understanding the prostate  The prostate is a gland in men about the size and shape of a walnut. It surrounds the upper part of the urethra. This is the tube that carries urine from the bladder. The prostate makes some of the fluid thats part of semen. During orgasm, semen leaves the body through the urethra.  When prostate cancer forms  As a man  ages, the cells of his prostate may change to form tumors or other growths. The types of growths include:  · Noncancerous growths. As a man ages, the prostate may grow larger. This is called benign prostatic hyperplasia (BPH). With BPH, extra prostate tissue often squeezes the urethra, causing symptoms such as trouble urinating. But BPH is not cancer and does not lead to cancer.  · Atypical cells. Sometimes prostate cells dont look like normal (typical) prostate cells. One type of abnormal growth is called prostatic intraepithelial neoplasia or PIN. Although PIN cells are not cancer cells, they may be a sign that cancer is likely to form.  · Cancer. When abnormal prostate cells grow out of control and start to invade other tissues, they are called cancer cells. These cells may or may not lead to symptoms. Some tumors can be felt during a physical exam, and some cant. Prostate cancer may grow into nearby organs or spread to nearby lymph nodes. Lymph nodes are small organs around the body that are part of the immune system. In some cases, the cancer spreads to bones or organs in distant parts of the body. This is called metastasis.  Diagnosing prostate cancer  Prostate cancer may not cause symptoms at first. Urinary problems are often not a sign of cancer, but of another condition, such as BPH. To find out if you have prostate cancer, your healthcare provider must examine you and order tests. The tests help confirm a diagnosis of cancer. They also help give more information about a cancerous tumor. Tests might include:  · Prostate specific antigen (PSA) testing. PSA is a chemical made by prostate tissue. The amount of PSA in the blood (PSA level) is tested to check a mans risk for prostate cancer. In general, a high or rising PSA level may mean an increased cancer risk. A PSA test by itself cannot show if a man has prostate cancer. PSA testing is also used to check the success of cancer treatments.  · Core needle  biopsy. This test is done to determine if a man has prostate cancer. A hollow needle is used to take small pieces of tissue from the prostate. This helps give more information about the cells. Before the test, pain medicine may be given to prevent pain. During the test, a small probe is inserted into the rectum. The probe sends an image of the prostate to a video monitor. With this image as a guide, the healthcare provider uses a thin, hollow needle to remove tiny tissue samples from the prostate. These are sent to a lab where they are looked at for cancer cells.  Date Last Reviewed: 5/1/2017 © 2000-2017 [employer]. [address] [place] [address], [place] [address]. All rights reserved. This information is not intended as a substitute for professional medical care. Always follow your healthcare professional's instructions.        Hormone Therapy for Prostate Cancer  Androgens are male hormones. Androgens such as testosterone are made in the testicles. Prostate cancer cells need androgens to grow. Reducing the amount of androgens in the body or blocking prostate cancer cells from using them can help treat prostate cancer. This therapy does not cure the cancer, but it can help control it. It may be used alone. Or it may be used with radiation therapy to help make this treatment more effective. Read below to learn more about this treatment.  How the therapy is done  Hormone therapy can be done with:  · LHRH (GnRH) agonists or antagonists. These are medicines that stop the testicles from making androgens. These are injected into a muscle or just under the skin. This is done every few weeks or months. Or they may be given with a small device put under the skin on the inside of the arm. This implant gives a steady dose of medicine over time. LHRH agonists and antagonists are often used with anti-androgens (see below).  · Anti-androgens. These are medicines that stop cancer cells from using androgens as a way to  grow. These come in pill form and are taken by mouth. They are often used along with other forms of hormone therapy.  · CYP17 inhibitors. These slow the amount of hormones made in prostate cancer cells and other body cells. They are given as pills. They are often used along with other forms of hormone therapy.  · Other medicines. These may include corticosteroids, estrogens, or anti-fungal medicines. These can also help lower the levels of androgens in the body. They are used less often than the medicines listed above.  · Orchiectomy. This is surgery to remove the testicles. This stops the body from making most androgens. Artificial (prosthetic) testicles can be placed afterward to give the look of real testicles.  Possible side effects   Side effects are similar for most types of hormone therapy, but they can vary a bit between medicines. Possible side effects can include:  · Sudden increase in body heat (hot flashes)  · Tiredness  · Less interest in sex  · Inability to get or keep an erection  · Decrease in size of penis and testicles  · Changes in facial hair  · Mood changes, such as depression, irritability, or anxiety  · Trouble with memory and concentration  · Loss of muscle  · Diarrhea  · Nausea  · Weight gain  · Breast-area tenderness or growth  · Low red blood cell count (anemia)  · Hair loss  · Bone thinning (osteoporosis)  · Increased risk of heart disease, stroke, and diabetes  Coping with side effects  Some of the side effects are temporary. Others are more long-lasting. This depends on the type of hormone therapy used, and how it affects your body. Most side effects of orchiectomy are permanent. Your health care provider can tell you more. To help cope with side effects, try the tips below.  · Talk to your health care provider about your symptoms. He or she may prescribe medicines that can help you feel better and reduce problems.  · If you have hot flashes, dont take hot showers. Dont use hot tubs  or saunas.  · Dont eat spicy food or drink alcohol. Dont have caffeine.  · Get regular physical activity.  · Eat a healthy diet.  · Keep mentally active.  · Work with your partner to manage sexual changes.  · Try counseling or support groups.  Follow-up care  During the course of your treatment, youll have regular visits with your health care provider. You may also have tests. These let your health care provider check your health and see how well the treatment is working. After treatment ends, you and your health care provider will discuss the results. Youll also discuss whether you need additional cancer treatments.  Resources  For more information about cancer and its treatment, visit the websites listed below:  · American Cancer Society   · National Cancer [place]   · [employer]   Date Last Reviewed: 3/30/2015  © 4541-9275 [employer]. [address] [place] [address]. All rights reserved. This information is not intended as a substitute for professional medical care. Always follow your healthcare professional's instructions.        Leuprolide injection  What is this medicine?  LEUPROLIDE (loo PROE lide) is a man-made hormone. It is used to treat the symptoms of prostate cancer. This medicine may also be used to treat children with early onset of puberty. It may be used for other hormonal conditions.  How should I use this medicine?  This medicine is for injection under the skin or into a muscle. You will be taught how to prepare and give this medicine. Use exactly as directed. Take your medicine at regular intervals. Do not take your medicine more often than directed.  It is important that you put your used needles and syringes in a special sharps container. Do not put them in a trash can. If you do not have a sharps container, call your pharmacist or healthcare provider to get one.  Talk to your pediatrician regarding the use of this medicine in children. While this medicine may be  prescribed for children as young as 8 years for selected conditions, precautions do apply.  What side effects may I notice from receiving this medicine?  Side effects that you should report to your doctor or health care professional as soon as possible:  · allergic reactions like skin rash, itching or hives, swelling of the face, lips, or tongue  · breathing problems  · chest pain  · depression or memory disorders  · pain in your legs or groin  · pain at site where injected  · severe headache  · swelling of the feet and legs  · visual changes  · vomiting  Side effects that usually do not require medical attention (report to your doctor or health care professional if they continue or are bothersome):  · breast swelling or tenderness  · decrease in sex drive or performance  · diarrhea  · hot flashes  · loss of appetite  · muscle, joint, or bone pains  · nausea  · redness or irritation at site where injected  · skin problems or acne  What may interact with this medicine?  Do not take this medicine with any of the following medications:  · chasteberry  This medicine may also interact with the following medications:  · herbal or dietary supplements, like black cohosh or DHEA  · female hormones, like estrogens or progestins and birth control pills, patches, rings, or injections  · male hormones, like testosterone  What if I miss a dose?  If you miss a dose, take it as soon as you can. If it is almost time for your next dose, take only that dose. Do not take double or extra doses.  Where should I keep my medicine?  Keep out of the reach of children.  Store below 25 degrees C (77 degrees F). Do not freeze. Protect from light. Do not use if it is not clear or if there are particles present. Throw away any unused medicine after the expiration date.  What should I tell my health care provider before I take this medicine?  They need to know if you have any of these conditions:  · diabetes  · heart disease or previous heart  attack  · high blood pressure  · high cholesterol  · pain or difficulty passing urine  · spinal cord metastasis  · stroke  · tobacco smoker  · an unusual or allergic reaction to leuprolide, benzyl alcohol, other medicines, foods, dyes, or preservatives  · pregnant or trying to get pregnant  · breast-feeding  What should I watch for while using this medicine?  Visit your doctor or health care professional for regular checks on your progress. During the first week, your symptoms may get worse, but then will improve as you continue your treatment. You may get hot flashes, increased bone pain, increased difficulty passing urine, or an aggravation of nerve symptoms. Discuss these effects with your doctor or health care professional, some of them may improve with continued use of this medicine.  Female patients may experience a menstrual cycle or spotting during the first 2 months of therapy with this medicine. If this continues, contact your doctor or health care professional.  NOTE:This sheet is a summary. It may not cover all possible information. If you have questions about this medicine, talk to your doctor, pharmacist, or health care provider. Copyright© 2017 [employer]
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
## 2018-09-25 ENCOUNTER — OFFICE VISIT (OUTPATIENT)
Dept: CARDIOLOGY | Facility: CLINIC | Age: 83
End: 2018-09-25
Payer: MEDICARE

## 2018-09-25 VITALS
WEIGHT: 159.81 LBS | HEART RATE: 68 BPM | DIASTOLIC BLOOD PRESSURE: 84 MMHG | BODY MASS INDEX: 24.22 KG/M2 | SYSTOLIC BLOOD PRESSURE: 178 MMHG | HEIGHT: 68 IN

## 2018-09-25 DIAGNOSIS — I35.0 NONRHEUMATIC AORTIC VALVE STENOSIS: ICD-10-CM

## 2018-09-25 DIAGNOSIS — I25.10 CORONARY ARTERY DISEASE INVOLVING NATIVE CORONARY ARTERY OF NATIVE HEART WITHOUT ANGINA PECTORIS: ICD-10-CM

## 2018-09-25 DIAGNOSIS — E11.22 CONTROLLED TYPE 2 DIABETES MELLITUS WITH STAGE 2 CHRONIC KIDNEY DISEASE, WITHOUT LONG-TERM CURRENT USE OF INSULIN: ICD-10-CM

## 2018-09-25 DIAGNOSIS — I25.5 ISCHEMIC CARDIOMYOPATHY: Primary | Chronic | ICD-10-CM

## 2018-09-25 DIAGNOSIS — I34.2 NON-RHEUMATIC MITRAL VALVE STENOSIS: ICD-10-CM

## 2018-09-25 DIAGNOSIS — E11.49 TYPE II DIABETES MELLITUS WITH NEUROLOGICAL MANIFESTATIONS: ICD-10-CM

## 2018-09-25 DIAGNOSIS — N18.2 CONTROLLED TYPE 2 DIABETES MELLITUS WITH STAGE 2 CHRONIC KIDNEY DISEASE, WITHOUT LONG-TERM CURRENT USE OF INSULIN: ICD-10-CM

## 2018-09-25 PROCEDURE — 99213 OFFICE O/P EST LOW 20 MIN: CPT | Mod: PBBFAC | Performed by: INTERNAL MEDICINE

## 2018-09-25 PROCEDURE — 99999 PR PBB SHADOW E&M-EST. PATIENT-LVL III: CPT | Mod: PBBFAC,,, | Performed by: INTERNAL MEDICINE

## 2018-09-25 PROCEDURE — 1100F PTFALLS ASSESS-DOCD GE2>/YR: CPT | Mod: CPTII,,, | Performed by: INTERNAL MEDICINE

## 2018-09-25 PROCEDURE — 3288F FALL RISK ASSESSMENT DOCD: CPT | Mod: CPTII,,, | Performed by: INTERNAL MEDICINE

## 2018-09-25 PROCEDURE — 99214 OFFICE O/P EST MOD 30 MIN: CPT | Mod: S$PBB,,, | Performed by: INTERNAL MEDICINE

## 2018-09-25 RX ORDER — CARVEDILOL 6.25 MG/1
6.25 TABLET ORAL 2 TIMES DAILY WITH MEALS
Qty: 180 TABLET | Refills: 3 | Status: SHIPPED | OUTPATIENT
Start: 2018-09-25 | End: 2019-03-26 | Stop reason: SDUPTHER

## 2018-09-25 NOTE — PROGRESS NOTES
"Subjective:   Patient ID:  Jeniffer Taylor is a 89 y.o. male who presents for follow up of Cardiomyopathy (1 yr f/u )      HPI: Back for routine yearly f/u.  Doing very well as always.  Only concern is occasional ankle swelling at the end of the day that resolves with sleep.  It's not every day and he has no other symptoms.  He denies chest discomfort, PRATT, palpitations, PND/orthopnea, lightheadedness and syncope.    He never checks his BP at home.  He says his BP rises every time he drinks coffee and he "drank it heavy this morning."  At many other recent visits this year, he's been in 130s systolic with good diastolic pressures.    Sept 2017 HPI: Back for routine f/u and doing very well.  Doesn't have a complaint in the world, which appears to be stable since May 2016.  Same level of dyspnea with climbing stairs.     No angina or lightheadedness/syncope.  No PND/orthopnea.  No claudication, stroke symptoms, or bleeding.     May 2016 HPI: Back for yearly f/u and doing well.  Not a complaint in the world.  He denies chest discomfort, new PRATT, palpitations, PND/orthopnea, lightheadedness and syncope.  He's out of breath at the top of a flight of stairs and this is stable for him.     No claudication.  No stroke symptoms.     He states that he doesn't take Plavix (and can't say why he stopped).    Patient Active Problem List   Diagnosis    Diabetic polyneuropathy associated with type 2 diabetes mellitus    Unspecified essential hypertension    Dyslipidemia    CAD (coronary artery disease)    Corns and callosities    Prostate cancer    Hypertensive heart disease with heart failure    Type II diabetes mellitus with neurological manifestations    Ischemic cardiomyopathy    Diabetic macular edema, right eye    Aortic stenosis    MS (mitral stenosis)    Anemia    Senile nuclear sclerosis    History of MI (myocardial infarction)    Hypertensive retinopathy of both eyes    CKD (chronic kidney disease), stage " II    Controlled type 2 diabetes mellitus with stage 2 chronic kidney disease, without long-term current use of insulin    Diabetes mellitus with glaucoma    Primary open angle glaucoma (POAG) of both eyes    Controlled type 2 diabetes mellitus with both eyes affected by mild nonproliferative retinopathy and macular edema, without long-term current use of insulin    Frequent falls       Current Outpatient Medications   Medication Sig    acetaminophen-codeine 300-30mg (TYLENOL #3) 300-30 mg Tab 1 po TID prn    amLODIPine (NORVASC) 10 MG tablet Take 1 tablet (10 mg total) by mouth once daily.    aspirin (ECOTRIN) 81 MG EC tablet Take 81 mg by mouth. 1 Tablet, Delayed Release (E.C.) Oral Every morning    atorvastatin (LIPITOR) 80 MG tablet Take 1 tablet (80 mg total) by mouth once daily.    BD ALCOHOL SWABS PadM     bicalutamide (CASODEX) 50 MG Tab TAKE 1 TABLET EVERY DAY    blood sugar diagnostic Strp Use daily    furosemide (LASIX) 40 MG tablet Take 1 tablet (40 mg total) by mouth 2 (two) times daily.    garlic 1,000 mg Cap Take 2 tablets by mouth once daily.    glipiZIDE (GLUCOTROL) 2.5 MG TR24 Take 1 tablet (2.5 mg total) by mouth once daily.    lancets (LANCETS,ULTRA THIN) Misc Use daily    latanoprost 0.005 % ophthalmic solution Place 1 drop into both eyes every evening.    lisinopril (PRINIVIL,ZESTRIL) 40 MG tablet Take 1 tablet (40 mg total) by mouth once daily.    metoprolol succinate (TOPROL-XL) 50 MG 24 hr tablet Take 1 tablet (50 mg total) by mouth once daily.    nitroGLYCERIN (NITROSTAT) 0.4 MG SL tablet Place 1 tablet (0.4 mg total) under the tongue every 5 (five) minutes as needed for Chest pain.    blood-glucose meter kit Use as instructed     Current Facility-Administered Medications   Medication    leuprolide (6 month) SyKt 45 mg       Review of Systems   Constitution: Negative.   HENT: Negative.    Eyes: Negative.    Cardiovascular: Negative.  Negative for chest pain, dyspnea on  exertion, near-syncope, orthopnea and palpitations.   Respiratory: Negative.  Negative for cough, hemoptysis and shortness of breath.    Endocrine: Negative.    Hematologic/Lymphatic: Negative.    Skin: Negative.    Musculoskeletal: Negative.    Gastrointestinal: Negative.    Genitourinary: Negative.    Neurological: Negative.    Psychiatric/Behavioral: Negative.      Objective:   Physical Exam   Constitutional: He is oriented to person, place, and time. He appears well-developed and well-nourished.   HENT:   Head: Normocephalic and atraumatic.   Mouth/Throat: Oropharynx is clear and moist.   Eyes: Conjunctivae and EOM are normal. No scleral icterus.   Neck: Normal range of motion. Neck supple. No JVD present.   Cardiovascular: Normal rate, regular rhythm and intact distal pulses. Exam reveals no gallop and no friction rub.   Murmur (Benign systolic murmur at the base (II/VI, early peaking)) heard.  Pulmonary/Chest: Effort normal and breath sounds normal. He has no wheezes. He has no rales.   Abdominal: Soft. Bowel sounds are normal. He exhibits no distension. There is no tenderness.   Musculoskeletal: Normal range of motion. He exhibits no edema.   Neurological: He is alert and oriented to person, place, and time.   Skin: Skin is warm and dry. No rash noted. No erythema.   Psychiatric: He has a normal mood and affect. His behavior is normal. Judgment and thought content normal.   Vitals reviewed.      Lab Results   Component Value Date    WBC 7.89 12/22/2017    HGB 11.4 (L) 12/22/2017    HCT 31.8 (L) 12/22/2017    MCV 79 (L) 12/22/2017     12/22/2017         Chemistry        Component Value Date/Time     08/03/2018 1033    K 4.8 08/03/2018 1033     08/03/2018 1033    CO2 27 08/03/2018 1033    BUN 24 (H) 08/03/2018 1033    CREATININE 1.5 (H) 08/03/2018 1033     (H) 08/03/2018 1033        Component Value Date/Time    CALCIUM 9.9 08/03/2018 1033    ALKPHOS 105 08/03/2018 1033    AST 27  08/03/2018 1033    ALT 18 08/03/2018 1033    BILITOT 0.5 08/03/2018 1033    ESTGFRAFRICA 47 (A) 08/03/2018 1033    EGFRNONAA 41 (A) 08/03/2018 1033            Lab Results   Component Value Date    CHOL 142 12/06/2017    CHOL 143 12/21/2016    CHOL 147 08/18/2015     Lab Results   Component Value Date    HDL 44 12/06/2017    HDL 43 12/21/2016    HDL 42 08/18/2015     Lab Results   Component Value Date    LDLCALC 76.8 12/06/2017    LDLCALC 81.4 12/21/2016    LDLCALC 82.2 08/18/2015     Lab Results   Component Value Date    TRIG 106 12/06/2017    TRIG 93 12/21/2016    TRIG 114 08/18/2015     Lab Results   Component Value Date    CHOLHDL 31.0 12/06/2017    CHOLHDL 30.1 12/21/2016    CHOLHDL 28.6 08/18/2015       Lab Results   Component Value Date    TSH 1.870 12/06/2017       Lab Results   Component Value Date    HGBA1C 6.1 (H) 08/03/2018       Assessment:     1. Ischemic cardiomyopathy    2. Coronary artery disease involving native coronary artery of native heart without angina pectoris    3. Nonrheumatic aortic valve stenosis    4. Non-rheumatic mitral valve stenosis    5. Controlled type 2 diabetes mellitus with stage 2 chronic kidney disease, without long-term current use of insulin    6. Type II diabetes mellitus with neurological manifestations        Plan:     Change Toprol 50 to carvedilol 6.25 bid.    Limit caffeinated coffee intake.    Continue other current medicines.    Diet/exercise goals reinforced.    F/U 12 months

## 2018-11-13 ENCOUNTER — OFFICE VISIT (OUTPATIENT)
Dept: OPTOMETRY | Facility: CLINIC | Age: 83
End: 2018-11-13
Payer: MEDICARE

## 2018-11-13 DIAGNOSIS — E11.9 TYPE 2 DIABETES MELLITUS WITHOUT OPHTHALMIC MANIFESTATIONS: ICD-10-CM

## 2018-11-13 DIAGNOSIS — Z96.1 PSEUDOPHAKIA OF BOTH EYES: ICD-10-CM

## 2018-11-13 DIAGNOSIS — H40.1132 PRIMARY OPEN ANGLE GLAUCOMA OF BOTH EYES, MODERATE STAGE: Primary | ICD-10-CM

## 2018-11-13 PROCEDURE — 92014 COMPRE OPH EXAM EST PT 1/>: CPT | Mod: HCNC,S$GLB,, | Performed by: OPTOMETRIST

## 2018-11-13 PROCEDURE — 99999 PR PBB SHADOW E&M-EST. PATIENT-LVL II: CPT | Mod: PBBFAC,HCNC,, | Performed by: OPTOMETRIST

## 2018-11-13 RX ORDER — LATANOPROST 50 UG/ML
1 SOLUTION/ DROPS OPHTHALMIC NIGHTLY
Qty: 3 BOTTLE | Refills: 3 | Status: SHIPPED | OUTPATIENT
Start: 2018-11-13 | End: 2019-03-21 | Stop reason: SDUPTHER

## 2018-11-13 NOTE — PROGRESS NOTES
Assessment HPI     Last eye exam was 11/13/17 with Dr. Jiménez.  Patient states no vision complaints since last exam. Needs refills on   Latanoprost. Happy with current glasses-not interested in updating rx.   Patient denies diplopia, headaches, flashes/floaters, and pain.    Latanoprost QHS OU    Last edited by Padmini Walker on 11/13/2018  8:25 AM. (History)            Assessment /Plan     For exam results, see Encounter Report.    Primary open angle glaucoma of both eyes, moderate stage  -     latanoprost 0.005 % ophthalmic solution; Place 1 drop into both eyes every evening.  Dispense: 3 Bottle; Refill: 3    Type 2 diabetes mellitus without ophthalmic manifestations    Pseudophakia of both eyes            1.  Continue Latanoprost QHS OU-good response.  Refills sent to pharmacy.      DFE: 11/13/18  Photos: 2/13/17  HVF: 7/13/17  OCT: 7/13/17  Gonio:   Pachy: 526 OD, 512 OS  Initial IOPs: 22 OD, 22 OS  MHx: DM, HTN  FHx: none  2.  No retinopathy--monitor yearly.  BS control.  3.  Continue w/ current rx            RTC 4 months for iop check and gonio.

## 2019-01-07 DIAGNOSIS — C61 PROSTATE CANCER: Primary | ICD-10-CM

## 2019-01-11 ENCOUNTER — TELEPHONE (OUTPATIENT)
Dept: INTERNAL MEDICINE | Facility: CLINIC | Age: 84
End: 2019-01-11

## 2019-01-11 DIAGNOSIS — I25.5 ISCHEMIC CARDIOMYOPATHY: Chronic | ICD-10-CM

## 2019-01-11 RX ORDER — GLIPIZIDE 2.5 MG/1
2.5 TABLET, EXTENDED RELEASE ORAL DAILY
Qty: 90 TABLET | Refills: 0 | Status: SHIPPED | OUTPATIENT
Start: 2019-01-11 | End: 2019-03-07 | Stop reason: SDUPTHER

## 2019-01-11 RX ORDER — FUROSEMIDE 40 MG/1
40 TABLET ORAL 2 TIMES DAILY
Qty: 180 TABLET | Refills: 0 | Status: SHIPPED | OUTPATIENT
Start: 2019-01-11 | End: 2019-03-26

## 2019-01-11 NOTE — TELEPHONE ENCOUNTER
Pt is also requesting Metoprolol Succinate 50 mg 24 hr tablet but it was last given on 4/30/2014 and 7/24/2014 it was discontinued.     See note and Please advise!

## 2019-01-13 ENCOUNTER — HOSPITAL ENCOUNTER (INPATIENT)
Facility: HOSPITAL | Age: 84
LOS: 2 days | Discharge: HOME | DRG: 193 | End: 2019-01-15
Attending: EMERGENCY MEDICINE | Admitting: EMERGENCY MEDICINE
Payer: MEDICARE

## 2019-01-13 DIAGNOSIS — J18.9 CAP (COMMUNITY ACQUIRED PNEUMONIA): ICD-10-CM

## 2019-01-13 DIAGNOSIS — I50.9 CHF (CONGESTIVE HEART FAILURE): ICD-10-CM

## 2019-01-13 DIAGNOSIS — J18.9 PNEUMONIA OF RIGHT UPPER LOBE DUE TO INFECTIOUS ORGANISM: Primary | ICD-10-CM

## 2019-01-13 DIAGNOSIS — R09.02 HYPOXIA: ICD-10-CM

## 2019-01-13 DIAGNOSIS — I50.9 ACUTE ON CHRONIC CONGESTIVE HEART FAILURE, UNSPECIFIED HEART FAILURE TYPE: ICD-10-CM

## 2019-01-13 PROBLEM — E11.3213 CONTROLLED TYPE 2 DIABETES MELLITUS WITH BOTH EYES AFFECTED BY MILD NONPROLIFERATIVE RETINOPATHY AND MACULAR EDEMA, WITHOUT LONG-TERM CURRENT USE OF INSULIN: Chronic | Status: ACTIVE | Noted: 2018-03-26

## 2019-01-13 PROBLEM — N18.2 CKD (CHRONIC KIDNEY DISEASE), STAGE II: Chronic | Status: ACTIVE | Noted: 2017-08-14

## 2019-01-13 LAB
ALBUMIN SERPL BCP-MCNC: 3.7 G/DL
ALP SERPL-CCNC: 105 U/L
ALT SERPL W/O P-5'-P-CCNC: 15 U/L
ANION GAP SERPL CALC-SCNC: 11 MMOL/L
AST SERPL-CCNC: 26 U/L
BASOPHILS # BLD AUTO: 0.02 K/UL
BASOPHILS NFR BLD: 0.2 %
BILIRUB SERPL-MCNC: 0.7 MG/DL
BNP SERPL-MCNC: 826 PG/ML
BUN SERPL-MCNC: 17 MG/DL
CALCIUM SERPL-MCNC: 9 MG/DL
CHLORIDE SERPL-SCNC: 101 MMOL/L
CO2 SERPL-SCNC: 26 MMOL/L
CREAT SERPL-MCNC: 1.4 MG/DL
CTP QC/QA: YES
DIFFERENTIAL METHOD: ABNORMAL
EOSINOPHIL # BLD AUTO: 0.2 K/UL
EOSINOPHIL NFR BLD: 2.1 %
ERYTHROCYTE [DISTWIDTH] IN BLOOD BY AUTOMATED COUNT: 15 %
EST. GFR  (AFRICAN AMERICAN): 50.8 ML/MIN/1.73 M^2
EST. GFR  (NON AFRICAN AMERICAN): 43.9 ML/MIN/1.73 M^2
ESTIMATED AVG GLUCOSE: 128 MG/DL
GLUCOSE SERPL-MCNC: 149 MG/DL
HBA1C MFR BLD HPLC: 6.1 %
HCT VFR BLD AUTO: 28.1 %
HGB BLD-MCNC: 9.8 G/DL
IMM GRANULOCYTES # BLD AUTO: 0.04 K/UL
IMM GRANULOCYTES NFR BLD AUTO: 0.4 %
LACTATE SERPL-SCNC: 0.8 MMOL/L
LYMPHOCYTES # BLD AUTO: 1.2 K/UL
LYMPHOCYTES NFR BLD: 12.4 %
MCH RBC QN AUTO: 27.1 PG
MCHC RBC AUTO-ENTMCNC: 34.9 G/DL
MCV RBC AUTO: 78 FL
MONOCYTES # BLD AUTO: 0.8 K/UL
MONOCYTES NFR BLD: 8.9 %
NEUTROPHILS # BLD AUTO: 7 K/UL
NEUTROPHILS NFR BLD: 76 %
NRBC BLD-RTO: 0 /100 WBC
PLATELET # BLD AUTO: 145 K/UL
PMV BLD AUTO: 10.9 FL
POC MOLECULAR INFLUENZA A AGN: NEGATIVE
POC MOLECULAR INFLUENZA B AGN: NEGATIVE
POCT GLUCOSE: 174 MG/DL (ref 70–110)
POTASSIUM SERPL-SCNC: 3.9 MMOL/L
PROT SERPL-MCNC: 7.2 G/DL
RBC # BLD AUTO: 3.61 M/UL
SODIUM SERPL-SCNC: 138 MMOL/L
TROPONIN I SERPL DL<=0.01 NG/ML-MCNC: 0.05 NG/ML
WBC # BLD AUTO: 9.25 K/UL

## 2019-01-13 PROCEDURE — 96375 TX/PRO/DX INJ NEW DRUG ADDON: CPT | Mod: HCNC

## 2019-01-13 PROCEDURE — 94761 N-INVAS EAR/PLS OXIMETRY MLT: CPT | Mod: HCNC

## 2019-01-13 PROCEDURE — 12000002 HC ACUTE/MED SURGE SEMI-PRIVATE ROOM: Mod: HCNC

## 2019-01-13 PROCEDURE — 87040 BLOOD CULTURE FOR BACTERIA: CPT | Mod: HCNC

## 2019-01-13 PROCEDURE — 94640 AIRWAY INHALATION TREATMENT: CPT | Mod: HCNC

## 2019-01-13 PROCEDURE — 93010 EKG 12-LEAD: ICD-10-PCS | Mod: HCNC,,, | Performed by: INTERNAL MEDICINE

## 2019-01-13 PROCEDURE — 93010 ELECTROCARDIOGRAM REPORT: CPT | Mod: HCNC,,, | Performed by: INTERNAL MEDICINE

## 2019-01-13 PROCEDURE — 99285 PR EMERGENCY DEPT VISIT,LEVEL V: ICD-10-PCS | Mod: ,,, | Performed by: EMERGENCY MEDICINE

## 2019-01-13 PROCEDURE — 25000242 PHARM REV CODE 250 ALT 637 W/ HCPCS: Mod: HCNC | Performed by: EMERGENCY MEDICINE

## 2019-01-13 PROCEDURE — 25000003 PHARM REV CODE 250: Mod: HCNC | Performed by: EMERGENCY MEDICINE

## 2019-01-13 PROCEDURE — 99285 EMERGENCY DEPT VISIT HI MDM: CPT | Mod: ,,, | Performed by: EMERGENCY MEDICINE

## 2019-01-13 PROCEDURE — 83880 ASSAY OF NATRIURETIC PEPTIDE: CPT | Mod: HCNC

## 2019-01-13 PROCEDURE — 99285 EMERGENCY DEPT VISIT HI MDM: CPT | Mod: 25,HCNC

## 2019-01-13 PROCEDURE — 83036 HEMOGLOBIN GLYCOSYLATED A1C: CPT | Mod: HCNC

## 2019-01-13 PROCEDURE — 93005 ELECTROCARDIOGRAM TRACING: CPT | Mod: HCNC

## 2019-01-13 PROCEDURE — 27000221 HC OXYGEN, UP TO 24 HOURS: Mod: HCNC

## 2019-01-13 PROCEDURE — 82962 GLUCOSE BLOOD TEST: CPT | Mod: HCNC

## 2019-01-13 PROCEDURE — 96372 THER/PROPH/DIAG INJ SC/IM: CPT | Mod: 59,HCNC

## 2019-01-13 PROCEDURE — 96365 THER/PROPH/DIAG IV INF INIT: CPT | Mod: HCNC

## 2019-01-13 PROCEDURE — 63600175 PHARM REV CODE 636 W HCPCS: Mod: HCNC | Performed by: STUDENT IN AN ORGANIZED HEALTH CARE EDUCATION/TRAINING PROGRAM

## 2019-01-13 PROCEDURE — 83605 ASSAY OF LACTIC ACID: CPT | Mod: HCNC

## 2019-01-13 PROCEDURE — 63600175 PHARM REV CODE 636 W HCPCS: Mod: HCNC | Performed by: EMERGENCY MEDICINE

## 2019-01-13 PROCEDURE — 85025 COMPLETE CBC W/AUTO DIFF WBC: CPT | Mod: HCNC

## 2019-01-13 PROCEDURE — 84484 ASSAY OF TROPONIN QUANT: CPT | Mod: HCNC

## 2019-01-13 PROCEDURE — 80053 COMPREHEN METABOLIC PANEL: CPT | Mod: HCNC

## 2019-01-13 RX ORDER — IPRATROPIUM BROMIDE AND ALBUTEROL SULFATE 2.5; .5 MG/3ML; MG/3ML
3 SOLUTION RESPIRATORY (INHALATION)
Status: DISCONTINUED | OUTPATIENT
Start: 2019-01-14 | End: 2019-01-15 | Stop reason: HOSPADM

## 2019-01-13 RX ORDER — CEFTRIAXONE 1 G/1
1 INJECTION, POWDER, FOR SOLUTION INTRAMUSCULAR; INTRAVENOUS
Status: DISCONTINUED | OUTPATIENT
Start: 2019-01-14 | End: 2019-01-14

## 2019-01-13 RX ORDER — HEPARIN SODIUM 5000 [USP'U]/ML
5000 INJECTION, SOLUTION INTRAVENOUS; SUBCUTANEOUS EVERY 8 HOURS
Status: DISCONTINUED | OUTPATIENT
Start: 2019-01-13 | End: 2019-01-14

## 2019-01-13 RX ORDER — FUROSEMIDE 10 MG/ML
40 INJECTION INTRAMUSCULAR; INTRAVENOUS
Status: COMPLETED | OUTPATIENT
Start: 2019-01-13 | End: 2019-01-13

## 2019-01-13 RX ORDER — NITROGLYCERIN 0.4 MG/1
0.4 TABLET SUBLINGUAL EVERY 5 MIN PRN
Status: DISCONTINUED | OUTPATIENT
Start: 2019-01-13 | End: 2019-01-15 | Stop reason: HOSPADM

## 2019-01-13 RX ORDER — IBUPROFEN 200 MG
24 TABLET ORAL
Status: DISCONTINUED | OUTPATIENT
Start: 2019-01-13 | End: 2019-01-15 | Stop reason: HOSPADM

## 2019-01-13 RX ORDER — ATORVASTATIN CALCIUM 20 MG/1
80 TABLET, FILM COATED ORAL DAILY
Status: DISCONTINUED | OUTPATIENT
Start: 2019-01-14 | End: 2019-01-15 | Stop reason: HOSPADM

## 2019-01-13 RX ORDER — IPRATROPIUM BROMIDE AND ALBUTEROL SULFATE 2.5; .5 MG/3ML; MG/3ML
3 SOLUTION RESPIRATORY (INHALATION)
Status: COMPLETED | OUTPATIENT
Start: 2019-01-13 | End: 2019-01-13

## 2019-01-13 RX ORDER — SODIUM CHLORIDE 0.9 % (FLUSH) 0.9 %
5 SYRINGE (ML) INJECTION
Status: DISCONTINUED | OUTPATIENT
Start: 2019-01-13 | End: 2019-01-15 | Stop reason: HOSPADM

## 2019-01-13 RX ORDER — LISINOPRIL 20 MG/1
40 TABLET ORAL DAILY
Status: DISCONTINUED | OUTPATIENT
Start: 2019-01-14 | End: 2019-01-15 | Stop reason: HOSPADM

## 2019-01-13 RX ORDER — ASPIRIN 81 MG/1
81 TABLET ORAL DAILY
Status: DISCONTINUED | OUTPATIENT
Start: 2019-01-14 | End: 2019-01-15 | Stop reason: HOSPADM

## 2019-01-13 RX ORDER — IBUPROFEN 200 MG
16 TABLET ORAL
Status: DISCONTINUED | OUTPATIENT
Start: 2019-01-13 | End: 2019-01-15 | Stop reason: HOSPADM

## 2019-01-13 RX ORDER — CEFTRIAXONE 1 G/1
1 INJECTION, POWDER, FOR SOLUTION INTRAMUSCULAR; INTRAVENOUS
Status: COMPLETED | OUTPATIENT
Start: 2019-01-13 | End: 2019-01-13

## 2019-01-13 RX ORDER — CARVEDILOL 6.25 MG/1
6.25 TABLET ORAL 2 TIMES DAILY WITH MEALS
Status: DISCONTINUED | OUTPATIENT
Start: 2019-01-14 | End: 2019-01-15 | Stop reason: HOSPADM

## 2019-01-13 RX ORDER — BICALUTAMIDE 50 MG/1
50 TABLET, FILM COATED ORAL DAILY
Status: DISCONTINUED | OUTPATIENT
Start: 2019-01-14 | End: 2019-01-15 | Stop reason: HOSPADM

## 2019-01-13 RX ORDER — INSULIN ASPART 100 [IU]/ML
0-5 INJECTION, SOLUTION INTRAVENOUS; SUBCUTANEOUS
Status: DISCONTINUED | OUTPATIENT
Start: 2019-01-13 | End: 2019-01-15 | Stop reason: HOSPADM

## 2019-01-13 RX ORDER — GLUCAGON 1 MG
1 KIT INJECTION
Status: DISCONTINUED | OUTPATIENT
Start: 2019-01-13 | End: 2019-01-15 | Stop reason: HOSPADM

## 2019-01-13 RX ORDER — AMLODIPINE BESYLATE 10 MG/1
10 TABLET ORAL DAILY
Status: DISCONTINUED | OUTPATIENT
Start: 2019-01-14 | End: 2019-01-15 | Stop reason: HOSPADM

## 2019-01-13 RX ADMIN — FUROSEMIDE 40 MG: 10 INJECTION, SOLUTION INTRAVENOUS at 07:01

## 2019-01-13 RX ADMIN — AZITHROMYCIN MONOHYDRATE 500 MG: 500 INJECTION, POWDER, LYOPHILIZED, FOR SOLUTION INTRAVENOUS at 09:01

## 2019-01-13 RX ADMIN — HEPARIN SODIUM 5000 UNITS: 5000 INJECTION, SOLUTION INTRAVENOUS; SUBCUTANEOUS at 11:01

## 2019-01-13 RX ADMIN — IPRATROPIUM BROMIDE AND ALBUTEROL SULFATE 3 ML: .5; 3 SOLUTION RESPIRATORY (INHALATION) at 06:01

## 2019-01-13 RX ADMIN — CEFTRIAXONE SODIUM 1 G: 1 INJECTION, POWDER, FOR SOLUTION INTRAMUSCULAR; INTRAVENOUS at 09:01

## 2019-01-13 NOTE — ED TRIAGE NOTES
Presents to ER with a cold for 2 weeks and a cough since yesterday that is productive of yellow sputum.  Also complaining of sore throat.  O2 applied at 2l/NC for O2 Sat of 91%.  Patient's name and date of birth checked and is correct.    LOC: The patient is awake, alert, and oriented to place, time, situation. Affect is appropriate.  Speech is appropriate and clear.      APPEARANCE: Patient resting comfortably, and is  in no acute distress.  Patient is clean and well groomed.     SKIN: The skin is warm and dry; color consistent with ethnicity.  Patient has normal skin turgor and moist mucus membranes.  Skin intact; no breakdown or bruising noted.      MUSCULOSKELETAL: Patient with generalized weakness and uses a walker to ambulate.     RESPIRATORY: Airway is open and patent. Respirations spontaneous, even, easy, and non-labored.  Patient has a normal effort and rate.  No accessory muscle use noted. Denies cough.  Faint rales scattered throughout.     CARDIAC:  1+ peripheral edema noted. No complaints of chest pain.       ABDOMEN: Soft and non tender to palpation.  No distention noted.      NEUROLOGIC: Eyes open spontaneously.  Behavior appropriate to situation.  Follows commands; facial expression symmetrical.  Purposeful motor response noted; normal sensation in all extremities.

## 2019-01-13 NOTE — ED PROVIDER NOTES
Encounter Date: 1/13/2019    SCRIBE #1 NOTE: I, Reji Power , am scribing for, and in the presence of,  Abdullahi Freire M.D. I have scribed the entire note.       History     Chief Complaint   Patient presents with    Cough     cold and cough x 2 weeks. yellow sputum. denies fever     Pt is a 89 yo M w pmhx of CHF, CKD, DM, and CAD presents to the ED for evaluation of cold and cough. States that for 2 weeks he has been experiencing cough, loss of voice, SOB, sore throat, and congestion.  Patient with chronic bilateral LE edema from his CHF that is unchanged.  He is not on oxygen at baseline.  No history of COPD or asthma.  He denies n/v/d/f/cp/abd pain/dysuria.  A ten point review of systems was completed and is negative except as documented above.  The patients available PMH, PSH, medications, allergies, and triage vital signs were reviewed just prior to their medical evaluation.          Review of patient's allergies indicates:  No Known Allergies  Past Medical History:   Diagnosis Date    Anemia     Arthritis     Cancer     Prostate    Cataract     CHF (congestive heart failure)     Chronic kidney disease     Coronary artery disease     Diabetes mellitus     Diabetic retinopathy     Disorder of kidney and ureter     ckd 2    Encounter for blood transfusion     Glaucoma     High cholesterol     Hypertension     Myocardial infarction     Prostate cancer     PVD (peripheral vascular disease)     Spinal stenosis     Status post cataract extraction and insertion of intraocular lens of left eye 11/13/2015     Past Surgical History:   Procedure Laterality Date    CARDIAC CATHETERIZATION      15 to 20 years ago x1 stent and 4/7/2014 x1 stent    CATARACT EXTRACTION Bilateral     Dr. Ferguson    CATHETERIZATION, HEART, LEFT N/A 4/7/2014    Performed by Raz Cox [facility] at [facility] CATH LAB    EYE SURGERY      HERNIA REPAIR      INSERTION-INTRAOCULAR LENS (IOL) Left 11/12/2015    Performed by Blanka  MARCO A Ferguson MD at [facility] OR 1ST FLR    INSERTION-INTRAOCULAR LENS (IOL) Right 10/1/2015    Performed by Blanka Ferguson [facility] at [facility] OR 1ST FLR    PHACOEMULSIFICATION-ASPIRATION-CATARACT Left 11/12/2015    Performed by Blanka Ferguson [facility] at [facility] OR 1ST FLR    PHACOEMULSIFICATION-ASPIRATION-CATARACT Right 10/1/2015    Performed by Blanka Ferguson MD at [facility] OR 1ST FLR     Family History   Problem Relation Age of Onset    Cancer Father     No Known Problems Mother     No Known Problems Son     Breast cancer Sister     Diabetes Sister     Hypertension Sister     No Known Problems Brother     No Known Problems Maternal Aunt     No Known Problems Maternal Uncle     No Known Problems Paternal Aunt     No Known Problems Paternal Uncle     No Known Problems Maternal Grandmother     No Known Problems Maternal Grandfather     No Known Problems Paternal Grandmother     No Known Problems Paternal Grandfather     No Known Problems Daughter     Diabetes Brother     Heart disease Neg Hx     Heart attack Neg Hx     Amblyopia Neg Hx     Blindness Neg Hx     Cataracts Neg Hx     Glaucoma Neg Hx     Macular degeneration Neg Hx     Retinal detachment Neg Hx     Strabismus Neg Hx     Stroke Neg Hx     Thyroid disease Neg Hx      Social History     Tobacco Use    Smoking status: Never Smoker    Smokeless tobacco: Former User   Substance Use Topics    Alcohol use: No    Drug use: No     Review of Systems   Constitutional: Negative for chills and fever.   HENT: Positive for congestion, sore throat and voice change.    Eyes: Negative for visual disturbance.   Respiratory: Positive for cough and shortness of breath.    Cardiovascular: Negative for chest pain.   Gastrointestinal: Negative for abdominal pain, diarrhea, nausea and vomiting.   Genitourinary: Negative for dysuria.   Musculoskeletal: Negative for neck pain and neck stiffness.   Skin: Negative for color change.   Allergic/Immunologic: Negative  for immunocompromised state.   Neurological: Negative for syncope.   All other systems reviewed and are negative.      Physical Exam     Initial Vitals [01/13/19 1729]   BP Pulse Resp Temp SpO2   (!) 152/68 69 18 98 °F (36.7 °C) (!) 91 %      MAP       --         Physical Exam    Nursing note and vitals reviewed.  Constitutional: He appears well-developed and well-nourished. He is not diaphoretic. No distress.   HENT:   Head: Normocephalic and atraumatic.   Nose: Nose normal.   Mouth/Throat: Oropharynx is clear and moist. No oropharyngeal exudate.   Sinuses nontender to palpation   Eyes: Conjunctivae are normal. Right eye exhibits no discharge. Left eye exhibits no discharge.   Neck: Normal range of motion. Neck supple.   nontender   Cardiovascular: Normal rate, regular rhythm and intact distal pulses. Exam reveals no gallop and no friction rub.    Murmur heard.   Systolic murmur is present with a grade of 3/6.  Pulmonary/Chest: No respiratory distress. He has no wheezes. He has no rhonchi. He has rales.   (+) mild rales thought    Abdominal: Soft. He exhibits no distension. There is no tenderness. There is no rebound and no guarding.   Musculoskeletal: Normal range of motion. He exhibits edema. He exhibits no tenderness.        Right lower leg: He exhibits edema.        Left lower leg: He exhibits edema.   (+) 1+ edema bilaterally to LE    Neurological: He is alert and oriented to person, place, and time. He has normal strength. GCS score is 15. GCS eye subscore is 4. GCS verbal subscore is 5. GCS motor subscore is 6.   Skin: Skin is warm and dry. No rash noted. No erythema.   Psychiatric: He has a normal mood and affect. His behavior is normal. Judgment and thought content normal.         ED Course   Procedures  Labs Reviewed   CBC W/ AUTO DIFFERENTIAL - Abnormal; Notable for the following components:       Result Value    RBC 3.61 (*)     Hemoglobin 9.8 (*)     Hematocrit 28.1 (*)     MCV 78 (*)     RDW 15.0 (*)      Platelets 145 (*)     Gran% 76.0 (*)     Lymph% 12.4 (*)     All other components within normal limits    Narrative:     shared lavender   COMPREHENSIVE METABOLIC PANEL - Abnormal; Notable for the following components:    Glucose 149 (*)     eGFR if  50.8 (*)     eGFR if non  43.9 (*)     All other components within normal limits    Narrative:     shared lavender   TROPONIN I - Abnormal; Notable for the following components:    Troponin I 0.050 (*)     All other components within normal limits    Narrative:     shared lavender   B-TYPE NATRIURETIC PEPTIDE - Abnormal; Notable for the following components:     (*)     All other components within normal limits    Narrative:     shared lavender   HEMOGLOBIN A1C - Abnormal; Notable for the following components:    Hemoglobin A1C 6.1 (*)     All other components within normal limits    Narrative:     ADD ON A1C PER DR ZANDER JARVIS/ORDER# [id number] @ 22:38 1/13/19  shared lavender   POCT GLUCOSE - Abnormal; Notable for the following components:    POCT Glucose 174 (*)     All other components within normal limits   CULTURE, BLOOD   CULTURE, BLOOD   LACTIC ACID, PLASMA    Narrative:     shared lavender   HEMOGLOBIN A1C   COMPREHENSIVE METABOLIC PANEL   MAGNESIUM   PHOSPHORUS   IRON AND TIBC   FERRITIN   RETICULOCYTES   POCT INFLUENZA A/B MOLECULAR   POCT GLUCOSE, HAND-HELD DEVICE     EKG Readings: (Independently Interpreted)   Initial Reading: No STEMI. Rhythm: Normal Sinus Rhythm. Ectopy: No Ectopy. Conduction: 1st Degree AV Block. T Waves Flipped: III, AVF, V5 and V6.   Likely LVH       Imaging Results          CT Chest Without Contrast (Final result)  Result time 01/13/19 20:16:13    Final result by Jeovany Shaver MD (01/13/19 20:16:13)                 Impression:      New patchy ground-glass opacities, most pronounced in the right upper lobe.  Findings could reflect infectious/inflammatory process including atypical  pneumonia or aspiration.  Asymmetric pulmonary edema or alveolar hemorrhage are also considered in the correct clinical setting.  Malignancy is felt to be less likely, but consider short-term follow-up to document resolution/improvement.    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    Mild mediastinal lymphadenopathy.    Small right and trace left pleural effusions with adjacent passive atelectasis.    Mild thoracic aortic ectasia at the level of the diaphragmatic hiatus.    Heterogeneous enlargement of the thyroid gland, similar prior exam from 2009.      Electronically signed by: Jeovany Shaver MD  Date:    01/13/2019  Time:    20:16             Narrative:    EXAMINATION:  CT CHEST WITHOUT CONTRAST    CLINICAL HISTORY:  Cough, persistent;    TECHNIQUE:  CT images of the chest obtained without intravenous contrast.  Multiplanar reconstructions were performed.  High resolution CT images were obtained. No contrast was administered.    COMPARISON:  Chest radiograph, 01/13/2019.  CT chest, 10/08/2009.    FINDINGS:  Thyroid is enlarged and mildly heterogeneous.  There is a calcified nodule in the left lobe of the thyroid measuring 1.1 cm, similar to the prior exam from 2009.    Thoracic aorta demonstrates mild ectasia at the level of the diaphragmatic hiatus, measuring up to 3.7 cm in maximum dimension.  There is moderate calcific atherosclerosis involving the visualized aorta.  Atherosclerotic calcifications are noted involving the coronary arteries.  Calcifications are also noted involving the mitral annulus.  Heart size is within normal limits.  No pericardial effusion.    Central airways are patent.  There are extensive bilateral calcified pleural plaques in a similar distribution to the prior exam.  Some of the calcified plaques demonstrate surface nodularity and prominent soft tissue component, also similar to prior.  There are new patchy  ground-glass airspace opacities, most pronounced in the right upper lobe.  Small right and trace left pleural effusions with adjacent passive atelectasis.  No confluent area of consolidation.  No pneumothorax.    There are multiple enlarged mediastinal lymph nodes, the largest pretracheal node measuring up to 1.5 cm in short axis (series 3, image 120).    Multilevel degenerative changes are identified in the thoracic spine.  No aggressive osseous lesion.                               X-Ray Chest PA And Lateral (Final result)  Result time 01/13/19 18:31:21    Final result by Francois Walters MD (01/13/19 18:31:21)                 Impression:      1. Increased interstitial and parenchymal attenuation bilaterally, findings may reflect interstitial edema noting bilateral basilar subsegmental atelectasis and possible trace effusions.  2. Multifocal pleural plaques, limit evaluation of the pulmonary parenchyma.  These appear grossly similar to the previous examination, correlation with any clinical findings to suggest infection as warranted.      Electronically signed by: Francois Walters MD  Date:    01/13/2019  Time:    18:31             Narrative:    EXAMINATION:  XR CHEST PA AND LATERAL    CLINICAL HISTORY:  Cough;    TECHNIQUE:  PA and lateral views of the chest were performed.    COMPARISON:  12/14/2015    FINDINGS:  The cardiomediastinal silhouette is prominent, similar to the previous exam, magnified by technique.  There is atherosclerotic calcification of the aorta..  There is obscuration of the costophrenic angles, may reflect small effusions versus atelectasis..  The trachea is midline.  The lungs are symmetrically expanded bilaterally with patchy increased interstitial and parenchymal attenuation bilaterally, more prominent than on the previous examination.  There are multifocal pleural plaques.  There is scattered bandlike atelectasis..  There is no pneumothorax.  The osseous structures are remarkable for  degenerative change..                              X-Rays:   Independently Interpreted Readings:   Other Readings:  Reviewed CXR    Medical Decision Making:   History:   I obtained history from: someone other than patient.  Old Medical Records: I decided to obtain old medical records.  Clinical Tests:   Lab Tests: Ordered and Reviewed  Radiological Study: Ordered and Reviewed  Medical Tests: Ordered and Reviewed  ED Management:  89 yo M with known CHF presents with cough.  Vitals with tachypnea and hypoxia.  Physical exam with rales and edema.  Labs with elevated BNP, but normal WBC.  CXR with edema vs infection.  CT shows infiltrate.  Treated for PNA and acute CHF exacerbation.  ECG without acute ischemia.  Doubt ACS, PNX, or dissection.  Treated with abx and lasix.  No sepsis.  Will admit to IM given hypoxia.  Did bedside teaching.  All questions answered.  Patient acknowledges understanding.  Other:   I have discussed this case with another health care provider.       <> Summary of the Discussion: IM            Scribe Attestation:   Scribe #1: I performed the above scribed service and the documentation accurately describes the services I performed. I attest to the accuracy of the note.               Clinical Impression:   The primary encounter diagnosis was Pneumonia of right upper lobe due to infectious organism. Diagnoses of Acute on chronic congestive heart failure, unspecified heart failure type, Hypoxia, and CHF (congestive heart failure) were also pertinent to this visit.      Disposition:   Disposition: Admitted  Condition: Stable                        Abdullahi Freire MD  01/14/19 0128

## 2019-01-14 ENCOUNTER — RESEARCH ENCOUNTER (OUTPATIENT)
Dept: RESEARCH | Facility: OTHER | Age: 84
End: 2019-01-14

## 2019-01-14 ENCOUNTER — TELEPHONE (OUTPATIENT)
Dept: INTERNAL MEDICINE | Facility: CLINIC | Age: 84
End: 2019-01-14

## 2019-01-14 LAB
ALBUMIN SERPL BCP-MCNC: 3.4 G/DL
ALP SERPL-CCNC: 102 U/L
ALT SERPL W/O P-5'-P-CCNC: 12 U/L
ANION GAP SERPL CALC-SCNC: 11 MMOL/L
ANISOCYTOSIS BLD QL SMEAR: SLIGHT
AST SERPL-CCNC: 24 U/L
AV INDEX (PROSTH): 0.2
AV MEAN GRADIENT: 20.38 MMHG
AV PEAK GRADIENT: 38.44 MMHG
AV VALVE AREA: 0.94 CM2
BASOPHILS # BLD AUTO: 0.02 K/UL
BASOPHILS NFR BLD: 0.3 %
BILIRUB SERPL-MCNC: 0.9 MG/DL
BSA FOR ECHO PROCEDURE: 2.01 M2
BUN SERPL-MCNC: 15 MG/DL
CALCIUM SERPL-MCNC: 9 MG/DL
CHLORIDE SERPL-SCNC: 103 MMOL/L
CO2 SERPL-SCNC: 24 MMOL/L
CREAT SERPL-MCNC: 1.1 MG/DL
CV ECHO LV RWT: 0.28 CM
DIFFERENTIAL METHOD: ABNORMAL
DOP CALC AO PEAK VEL: 3.1 M/S
DOP CALC AO VTI: 69.73 CM
DOP CALC LVOT AREA: 4.79 CM2
DOP CALC LVOT DIAMETER: 2.47 CM
DOP CALC LVOT STROKE VOLUME: 65.52 CM3
DOP CALCLVOT PEAK VEL VTI: 13.68 CM
E WAVE DECELERATION TIME: 335.46 MSEC
E/A RATIO: 0.99
E/E' RATIO: 23.56
ECHO LV POSTERIOR WALL: 0.91 CM (ref 0.6–1.1)
EOSINOPHIL # BLD AUTO: 0.2 K/UL
EOSINOPHIL NFR BLD: 3.1 %
ERYTHROCYTE [DISTWIDTH] IN BLOOD BY AUTOMATED COUNT: 15.1 %
EST. GFR  (AFRICAN AMERICAN): >60 ML/MIN/1.73 M^2
EST. GFR  (NON AFRICAN AMERICAN): 58.8 ML/MIN/1.73 M^2
FERRITIN SERPL-MCNC: 233 NG/ML
FRACTIONAL SHORTENING: 11 % (ref 28–44)
GIANT PLATELETS BLD QL SMEAR: PRESENT
GLUCOSE SERPL-MCNC: 107 MG/DL
HCT VFR BLD AUTO: 28.3 %
HGB BLD-MCNC: 10 G/DL
HYPOCHROMIA BLD QL SMEAR: ABNORMAL
IMM GRANULOCYTES # BLD AUTO: 0.02 K/UL
IMM GRANULOCYTES NFR BLD AUTO: 0.3 %
INTERVENTRICULAR SEPTUM: 0.8 CM (ref 0.6–1.1)
IRON SERPL-MCNC: 26 UG/DL
LA MAJOR: 5.83 CM
LA MINOR: 6.22 CM
LA WIDTH: 5.04 CM
LEFT ATRIUM SIZE: 5.54 CM
LEFT ATRIUM VOLUME INDEX: 72.2 ML/M2
LEFT ATRIUM VOLUME: 142.84 CM3
LEFT INTERNAL DIMENSION IN SYSTOLE: 5.78 CM (ref 2.1–4)
LEFT VENTRICLE DIASTOLIC VOLUME INDEX: 108.09 ML/M2
LEFT VENTRICLE DIASTOLIC VOLUME: 213.73 ML
LEFT VENTRICLE MASS INDEX: 116.6 G/M2
LEFT VENTRICLE SYSTOLIC VOLUME INDEX: 83.5 ML/M2
LEFT VENTRICLE SYSTOLIC VOLUME: 165.2 ML
LEFT VENTRICULAR INTERNAL DIMENSION IN DIASTOLE: 6.47 CM (ref 3.5–6)
LEFT VENTRICULAR MASS: 230.65 G
LV LATERAL E/E' RATIO: 21.2
LV SEPTAL E/E' RATIO: 26.5
LYMPHOCYTES # BLD AUTO: 1 K/UL
LYMPHOCYTES NFR BLD: 13.2 %
MAGNESIUM SERPL-MCNC: 2.1 MG/DL
MCH RBC QN AUTO: 28.1 PG
MCHC RBC AUTO-ENTMCNC: 35.3 G/DL
MCV RBC AUTO: 80 FL
MONOCYTES # BLD AUTO: 0.9 K/UL
MONOCYTES NFR BLD: 12.6 %
MV MEAN GRADIENT: 3 MMHG
MV PEAK A VEL: 1.07 M/S
MV PEAK E VEL: 1.06 M/S
MV STENOSIS PRESSURE HALF TIME: 63 MS
MV VALVE AREA P 1/2 METHOD: 3.49 CM2
NEUTROPHILS # BLD AUTO: 5.2 K/UL
NEUTROPHILS NFR BLD: 70.5 %
NRBC BLD-RTO: 0 /100 WBC
OVALOCYTES BLD QL SMEAR: ABNORMAL
PHOSPHATE SERPL-MCNC: 3.1 MG/DL
PLATELET # BLD AUTO: 141 K/UL
PMV BLD AUTO: 11.5 FL
POCT GLUCOSE: 112 MG/DL (ref 70–110)
POCT GLUCOSE: 121 MG/DL (ref 70–110)
POCT GLUCOSE: 138 MG/DL (ref 70–110)
POCT GLUCOSE: 165 MG/DL (ref 70–110)
POCT GLUCOSE: 179 MG/DL (ref 70–110)
POIKILOCYTOSIS BLD QL SMEAR: SLIGHT
POLYCHROMASIA BLD QL SMEAR: ABNORMAL
POTASSIUM SERPL-SCNC: 3.8 MMOL/L
PROT SERPL-MCNC: 6.9 G/DL
PULM VEIN S/D RATIO: 0.81
PV PEAK D VEL: 0.54 M/S
PV PEAK S VEL: 0.44 M/S
RA MAJOR: 4.87 CM
RA WIDTH: 4.48 CM
RBC # BLD AUTO: 3.56 M/UL
RETICS/RBC NFR AUTO: 1.4 %
SATURATED IRON: 11 %
SINUS: 3.07 CM
SODIUM SERPL-SCNC: 138 MMOL/L
STJ: 2.92 CM
TDI LATERAL: 0.05
TDI SEPTAL: 0.04
TDI: 0.05
TOTAL IRON BINDING CAPACITY: 241 UG/DL
TRANSFERRIN SERPL-MCNC: 163 MG/DL
TRICUSPID ANNULAR PLANE SYSTOLIC EXCURSION: 2.27 CM
TROPONIN I SERPL DL<=0.01 NG/ML-MCNC: 0.04 NG/ML
WBC # BLD AUTO: 7.37 K/UL

## 2019-01-14 PROCEDURE — 94640 AIRWAY INHALATION TREATMENT: CPT | Mod: HCNC

## 2019-01-14 PROCEDURE — 25000003 PHARM REV CODE 250: Mod: HCNC | Performed by: STUDENT IN AN ORGANIZED HEALTH CARE EDUCATION/TRAINING PROGRAM

## 2019-01-14 PROCEDURE — 63600175 PHARM REV CODE 636 W HCPCS: Mod: HCNC | Performed by: STUDENT IN AN ORGANIZED HEALTH CARE EDUCATION/TRAINING PROGRAM

## 2019-01-14 PROCEDURE — 93005 ELECTROCARDIOGRAM TRACING: CPT | Mod: HCNC

## 2019-01-14 PROCEDURE — 82962 GLUCOSE BLOOD TEST: CPT | Mod: HCNC

## 2019-01-14 PROCEDURE — 25000242 PHARM REV CODE 250 ALT 637 W/ HCPCS: Mod: HCNC | Performed by: STUDENT IN AN ORGANIZED HEALTH CARE EDUCATION/TRAINING PROGRAM

## 2019-01-14 PROCEDURE — 80053 COMPREHEN METABOLIC PANEL: CPT | Mod: HCNC

## 2019-01-14 PROCEDURE — 93010 ELECTROCARDIOGRAM REPORT: CPT | Mod: HCNC,,, | Performed by: INTERNAL MEDICINE

## 2019-01-14 PROCEDURE — 94761 N-INVAS EAR/PLS OXIMETRY MLT: CPT | Mod: HCNC

## 2019-01-14 PROCEDURE — 84484 ASSAY OF TROPONIN QUANT: CPT | Mod: HCNC

## 2019-01-14 PROCEDURE — 99223 PR INITIAL HOSPITAL CARE,LEVL III: ICD-10-PCS | Mod: HCNC,AI,GC, | Performed by: HOSPITALIST

## 2019-01-14 PROCEDURE — 27000221 HC OXYGEN, UP TO 24 HOURS: Mod: HCNC

## 2019-01-14 PROCEDURE — 11000001 HC ACUTE MED/SURG PRIVATE ROOM: Mod: HCNC

## 2019-01-14 PROCEDURE — 82728 ASSAY OF FERRITIN: CPT | Mod: HCNC

## 2019-01-14 PROCEDURE — 99223 1ST HOSP IP/OBS HIGH 75: CPT | Mod: HCNC,AI,GC, | Performed by: HOSPITALIST

## 2019-01-14 PROCEDURE — 85045 AUTOMATED RETICULOCYTE COUNT: CPT | Mod: HCNC

## 2019-01-14 PROCEDURE — 93010 EKG 12-LEAD: ICD-10-PCS | Mod: HCNC,,, | Performed by: INTERNAL MEDICINE

## 2019-01-14 PROCEDURE — 27000646 HC AEROBIKA DEVICE: Mod: HCNC

## 2019-01-14 PROCEDURE — 83540 ASSAY OF IRON: CPT | Mod: HCNC

## 2019-01-14 PROCEDURE — 85025 COMPLETE CBC W/AUTO DIFF WBC: CPT | Mod: HCNC

## 2019-01-14 PROCEDURE — 36415 COLL VENOUS BLD VENIPUNCTURE: CPT | Mod: HCNC

## 2019-01-14 PROCEDURE — 83735 ASSAY OF MAGNESIUM: CPT | Mod: HCNC

## 2019-01-14 PROCEDURE — 84100 ASSAY OF PHOSPHORUS: CPT | Mod: HCNC

## 2019-01-14 PROCEDURE — 94664 DEMO&/EVAL PT USE INHALER: CPT | Mod: HCNC

## 2019-01-14 PROCEDURE — 99900035 HC TECH TIME PER 15 MIN (STAT): Mod: HCNC

## 2019-01-14 PROCEDURE — 25000003 PHARM REV CODE 250: Mod: HCNC | Performed by: HOSPITALIST

## 2019-01-14 PROCEDURE — 87632 RESP VIRUS 6-11 TARGETS: CPT | Mod: HCNC

## 2019-01-14 RX ORDER — FUROSEMIDE 10 MG/ML
40 INJECTION INTRAMUSCULAR; INTRAVENOUS 2 TIMES DAILY
Status: DISCONTINUED | OUTPATIENT
Start: 2019-01-14 | End: 2019-01-15

## 2019-01-14 RX ORDER — ENOXAPARIN SODIUM 100 MG/ML
40 INJECTION SUBCUTANEOUS EVERY 24 HOURS
Status: DISCONTINUED | OUTPATIENT
Start: 2019-01-14 | End: 2019-01-15 | Stop reason: HOSPADM

## 2019-01-14 RX ORDER — CEFTRIAXONE 1 G/1
1 INJECTION, POWDER, FOR SOLUTION INTRAMUSCULAR; INTRAVENOUS
Status: DISCONTINUED | OUTPATIENT
Start: 2019-01-15 | End: 2019-01-14

## 2019-01-14 RX ORDER — AZITHROMYCIN 250 MG/1
250 TABLET, FILM COATED ORAL DAILY
Status: DISCONTINUED | OUTPATIENT
Start: 2019-01-14 | End: 2019-01-15 | Stop reason: HOSPADM

## 2019-01-14 RX ORDER — CEFTRIAXONE 1 G/1
1 INJECTION, POWDER, FOR SOLUTION INTRAMUSCULAR; INTRAVENOUS
Status: DISCONTINUED | OUTPATIENT
Start: 2019-01-15 | End: 2019-01-15 | Stop reason: HOSPADM

## 2019-01-14 RX ADMIN — IPRATROPIUM BROMIDE AND ALBUTEROL SULFATE 3 ML: .5; 3 SOLUTION RESPIRATORY (INHALATION) at 08:01

## 2019-01-14 RX ADMIN — CEFTRIAXONE SODIUM 1 G: 1 INJECTION, POWDER, FOR SOLUTION INTRAMUSCULAR; INTRAVENOUS at 08:01

## 2019-01-14 RX ADMIN — LISINOPRIL 40 MG: 20 TABLET ORAL at 08:01

## 2019-01-14 RX ADMIN — CARVEDILOL 6.25 MG: 6.25 TABLET, FILM COATED ORAL at 06:01

## 2019-01-14 RX ADMIN — BICALUTAMIDE 50 MG: 50 TABLET, FILM COATED ORAL at 08:01

## 2019-01-14 RX ADMIN — HEPARIN SODIUM 5000 UNITS: 5000 INJECTION, SOLUTION INTRAVENOUS; SUBCUTANEOUS at 06:01

## 2019-01-14 RX ADMIN — CARVEDILOL 6.25 MG: 6.25 TABLET, FILM COATED ORAL at 08:01

## 2019-01-14 RX ADMIN — ENOXAPARIN SODIUM 40 MG: 100 INJECTION SUBCUTANEOUS at 06:01

## 2019-01-14 RX ADMIN — ASPIRIN 81 MG: 81 TABLET, COATED ORAL at 08:01

## 2019-01-14 RX ADMIN — ATORVASTATIN CALCIUM 80 MG: 20 TABLET, FILM COATED ORAL at 08:01

## 2019-01-14 RX ADMIN — AMLODIPINE BESYLATE 10 MG: 10 TABLET ORAL at 09:01

## 2019-01-14 RX ADMIN — IPRATROPIUM BROMIDE AND ALBUTEROL SULFATE 3 ML: .5; 3 SOLUTION RESPIRATORY (INHALATION) at 01:01

## 2019-01-14 RX ADMIN — FUROSEMIDE 40 MG: 10 INJECTION, SOLUTION INTRAVENOUS at 12:01

## 2019-01-14 RX ADMIN — AZITHROMYCIN 250 MG: 250 TABLET, FILM COATED ORAL at 09:01

## 2019-01-14 RX ADMIN — IPRATROPIUM BROMIDE AND ALBUTEROL SULFATE 3 ML: .5; 3 SOLUTION RESPIRATORY (INHALATION) at 04:01

## 2019-01-14 RX ADMIN — FUROSEMIDE 40 MG: 10 INJECTION, SOLUTION INTRAVENOUS at 06:01

## 2019-01-14 NOTE — ASSESSMENT & PLAN NOTE
Mr Taylor is a 89 yo man with ischemic CAD (HFrEF 35%), DM2 w/ retinopathy, CKD, who presents with chronically worsening cough and SOB.     DDx: Pneumonia (CAP>HAP), HF exacerbation, MI/ACS, arrythmia, anemia    Assessment:  Community acquired pneumonia, RUL - active, hemodynamically stable, on room air  Ischemic CAD/HFrEF- suspect without exacerbation, stable  Chronic iron deficiency anemia    Plan:  - Continue broad spectrum therapy with Ceft/Azithro. Flu negative  - Continue diuresis. Will aim for slightly negative balance. Expect to transition to po, once improveing. Monitor UOP. Replace lytes prn. Wean O2 as tolerated.   - Continue GDMT for HFrEF. ECHO pending.   - Duonebs q4h  - No history of COPD, O2 goals >95%  - Iron studies, reticulocyte pending

## 2019-01-14 NOTE — PLAN OF CARE
On Discharge planning assessment patient is in bed, resting quietly, daughter at the bedside.  Introduced myself and CM role in patients care plan, patient verbalized understanding.     Pt lives in a single story home, his son comes to stay with his frequently. He has family provided transportation at discharge.  Patient denies HD, H/h and coumadin.Pt has a rollator, straight cane, W/C, and glucometer for home use. Verified Pts Address, Emergency Contact, Pharmacy and PCP.     Pt denies any concerns for this visit, CM will continue to follow. CM name and number placed on patients white board and Health Packet given to the patient with a brief overview of the information provided patient verbalized understanding.        01/14/19 1343   Discharge Assessment   Assessment Type Discharge Planning Assessment   Confirmed/corrected address and phone number on facesheet? Yes   Assessment information obtained from? Patient;Caregiver   Expected Length of Stay (days) 3   Communicated expected length of stay with patient/caregiver yes   Prior to hospitilization cognitive status: Alert/Oriented   Prior to hospitalization functional status: Assistive Equipment   Current cognitive status: Alert/Oriented   Current Functional Status: Assistive Equipment   Lives With child(kin), adult   Able to Return to Prior Arrangements yes   Is patient able to care for self after discharge? Yes   Who are your caregiver(s) and their phone number(s)? Marisela [phone]   Patient's perception of discharge disposition home or selfcare   Readmission Within the Last 30 Days no previous admission in last 30 days   Patient currently being followed by outpatient case management? No   Patient currently receives any other outside agency services? No   Equipment Currently Used at Home wheelchair;rollator;glucometer;cane, straight   Do you have any problems affording any of your prescribed medications? No   Is the patient taking medications as  prescribed? yes   Does the patient have transportation home? Yes   Transportation Anticipated family or friend will provide   Does the patient receive services at the [facility]? No   Discharge Plan A Home with family   Discharge Plan B Home Health   DME Needed Upon Discharge  none   Patient/Family in Agreement with Plan yes     Shari Chauhan MD  [address] [facility] [address]  [phone]    Extended Emergency Contact Information  Primary Emergency Contact: Marisela Carbajal   [place]  Mobile Phone: [phone]  Relation: Daughter      [facility] - [place], [place] - [address]  [address] [facility] [address]  Phone: [phone] Fax: [phone]    [facility] - [place], [place] - [address]  [address]  [facility] [address]  Phone: [phone] Fax: [phone]

## 2019-01-14 NOTE — HPI
Mr Taylor is a 89 yo man with ischemic CAD (MI x 2, with stents x2), HFrEF (35%, 2014), aortic stenosis,  HTN, DM2 w/ retinopathy, CKD, who presents with cough and SOB.     The patient reports having a cold consistent of sinus congestion and rhinorrhea, sore throat for more than 1 week. A few days ago, he developed a productive cough with yellow sputum, and worsening lower extremity swelling, so his daughter, who is an RN, brought him to ED.     He reports that his cold symptoms  never resolved, but actually just progressively worsened. He currently denies a sore throat. He does not remember having sick contacts. He is a non smoker, without COPD. He has not been in the hospital or nursing facility recently. He is not chronically immunosuppressed.     He does have some exertional SOB, which is slightly worse than it is at baseline. He denies orthopnia, paroxysmal nocturnal dyspnea, palpitations, chest pain, syncopal episodes.     No fevers at home. No belly pain, no abdominal complaints. He is not on home oxygen. No bleeding in stool, no melena.     He reports no history of immobility, recent travel. He is able to mostly ambulate with walker, and family reports he is relatively active.     He was most recently seen by his Cardiologist Dr. Pool who notes that he has been doing well the last couple years. He was recently changed from metoprolol to coreg 6.25 for blood pressure control. He is on goal directed therapy for HFrEF (coreg, Ace-I, lasix 40 BID).

## 2019-01-14 NOTE — NURSING
Pt oriented to room and environment. AAO X4. Able to make his known to staff. Daughter is at the bedside. Assisting with ADLs. VSS. No cough or SOB at this time. Denies any pain and discomfort. Per daughter Pt use walker or cane to ambulate, and able to go to BR. Urinal provided. Placed CB to easy reach. Advised to call for assist and wait till assist arrive. Verbalized understanding.

## 2019-01-14 NOTE — ASSESSMENT & PLAN NOTE
He was has been initially treated with Dr. Bee since 1999 with Su 4.  He began LHRH agonist therapy 02/07/2007.  He is currently on Casodex and Trelstar with last office visit and injection 02/27/2018.    Continue casodex inpatient.   Bladder scan, straight cath prn

## 2019-01-14 NOTE — PROGRESS NOTES
Sponsor-FDA     Study Title/IRB Number: [id number]     Principle Investigator: Dr. Berger    01/14/2017    Patient approached regarding potential enrolment in CABP study, patient declined.  Thanked patient for his time and wished him a speedy recovery.

## 2019-01-14 NOTE — TELEPHONE ENCOUNTER
----- Message from Louis Salinas sent at 1/14/2019 10:20 AM CST -----  Contact: Daughter, Marisela [phone]  FYI She wants you to know that he was admitted to the hospital this morning.    Thank you

## 2019-01-14 NOTE — MEDICAL/APP STUDENT
[facility] Medicine  History & Physical      Patient Name: Jeniffer Taylor  MRN: [id number]  Admission Date: 1/13/2019  Attending Physician: Kavita Rosa MD   Primary Care Provider: Shari Chauhan MD     [facility] Medicine Team: [facility] MED 2    Patient information was obtained from patient, relative(s) and ER records.       Principal problem: Pneumonia of right upper lobe due to infectious organism.  CC: Progressive cough with yellow sputum and bilateral edema of lower limbs. Patient denies fever.   Hx provided by patient and with assistance from his daughter (at bedside)    HPI: Mr. Taylor is a 89yr old man with a history of ischemic CAD, HFrEF (35%, 2014), aortic stenosis, HTN, DM2 w/retinopathy, chronic kidney disease stage II, who presented with progressive cough, SOB and bilateral edema of lower limbs.     The patient reported having a cold for two weeks. A few days ago he developed a progressive cough with yellow sputum and bilateral edema of lower limbs. This prompted his daughter to bring him to the ED.     Patient denies having a sore throat. Daughter suspects that  patient caught his infection during his time with his [employer] group but patient doesn't remember having specific sick contacts (patient lives with his son, who is well). Patient reports feeling unwell when in his garden, then woke up that evening with cold symptoms. He is a non smoker, without COPD. He has not been in the hospital recently and is not chronically immunosuppressed.    Patient has some exertional SOB, slightly worse than baseline. He denies orthopnea, paroxysmal nocturnal dyspnea, palpitations, chest pain, syncopal episodes.    No fevers at home. No recent travel history. Patient is functional, requires walker to be mobile, but denies any other history of immobility or restrictions of ADLs       Past Medical History   · Anemia  · Arthritis   · Cancer - Prostate   · Cataracts (bilateral) with  phacoemulsification and-aspiration and IOL insertion (bilateral)  · CHF (congestive heart failure)   · Chronic kidney disease -stage II  · Coronary artery disease  · Diabetes mellitus   · Diabetic retinopathy  · Disorder of kidney and ureter ckd 2   · Encounter for blood transfusion  · Glaucoma   · High cholesterol   · Hypertension  · Myocardial infarction x 2 with 2 x stent placement  · Prostate infarction  · Prostate cancer   · PVD (peripheral vascular disease)  · Spinal stenosis   · Status post cataract extraction and insertion of intraocular lens of left eye      Past Surgical History   CARDIAC CATHETERIZATION         15 to 20 years ago x1 stent and 4/7/2014 x1 stent    CATARACT EXTRACTION Bilateral       Dr. Ferguson    CATHETERIZATION, HEART, LEFT N/A 4/7/2014     Performed by Raz [facility] at [facility] CATH LAB    EYE SURGERY        HERNIA REPAIR        INSERTION-INTRAOCULAR LENS (IOL) Left 11/12/2015     Performed by Blanka [facility] at [facility] OR [facility]    INSERTION-INTRAOCULAR LENS (IOL) Right 10/1/2015     Performed by Bl[facility] at [facility]    PHACOEMULSIFICATION-ASPIRATION-CATARACT Left 11/12/2015     Performed by Bl[facility] at [facility] OR [facility]    PHACOEMULSIFICATION-ASPIRATION-CATARACT Right 10/1/2015     Performed by Blanka Ferguson MD at [facility] OR [address] [place] [address]        No Known Allergies     Scheduled Medications   · Albuterol-ipratropium 2.5 mg - 0.5 mg/3 mL nebulizer solution - Every 4 hours while awake.   · amLODIPine tablet 10 mg, oral, daily   · Aspirin EC tablet 81 mg, oral , daily  · Atorvastatin tablet 80mg , oral, daily  · Azithromycin tablet 250 mg oral, daily  · Bicalutamide tablet 50mg, oral, daily   · Carvedilol tablet 6.25 mg oral, 2 times daily  · cefTRIAXone injection 1 g IV, every 24 hours  · Enoxaparin injection 40 mg, subcutaneous, every 24 hours   · Furosemide injection 40 mg, IV, 2 times daily  · Lisinopril tablet 40mg, oral, daily    Current  Facility-Administered Medications on File Prior to Encounter   Medication Sig    amLODIPine (NORVASC) 10 MG tablet Take 1 tablet (10 mg total) by mouth once daily.    aspirin (ECOTRIN) 81 MG EC tablet Take 81 mg by mouth. 1 Tablet, Delayed Release (E.C.) Oral Every morning    atorvastatin (LIPITOR) 80 MG tablet Take 1 tablet (80 mg total) by mouth once daily.    bicalutamide (CASODEX) 50 MG Tab TAKE 1 TABLET EVERY DAY    blood sugar diagnostic Strp Use daily    furosemide (LASIX) 40 MG tablet Take 1 tablet (40 mg total) by mouth 2 (two) times daily.    garlic 1,000 mg Cap Take 2 tablets by mouth once daily.    glipiZIDE (GLUCOTROL) 2.5 MG TR24 TAKE 1 TABLET (2.5 MG TOTAL) BY MOUTH ONCE DAILY.    latanoprost 0.005 % ophthalmic solution Place 1 drop into both eyes every evening.    lisinopril (PRINIVIL,ZESTRIL) 40 MG tablet Take 1 tablet (40 mg total) by mouth once daily.    BD ALCOHOL SWABS PadM      blood-glucose meter kit Use as instructed    carvedilol (COREG) 6.25 MG tablet Take 1 tablet (6.25 mg total) by mouth 2 (two) times daily with meals.    lancets (LANCETS,ULTRA THIN) Misc Use daily    nitroGLYCERIN (NITROSTAT) 0.4 MG SL tablet Place 1 tablet (0.4 mg total) under the tongue every 5 (five) minutes as needed for Chest pain.    [DISCONTINUED] acetaminophen-codeine 300-30mg (TYLENOL #3) 300-30 mg Tab 1 po TID prn         Family History  · Breast cancer - sister  · Father had cancer (unspecified).  · Diabetes - sister, brother   · Hypertension - sister   · No Known Problems - Mother, son, daughter, brother, maternal aunt, maternal uncle, paternal aunt, paternal uncle, maternal grandmother, maternal grandfather, paternal grandmother, paternal grandfather.      Social History   · Smoking status: Never Smoker   · Smokeless tobacco: Former user   · No alcohol use   · No Drug use   · No sexual activity       Review of Systems   · Constitutional: Afebrile and negative for weight change.   · Eyes: No  visual disturbances   · Respiratory: Negative for shortness of breath. Negative for chest tightness and wheezing.   · Cardiovascular: Negative for leg swelling. Negative for chest pain and palpitations.   · Gastrointestinal: Negative for abdominal pain, diarrhea and nausea.  · Genitourinary: Negative for difficulty urinating and flank pain.   · Musculoskeletal: Negative for arthralgias.   · Skin: Negative for rash.   · Allergic/Immunologic: Negative for immunocompromised state.   · Neurological: Negative for syncope.  · Hematological: Does not bruise/bleed easy.   · Psychiatric/Behavioral: Negative for agitation.     Vital Signs (Most Recent)   · Temp: ! 87.3 (30.7) - 97.9  ( 01/14/19 - 0700, 01/14/19 - 1402)  · Pulse: 65   (01/14/19 - 1402)  · Resp: 18    (01/14/19 - 1402)  · BP: ! 157/75  (01/14/19 -1402)  · MAP: 108 mmHg  (01/14/)  · SpO2%: 94%  (01/14/19 -1320)    Weight: 83.9 kg (185 lb)  BMI: 28.13 kg/m².       Physical Exam  Constitutional: Alert and orientated to person, place, and time. No acute distress.   Cardiovascular  · Normal rate and rhythm   · Blowing systolic murmur heard over Aortic and Mitral area  · No elevated jugular venous pressure.  · Mild swelling of the lower limbs. Greatly reduced from initial admission.   Respiratory   · No respiratory distress.  · No signs of cyanosis.   · Mild pulmonary edema of the lower lobes. Improved from yesterday  · Inspiratory crackles of the right upper lobe.  · No wheezing.   Gastrointestinal   · No abdominal pain. Soft and non-tender.   · Bowel sounds normal.   Neurological   · CN III, IV, VI, IX are normal  · Pupils are equal, round and reactive to light      Significant Labs  Glu 149 (01/13) --> 107 (01/14/19)  Na 138  K  3.8  Cl  103  CO2 24  BUN 15  Creatinine 1.1  Calcium   eGFR >60 (01/14)  Albumin 3.7    WBC 7.37  Hemoglobin 10.0  Hematocrit 28.3  Platelets 141    Troponin I: 0.045 (01/13/19 - 1750)  BNP: 0.045 (01/14/19 - 1048)    Iron Studies    · Iron 26  · TIBC 241  · Saturated Iron 11  · Transferrin 163  · Ferritin 233      Assessment/Plan    Pneumonia of right upper lobe due to infectious organism  Mr. Taylor is a 89 yo man with ischemic CAD (HFrEF 35%), DM2 w/retinopathy, CKD, who presents with chronically worsening cough and SOB.  Dx: Pneumonia (CAP > HAP) of the RUL with HF exacerbation    Plan    -Continue broad spectrum therapy with Ceft/azithro.    - Flu negative     - Blood cultures negative      Anemia     -Pt is iron deficient, potentially due to chronic disease.     -No intervention needed.    Hypertensive heart disease with heart failure    -Continue GDMT for CAD     -Continue amlodipine       Dyslipidemia     -Continue statin     Diabetic polyneuropathy associated with type 2 diabetes mellitus    - Continue Glipizide at home    - LDSSI for now then transition to basal-bolus if able.

## 2019-01-14 NOTE — SUBJECTIVE & OBJECTIVE
Past Medical History:   Diagnosis Date    Anemia     Arthritis     Cancer     Prostate    Cataract     CHF (congestive heart failure)     Chronic kidney disease     Coronary artery disease     Diabetes mellitus     Diabetic retinopathy     Disorder of kidney and ureter     ckd 2    Encounter for blood transfusion     Glaucoma     High cholesterol     Hypertension     Myocardial infarction     Prostate cancer     PVD (peripheral vascular disease)     Spinal stenosis     Status post cataract extraction and insertion of intraocular lens of left eye 11/13/2015       Past Surgical History:   Procedure Laterality Date    CARDIAC CATHETERIZATION      15 to 20 years ago x1 stent and 4/7/2014 x1 stent    CATARACT EXTRACTION Bilateral     Dr. Ferguson    CATHETERIZATION, HEART, LEFT N/A 4/7/2014    Performed by [facility] [place] [facility] CATH LAB    EYE SURGERY      HERNIA REPAIR      INSERTION-INTRAOCULAR LENS (IOL) Left 11/12/2015    [facility] [place] [facility]    INSERTION-INTRAOCULAR LENS (IOL) Right 10/1/2015    [facility] [place] [facility]    PHACOEMULSIFICATION-ASPIRATION-CATARACT Left 11/12/2015    [facility] [place] [facility]    PHACOEMULSIFICATION-ASPIRATION-CATARACT Right 10/1/2015    [facility] [place] [facility]       Review of patient's allergies indicates:  No Known Allergies    Current Facility-Administered Medications on File Prior to Encounter   Medication    leuprolide (6 month) SyKt 45 mg     Current Outpatient Medications on File Prior to Encounter   Medication Sig    amLODIPine (NORVASC) 10 MG tablet Take 1 tablet (10 mg total) by mouth once daily.    aspirin (ECOTRIN) 81 MG EC tablet Take 81 mg by mouth. 1 Tablet, Delayed Release (E.C.) Oral Every morning    atorvastatin (LIPITOR) 80 MG tablet Take 1 tablet (80 mg total) by mouth once daily.    bicalutamide (CASODEX) 50 MG Tab  TAKE 1 TABLET EVERY DAY    blood sugar diagnostic Strp Use daily    furosemide (LASIX) 40 MG tablet Take 1 tablet (40 mg total) by mouth 2 (two) times daily.    garlic 1,000 mg Cap Take 2 tablets by mouth once daily.    glipiZIDE (GLUCOTROL) 2.5 MG TR24 TAKE 1 TABLET (2.5 MG TOTAL) BY MOUTH ONCE DAILY.    latanoprost 0.005 % ophthalmic solution Place 1 drop into both eyes every evening.    lisinopril (PRINIVIL,ZESTRIL) 40 MG tablet Take 1 tablet (40 mg total) by mouth once daily.    BD ALCOHOL SWABS PadM     blood-glucose meter kit Use as instructed    carvedilol (COREG) 6.25 MG tablet Take 1 tablet (6.25 mg total) by mouth 2 (two) times daily with meals.    lancets (LANCETS,ULTRA THIN) Misc Use daily    nitroGLYCERIN (NITROSTAT) 0.4 MG SL tablet Place 1 tablet (0.4 mg total) under the tongue every 5 (five) minutes as needed for Chest pain.    [DISCONTINUED] acetaminophen-codeine 300-30mg (TYLENOL #3) 300-30 mg Tab 1 po TID prn     Family History     Problem Relation (Age of Onset)    Breast cancer Sister    Cancer Father    Diabetes Sister, Brother    Hypertension Sister    No Known Problems Mother, Son, Brother, Maternal Aunt, Maternal Uncle, Paternal Aunt, Paternal Uncle, Maternal Grandmother, Maternal Grandfather, Paternal Grandmother, Paternal Grandfather, Daughter        Tobacco Use    Smoking status: Never Smoker    Smokeless tobacco: Former User   Substance and Sexual Activity    Alcohol use: No    Drug use: No    Sexual activity: No     Review of Systems   Constitutional: Negative for fever and unexpected weight change.   HENT: Positive for congestion. Negative for postnasal drip, rhinorrhea, sinus pressure, sinus pain, sore throat and trouble swallowing.    Eyes: Negative for visual disturbance.   Respiratory: Positive for shortness of breath. Negative for chest tightness and wheezing.    Cardiovascular: Positive for leg swelling. Negative for chest pain and palpitations.    Gastrointestinal: Negative for abdominal pain, diarrhea and nausea.   Genitourinary: Negative for difficulty urinating and flank pain.   Musculoskeletal: Negative for arthralgias.   Skin: Negative for rash.   Allergic/Immunologic: Negative for immunocompromised state.   Neurological: Negative for syncope.   Hematological: Does not bruise/bleed easily.   Psychiatric/Behavioral: Negative for agitation.     Objective:     Vital Signs (Most Recent):  Temp: 98 °F (36.7 °C) (01/13/19 1729)  Pulse: (!) 59 (01/13/19 2102)  Resp: 17 (01/13/19 2102)  BP: (!) 165/76 (01/13/19 2102)  SpO2: 95 % (01/13/19 2102) Vital Signs (24h Range):  Temp:  [98 °F (36.7 °C)] 98 °F (36.7 °C)  Pulse:  [59-69] 59  Resp:  [13-25] 17  SpO2:  [91 %-96 %] 95 %  BP: (152-169)/(68-81) 165/76     Weight: 83.9 kg (185 lb)  Body mass index is 28.13 kg/m².    Physical Exam   Constitutional: He is oriented to person, place, and time. No distress.   HENT:   Head: Atraumatic.   Eyes: EOM are normal. Pupils are equal, round, and reactive to light.   Neck: Normal range of motion. Neck supple. No JVD present.   Cardiovascular: Normal rate and regular rhythm.   No murmur heard.  Pulmonary/Chest: Effort normal. No respiratory distress. He has no wheezes. He has rales.   Laying flat, no increased work of breathing     Abdominal: Soft. Bowel sounds are normal. There is no tenderness.   Musculoskeletal: Normal range of motion. He exhibits edema.   Neurological: He is alert and oriented to person, place, and time. No cranial nerve deficit.   Skin: Skin is warm and dry. No rash noted.   Psychiatric: He has a normal mood and affect.   Nursing note and vitals reviewed.        CRANIAL NERVES     CN III, IV, VI   Pupils are equal, round, and reactive to light.  Extraocular motions are normal.        Significant Labs:   BMP:   Recent Labs   Lab 01/13/19  1750   *      K 3.9      CO2 26   BUN 17   CREATININE 1.4   CALCIUM 9.0     CMP:   Recent Labs    Lab 01/13/19  1750      K 3.9      CO2 26   *   BUN 17   CREATININE 1.4   CALCIUM 9.0   PROT 7.2   ALBUMIN 3.7   BILITOT 0.7   ALKPHOS 105   AST 26   ALT 15   ANIONGAP 11   EGFRNONAA 43.9*       Significant Imaging: I have reviewed and interpreted all pertinent imaging results/findings within the past 24 hours.

## 2019-01-14 NOTE — H&P
[facility] Hospital Medicine  History & Physical    Patient Name: Jeniffer Taylor  MRN: [id number]  Admission Date: 1/13/2019  Attending Physician: Kavita Rosa MD   Primary Care Provider: Shari Chauhan MD    [facility] Medicine Team: [facility] HOSP MED 2 Daljit Alfred MD     Patient information was obtained from patient, relative(s) and ER records.     Subjective:     Principal Problem:Pneumonia of right upper lobe due to infectious organism    Chief Complaint:   Chief Complaint   Patient presents with    Cough     cold and cough x 2 weeks. yellow sputum. denies fever        HPI: Mr Taylor is a 89 yo man with ischemic CAD (MI x 2, with stents x2), HFrEF (35%, 2014), aortic stenosis,  HTN, DM2 w/ retinopathy, CKD, who presents with cough and SOB.     The patient reports having a cold consistent of sinus congestion and rhinorrhea, sore throat for more than 1 week. A few days ago, he developed a productive cough with yellow sputum, and worsening lower extremity swelling, so his daughter, who is an RN, brought him to ED.     He reports that his cold symptoms  never resolved, but actually just progressively worsened. He currently denies a sore throat. He does not remember having sick contacts. He is a non smoker, without COPD. He has not been in the hospital or nursing facility recently. He is not chronically immunosuppressed.     He does have some exertional SOB, which is slightly worse than it is at baseline. He denies orthopnia, paroxysmal nocturnal dyspnea, palpitations, chest pain, syncopal episodes.     No fevers at home. No belly pain, no abdominal complaints. He is not on home oxygen. No bleeding in stool, no melena.     He reports no history of immobility, recent travel. He is able to mostly ambulate with walker, and family reports he is relatively active.     He was most recently seen by his Cardiologist Dr. Pool who notes that he has been doing well the last couple years. He was recently  changed from metoprolol to coreg 6.25 for blood pressure control. He is on goal directed therapy for HFrEF (coreg, Ace-I, lasix 40 BID).     Past Medical History:   Diagnosis Date    Anemia     Arthritis     Cancer     Prostate    Cataract     CHF (congestive heart failure)     Chronic kidney disease     Coronary artery disease     Diabetes mellitus     Diabetic retinopathy     Disorder of kidney and ureter     ckd 2    Encounter for blood transfusion     Glaucoma     High cholesterol     Hypertension     Myocardial infarction     Prostate cancer     PVD (peripheral vascular disease)     Spinal stenosis     Status post cataract extraction and insertion of intraocular lens of left eye 11/13/2015       Past Surgical History:   Procedure Laterality Date    CARDIAC CATHETERIZATION      15 to 20 years ago x1 stent and 4/7/2014 x1 stent    CATARACT EXTRACTION Bilateral     Dr. Ferguson    CATHETERIZATION, HEART, LEFT N/A 4/7/2014    Performed by Raz Cox [facility] at [facility] CATH LAB    EYE SURGERY      HERNIA REPAIR      INSERTION-INTRAOCULAR LENS (IOL) Left 11/12/2015    Performed by Blanka Ferguson MD at [facility] OR 1ST FLR    INSERTION-INTRAOCULAR LENS (IOL) Right 10/1/2015    Performed by Blanka [facility] at [facility] OR 1ST FLR    PHACOEMULSIFICATION-ASPIRATION-CATARACT Left 11/12/2015    Performed by Blanka Ferguson [facility] at [facility] OR 1ST FLR    PHACOEMULSIFICATION-ASPIRATION-CATARACT Right 10/1/2015    Performed by Blanka Ferguson [facility] at [facility] OR 1ST FLR       Review of patient's allergies indicates:  No Known Allergies    Current Facility-Administered Medications on File Prior to Encounter   Medication    leuprolide (6 month) SyKt 45 mg     Current Outpatient Medications on File Prior to Encounter   Medication Sig    amLODIPine (NORVASC) 10 MG tablet Take 1 tablet (10 mg total) by mouth once daily.    aspirin (ECOTRIN) 81 MG EC tablet Take 81 mg by mouth. 1 Tablet, Delayed Release (E.C.) Oral  Every morning    atorvastatin (LIPITOR) 80 MG tablet Take 1 tablet (80 mg total) by mouth once daily.    bicalutamide (CASODEX) 50 MG Tab TAKE 1 TABLET EVERY DAY    blood sugar diagnostic Strp Use daily    furosemide (LASIX) 40 MG tablet Take 1 tablet (40 mg total) by mouth 2 (two) times daily.    garlic 1,000 mg Cap Take 2 tablets by mouth once daily.    glipiZIDE (GLUCOTROL) 2.5 MG TR24 TAKE 1 TABLET (2.5 MG TOTAL) BY MOUTH ONCE DAILY.    latanoprost 0.005 % ophthalmic solution Place 1 drop into both eyes every evening.    lisinopril (PRINIVIL,ZESTRIL) 40 MG tablet Take 1 tablet (40 mg total) by mouth once daily.    BD ALCOHOL SWABS PadM     blood-glucose meter kit Use as instructed    carvedilol (COREG) 6.25 MG tablet Take 1 tablet (6.25 mg total) by mouth 2 (two) times daily with meals.    lancets (LANCETS,ULTRA THIN) Misc Use daily    nitroGLYCERIN (NITROSTAT) 0.4 MG SL tablet Place 1 tablet (0.4 mg total) under the tongue every 5 (five) minutes as needed for Chest pain.    [DISCONTINUED] acetaminophen-codeine 300-30mg (TYLENOL #3) 300-30 mg Tab 1 po TID prn     Family History     Problem Relation (Age of Onset)    Breast cancer Sister    Cancer Father    Diabetes Sister, Brother    Hypertension Sister    No Known Problems Mother, Son, Brother, Maternal Aunt, Maternal Uncle, Paternal Aunt, Paternal Uncle, Maternal Grandmother, Maternal Grandfather, Paternal Grandmother, Paternal Grandfather, Daughter        Tobacco Use    Smoking status: Never Smoker    Smokeless tobacco: Former User   Substance and Sexual Activity    Alcohol use: No    Drug use: No    Sexual activity: No     Review of Systems   Constitutional: Negative for fever and unexpected weight change.   HENT: Positive for congestion. Negative for postnasal drip, rhinorrhea, sinus pressure, sinus pain, sore throat and trouble swallowing.    Eyes: Negative for visual disturbance.   Respiratory: Positive for shortness of breath. Negative  for chest tightness and wheezing.    Cardiovascular: Positive for leg swelling. Negative for chest pain and palpitations.   Gastrointestinal: Negative for abdominal pain, diarrhea and nausea.   Genitourinary: Negative for difficulty urinating and flank pain.   Musculoskeletal: Negative for arthralgias.   Skin: Negative for rash.   Allergic/Immunologic: Negative for immunocompromised state.   Neurological: Negative for syncope.   Hematological: Does not bruise/bleed easily.   Psychiatric/Behavioral: Negative for agitation.     Objective:     Vital Signs (Most Recent):  Temp: 98 °F (36.7 °C) (01/13/19 1729)  Pulse: (!) 59 (01/13/19 2102)  Resp: 17 (01/13/19 2102)  BP: (!) 165/76 (01/13/19 2102)  SpO2: 95 % (01/13/19 2102) Vital Signs (24h Range):  Temp:  [98 °F (36.7 °C)] 98 °F (36.7 °C)  Pulse:  [59-69] 59  Resp:  [13-25] 17  SpO2:  [91 %-96 %] 95 %  BP: (152-169)/(68-81) 165/76     Weight: 83.9 kg (185 lb)  Body mass index is 28.13 kg/m².    Physical Exam   Constitutional: He is oriented to person, place, and time. No distress.   HENT:   Head: Atraumatic.   Eyes: EOM are normal. Pupils are equal, round, and reactive to light.   Neck: Normal range of motion. Neck supple. No JVD present.   Cardiovascular: Normal rate and regular rhythm.   No murmur heard.  Pulmonary/Chest: Effort normal. No respiratory distress. He has no wheezes. He has rales.   Laying flat, no increased work of breathing     Abdominal: Soft. Bowel sounds are normal. There is no tenderness.   Musculoskeletal: Normal range of motion. He exhibits edema.   Neurological: He is alert and oriented to person, place, and time. No cranial nerve deficit.   Skin: Skin is warm and dry. No rash noted.   Psychiatric: He has a normal mood and affect.   Nursing note and vitals reviewed.        CRANIAL NERVES     CN III, IV, VI   Pupils are equal, round, and reactive to light.  Extraocular motions are normal.        Significant Labs:   BMP:   Recent Labs   Lab  01/13/19  1750   *      K 3.9      CO2 26   BUN 17   CREATININE 1.4   CALCIUM 9.0     CMP:   Recent Labs   Lab 01/13/19  1750      K 3.9      CO2 26   *   BUN 17   CREATININE 1.4   CALCIUM 9.0   PROT 7.2   ALBUMIN 3.7   BILITOT 0.7   ALKPHOS 105   AST 26   ALT 15   ANIONGAP 11   EGFRNONAA 43.9*       Significant Imaging: I have reviewed and interpreted all pertinent imaging results/findings within the past 24 hours.    Assessment/Plan:     * Pneumonia of right upper lobe due to infectious organism    Mr Taylor is a 89 yo man with ischemic CAD (HFrEF 35%), DM2 w/ retinopathy, CKD, who presents with chronically worsening cough and SOB.     DDx: Pneumonia (CAP>HAP), HF exacerbation, MI/ACS, arrythmia, anemia    Assessment:  Community acquired pneumonia, RUL - active, hemodynamically stable, on room air  Ischemic CAD/HFrEF- suspect without exacerbation, stable  Chronic iron deficiency anemia    Plan:  - Continue broad spectrum therapy with Ceft/Azithro. Flu negative  - Continue diuresis. Will aim for slightly negative balance. Expect to transition to po, once improveing. Monitor UOP. Replace lytes prn. Wean O2 as tolerated.   - Continue GDMT for HFrEF. ECHO pending.   - Duonebs q4h  - No history of COPD, O2 goals >95%  - Iron studies, reticulocyte pending       CKD (chronic kidney disease), stage II    Associated with DM2 with retinopathy    Renal function stable         Anemia    Iron studies  Reticulocyte count pending       Hypertensive heart disease with heart failure    Continue GDMT for CAD  Continue amlodipine       CAD (coronary artery disease)    As above.      Dyslipidemia    Continue statin       Diabetic polyneuropathy associated with type 2 diabetes mellitus    On Glipizide at home  LDSSI for now then transition to basal-bolus if able.         VTE Risk Mitigation (From admission, onward)        Ordered     heparin (porcine) injection 5,000 Units  Every 8 hours       01/13/19 2147     IP VTE HIGH RISK PATIENT  Once      01/13/19 2147     Place sequential compression device  Until discontinued      01/13/19 2147             Daljit Alfred MD  Department of Hospital Medicine   [facility]

## 2019-01-15 VITALS
SYSTOLIC BLOOD PRESSURE: 101 MMHG | RESPIRATION RATE: 18 BRPM | HEART RATE: 61 BPM | TEMPERATURE: 98 F | OXYGEN SATURATION: 95 % | BODY MASS INDEX: 28.04 KG/M2 | DIASTOLIC BLOOD PRESSURE: 57 MMHG | HEIGHT: 68 IN | WEIGHT: 185 LBS

## 2019-01-15 LAB
ALBUMIN SERPL BCP-MCNC: 3.3 G/DL
ALP SERPL-CCNC: 101 U/L
ALT SERPL W/O P-5'-P-CCNC: 11 U/L
ANION GAP SERPL CALC-SCNC: 9 MMOL/L
AST SERPL-CCNC: 21 U/L
BASOPHILS # BLD AUTO: 0.03 K/UL
BASOPHILS NFR BLD: 0.5 %
BILIRUB SERPL-MCNC: 0.5 MG/DL
BUN SERPL-MCNC: 21 MG/DL
CALCIUM SERPL-MCNC: 9 MG/DL
CHLORIDE SERPL-SCNC: 101 MMOL/L
CO2 SERPL-SCNC: 26 MMOL/L
CREAT SERPL-MCNC: 1.1 MG/DL
DIFFERENTIAL METHOD: ABNORMAL
EOSINOPHIL # BLD AUTO: 0.3 K/UL
EOSINOPHIL NFR BLD: 4.5 %
ERYTHROCYTE [DISTWIDTH] IN BLOOD BY AUTOMATED COUNT: 15 %
EST. GFR  (AFRICAN AMERICAN): >60 ML/MIN/1.73 M^2
EST. GFR  (NON AFRICAN AMERICAN): 58.8 ML/MIN/1.73 M^2
GLUCOSE SERPL-MCNC: 102 MG/DL
HCT VFR BLD AUTO: 28 %
HGB BLD-MCNC: 9.6 G/DL
IMM GRANULOCYTES # BLD AUTO: 0.02 K/UL
IMM GRANULOCYTES NFR BLD AUTO: 0.3 %
LYMPHOCYTES # BLD AUTO: 1.1 K/UL
LYMPHOCYTES NFR BLD: 17.2 %
MAGNESIUM SERPL-MCNC: 2.1 MG/DL
MCH RBC QN AUTO: 27.1 PG
MCHC RBC AUTO-ENTMCNC: 34.3 G/DL
MCV RBC AUTO: 79 FL
MONOCYTES # BLD AUTO: 0.7 K/UL
MONOCYTES NFR BLD: 11.4 %
NEUTROPHILS # BLD AUTO: 4.3 K/UL
NEUTROPHILS NFR BLD: 66.1 %
NRBC BLD-RTO: 0 /100 WBC
PHOSPHATE SERPL-MCNC: 3.9 MG/DL
PLATELET # BLD AUTO: 164 K/UL
PMV BLD AUTO: 11.7 FL
POCT GLUCOSE: 93 MG/DL (ref 70–110)
POTASSIUM SERPL-SCNC: 3.7 MMOL/L
PROT SERPL-MCNC: 6.8 G/DL
RBC # BLD AUTO: 3.54 M/UL
SODIUM SERPL-SCNC: 136 MMOL/L
WBC # BLD AUTO: 6.47 K/UL

## 2019-01-15 PROCEDURE — 94761 N-INVAS EAR/PLS OXIMETRY MLT: CPT | Mod: HCNC

## 2019-01-15 PROCEDURE — 25000003 PHARM REV CODE 250: Mod: HCNC | Performed by: STUDENT IN AN ORGANIZED HEALTH CARE EDUCATION/TRAINING PROGRAM

## 2019-01-15 PROCEDURE — 80053 COMPREHEN METABOLIC PANEL: CPT | Mod: HCNC

## 2019-01-15 PROCEDURE — 99238 HOSP IP/OBS DSCHRG MGMT 30/<: CPT | Mod: HCNC,GC,, | Performed by: HOSPITALIST

## 2019-01-15 PROCEDURE — 85025 COMPLETE CBC W/AUTO DIFF WBC: CPT | Mod: HCNC

## 2019-01-15 PROCEDURE — 25000242 PHARM REV CODE 250 ALT 637 W/ HCPCS: Mod: HCNC | Performed by: STUDENT IN AN ORGANIZED HEALTH CARE EDUCATION/TRAINING PROGRAM

## 2019-01-15 PROCEDURE — 94640 AIRWAY INHALATION TREATMENT: CPT | Mod: HCNC

## 2019-01-15 PROCEDURE — 97165 OT EVAL LOW COMPLEX 30 MIN: CPT | Mod: HCNC

## 2019-01-15 PROCEDURE — 84100 ASSAY OF PHOSPHORUS: CPT | Mod: HCNC

## 2019-01-15 PROCEDURE — 63600175 PHARM REV CODE 636 W HCPCS: Mod: HCNC | Performed by: STUDENT IN AN ORGANIZED HEALTH CARE EDUCATION/TRAINING PROGRAM

## 2019-01-15 PROCEDURE — 36415 COLL VENOUS BLD VENIPUNCTURE: CPT | Mod: HCNC

## 2019-01-15 PROCEDURE — 63600175 PHARM REV CODE 636 W HCPCS: Mod: HCNC | Performed by: HOSPITALIST

## 2019-01-15 PROCEDURE — 94664 DEMO&/EVAL PT USE INHALER: CPT | Mod: HCNC

## 2019-01-15 PROCEDURE — 99900035 HC TECH TIME PER 15 MIN (STAT): Mod: HCNC

## 2019-01-15 PROCEDURE — 83735 ASSAY OF MAGNESIUM: CPT | Mod: HCNC

## 2019-01-15 PROCEDURE — 99238 PR HOSPITAL DISCHARGE DAY,<30 MIN: ICD-10-PCS | Mod: HCNC,GC,, | Performed by: HOSPITALIST

## 2019-01-15 RX ORDER — POTASSIUM CHLORIDE 750 MG/1
30 CAPSULE, EXTENDED RELEASE ORAL ONCE
Status: COMPLETED | OUTPATIENT
Start: 2019-01-15 | End: 2019-01-15

## 2019-01-15 RX ORDER — ISOSORBIDE DINITRATE 10 MG/1
10 TABLET ORAL 3 TIMES DAILY
Qty: 90 TABLET | Refills: 2 | Status: SHIPPED | OUTPATIENT
Start: 2019-01-15 | End: 2019-01-15 | Stop reason: SDUPTHER

## 2019-01-15 RX ORDER — ISOSORBIDE DINITRATE 20 MG/1
20 TABLET ORAL 3 TIMES DAILY
Qty: 90 TABLET | Refills: 2 | Status: SHIPPED | OUTPATIENT
Start: 2019-01-15 | End: 2019-02-07 | Stop reason: ALTCHOICE

## 2019-01-15 RX ORDER — MOXIFLOXACIN HYDROCHLORIDE 400 MG/1
400 TABLET ORAL DAILY
Qty: 3 TABLET | Refills: 0 | Status: SHIPPED | OUTPATIENT
Start: 2019-01-15 | End: 2019-06-10

## 2019-01-15 RX ORDER — ISOSORBIDE DINITRATE AND HYDRALAZINE HYDROCHLORIDE 37.5; 2 MG/1; MG/1
1 TABLET ORAL 3 TIMES DAILY
Status: DISCONTINUED | OUTPATIENT
Start: 2019-01-15 | End: 2019-01-15 | Stop reason: HOSPADM

## 2019-01-15 RX ORDER — ISOSORBIDE DINITRATE AND HYDRALAZINE HYDROCHLORIDE 37.5; 2 MG/1; MG/1
1 TABLET ORAL 3 TIMES DAILY
Qty: 90 TABLET | Refills: 2 | Status: SHIPPED | OUTPATIENT
Start: 2019-01-15 | End: 2019-01-15 | Stop reason: HOSPADM

## 2019-01-15 RX ORDER — FUROSEMIDE 40 MG/1
40 TABLET ORAL 2 TIMES DAILY
Status: DISCONTINUED | OUTPATIENT
Start: 2019-01-15 | End: 2019-01-15 | Stop reason: HOSPADM

## 2019-01-15 RX ADMIN — ASPIRIN 81 MG: 81 TABLET, COATED ORAL at 08:01

## 2019-01-15 RX ADMIN — FUROSEMIDE 40 MG: 10 INJECTION, SOLUTION INTRAVENOUS at 08:01

## 2019-01-15 RX ADMIN — IPRATROPIUM BROMIDE AND ALBUTEROL SULFATE 3 ML: .5; 3 SOLUTION RESPIRATORY (INHALATION) at 12:01

## 2019-01-15 RX ADMIN — CARVEDILOL 6.25 MG: 6.25 TABLET, FILM COATED ORAL at 08:01

## 2019-01-15 RX ADMIN — BICALUTAMIDE 50 MG: 50 TABLET, FILM COATED ORAL at 08:01

## 2019-01-15 RX ADMIN — ATORVASTATIN CALCIUM 80 MG: 20 TABLET, FILM COATED ORAL at 08:01

## 2019-01-15 RX ADMIN — AMLODIPINE BESYLATE 10 MG: 10 TABLET ORAL at 08:01

## 2019-01-15 RX ADMIN — CEFTRIAXONE SODIUM 1 G: 1 INJECTION, POWDER, FOR SOLUTION INTRAMUSCULAR; INTRAVENOUS at 08:01

## 2019-01-15 RX ADMIN — IPRATROPIUM BROMIDE AND ALBUTEROL SULFATE 3 ML: .5; 3 SOLUTION RESPIRATORY (INHALATION) at 08:01

## 2019-01-15 RX ADMIN — LISINOPRIL 40 MG: 20 TABLET ORAL at 08:01

## 2019-01-15 RX ADMIN — POTASSIUM CHLORIDE 30 MEQ: 750 CAPSULE, EXTENDED RELEASE ORAL at 09:01

## 2019-01-15 RX ADMIN — HYDRALAZINE HYDROCHLORIDE AND ISOSORBIDE DINITRATE 1 TABLET: 37.5; 2 TABLET, FILM COATED ORAL at 10:01

## 2019-01-15 NOTE — PLAN OF CARE
01/15/19 1232   Final Note   Assessment Type Final Discharge Note   Anticipated Discharge Disposition Home   What phone number can be called within the next 1-3 days to see how you are doing after discharge? [phone]   Hospital Follow Up  Appt(s) scheduled? Yes   Discharge plans and expectations educations in teach back method with documentation complete? Yes   Right Care Referral Info   Post Acute Recommendation No Care     Future Appointments   Date Time Provider Department Center   2/27/2019  8:00 AM Jessa Ulloa NP [facility] UROLOG [facility]   3/7/2019 11:20 AM Shari Chauhan MD [facility]

## 2019-01-15 NOTE — PROGRESS NOTES
Patient received discharge instructions and verbalized understanding. IV discontinued with catheter tip intact. Patient disconnected from telemetry monitor. Patient and daughter in room waiting for bedside delivery from pharmacy. Will continue to monitor.

## 2019-01-15 NOTE — PLAN OF CARE
Problem: Occupational Therapy Goal  Goal: Occupational Therapy Goal  Outcome: Outcome(s) achieved Date Met: 01/15/19  Evaluation complete.  Pt at baseline MI functional performance and does not require nor desire skilled OT. DC OT  Sondra Garrison, OT  1/15/2019

## 2019-01-15 NOTE — PLAN OF CARE
Problem: Adult Inpatient Plan of Care  Goal: Plan of Care Review  Outcome: Ongoing (interventions implemented as appropriate)   01/14/19 1700   Plan of Care Review   Plan of Care Reviewed With patient;daughter   A/O V/S STABLE CBG WITHIN ACCEPTABLE VALUES DENIES SOB STRICT I/O SEE FLOW SHEET PT WITH UNEVENTFUL NO INJURY FREE OF FALLS FALL PRECAUTION MAINTAINED

## 2019-01-15 NOTE — PT/OT/SLP EVAL
Occupational Therapy   Evaluation and Discharge Note    Name: Jeniffer Taylor  MRN: [id number]  Admitting Diagnosis:  Pneumonia of right upper lobe due to infectious organism      Recommendations:     Discharge Recommendations: (home )  Discharge Equipment Recommendations:  none  Barriers to discharge:       Assessment:     Jeniffer Taylor is a 89 y.o. male with a medical diagnosis of Pneumonia of right upper lobe due to infectious organism. At this time, patient is functioning at their prior level of function and does not require further acute OT services.     Plan:     During this hospitalization, patient does not require further acute OT services.  Please re-consult if situation changes.    · Plan of Care Reviewed with: patient, daughter    Subjective     Chief Complaint: no complaints   Patient/Family Comments/goals: return to home     Occupational Profile:  Living Environment: Pt lives with son and daughter in law in 1 story house with 4 steps to enter.    Previous level of function: Pt uses rollator at baseline and is able to perform self care without assist but with DME in place   Roles and Routines: pt drives   Equipment Used at home:  wheelchair, rollator, cane, straight  Assistance upon Discharge: family able to assist     Pain/Comfort:  · Pain Rating 1: 0/10    Patients cultural, spiritual, Confucianist conflicts given the current situation: no    Objective:     Communicated with: RN prior to session.  Patient found supine with telemetry upon OT entry to room.    General Precautions: Standard, fall   Orthopedic Precautions:N/A   Braces: N/A     Occupational Performance:    Bed Mobility:    · Pt independent with bed mobility     Functional Mobility/Transfers:  · Functional Mobility: Pt uses rollator for all mobility     Activities of Daily Living:  · Pt able to to complete self care     Cognitive/Visual Perceptual:  Cognitive/Psychosocial Skills:     -       Oriented to: Person, Place, Time and Situation   -        Follows Commands/attention:Follows two-step commands  -       Communication: clear/fluent  -       Memory: No Deficits noted  -       Safety awareness/insight to disability: intact   -       Mood/Affect/Coping skills/emotional control: Appropriate to situation    Physical Exam:  Upper Extremity Range of Motion:     -       Right Upper Extremity: WFL  -       Left Upper Extremity: WFL  Upper Extremity Strength:    -       Right Upper Extremity: WFL  -       Left Upper Extremity: WFL    AMPAC 6 Click ADL:  AMPAC Total Score: 23    Treatment & Education:  Evaluation complete.  Pt educated on safety, role of OT, importance of increased participation in self care for gains , expectations for participation, expectations for gains, POC, energy conservation, caregiver strain. White board updated.     Education:    Patient left supine with all lines intact    GOALS:   Multidisciplinary Problems     Occupational Therapy Goals     Not on file          Multidisciplinary Problems (Resolved)        Problem: Occupational Therapy Goal    Goal Priority Disciplines Outcome Interventions   Occupational Therapy Goal   (Resolved)     OT, PT/OT Outcome(s) achieved                    History:     Past Medical History:   Diagnosis Date    Anemia     Arthritis     Cancer     Prostate    Cataract     CHF (congestive heart failure)     Chronic kidney disease     Coronary artery disease     Diabetes mellitus     Diabetic retinopathy     Disorder of kidney and ureter     ckd 2    Encounter for blood transfusion     Glaucoma     High cholesterol     Hypertension     Myocardial infarction     Prostate cancer     PVD (peripheral vascular disease)     Spinal stenosis     Status post cataract extraction and insertion of intraocular lens of left eye 11/13/2015       Past Surgical History:   Procedure Laterality Date    CARDIAC CATHETERIZATION      15 to 20 years ago x1 stent and 4/7/2014 x1 stent    CATARACT EXTRACTION  "Bilateral     Dr. Ferguson    CATHETERIZATION, HEART, LEFT N/A 2014    Performed by [facility] CATH LAB    EYE SURGERY      HERNIA REPAIR      INSERTION-INTRAOCULAR LENS (IOL) Left 2015    Performed by Blanka Ferguson [facility] at [facility] OR 1ST FLR    INSERTION-INTRAOCULAR LENS (IOL) Right 10/1/2015    Performed by Bl[facility] at [facility] OR 1ST FLR    PHACOEMULSIFICATION-ASPIRATION-CATARACT Left 2015    Performed by Bl[facility] at [facility] OR 1ST FLR    PHACOEMULSIFICATION-ASPIRATION-CATARACT Right 10/1/2015    Performed by Blanka Ferguson [facility] at [facility] OR [address] [place] [address]       Clinical Decision Makin.  OT Low:  "Pt evaluation falls under low complexity for evaluation coding due to performance deficits noted in 1-3 areas as stated above and 0 co-morbities affecting current functional status. Data obtained from problem focused assessments. No modifications or assistance was required for completion of evaluation. Only brief occupational profile and history review completed."     Time Tracking:     OT Date of Treatment: 01/15/19  OT Start Time: 935  OT Stop Time: 50  OT Total Time (min): 15 min    Billable Minutes:Evaluation 15    Sondra Garrison, OT  1/15/2019    "

## 2019-01-15 NOTE — DISCHARGE SUMMARY
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      Hypertensive heart disease with heart failure [I11.0]  Yes     Chronic    Prostate CA [C61]  Unknown    CAD (coronary artery disease) [I25.10]  Yes     Chronic     Last PCI - prox LAD, April 2014  Small LCx known to have subtotal occlusion      Dyslipidemia [E78.5]  Yes     Chronic    Diabetic polyneuropathy associated with type 2 diabetes mellitus [E11.42]  Yes     Chronic      Resolved Hospital Problems   No resolved problems to display.       Discharged Condition: Admit problems have stabilized     HOSPITAL COURSE:      Initial Presentation:  Mr Taylor is a 89 yo man with ischemic CAD (MI x 2, with stents x2), HFrEF (35%, 2014), aortic stenosis,  HTN, DM2 w/ retinopathy, CKD, who presents with cough and SOB.      The patient reports having a cold consistent of sinus congestion and rhinorrhea, sore throat for more than 1 week. A few days ago, he developed a productive cough with yellow sputum, and worsening lower extremity swelling, so his daughter, who is an RN, brought him to ED.      He  "reports that his cold symptoms  never resolved, but actually just progressively worsened. He currently denies a sore throat. He does not remember having sick contacts. He is a non smoker, without COPD. He has not been in the hospital or nursing facility recently. He is not chronically immunosuppressed.      He does have some exertional SOB, which is slightly worse than it is at baseline. He denies orthopnia, paroxysmal nocturnal dyspnea, palpitations, chest pain, syncopal episodes.      No fevers at home. No belly pain, no abdominal complaints. He is not on home oxygen. No bleeding in stool, no melena.      He reports no history of immobility, recent travel. He is able to mostly ambulate with walker, and family reports he is relatively active.      He was most recently seen by his Cardiologist Dr. Pool who notes that he has been doing well the last couple years. He was recently changed from metoprolol to coreg 6.25 for blood pressure control. He is on goal directed therapy for HFrEF (coreg, Ace-I, lasix 40 BID).     BP (!) 101/57   Pulse 61   Temp 97.9 °F (36.6 °C)   Resp 18   Ht 5' 8" (1.727 m)   Wt 83.9 kg (185 lb)   SpO2 95%   BMI 28.13 kg/m²       Physical Exam   Constitutional: He is oriented to person, place, and time. No distress.   HENT:   Head: Atraumatic.   Eyes: EOM are normal. Pupils are equal, round, and reactive to light.   Neck: Normal range of motion. Neck supple. No JVD present.   Cardiovascular: Normal rate and regular rhythm.   No murmur heard.  Pulmonary/Chest: Effort normal. No respiratory distress. He has no wheezes. He has no rales.   Laying flat, no increased work of breathing  Abdominal: Soft. Bowel sounds are normal. There is no tenderness.   Musculoskeletal: Normal range of motion. He exhibits no edema.   Neurological: He is alert and oriented to person, place, and time. No cranial nerve deficit.   Skin: Skin is warm and dry. No rash noted.   Psychiatric: He has a normal mood and " affect.   Nursing note and vitals reviewed.    Course of Principle Problem for Admission:  Admitted to IM2. Started on CTX and azithro for CAP treatment. Started on IV lasix 40 BID for CHF exacerbation. Switched to PO moxi on d/c to complete 5 day course of abx. And resumed home lasix dose 40 BID. With elevated SBPs, added isosorbide-dinitrate 20 TID for better control. Pt to f/u with PCP and Dr. Pool in cardiology.     Medical Problems Addressed in the Hospital:  Pneumonia of right upper lobe due to infectious organism     Mr Taylor is a 89 yo man with ischemic CAD (HFrEF 35%), DM2 w/ retinopathy, CKD, who presents with chronically worsening cough and SOB.      DDx: Pneumonia (CAP>HAP), HF exacerbation, MI/ACS, arrythmia, anemia     Assessment:  Community acquired pneumonia, RUL -hemodynamically stable, on room air  Ischemic CAD/HFrEF-  stable  Chronic iron deficiency anemia     Plan:  -  Ceft/Azithro> moxi. Flu negative  - Continue diuresis, now back on home lasix regimen  - Continue GDMT for HFrEF. ECHO with similar results as prior, EF 33%.      CKD (chronic kidney disease), stage II     Associated with DM2 with retinopathy     Renal function stable             Hypertensive heart disease with heart failure     Continue GDMT for CAD  Continue amlodipine, added isosorbide dinitrate      CAD (coronary artery disease)     As above.       Dyslipidemia     Continue statin         Diabetic polyneuropathy associated with type 2 diabetes mellitus     On Glipizide at home  LDSSI for now then transition to basal-bolus if able.       CONSULTS: n/a    Last CBC/BMP/HgbA1c (if applicable):  Recent Results (from the past 336 hour(s))   CBC auto differential    Collection Time: 01/15/19  5:30 AM   Result Value Ref Range    WBC 6.47 3.90 - 12.70 K/uL    Hemoglobin 9.6 (L) 14.0 - 18.0 g/dL    Hematocrit 28.0 (L) 40.0 - 54.0 %    Platelets 164 150 - 350 K/uL   CBC auto differential    Collection Time: 01/14/19  4:25 AM   Result  Value Ref Range    WBC 7.37 3.90 - 12.70 K/uL    Hemoglobin 10.0 (L) 14.0 - 18.0 g/dL    Hematocrit 28.3 (L) 40.0 - 54.0 %    Platelets 141 (L) 150 - 350 K/uL   CBC auto differential    Collection Time: 01/13/19  5:50 PM   Result Value Ref Range    WBC 9.25 3.90 - 12.70 K/uL    Hemoglobin 9.8 (L) 14.0 - 18.0 g/dL    Hematocrit 28.1 (L) 40.0 - 54.0 %    Platelets 145 (L) 150 - 350 K/uL     No results found for this or any previous visit (from the past 336 hour(s)).  Lab Results   Component Value Date    HGBA1C 6.1 (H) 01/13/2019       Disposition:  Home        Future Scheduled Appointments:  Future Appointments   Date Time Provider Department Center   2/27/2019  8:00 AM Jessa Ulloa NP [facility] UROLOG [facility]   3/7/2019 11:20 AM [facility]han [facility]       Follow-up Plans from This Hospitalization: Pt to f/u with PCP and Cardiology Dr. Pool.    Discharge Medication List:       Jeniffer Taylor   Home Medication Instructions [place]:[id number]    Printed on:01/15/19 1410   Medication Information                      amLODIPine (NORVASC) 10 MG tablet  Take 1 tablet (10 mg total) by mouth once daily.             aspirin (ECOTRIN) 81 MG EC tablet  Take 81 mg by mouth. 1 Tablet, Delayed Release (E.C.) Oral Every morning             atorvastatin (LIPITOR) 80 MG tablet  Take 1 tablet (80 mg total) by mouth once daily.             BD ALCOHOL SWABS PadM               bicalutamide (CASODEX) 50 MG Tab  TAKE 1 TABLET EVERY DAY             blood sugar diagnostic Strp  Use daily             blood-glucose meter kit  Use as instructed             carvedilol (COREG) 6.25 MG tablet  Take 1 tablet (6.25 mg total) by mouth 2 (two) times daily with meals.             furosemide (LASIX) 40 MG tablet  Take 1 tablet (40 mg total) by mouth 2 (two) times daily.             garlic 1,000 mg Cap  Take 2 tablets by mouth once daily.             glipiZIDE (GLUCOTROL) 2.5 MG TR24  TAKE 1 TABLET (2.5 MG TOTAL) BY MOUTH ONCE  DAILY.             isosorbide dinitrate (ISORDIL) 20 MG tablet  Take 1 tablet (20 mg total) by mouth 3 (three) times daily.             lancets (LANCETS,ULTRA THIN) Misc  Use daily             latanoprost 0.005 % ophthalmic solution  Place 1 drop into both eyes every evening.             lisinopril (PRINIVIL,ZESTRIL) 40 MG tablet  Take 1 tablet (40 mg total) by mouth once daily.             moxifloxacin (AVELOX) 400 mg tablet  Take 1 tablet (400 mg total) by mouth once daily.             nitroGLYCERIN (NITROSTAT) 0.4 MG SL tablet  Place 1 tablet (0.4 mg total) under the tongue every 5 (five) minutes as needed for Chest pain.                 Patient Instructions:  Discharge Procedure Orders   Ambulatory referral to Cardiology   Referral Priority: Routine Referral Type: Consultation   Referral Reason: Specialty Services Required   Referred to Provider: [facility] Requested Specialty: Cardiology   Number of Visits Requested: 1     Notify your health care provider if you experience any of the following:  temperature >100.4     Notify your health care provider if you experience any of the following:  persistent nausea and vomiting or diarrhea     Notify your health care provider if you experience any of the following:  severe uncontrolled pain     Notify your health care provider if you experience any of the following:  difficulty breathing or increased cough     Notify your health care provider if you experience any of the following:  severe persistent headache     Notify your health care provider if you experience any of the following:  persistent dizziness, light-headedness, or visual disturbances     Notify your health care provider if you experience any of the following:  increased confusion or weakness     Activity as tolerated         Signing Physician:  Chana Suarez MD

## 2019-01-16 LAB
ENTEROVIRUS: NOT DETECTED
HUMAN BOCAVIRUS: NOT DETECTED
HUMAN CORONAVIRUS, COMMON COLD VIRUS: NOT DETECTED
INFLUENZA A - H1N1-09: NOT DETECTED
PARAINFLUENZA: NOT DETECTED
RVP - ADENOVIRUS: NOT DETECTED
RVP - HUMAN METAPNEUMOVIRUS (HMPV): NOT DETECTED
RVP - INFLUENZA A: NOT DETECTED
RVP - INFLUENZA B: NOT DETECTED
RVP - RESPIRATORY SYNCTIAL VIRUS (RSV) A: NOT DETECTED
RVP - RESPIRATORY VIRAL PANEL, SOURCE: NORMAL
RVP - RHINOVIRUS: NOT DETECTED

## 2019-01-17 ENCOUNTER — TELEPHONE (OUTPATIENT)
Dept: INTERNAL MEDICINE | Facility: CLINIC | Age: 84
End: 2019-01-17

## 2019-01-17 ENCOUNTER — PATIENT OUTREACH (OUTPATIENT)
Dept: ADMINISTRATIVE | Facility: CLINIC | Age: 84
End: 2019-01-17

## 2019-01-17 ENCOUNTER — DOCUMENTATION ONLY (OUTPATIENT)
Dept: INTERNAL MEDICINE | Facility: CLINIC | Age: 84
End: 2019-01-17

## 2019-01-17 RX ORDER — AMOXICILLIN 500 MG
1 CAPSULE ORAL DAILY
Status: ON HOLD | COMMUNITY
End: 2019-10-02 | Stop reason: HOSPADM

## 2019-01-17 NOTE — TELEPHONE ENCOUNTER
----- Message from Louis Salinas sent at 1/17/2019  4:26 PM CST -----  Contact: [facility] [phone]  Is this a refill or new RX:  Refill    RX name and strength: metoprolol succinate (TOPROL-XL) 50 MG 24      Pharmacy name and phone # [facility] - [facility] [address] [phone] (Phone)  [phone] (Fax)

## 2019-01-17 NOTE — TELEPHONE ENCOUNTER
----- Message from Kristine Shelby MA sent at 1/17/2019  9:39 AM CST -----      ----- Message -----  From: Sheyla Vieira RN  Sent: 1/17/2019   9:34 AM  To: [facility]ien PAINTER Staff    I did a T[facility] call with above pt. Pt has Metoprolol 50 mg to be taken daily at home. This med is not on med rec nor is it listed to d\c upon d\c. He has Lisinopirl on med rec, but does not have that at home. Please clarify if he is to take these meds and call him with right meds. Also, please update med rec if needed.  Thanks,  Sheyla Vieira RN

## 2019-01-17 NOTE — PROGRESS NOTES
Pt has metopolol 50 mg at home to take daily. It is not listed on Med Rec. Instructed to put on side for now and will send message to pcp and see if needs to take. Lisinopril is on list, but pt does not have. Message routed to pcp to clarify what pt should be taking and to call pt with information and correct med rec if needed.

## 2019-01-17 NOTE — PATIENT INSTRUCTIONS
Sepsis     To treat sepsis, antibiotics and fluids may by given through an intravenous (IV) line.     Sepsis happens when your body responds with widespread inflammation to a bad infection or bacteremia--the presence of bacteria in your bloodstream. Sepsis can be deadly. Blood pressure may drop and the lungs and kidneys may start to fail. Emergency care for sepsis is crucial.  Risk factors  Those most at risk for sepsis are:  · Infants or older adults  · People who have an illness that weakens their immune system, such as cancer, AIDS, or diabetes  · People being treated with chemotherapy medicines or radiation, which weakens the immune system  · People who have had a transplant  · People with a very severe infection such as pneumonia, meningitis, or a urinary tract infection  When to go to the emergency department (ED)  Sepsis is an emergency. Go to the nearest ED if you have a fever with any of these symptoms:  · Chills and shaking  · Rapid heartbeat and breathing  · Trouble breathing  · Severe nausea or uncontrolled vomiting  · Confusion, disorientation, drowsiness, or dizziness  · Decreased urination  · Severe pain, including in the back or joints   What to expect in the ED  · Blood and urine tests are done to look for bacteria. They also check for organ failure.  · Blood, urine, or sputum cultures may be taken. The samples are sent to a lab. They are placed in a special container. Any bacteria should grow in 24 hours.  · X-rays or other imaging tests may be done.  A person with sepsis will be admitted to the hospital and treated with antibiotics. Treatment may also include oxygen and intravenous fluids.  Date Last Reviewed: 10/1/2016  © [employer]9 [employer]. [address] [place] [address], [place] [address]. All rights reserved. This information is not intended as a substitute for professional medical care. Always follow your healthcare professional's instructions.

## 2019-01-17 NOTE — TELEPHONE ENCOUNTER
Please advise him to follow the medication list that he was given upon discharge until I can see him back in clinic

## 2019-01-18 LAB
BACTERIA BLD CULT: NORMAL
BACTERIA BLD CULT: NORMAL

## 2019-01-21 NOTE — TELEPHONE ENCOUNTER
Spoke with pt and informed him of Dr. Chauhan message and also to remind him of his jim, on Friday with us. Pt agreed verbally.

## 2019-01-25 ENCOUNTER — OFFICE VISIT (OUTPATIENT)
Dept: INTERNAL MEDICINE | Facility: CLINIC | Age: 84
End: 2019-01-25
Payer: MEDICARE

## 2019-01-25 DIAGNOSIS — J18.9 PNEUMONIA DUE TO INFECTIOUS ORGANISM, UNSPECIFIED LATERALITY, UNSPECIFIED PART OF LUNG: Primary | ICD-10-CM

## 2019-01-25 PROCEDURE — 99999 PR PBB SHADOW E&M-EST. PATIENT-LVL III: CPT | Mod: PBBFAC,HCNC,, | Performed by: INTERNAL MEDICINE

## 2019-01-25 PROCEDURE — 99999 PR PBB SHADOW E&M-EST. PATIENT-LVL III: ICD-10-PCS | Mod: PBBFAC,HCNC,, | Performed by: INTERNAL MEDICINE

## 2019-01-25 PROCEDURE — 99215 OFFICE O/P EST HI 40 MIN: CPT | Mod: HCNC,S$GLB,, | Performed by: INTERNAL MEDICINE

## 2019-01-25 PROCEDURE — 99215 PR OFFICE/OUTPT VISIT, EST, LEVL V, 40-54 MIN: ICD-10-PCS | Mod: HCNC,S$GLB,, | Performed by: INTERNAL MEDICINE

## 2019-01-25 PROCEDURE — 1101F PR PT FALLS ASSESS DOC 0-1 FALLS W/OUT INJ PAST YR: ICD-10-PCS | Mod: HCNC,CPTII,S$GLB, | Performed by: INTERNAL MEDICINE

## 2019-01-25 PROCEDURE — 1101F PT FALLS ASSESS-DOCD LE1/YR: CPT | Mod: HCNC,CPTII,S$GLB, | Performed by: INTERNAL MEDICINE

## 2019-01-25 RX ORDER — LISINOPRIL 40 MG/1
40 TABLET ORAL DAILY
Qty: 90 TABLET | Refills: 1 | Status: SHIPPED | OUTPATIENT
Start: 2019-01-25 | End: 2019-06-24

## 2019-01-28 DIAGNOSIS — C61 PROSTATE CANCER: ICD-10-CM

## 2019-01-29 RX ORDER — BICALUTAMIDE 50 MG/1
TABLET, FILM COATED ORAL
Qty: 90 TABLET | Refills: 3 | Status: ON HOLD | OUTPATIENT
Start: 2019-01-29 | End: 2019-10-02 | Stop reason: HOSPADM

## 2019-02-03 VITALS
WEIGHT: 162.69 LBS | HEIGHT: 68 IN | BODY MASS INDEX: 24.66 KG/M2 | HEART RATE: 62 BPM | SYSTOLIC BLOOD PRESSURE: 132 MMHG | TEMPERATURE: 99 F | DIASTOLIC BLOOD PRESSURE: 78 MMHG | OXYGEN SATURATION: 99 %

## 2019-02-03 NOTE — PROGRESS NOTES
Subjective:       Patient ID: Jeniffer Taylor is a 89 y.o. male.    Chief Complaint: Hospital Follow Up    HPI  He is here for hospital follow-up.  Please see hospitalization records.  Currently without complaint  Review of Systems   Constitutional: Negative for chills, fatigue, fever and unexpected weight change.   Respiratory: Negative for chest tightness and shortness of breath.    Cardiovascular: Negative for chest pain and palpitations.   Gastrointestinal: Negative for abdominal pain and blood in stool.   Neurological: Negative for dizziness, syncope, numbness and headaches.       Objective:      Physical Exam   HENT:   Right Ear: External ear normal.   Left Ear: External ear normal.   Nose: Nose normal.   Mouth/Throat: Oropharynx is clear and moist.   Eyes: Pupils are equal, round, and reactive to light.   Neck: Normal range of motion.   Cardiovascular: Normal rate and regular rhythm.   Murmur heard.  Pulmonary/Chest: Breath sounds normal.   Abdominal: He exhibits no distension. There is no hepatomegaly. There is no tenderness.   Lymphadenopathy:     He has no cervical adenopathy.     He has no axillary adenopathy.   Neurological: He has normal strength and normal reflexes. No cranial nerve deficit or sensory deficit.       Assessment/Plan     assessment and plan:  Pneumonia:  Repeat CT scan of chest in March

## 2019-02-07 ENCOUNTER — OFFICE VISIT (OUTPATIENT)
Dept: CARDIOLOGY | Facility: CLINIC | Age: 84
End: 2019-02-07
Payer: MEDICARE

## 2019-02-07 VITALS
OXYGEN SATURATION: 100 % | BODY MASS INDEX: 24.29 KG/M2 | DIASTOLIC BLOOD PRESSURE: 79 MMHG | HEART RATE: 60 BPM | HEIGHT: 68 IN | SYSTOLIC BLOOD PRESSURE: 166 MMHG | WEIGHT: 160.25 LBS

## 2019-02-07 DIAGNOSIS — E78.5 DYSLIPIDEMIA: Chronic | ICD-10-CM

## 2019-02-07 DIAGNOSIS — I50.23 ACUTE ON CHRONIC SYSTOLIC HEART FAILURE DUE TO VALVULAR DISEASE: Primary | ICD-10-CM

## 2019-02-07 DIAGNOSIS — I38 ACUTE ON CHRONIC SYSTOLIC HEART FAILURE DUE TO VALVULAR DISEASE: Primary | ICD-10-CM

## 2019-02-07 DIAGNOSIS — I10 ESSENTIAL HYPERTENSION: ICD-10-CM

## 2019-02-07 DIAGNOSIS — I25.5 ISCHEMIC CARDIOMYOPATHY: Chronic | ICD-10-CM

## 2019-02-07 DIAGNOSIS — I25.10 CORONARY ARTERY DISEASE INVOLVING NATIVE CORONARY ARTERY OF NATIVE HEART WITHOUT ANGINA PECTORIS: Chronic | ICD-10-CM

## 2019-02-07 DIAGNOSIS — I35.0 NONRHEUMATIC AORTIC VALVE STENOSIS: ICD-10-CM

## 2019-02-07 DIAGNOSIS — I25.2 HISTORY OF MI (MYOCARDIAL INFARCTION): ICD-10-CM

## 2019-02-07 PROCEDURE — 99499 RISK ADDL DX/OHS AUDIT: ICD-10-PCS | Mod: HCNC,S$GLB,, | Performed by: NURSE PRACTITIONER

## 2019-02-07 PROCEDURE — 99499 UNLISTED E&M SERVICE: CPT | Mod: HCNC,S$GLB,, | Performed by: NURSE PRACTITIONER

## 2019-02-07 PROCEDURE — 99999 PR PBB SHADOW E&M-EST. PATIENT-LVL IV: ICD-10-PCS | Mod: PBBFAC,HCNC,, | Performed by: NURSE PRACTITIONER

## 2019-02-07 PROCEDURE — 1101F PT FALLS ASSESS-DOCD LE1/YR: CPT | Mod: HCNC,CPTII,S$GLB, | Performed by: NURSE PRACTITIONER

## 2019-02-07 PROCEDURE — 99214 PR OFFICE/OUTPT VISIT, EST, LEVL IV, 30-39 MIN: ICD-10-PCS | Mod: HCNC,S$GLB,, | Performed by: NURSE PRACTITIONER

## 2019-02-07 PROCEDURE — 99214 OFFICE O/P EST MOD 30 MIN: CPT | Mod: HCNC,S$GLB,, | Performed by: NURSE PRACTITIONER

## 2019-02-07 PROCEDURE — 99999 PR PBB SHADOW E&M-EST. PATIENT-LVL IV: CPT | Mod: PBBFAC,HCNC,, | Performed by: NURSE PRACTITIONER

## 2019-02-07 PROCEDURE — 1101F PR PT FALLS ASSESS DOC 0-1 FALLS W/OUT INJ PAST YR: ICD-10-PCS | Mod: HCNC,CPTII,S$GLB, | Performed by: NURSE PRACTITIONER

## 2019-02-07 RX ORDER — NAPROXEN SODIUM 220 MG
220 TABLET ORAL 2 TIMES DAILY WITH MEALS
COMMUNITY
End: 2019-06-10

## 2019-02-07 RX ORDER — HYDROCHLOROTHIAZIDE 12.5 MG/1
12.5 TABLET ORAL DAILY
Qty: 90 TABLET | Refills: 3 | Status: SHIPPED | OUTPATIENT
Start: 2019-02-07 | End: 2019-04-02

## 2019-02-07 NOTE — PATIENT INSTRUCTIONS
Salt restriction of 2,000mg a day. Weigh yourself every morning after you go to the restroom.  Take an extra dose of Lasix (furosemide) in the morning if weight increases 2 or more pounds in a 24 hr period, or 5 pounds over a 7 day period. Contact our office if weight does not return to baseline within 1-2 days.     Finish the isosorbide dinitrate while you wait for the hydrochlorothiazide (HCTZ) 12.5 mg to come.

## 2019-02-07 NOTE — PROGRESS NOTES
Mr. Taylor is a patient of Dr. Pool and was last seen in [facility] 9/25/2018.    Subjective:   Patient ID:  Jeniffer Taylor is a 89 y.o. male who presents for follow-up of Cardiomyopathy; Pneumonia of right upper lobe due to infectious organism (hospital discharge 01/15/19); and Edema (bilateral ankles x 1 month)    Problems:  HFrEF, EF 33%  Moderate aortic stenosis, Aortic valve area is 0.94 cm2; peak velocity is 3.1 m/s; mean gradient is 20.38 mmHg  Mild mitral valve stenosis and moderate MR  CAD s/p MI and stents  DM type II  PVD  HTN    HPI  Mr. Taylor is in clinic today for hospital follow up HFrEF, 1/13-1/15/19.  He had bilateral pleural effusions and his BNP was 826.  He was diuresed with IVP 40 mg and lost about 25 lbs.  He was transitioned to lasix 40 mg BID.  His weight is stable after discharge based on clinic weights but he has not been instructed to weigh himself.  Patient denies chest pain with exertion or at rest, palpitations, SOB, PRATT, dizziness, syncope, edema, orthopnea, PND, or claudication.      Review of Systems   Constitution: Negative for decreased appetite, diaphoresis, weakness, malaise/fatigue, weight gain and weight loss.   Eyes: Negative for visual disturbance.   Cardiovascular: Negative for chest pain, claudication, dyspnea on exertion, irregular heartbeat, leg swelling, near-syncope, orthopnea, palpitations, paroxysmal nocturnal dyspnea and syncope.        Denies chest pressure   Respiratory: Negative for cough, hemoptysis, shortness of breath, sleep disturbances due to breathing and snoring.    Endocrine: Negative for cold intolerance and heat intolerance.   Hematologic/Lymphatic: Negative for bleeding problem. Does not bruise/bleed easily.   Musculoskeletal: Negative for myalgias.   Gastrointestinal: Negative for bloating, abdominal pain, anorexia, change in bowel habit, constipation, diarrhea, nausea and vomiting.   Neurological: Negative for difficulty with  concentration, disturbances in coordination, excessive daytime sleepiness, dizziness, headaches, light-headedness, loss of balance and numbness.   Psychiatric/Behavioral: The patient does not have insomnia.      Allergies and current medications updated and reviewed:  Review of patient's allergies indicates:  No Known Allergies  Current Outpatient Medications   Medication Sig    amLODIPine (NORVASC) 10 MG tablet Take 1 tablet (10 mg total) by mouth once daily.    aspirin (ECOTRIN) 81 MG EC tablet Take 81 mg by mouth. 1 Tablet, Delayed Release (E.C.) Oral Every morning    atorvastatin (LIPITOR) 80 MG tablet Take 1 tablet (80 mg total) by mouth once daily.    BD ALCOHOL SWABS PadM     bicalutamide (CASODEX) 50 MG Tab TAKE 1 TABLET EVERY DAY    blood sugar diagnostic Strp Use daily    blood-glucose meter kit Use as instructed    carvedilol (COREG) 6.25 MG tablet Take 1 tablet (6.25 mg total) by mouth 2 (two) times daily with meals.    fish oil-omega-3 fatty acids 300-1,000 mg capsule Take 1 capsule by mouth once daily.    furosemide (LASIX) 40 MG tablet Take 1 tablet (40 mg total) by mouth 2 (two) times daily.    garlic 1,000 mg Cap Take 2 tablets by mouth once daily.    glipiZIDE (GLUCOTROL) 2.5 MG TR24 TAKE 1 TABLET (2.5 MG TOTAL) BY MOUTH ONCE DAILY.    isosorbide dinitrate (ISORDIL) 20 MG tablet Take 1 tablet (20 mg total) by mouth 3 (three) times daily.    lancets (LANCETS,ULTRA THIN) Misc Use daily    latanoprost 0.005 % ophthalmic solution Place 1 drop into both eyes every evening.    lisinopril (PRINIVIL,ZESTRIL) 40 MG tablet Take 1 tablet (40 mg total) by mouth once daily.    multivitamin capsule Take 1 capsule by mouth once daily.    naproxen sodium (ANAPROX) 220 MG tablet Take 220 mg by mouth 2 (two) times daily with meals.    nitroGLYCERIN (NITROSTAT) 0.4 MG SL tablet Place 1 tablet (0.4 mg total) under the tongue every 5 (five) minutes as needed for Chest pain.    moxifloxacin (AVELOX)  "400 mg tablet Take 1 tablet (400 mg total) by mouth once daily.     Current Facility-Administered Medications   Medication    leuprolide (6 month) SyKt 45 mg     Objective:     Right Arm BP - Sittin/79 (19 1042)  Left Arm BP - Sittin/79 (19 1042)    BP (!) 166/79 (BP Location: Left arm, Patient Position: Sitting, BP Method: Large (Automatic))   Pulse 60   Ht 5' 8" (1.727 m)   Wt 72.7 kg (160 lb 4.4 oz)   SpO2 100%   BMI 24.37 kg/m²       Physical Exam   Constitutional: He is oriented to person, place, and time. Vital signs are normal. He appears well-developed and well-nourished. He is active. No distress.   HENT:   Head: Normocephalic and atraumatic.   Eyes: Conjunctivae and lids are normal. No scleral icterus.   Neck: Neck supple. Normal carotid pulses, no hepatojugular reflux and no JVD present. Carotid bruit is not present.   Cardiovascular: Normal rate, regular rhythm, S1 normal, S2 normal and intact distal pulses. PMI is not displaced. Exam reveals no gallop and no friction rub.   Murmur heard.   Musical midsystolic murmur is present with a grade of 3/6 at the upper right sternal border radiating to the neck.   Low-pitched rumbling crescendo presystolic murmur is present at the apex.  Pulses:       Carotid pulses are 2+ on the right side, and 2+ on the left side.       Radial pulses are 2+ on the right side, and 2+ on the left side.        Dorsalis pedis pulses are 2+ on the right side, and 2+ on the left side.        Posterior tibial pulses are 1+ on the right side, and 1+ on the left side.   Pulmonary/Chest: Effort normal and breath sounds normal. No respiratory distress. He has no decreased breath sounds. He has no wheezes. He has no rhonchi. He has no rales. He exhibits no tenderness.   Abdominal: Soft. Normal appearance and bowel sounds are normal. He exhibits no distension, no fluid wave, no abdominal bruit, no ascites and no pulsatile midline mass. There is no " hepatosplenomegaly. There is no tenderness.   Musculoskeletal: He exhibits edema (BLE trace pitting).   Neurological: He is alert and oriented to person, place, and time. Gait normal.   Skin: Skin is warm, dry and intact. No rash noted. He is not diaphoretic. Nails show no clubbing.   Psychiatric: He has a normal mood and affect. His speech is normal and behavior is normal. Judgment and thought content normal. Cognition and memory are normal.   Nursing note and vitals reviewed.      Chemistry        Component Value Date/Time     01/15/2019 0530    K 3.7 01/15/2019 0530     01/15/2019 0530    CO2 26 01/15/2019 0530    BUN 21 01/15/2019 0530    CREATININE 1.1 01/15/2019 0530     01/15/2019 0530        Component Value Date/Time    CALCIUM 9.0 01/15/2019 0530    ALKPHOS 101 01/15/2019 0530    AST 21 01/15/2019 0530    ALT 11 01/15/2019 0530    BILITOT 0.5 01/15/2019 0530    ESTGFRAFRICA >60.0 01/15/2019 0530    EGFRNONAA 58.8 (A) 01/15/2019 0530        Lab Results   Component Value Date    HGBA1C 6.1 (H) 01/13/2019     Recent Labs   Lab 12/06/17  0839  01/13/19  1750  01/15/19  0530   WHITE BLOOD CELL COUNT  --    < > 9.25   < > 6.47   HEMOGLOBIN  --    < > 9.8 L   < > 9.6 L   HEMATOCRIT  --    < > 28.1 L   < > 28.0 L   MCV  --    < > 78 L   < > 79 L   PLATELETS  --    < > 145 L   < > 164   BNP  --   --  826 H  --   --    TSH 1.870  --   --   --   --    CHOLESTEROL 142  --   --   --   --    HDL 44  --   --   --   --    LDL CHOLESTEROL 76.8  --   --   --   --    TRIGLYCERIDES 106  --   --   --   --    HDL/CHOLESTEROL RATIO 31.0  --   --   --   --     < > = values in this interval not displayed.     Lab Results   Component Value Date    IRON 26 (L) 01/14/2019    TIBC 241 (L) 01/14/2019    FERRITIN 233 01/14/2019            Test(s) Reviewed  I have reviewed the following in detail:  [] Stress test   [] Angiography   [x] Echocardiogram   [x] Labs    Other:         Assessment/Plan:   1. Acute on chronic  systolic heart failure due to valvular disease  Asymptomatic.  BLE +1 edema in ankles. No JVD. Lungs CTA. Taking lisinopril 40 mg and coreg 6.25 mg BID.  HR in the low 60s prevents up titration of coreg. Continue current dose of lasix 40 mg BID. Repeat bmp to check potassium and renal function.  Consider addition of spironolactone.  Instructed to start doing daily weights and to take an extra tablet (40mg) lasix in the morning for wt gain >2 lbs/day or >5 lbs/wk.     - Basic metabolic panel; Future  - Magnesium; Future    2. Ischemic cardiomyopathy      3. Coronary artery disease involving native coronary artery of native heart without angina pectoris  Asymptomatic. Taking high intensity statin and ASA. No changes.      4. History of MI (myocardial infarction)      5. Dyslipidemia  LDL 76. No changes      6. Nonrheumatic aortic valve stenosis  2/6 musical murmur. Suspect low gradient secondary to depressed EF. Refer to valve clinic for evaluation for TAVR.  This patient is very active still gardening and doing his own house cleaning.  He gets onto the exam table unaided.     - Ambulatory Referral to Interventional Cardiology    7. Essential hypertension  BP not at goal <140/90. Isordil copay expensive per pt.  Requesting alternate therapy.  Start HCTZ 12.5 mg and slowly increase.  Continue current regimen.    - hydroCHLOROthiazide (HYDRODIURIL) 12.5 MG Tab; Take 1 tablet (12.5 mg total) by mouth once daily.  Dispense: 90 tablet; Refill: 3         The patient was discussed and examined by Dr. Pool    Follow-up in about 6 weeks (around 3/21/2019).[]

## 2019-02-11 DIAGNOSIS — I50.9 CONGESTIVE HEART FAILURE, UNSPECIFIED HF CHRONICITY, UNSPECIFIED HEART FAILURE TYPE: ICD-10-CM

## 2019-02-11 DIAGNOSIS — I35.0 AORTIC VALVE STENOSIS, ETIOLOGY OF CARDIAC VALVE DISEASE UNSPECIFIED: Primary | ICD-10-CM

## 2019-02-27 ENCOUNTER — OFFICE VISIT (OUTPATIENT)
Dept: UROLOGY | Facility: CLINIC | Age: 84
End: 2019-02-27
Payer: MEDICARE

## 2019-02-27 ENCOUNTER — LAB VISIT (OUTPATIENT)
Dept: LAB | Facility: HOSPITAL | Age: 84
End: 2019-02-27
Payer: MEDICARE

## 2019-02-27 VITALS
BODY MASS INDEX: 24.25 KG/M2 | HEART RATE: 62 BPM | WEIGHT: 160 LBS | SYSTOLIC BLOOD PRESSURE: 134 MMHG | RESPIRATION RATE: 15 BRPM | HEIGHT: 68 IN | DIASTOLIC BLOOD PRESSURE: 64 MMHG

## 2019-02-27 DIAGNOSIS — C61 PROSTATE CANCER: Primary | ICD-10-CM

## 2019-02-27 DIAGNOSIS — C61 PROSTATE CANCER: ICD-10-CM

## 2019-02-27 LAB — COMPLEXED PSA SERPL-MCNC: 0.21 NG/ML

## 2019-02-27 PROCEDURE — 84153 ASSAY OF PSA TOTAL: CPT | Mod: HCNC

## 2019-02-27 PROCEDURE — 1101F PT FALLS ASSESS-DOCD LE1/YR: CPT | Mod: HCNC,CPTII,S$GLB, | Performed by: NURSE PRACTITIONER

## 2019-02-27 PROCEDURE — 99214 OFFICE O/P EST MOD 30 MIN: CPT | Mod: 25,HCNC,S$GLB, | Performed by: NURSE PRACTITIONER

## 2019-02-27 PROCEDURE — 36415 COLL VENOUS BLD VENIPUNCTURE: CPT | Mod: HCNC

## 2019-02-27 PROCEDURE — 99999 PR PBB SHADOW E&M-EST. PATIENT-LVL V: ICD-10-PCS | Mod: PBBFAC,HCNC,, | Performed by: NURSE PRACTITIONER

## 2019-02-27 PROCEDURE — 1101F PR PT FALLS ASSESS DOC 0-1 FALLS W/OUT INJ PAST YR: ICD-10-PCS | Mod: HCNC,CPTII,S$GLB, | Performed by: NURSE PRACTITIONER

## 2019-02-27 PROCEDURE — 99999 PR PBB SHADOW E&M-EST. PATIENT-LVL V: CPT | Mod: PBBFAC,HCNC,, | Performed by: NURSE PRACTITIONER

## 2019-02-27 PROCEDURE — 96402 PR CHEMOTHER HORMON ANTINEOPL SUB-Q/IM: ICD-10-PCS | Mod: HCNC,S$GLB,, | Performed by: NURSE PRACTITIONER

## 2019-02-27 PROCEDURE — 96402 CHEMO HORMON ANTINEOPL SQ/IM: CPT | Mod: HCNC,S$GLB,, | Performed by: NURSE PRACTITIONER

## 2019-02-27 PROCEDURE — 99214 PR OFFICE/OUTPT VISIT, EST, LEVL IV, 30-39 MIN: ICD-10-PCS | Mod: 25,HCNC,S$GLB, | Performed by: NURSE PRACTITIONER
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
## 2019-03-07 ENCOUNTER — OFFICE VISIT (OUTPATIENT)
Dept: INTERNAL MEDICINE | Facility: CLINIC | Age: 84
End: 2019-03-07
Payer: MEDICARE

## 2019-03-07 ENCOUNTER — TELEPHONE (OUTPATIENT)
Dept: CARDIOLOGY | Facility: CLINIC | Age: 84
End: 2019-03-07

## 2019-03-07 VITALS
OXYGEN SATURATION: 98 % | WEIGHT: 146.38 LBS | HEART RATE: 63 BPM | BODY MASS INDEX: 22.19 KG/M2 | HEIGHT: 68 IN | DIASTOLIC BLOOD PRESSURE: 66 MMHG | SYSTOLIC BLOOD PRESSURE: 136 MMHG

## 2019-03-07 DIAGNOSIS — I10 HYPERTENSION, UNSPECIFIED TYPE: Primary | ICD-10-CM

## 2019-03-07 DIAGNOSIS — R45.84 ANHEDONIA: ICD-10-CM

## 2019-03-07 DIAGNOSIS — I25.5 ISCHEMIC CARDIOMYOPATHY: Chronic | ICD-10-CM

## 2019-03-07 PROCEDURE — 99999 PR PBB SHADOW E&M-EST. PATIENT-LVL IV: ICD-10-PCS | Mod: PBBFAC,HCNC,, | Performed by: INTERNAL MEDICINE

## 2019-03-07 PROCEDURE — 99999 PR PBB SHADOW E&M-EST. PATIENT-LVL IV: CPT | Mod: PBBFAC,HCNC,, | Performed by: INTERNAL MEDICINE

## 2019-03-07 PROCEDURE — 99215 OFFICE O/P EST HI 40 MIN: CPT | Mod: HCNC,S$GLB,, | Performed by: INTERNAL MEDICINE

## 2019-03-07 PROCEDURE — 1101F PT FALLS ASSESS-DOCD LE1/YR: CPT | Mod: HCNC,CPTII,S$GLB, | Performed by: INTERNAL MEDICINE

## 2019-03-07 PROCEDURE — 1101F PR PT FALLS ASSESS DOC 0-1 FALLS W/OUT INJ PAST YR: ICD-10-PCS | Mod: HCNC,CPTII,S$GLB, | Performed by: INTERNAL MEDICINE

## 2019-03-07 PROCEDURE — 99215 PR OFFICE/OUTPT VISIT, EST, LEVL V, 40-54 MIN: ICD-10-PCS | Mod: HCNC,S$GLB,, | Performed by: INTERNAL MEDICINE

## 2019-03-07 RX ORDER — GLIPIZIDE 2.5 MG/1
2.5 TABLET, EXTENDED RELEASE ORAL DAILY
Qty: 90 TABLET | Refills: 1 | Status: SHIPPED | OUTPATIENT
Start: 2019-03-07 | End: 2019-08-17 | Stop reason: SDUPTHER

## 2019-03-07 RX ORDER — NITROGLYCERIN 0.4 MG/1
0.4 TABLET SUBLINGUAL EVERY 5 MIN PRN
Qty: 100 TABLET | Refills: 5 | Status: ON HOLD | OUTPATIENT
Start: 2019-03-07 | End: 2019-10-02 | Stop reason: HOSPADM

## 2019-03-07 RX ORDER — ATORVASTATIN CALCIUM 80 MG/1
80 TABLET, FILM COATED ORAL DAILY
Qty: 90 TABLET | Refills: 1 | Status: ON HOLD | OUTPATIENT
Start: 2019-03-07 | End: 2019-10-02 | Stop reason: HOSPADM

## 2019-03-07 NOTE — TELEPHONE ENCOUNTER
----- Message from Cat Randolph sent at 3/7/2019  2:19 PM CST -----  Contact: Marisela   Pt need clarfication on medication  furosemide (LASIX) 40 MG tablet. Last visit 2/7/19 vinny Phelps. Please call Marisela @ [phone]. Thank you.

## 2019-03-07 NOTE — TELEPHONE ENCOUNTER
Spoke with the pt's daughter - reviewed instructions for furosemide, weight gain and HCTZ. AVS faxed from appt on 2-7-19 to the daughter at [phone].

## 2019-03-10 NOTE — PROGRESS NOTES
Subjective:       Patient ID: Jeniffer Taylor is a 89 y.o. male.    Chief Complaint: Hypertension    HPI  He returns for management of hypertension.  He has had hypertension for over a year.  Current treatment has included medications outlined in medication list.  He denies chest pain or shortness of breath.  No palpitations.  Denies left arm or neck pain.    PAST MEDICAL HISTORY: Hypertension, diabetes with retinopathy and neuropathy ,  hyperlipidemia, congestive heart failure, prostate cancer, aortic stenosis, anemia, glaucoma, lumbar spinal stenosis, thyroid nodule,  coronary artery disease. He had a colonoscopy February 2011     MEDICATIONS: GlucotrolXL 2.5 mg daily,lisinopril 40mg daily ,Lipitor 80 mg daily,xalatan ,aspirin one by mouth daily, Lasix 40 mg twice a day, Norvasc 10 mg daily ,casodex, coreg 6.25 mg b.i.d., hydrochlorothiazide 12.5 mg daily    ALLERGIES: No known drug allergies.        Review of Systems   Constitutional: Negative for chills, fatigue, fever and unexpected weight change.   Respiratory: Negative for chest tightness and shortness of breath.    Cardiovascular: Negative for chest pain and palpitations.   Gastrointestinal: Negative for abdominal pain and blood in stool.   Neurological: Negative for dizziness, syncope, numbness and headaches.       Objective:      Physical Exam   HENT:   Right Ear: External ear normal.   Left Ear: External ear normal.   Nose: Nose normal.   Mouth/Throat: Oropharynx is clear and moist.   Eyes: Pupils are equal, round, and reactive to light.   Neck: Normal range of motion.   Cardiovascular: Normal rate and regular rhythm.   Murmur heard.  Pulmonary/Chest: Breath sounds normal.   Abdominal: He exhibits no distension. There is no hepatomegaly. There is no tenderness.   Lymphadenopathy:     He has no cervical adenopathy.     He has no axillary adenopathy.   Neurological: He has normal strength and normal reflexes. No cranial nerve deficit or sensory deficit.        Assessment/Plan           assessment and plan:  Hypertension:  Check CMP, CBC, TSH

## 2019-03-13 ENCOUNTER — HOSPITAL ENCOUNTER (OUTPATIENT)
Dept: PULMONOLOGY | Facility: CLINIC | Age: 84
Discharge: HOME | End: 2019-03-13
Payer: MEDICARE

## 2019-03-13 ENCOUNTER — HOSPITAL ENCOUNTER (OUTPATIENT)
Dept: RADIOLOGY | Facility: HOSPITAL | Age: 84
Discharge: HOME | End: 2019-03-13
Attending: INTERNAL MEDICINE
Payer: MEDICARE

## 2019-03-13 ENCOUNTER — HOSPITAL ENCOUNTER (OUTPATIENT)
Dept: CARDIOLOGY | Facility: CLINIC | Age: 84
Discharge: HOME | End: 2019-03-13
Attending: INTERNAL MEDICINE
Payer: MEDICARE

## 2019-03-13 ENCOUNTER — OFFICE VISIT (OUTPATIENT)
Dept: CARDIOLOGY | Facility: CLINIC | Age: 84
End: 2019-03-13
Payer: MEDICARE

## 2019-03-13 VITALS
WEIGHT: 155.44 LBS | DIASTOLIC BLOOD PRESSURE: 56 MMHG | HEART RATE: 45 BPM | BODY MASS INDEX: 24.4 KG/M2 | HEIGHT: 67 IN | SYSTOLIC BLOOD PRESSURE: 137 MMHG | OXYGEN SATURATION: 99 %

## 2019-03-13 VITALS
BODY MASS INDEX: 23.19 KG/M2 | WEIGHT: 153 LBS | SYSTOLIC BLOOD PRESSURE: 186 MMHG | HEART RATE: 51 BPM | RESPIRATION RATE: 20 BRPM | DIASTOLIC BLOOD PRESSURE: 74 MMHG | HEIGHT: 68 IN

## 2019-03-13 VITALS — BODY MASS INDEX: 24.01 KG/M2 | HEIGHT: 67 IN | WEIGHT: 153 LBS

## 2019-03-13 DIAGNOSIS — I35.0 AORTIC VALVE STENOSIS, ETIOLOGY OF CARDIAC VALVE DISEASE UNSPECIFIED: ICD-10-CM

## 2019-03-13 DIAGNOSIS — I10 ESSENTIAL HYPERTENSION: ICD-10-CM

## 2019-03-13 DIAGNOSIS — J18.9 PNEUMONIA OF RIGHT UPPER LOBE DUE TO INFECTIOUS ORGANISM: ICD-10-CM

## 2019-03-13 DIAGNOSIS — C61 PROSTATE CA: ICD-10-CM

## 2019-03-13 DIAGNOSIS — E11.311 DIABETIC MACULAR EDEMA, RIGHT EYE: ICD-10-CM

## 2019-03-13 DIAGNOSIS — I25.10 CORONARY ARTERY DISEASE INVOLVING NATIVE CORONARY ARTERY OF NATIVE HEART WITHOUT ANGINA PECTORIS: Chronic | ICD-10-CM

## 2019-03-13 DIAGNOSIS — I50.23 ACUTE ON CHRONIC SYSTOLIC HEART FAILURE: ICD-10-CM

## 2019-03-13 DIAGNOSIS — I11.0 HYPERTENSIVE HEART DISEASE WITH HEART FAILURE: Chronic | ICD-10-CM

## 2019-03-13 DIAGNOSIS — I35.0 NONRHEUMATIC AORTIC VALVE STENOSIS: ICD-10-CM

## 2019-03-13 DIAGNOSIS — E11.39 DIABETES MELLITUS WITH GLAUCOMA: ICD-10-CM

## 2019-03-13 DIAGNOSIS — D64.9 ANEMIA, UNSPECIFIED TYPE: Chronic | ICD-10-CM

## 2019-03-13 DIAGNOSIS — I50.9 CONGESTIVE HEART FAILURE, UNSPECIFIED HF CHRONICITY, UNSPECIFIED HEART FAILURE TYPE: ICD-10-CM

## 2019-03-13 DIAGNOSIS — H35.033 HYPERTENSIVE RETINOPATHY OF BOTH EYES: ICD-10-CM

## 2019-03-13 DIAGNOSIS — N18.2 CONTROLLED TYPE 2 DIABETES MELLITUS WITH STAGE 2 CHRONIC KIDNEY DISEASE, WITHOUT LONG-TERM CURRENT USE OF INSULIN: ICD-10-CM

## 2019-03-13 DIAGNOSIS — N18.2 CKD (CHRONIC KIDNEY DISEASE), STAGE II: Chronic | ICD-10-CM

## 2019-03-13 DIAGNOSIS — I25.2 HISTORY OF MI (MYOCARDIAL INFARCTION): ICD-10-CM

## 2019-03-13 DIAGNOSIS — I50.42 CHRONIC COMBINED SYSTOLIC AND DIASTOLIC HEART FAILURE: ICD-10-CM

## 2019-03-13 DIAGNOSIS — R29.6 FREQUENT FALLS: ICD-10-CM

## 2019-03-13 DIAGNOSIS — N18.30 CKD (CHRONIC KIDNEY DISEASE), STAGE III: ICD-10-CM

## 2019-03-13 DIAGNOSIS — E11.3213 CONTROLLED TYPE 2 DIABETES MELLITUS WITH BOTH EYES AFFECTED BY MILD NONPROLIFERATIVE RETINOPATHY AND MACULAR EDEMA, WITHOUT LONG-TERM CURRENT USE OF INSULIN: Chronic | ICD-10-CM

## 2019-03-13 DIAGNOSIS — E11.22 CONTROLLED TYPE 2 DIABETES MELLITUS WITH STAGE 2 CHRONIC KIDNEY DISEASE, WITHOUT LONG-TERM CURRENT USE OF INSULIN: ICD-10-CM

## 2019-03-13 DIAGNOSIS — E11.49 TYPE II DIABETES MELLITUS WITH NEUROLOGICAL MANIFESTATIONS: ICD-10-CM

## 2019-03-13 DIAGNOSIS — E78.5 DYSLIPIDEMIA: Chronic | ICD-10-CM

## 2019-03-13 DIAGNOSIS — E11.42 DIABETIC POLYNEUROPATHY ASSOCIATED WITH TYPE 2 DIABETES MELLITUS: Primary | Chronic | ICD-10-CM

## 2019-03-13 DIAGNOSIS — H25.10 NUCLEAR SENILE CATARACT, UNSPECIFIED LATERALITY: ICD-10-CM

## 2019-03-13 DIAGNOSIS — H40.1132 PRIMARY OPEN ANGLE GLAUCOMA (POAG) OF BOTH EYES, MODERATE STAGE: ICD-10-CM

## 2019-03-13 DIAGNOSIS — I34.2 NON-RHEUMATIC MITRAL VALVE STENOSIS: ICD-10-CM

## 2019-03-13 DIAGNOSIS — I25.5 ISCHEMIC CARDIOMYOPATHY: Chronic | ICD-10-CM

## 2019-03-13 DIAGNOSIS — H42 DIABETES MELLITUS WITH GLAUCOMA: ICD-10-CM

## 2019-03-13 DIAGNOSIS — L84 CORNS AND CALLOSITIES: ICD-10-CM

## 2019-03-13 LAB
ALLENS TEST: ABNORMAL
AORTIC VALVE CUSP SEPERATION: 40 CM
ASCENDING AORTA: 3.34 CM
AV INDEX (PROSTH): 0.29
AV MEAN GRADIENT: 23 MMHG
AV PEAK GRADIENT: 40.96 MMHG
AV VALVE AREA: 0.92 CM2
AV VELOCITY RATIO: 0.34
BSA FOR ECHO PROCEDURE: 1.82 M2
CV ECHO LV RWT: 0.37 CM
CV STRESS BASE HR: 52 BPM
DELSYS: ABNORMAL
DIASTOLIC BLOOD PRESSURE: 74 MMHG
DOP CALC AO PEAK VEL: 3.2 M/S
DOP CALC AO VTI: 75 CM
DOP CALC LVOT AREA: 3.14 CM2
DOP CALC LVOT DIAMETER: 2 CM
DOP CALC LVOT PEAK VEL: 1.1 M/S
DOP CALC LVOT STROKE VOLUME: 69.08 CM3
DOP CALCLVOT PEAK VEL VTI: 22 CM
E WAVE DECELERATION TIME: 300.72 MSEC
E/A RATIO: 0.8
E/E' RATIO: 21.8
ECHO LV POSTERIOR WALL: 0.99 CM (ref 0.6–1.1)
FRACTIONAL SHORTENING: 27 % (ref 28–44)
HCO3 UR-SCNC: 25.3 MMOL/L (ref 24–28)
INTERVENTRICULAR SEPTUM: 1.3 CM (ref 0.6–1.1)
IVRT: 0.14 MSEC
LA MAJOR: 6.04 CM
LA MINOR: 6.02 CM
LA WIDTH: 3.55 CM
LEFT ATRIUM SIZE: 5 CM
LEFT ATRIUM VOLUME INDEX: 49.9 ML/M2
LEFT ATRIUM VOLUME: 90.98 CM3
LEFT INTERNAL DIMENSION IN SYSTOLE: 3.89 CM (ref 2.1–4)
LEFT VENTRICLE DIASTOLIC VOLUME INDEX: 75.11 ML/M2
LEFT VENTRICLE DIASTOLIC VOLUME: 136.99 ML
LEFT VENTRICLE MASS INDEX: 133.1 G/M2
LEFT VENTRICLE SYSTOLIC VOLUME INDEX: 35.9 ML/M2
LEFT VENTRICLE SYSTOLIC VOLUME: 65.45 ML
LEFT VENTRICULAR INTERNAL DIMENSION IN DIASTOLE: 5.33 CM (ref 3.5–6)
LEFT VENTRICULAR MASS: 242.74 G
LV LATERAL E/E' RATIO: 18.17
LV SEPTAL E/E' RATIO: 27.25
MV PEAK A VEL: 1.37 M/S
MV PEAK E VEL: 1.09 M/S
OHS CV CPX 1 MINUTE RECOVERY HEART RATE: 63 BPM
OHS CV CPX 85 PERCENT MAX PREDICTED HEART RATE MALE: 111
OHS CV CPX MAX PREDICTED HEART RATE: 130
OHS CV CPX PATIENT IS FEMALE: 0
OHS CV CPX PATIENT IS MALE: 1
OHS CV CPX PEAK DIASTOLIC BLOOD PRESSURE: 64 MMHG
OHS CV CPX PEAK HEAR RATE: 63 BPM
OHS CV CPX PEAK RATE PRESSURE PRODUCT: NORMAL
OHS CV CPX PEAK SYSTOLIC BLOOD PRESSURE: 191 MMHG
OHS CV CPX PERCENT MAX PREDICTED HEART RATE ACHIEVED: 48
OHS CV CPX PERCENT TARGET HEART RATE ACHIEVED: 57.27
OHS CV CPX RATE PRESSURE PRODUCT PRESENTING: 9672
OHS CV CPX TARGET HEART RATE: 110
PCO2 BLDA: 33.1 MMHG (ref 35–45)
PH SMN: 7.49 [PH] (ref 7.35–7.45)
PISA TR MAX VEL: 2.87 M/S
PO2 BLDA: 101 MMHG (ref 80–100)
POC BE: 2 MMOL/L
POC SATURATED O2: 98 % (ref 95–100)
POC TCO2: 26 MMOL/L (ref 23–27)
PRE FEV1 FVC: 70
PRE FEV1: 2.14
PRE FVC: 3.06
PREDICTED FEV1 FVC: 72
PREDICTED FEV1: 2.05
PREDICTED FVC: 2.84
PULM VEIN S/D RATIO: 1.26
PV PEAK D VEL: 0.5 M/S
PV PEAK S VEL: 0.63 M/S
RA MAJOR: 4.93 CM
RA PRESSURE: 3 MMHG
RA WIDTH: 4.17 CM
RIGHT VENTRICULAR END-DIASTOLIC DIMENSION: 3.66 CM
RV TISSUE DOPPLER FREE WALL SYSTOLIC VELOCITY 1 (APICAL 4 CHAMBER VIEW): 12.51 M/S
SAMPLE: ABNORMAL
SINUS: 3.19 CM
SITE: ABNORMAL
STJ: 2.4 CM
SYSTOLIC BLOOD PRESSURE: 186 MMHG
TDI LATERAL: 0.06
TDI SEPTAL: 0.04
TDI: 0.05
TR MAX PG: 32.95 MMHG
TRICUSPID ANNULAR PLANE SYSTOLIC EXCURSION: 2.91 CM
TV REST PULMONARY ARTERY PRESSURE: 36 MMHG

## 2019-03-13 PROCEDURE — 93320 DOPPLER ECHO COMPLETE: CPT | Mod: HCNC,S$GLB,, | Performed by: INTERNAL MEDICINE

## 2019-03-13 PROCEDURE — 94010 BREATHING CAPACITY TEST: CPT | Mod: HCNC,S$GLB,, | Performed by: INTERNAL MEDICINE

## 2019-03-13 PROCEDURE — 36600 WITHDRAWAL OF ARTERIAL BLOOD: CPT | Mod: HCNC,S$GLB,, | Performed by: INTERNAL MEDICINE

## 2019-03-13 PROCEDURE — 74174 CTA ABD&PLVS W/CONTRAST: CPT | Mod: 26,HCNC,, | Performed by: RADIOLOGY

## 2019-03-13 PROCEDURE — 99499 UNLISTED E&M SERVICE: CPT | Mod: HCNC,S$GLB,, | Performed by: PHYSICIAN ASSISTANT

## 2019-03-13 PROCEDURE — 94729 DIFFUSING CAPACITY: CPT | Mod: HCNC,S$GLB,, | Performed by: INTERNAL MEDICINE

## 2019-03-13 PROCEDURE — 93325 DOPPLER ECHO COLOR FLOW MAPG: CPT | Mod: HCNC,S$GLB,, | Performed by: INTERNAL MEDICINE

## 2019-03-13 PROCEDURE — 71275 CT ANGIOGRAPHY CHEST: CPT | Mod: 26,HCNC,, | Performed by: RADIOLOGY

## 2019-03-13 PROCEDURE — 94618 PULMONARY STRESS TESTING: ICD-10-PCS | Mod: HCNC,S$GLB,, | Performed by: INTERNAL MEDICINE

## 2019-03-13 PROCEDURE — 93325 ECHOCARDIOGRAM STRESS TEST WITH COLOR FLOW DOPPLER (CUPID ONLY): ICD-10-PCS | Mod: HCNC,S$GLB,, | Performed by: INTERNAL MEDICINE

## 2019-03-13 PROCEDURE — 94618 PULMONARY STRESS TESTING: CPT | Mod: HCNC,S$GLB,, | Performed by: INTERNAL MEDICINE

## 2019-03-13 PROCEDURE — 93351 STRESS TTE COMPLETE: CPT | Mod: HCNC,S$GLB,, | Performed by: INTERNAL MEDICINE

## 2019-03-13 PROCEDURE — 93320 ECHOCARDIOGRAM STRESS TEST WITH COLOR FLOW DOPPLER (CUPID ONLY): ICD-10-PCS | Mod: HCNC,S$GLB,, | Performed by: INTERNAL MEDICINE

## 2019-03-13 PROCEDURE — 93351 ECHOCARDIOGRAM STRESS TEST WITH COLOR FLOW DOPPLER (CUPID ONLY): ICD-10-PCS | Mod: HCNC,S$GLB,, | Performed by: INTERNAL MEDICINE

## 2019-03-13 PROCEDURE — 1101F PR PT FALLS ASSESS DOC 0-1 FALLS W/OUT INJ PAST YR: ICD-10-PCS | Mod: HCNC,CPTII,S$GLB, | Performed by: INTERNAL MEDICINE

## 2019-03-13 PROCEDURE — 99999 PR PBB SHADOW E&M-EST. PATIENT-LVL V: CPT | Mod: PBBFAC,HCNC,,

## 2019-03-13 PROCEDURE — 99204 OFFICE O/P NEW MOD 45 MIN: CPT | Mod: HCNC,S$GLB,, | Performed by: INTERNAL MEDICINE

## 2019-03-13 PROCEDURE — 74174 CTA CARDIAC TAVR_PARTNERS (XPD): ICD-10-PCS | Mod: 26,HCNC,, | Performed by: RADIOLOGY

## 2019-03-13 PROCEDURE — 94010 BREATHING CAPACITY TEST: ICD-10-PCS | Mod: HCNC,S$GLB,, | Performed by: INTERNAL MEDICINE

## 2019-03-13 PROCEDURE — 99499 RISK ADDL DX/OHS AUDIT: ICD-10-PCS | Mod: HCNC,S$GLB,, | Performed by: PHYSICIAN ASSISTANT

## 2019-03-13 PROCEDURE — 25500020 PHARM REV CODE 255: Mod: HCNC | Performed by: INTERNAL MEDICINE

## 2019-03-13 PROCEDURE — 82803 BLOOD GASES ANY COMBINATION: CPT | Mod: HCNC,S$GLB,, | Performed by: INTERNAL MEDICINE

## 2019-03-13 PROCEDURE — 99204 PR OFFICE/OUTPT VISIT, NEW, LEVL IV, 45-59 MIN: ICD-10-PCS | Mod: HCNC,S$GLB,, | Performed by: INTERNAL MEDICINE

## 2019-03-13 PROCEDURE — 82803 PR  BLOOD GASES: PH, PO2 & PCO2: ICD-10-PCS | Mod: HCNC,S$GLB,, | Performed by: INTERNAL MEDICINE

## 2019-03-13 PROCEDURE — 94729 PR C02/MEMBANE DIFFUSE CAPACITY: ICD-10-PCS | Mod: HCNC,S$GLB,, | Performed by: INTERNAL MEDICINE

## 2019-03-13 PROCEDURE — 71275 CT ANGIOGRAPHY CHEST: CPT | Mod: TC,HCNC

## 2019-03-13 PROCEDURE — 36600 PR WITHDRAWAL OF ARTERIAL BLOOD: ICD-10-PCS | Mod: HCNC,S$GLB,, | Performed by: INTERNAL MEDICINE

## 2019-03-13 PROCEDURE — 1101F PT FALLS ASSESS-DOCD LE1/YR: CPT | Mod: HCNC,CPTII,S$GLB, | Performed by: INTERNAL MEDICINE

## 2019-03-13 PROCEDURE — 71275 CTA CARDIAC TAVR_PARTNERS (XPD): ICD-10-PCS | Mod: 26,HCNC,, | Performed by: RADIOLOGY

## 2019-03-13 PROCEDURE — 99999 PR PBB SHADOW E&M-EST. PATIENT-LVL V: ICD-10-PCS | Mod: PBBFAC,HCNC,,

## 2019-03-13 RX ORDER — DOBUTAMINE HYDROCHLORIDE 100 MG/100ML
5 INJECTION INTRAVENOUS
Status: COMPLETED | OUTPATIENT
Start: 2019-03-13 | End: 2019-03-13

## 2019-03-13 RX ORDER — DOBUTAMINE HYDROCHLORIDE 100 MG/100ML
10 INJECTION INTRAVENOUS
Status: CANCELLED | OUTPATIENT
Start: 2019-03-13 | End: 2019-03-13

## 2019-03-13 RX ADMIN — IOHEXOL 100 ML: 350 INJECTION, SOLUTION INTRAVENOUS at 09:03

## 2019-03-13 RX ADMIN — DOBUTAMINE HYDROCHLORIDE 5 MCG/KG/MIN: 100 INJECTION INTRAVENOUS at 11:03

## 2019-03-13 NOTE — ASSESSMENT & PLAN NOTE
Coronary angiogram (4/7/14): Prox LAD PCI, 99% LCx (small vessel), 40% Ramus, 95% RCA (multiple lesions, heavily calcified). Needs repeat prior to TAVR.   Stress echo today shows no evidence of ischemia.   On ASA, statin, and b-blocker.   CCS Class I

## 2019-03-13 NOTE — PROGRESS NOTES
Subjective:    Patient ID:  Jeniffer Taylor is a 89 y.o. male who presents for evaluation of Aortic Stenosis    Referring: Dr. Pool/Nelida Phelps NP    HPI  Mr. Taylor was recently hospitalized with PNA and decompensated heart failure. An echo done at that time revealed moderate AS, however his EF is 35%.. He was subsequently referred for consideration of TAVR.     Currently, he is without cardiovascular complaints. He takes Lasix 40mg BID and this controls his LE edema well. He is very active at home and works in his yard and does his own house cleaning. He denies CP, PND, or orthopnea.      He has undergone the following TAVR work-up:  · Dobutamine echo (3/13/19): SHIN= 1.1 cm2, MG= 31 mmHg, PV= 3.9 m/s, EF= 40%  · Coronary angiogram (4/7/14): Prox LAD PCI, 99% LCx (small vessel), 40% Ramus, 95% RCA (multiple lesions, heavily calcified). Needs repeat.   · STS= 5.8%  · Frailty: 2/4  · Iliacs are >8.5 on R and >8.05 on L  · LVOT per JDT: Area= 5.01 cm2, Avg Diam= 25.3mm (30.4 x 21.8 mm)  · Incidental CT findings: None  · Rhythm issues: SB with 1st degree AVB  · PFTs: Normal, FEV1= 2.14/99% predicted, FVC= 3.06/106% predicted, DLCO= 75% predicted.   · Comorbidities: Prostate CA on medical tx, CAD s/p PCI, CMP (EF= 35%), DM on PO meds, Stage III CKD, HTN    OK for 26mm Zach S3 (4% OS) + 2 ccs via R TF access.     Review of Systems   Constitution: Negative for chills, diaphoresis, fever, weakness, weight gain and weight loss.   HENT: Negative for sore throat.    Eyes: Negative for blurred vision, vision loss in left eye, vision loss in right eye and visual disturbance.   Cardiovascular: Negative for chest pain, claudication, dyspnea on exertion, leg swelling, near-syncope, orthopnea, palpitations, paroxysmal nocturnal dyspnea and syncope.   Respiratory: Negative for cough, hemoptysis, shortness of breath, sputum production and wheezing.    Endocrine: Negative for cold intolerance and heat intolerance.  "  Hematologic/Lymphatic: Negative for adenopathy. Does not bruise/bleed easily.   Skin: Negative for rash.   Musculoskeletal: Negative for falls, muscle weakness and myalgias.   Gastrointestinal: Negative for abdominal pain, change in bowel habit, constipation, diarrhea, melena and nausea.   Genitourinary: Negative for bladder incontinence.   Neurological: Negative for dizziness, focal weakness, headaches, light-headedness and numbness.   Psychiatric/Behavioral: Negative for altered mental status.         Vitals:    03/13/19 1401 03/13/19 1404   BP: 130/62 (!) 137/56   BP Location: Right arm Left arm   Patient Position: Sitting Sitting   BP Method: Large (Automatic) Large (Automatic)   Pulse: (!) 45 (!) 45   SpO2: 99%    Weight: 70.5 kg (155 lb 6.8 oz)    Height: 5' 7" (1.702 m)    Body mass index is 24.34 kg/m².    Objective:    Physical Exam   Constitutional: He is oriented to person, place, and time. He appears well-developed and well-nourished.   HENT:   Head: Normocephalic and atraumatic.   Eyes: EOM are normal. Pupils are equal, round, and reactive to light.   Neck: Neck supple. No JVD present. No tracheal deviation present. No thyromegaly present.   Cardiovascular: Normal rate, regular rhythm, S1 normal, S2 normal, intact distal pulses and normal pulses. PMI is not displaced. Exam reveals no gallop and no friction rub.   Murmur heard.   Harsh midsystolic murmur is present with a grade of 3/6 at the upper right sternal border radiating to the neck.  Pulmonary/Chest: Effort normal and breath sounds normal. No respiratory distress. He has no wheezes. He has no rales. He exhibits no tenderness.   Abdominal: Soft. Bowel sounds are normal. He exhibits no distension and no mass. There is no tenderness.   Musculoskeletal: Normal range of motion. He exhibits no edema or tenderness.   Neurological: He is alert and oriented to person, place, and time.   Skin: Skin is warm and dry. No rash noted.   Psychiatric: He has a " normal mood and affect. His behavior is normal.         Assessment:         Aortic stenosis  Moderate.   He has undergone the following TAVR work-up:  · Dobutamine echo (3/13/19): SHIN= 1.1 cm2, MG= 31 mmHg, PV= 3.9 m/s, EF= 40%  · Coronary angiogram (4/7/14): Prox LAD PCI, 99% LCx (small vessel), 40% Ramus, 95% RCA (multiple lesions, heavily calcified). Needs repeat.   · STS= 5.8%  · Frailty: 2/4  · Iliacs are >8.5 on R and >8.05 on L  · LVOT per JDT: Area= 5.01 cm2, Avg Diam= 25.3mm (30.4 x 21.8 mm)  · Incidental CT findings: None  · Rhythm issues: SB with 1st degree AVB  · PFTs: Normal, FEV1= 2.14/99% predicted, FVC= 3.06/106% predicted, DLCO= 75% predicted.   · Comorbidities: Prostate CA on medical tx, CAD s/p PCI, CMP (EF= 35%), DM on PO meds, Stage III CKD, HTN    OK for 26mm Zach S3 (4% OS) + 2 ccs via R TF access.     Ischemic cardiomyopathy  EF= 35%.   Recent stress echo shows no evidence of ischemia.     CAD (coronary artery disease)  Coronary angiogram (4/7/14): Prox LAD PCI, 99% LCx (small vessel), 40% Ramus, 95% RCA (multiple lesions, heavily calcified). Needs repeat prior to TAVR.   Stress echo today shows no evidence of ischemia.   On ASA, statin, and b-blocker.   CCS Class I    Essential hypertension  Controlled on current regimen.     CKD (chronic kidney disease), stage III  GFR 42.   Stable.     Prostate CA  Follows with Urology.   He was has been initially treated with Dr. Bee since 1999 with Su 4.  He had been on active surveillance.  He began LHRH agonist therapy 02/07/2007.  He is currently on Casodex and Trelstar.    Anemia  Chronic, iron deficiency.   Stable.     Controlled type 2 diabetes mellitus with stage 2 chronic kidney disease, without long-term current use of insulin  On PO meds.   A1C in Jan 2019 6.1%.    Chronic combined systolic and diastolic heart failure  Well compensated clinically at this time.   On Lasix BID.     Plan:       His dobutamine echo today shows moderate  AS, thus he does not qualify for TAVR at this time.   Follow up with Dr. Pool Q6 months with echo -- if/when he qualifies and is symptomatic, we will be happy to re-evaluate him.   Continue medical therapy for diastolic dysfunction and CMP.

## 2019-03-13 NOTE — ASSESSMENT & PLAN NOTE
Moderate.   He has undergone the following TAVR work-up:  · Dobutamine echo (3/13/19): SHIN= 1.1 cm2, MG= 31 mmHg, PV= 3.9 m/s, EF= 40%  · Coronary angiogram (4/7/14): Prox LAD PCI, 99% LCx (small vessel), 40% Ramus, 95% RCA (multiple lesions, heavily calcified). Needs repeat.   · STS= 5.8%  · Frailty: 2/4  · Iliacs are >8.5 on R and >8.05 on L  · LVOT per JDT: Area= 5.01 cm2, Avg Diam= 25.3mm (30.4 x 21.8 mm)  · Incidental CT findings: None  · Rhythm issues: SB with 1st degree AVB  · PFTs: Normal, FEV1= 2.14/99% predicted, FVC= 3.06/106% predicted, DLCO= 75% predicted.   · Comorbidities: Prostate CA on medical tx, CAD s/p PCI, CMP (EF= 35%), DM on PO meds, Stage III CKD, HTN    OK for 26mm Zach S3 (4% OS) + 2 ccs via R TF access.

## 2019-03-13 NOTE — ASSESSMENT & PLAN NOTE
Follows with Urology.   He was has been initially treated with Dr. Bee since 1999 with Su 4.  He had been on active surveillance.  He began LHRH agonist therapy 02/07/2007.  He is currently on Casodex and Trelstar.

## 2019-03-14 ENCOUNTER — DOCUMENTATION ONLY (OUTPATIENT)
Dept: CARDIOLOGY | Facility: CLINIC | Age: 84
End: 2019-03-14

## 2019-03-14 ENCOUNTER — TELEPHONE (OUTPATIENT)
Dept: CARDIOLOGY | Facility: CLINIC | Age: 84
End: 2019-03-14

## 2019-03-14 NOTE — TELEPHONE ENCOUNTER
Called patient to inform him that Artemio Martinez and Ellen reviewed his dobutamine echo and do think that he meets TAVR criteria.  He will need a c poss pci by Dr Ulloa prior to that.  He states he doesn't think he wants to do anything right now and will call back if he changes his mind.  Will follow up in 6 months.

## 2019-03-14 NOTE — PROCEDURES
Jeniffer Taylor is a 89 y.o.  male patient, who presents for a 6 minute walk test ordered by Sean Lovell MD.  The diagnosis is Aortic Valve Disorder.  The patient's BMI is 24 kg/m2.  Predicted distance (lower limit of normal) is 227.76 meters.      Test Results:    The test was not completed.  The patient stopped 1 time for a total of 65 seconds.  The total time walked was 295 seconds.  During walking, the patient reported:  No complaints.  The patient used a walker for assistance during testing.     03/13/2019---------Distance: 60.96 meters (200 feet)     O2 Sat % Supplemental Oxygen Heart Rate Blood Pressure Mayuri Scale   Pre-exercise  (Resting) 99 % Room Air 55 bpm 117/56 mmHg 0   During Exercise 99 % Room Air 66 bpm 161/72 mmHg 1   Post-exercise  (Recovery) 99 % Room Air  46 bpm 151/68 mmHg      Recovery Time:  420 seconds    Performing nurse/tech:  Tono RAND      PREVIOUS STUDY:   The patient has not had a previous study.      CLINICAL INTERPRETATION:  Six minute walk distance is 60.96 meters (200 feet) with very light dyspnea.  During exercise, there was no desaturation while breathing room air.  Blood pressure increased significantly and Heart rate remained stable with walking.  Bradycardia was present prior to exercise.  The patient did not report non-pulmonary symptoms during exercise.  Significant exercise impairment is likely due to cardiovascular causes and subjective symptoms.  The patient did not complete the study, walking 295 seconds of the 360 second test.  No previous study performed.  Based upon age and body mass index, exercise capacity is less than predicted.

## 2019-03-14 NOTE — PROGRESS NOTES
TAVR SUMMARY NOTE:    REF:  Kaleb Pool    Reason for referral:  Hospital admission for CHF and severe AS.     He has undergone the following TAVR work-up:    Cardiomyopathy with severe, symptomatic AS, currently NYHA Class II PRATT.  ? Dobutamine echo (3/13/19): SHIN= 1.1 cm2, MG= 31 mmHg, PV= 4.02 m/s, EF= 40%. Four and six beat runs of VT.  ? Coronary angiogram (4/7/14): Prox LAD PCI, 99% LCx (small vessel), 40% Ramus, 95% RCA (multiple lesions, heavily calcified). Needs repeat.   ? STS= 5.8%  ? Frailty: 2/4  ? Iliacs are >8.5 on R and >8.05 on L  ? LVOT per JDT: Area= 5.01 cm2, Avg Diam= 25.3mm (30.4 x 21.8 mm)  ? Incidental CT findings: None  ? CTS risk assessment: Pending  ? Rhythm issues: NSR with 1st degree AVB  ? PFTs: Normal, FEV1= 2.14/99% predicted, FVC= 3.06/106% predicted, DLCO= 75% predicted.   ? Comorbidities: Prostate CA on medical tx, CAD s/p PCI, CMP (EF= 35%), DM on PO meds, Stage III CKD, HTN     OK for either 29 Evolut valve or 26mm Zach S3 (4% OS) + 2 ccs via R TF access.     Needs cath and possible PCI and CT surgery evaluation if Ty agrees.

## 2019-03-21 ENCOUNTER — OFFICE VISIT (OUTPATIENT)
Dept: OPTOMETRY | Facility: CLINIC | Age: 84
End: 2019-03-21
Payer: MEDICARE

## 2019-03-21 DIAGNOSIS — H40.1132 PRIMARY OPEN ANGLE GLAUCOMA OF BOTH EYES, MODERATE STAGE: Primary | ICD-10-CM

## 2019-03-21 PROCEDURE — 92012 PR EYE EXAM, EST PATIENT,INTERMED: ICD-10-PCS | Mod: HCNC,S$GLB,, | Performed by: OPTOMETRIST

## 2019-03-21 PROCEDURE — 99999 PR PBB SHADOW E&M-EST. PATIENT-LVL II: ICD-10-PCS | Mod: PBBFAC,HCNC,, | Performed by: OPTOMETRIST

## 2019-03-21 PROCEDURE — 99499 UNLISTED E&M SERVICE: CPT | Mod: HCNC,S$GLB,, | Performed by: OPTOMETRIST

## 2019-03-21 PROCEDURE — 99499 RISK ADDL DX/OHS AUDIT: ICD-10-PCS | Mod: HCNC,S$GLB,, | Performed by: OPTOMETRIST

## 2019-03-21 PROCEDURE — 92012 INTRM OPH EXAM EST PATIENT: CPT | Mod: HCNC,S$GLB,, | Performed by: OPTOMETRIST

## 2019-03-21 PROCEDURE — 99999 PR PBB SHADOW E&M-EST. PATIENT-LVL II: CPT | Mod: PBBFAC,HCNC,, | Performed by: OPTOMETRIST

## 2019-03-21 RX ORDER — LATANOPROST 50 UG/ML
1 SOLUTION/ DROPS OPHTHALMIC NIGHTLY
Qty: 3 BOTTLE | Refills: 3 | Status: ON HOLD | OUTPATIENT
Start: 2019-03-21 | End: 2019-10-02 | Stop reason: HOSPADM

## 2019-03-21 NOTE — PROGRESS NOTES
Assessment HPI     DLS: 11/13/2018  Primary open angle glaucoma of both eyes, moderate stage  Type 2 diabetes mellitus without ophthalmic manifestations  Pseudophakia of both eyes    Latanoprost Q HS OU     Last edited by Melodie Hutton on 3/21/2019  9:35 AM. (History)            Assessment /Plan     For exam results, see Encounter Report.    Primary open angle glaucoma of both eyes, moderate stage            1.  Continue Latanoprost QHS OU-good response.  Refills sent to pharmacy.      DFE: 11/13/18  Photos: 2/13/17  HVF: 7/13/17  OCT: 7/13/17  Gonio:   Pachy: 526 OD, 512 OS  Initial IOPs: 22 OD, 22 OS  MHx: DM, HTN  FHx: none              RTC 4 months for iop check, hvf, and oct.                                                      Fall Risk

## 2019-03-23 ENCOUNTER — TELEPHONE (OUTPATIENT)
Dept: INTERNAL MEDICINE | Facility: CLINIC | Age: 84
End: 2019-03-23

## 2019-03-23 NOTE — TELEPHONE ENCOUNTER
Have attempted to contact patient by phone numerous times about his laboratory results.  No answer.  We will mail letter asking him to contact our office

## 2019-03-26 ENCOUNTER — OFFICE VISIT (OUTPATIENT)
Dept: CARDIOLOGY | Facility: CLINIC | Age: 84
End: 2019-03-26
Payer: MEDICARE

## 2019-03-26 VITALS
BODY MASS INDEX: 23.89 KG/M2 | SYSTOLIC BLOOD PRESSURE: 158 MMHG | HEART RATE: 57 BPM | HEIGHT: 68 IN | WEIGHT: 157.63 LBS | DIASTOLIC BLOOD PRESSURE: 73 MMHG

## 2019-03-26 DIAGNOSIS — I50.42 CHRONIC COMBINED SYSTOLIC AND DIASTOLIC HEART FAILURE: Primary | ICD-10-CM

## 2019-03-26 DIAGNOSIS — I35.0 NONRHEUMATIC AORTIC VALVE STENOSIS: ICD-10-CM

## 2019-03-26 DIAGNOSIS — I25.2 HISTORY OF MI (MYOCARDIAL INFARCTION): ICD-10-CM

## 2019-03-26 DIAGNOSIS — I10 ESSENTIAL HYPERTENSION: ICD-10-CM

## 2019-03-26 DIAGNOSIS — E78.5 DYSLIPIDEMIA: Chronic | ICD-10-CM

## 2019-03-26 DIAGNOSIS — N18.2 CONTROLLED TYPE 2 DIABETES MELLITUS WITH STAGE 2 CHRONIC KIDNEY DISEASE, WITHOUT LONG-TERM CURRENT USE OF INSULIN: ICD-10-CM

## 2019-03-26 DIAGNOSIS — I25.5 ISCHEMIC CARDIOMYOPATHY: Chronic | ICD-10-CM

## 2019-03-26 DIAGNOSIS — N17.9 AKI (ACUTE KIDNEY INJURY): ICD-10-CM

## 2019-03-26 DIAGNOSIS — E11.22 CONTROLLED TYPE 2 DIABETES MELLITUS WITH STAGE 2 CHRONIC KIDNEY DISEASE, WITHOUT LONG-TERM CURRENT USE OF INSULIN: ICD-10-CM

## 2019-03-26 DIAGNOSIS — D69.6 THROMBOCYTOPENIA: ICD-10-CM

## 2019-03-26 DIAGNOSIS — I25.10 CORONARY ARTERY DISEASE INVOLVING NATIVE CORONARY ARTERY OF NATIVE HEART WITHOUT ANGINA PECTORIS: Chronic | ICD-10-CM

## 2019-03-26 PROCEDURE — 1101F PR PT FALLS ASSESS DOC 0-1 FALLS W/OUT INJ PAST YR: ICD-10-PCS | Mod: HCNC,CPTII,S$GLB, | Performed by: NURSE PRACTITIONER

## 2019-03-26 PROCEDURE — 99999 PR PBB SHADOW E&M-EST. PATIENT-LVL III: CPT | Mod: PBBFAC,HCNC,, | Performed by: NURSE PRACTITIONER

## 2019-03-26 PROCEDURE — 99214 PR OFFICE/OUTPT VISIT, EST, LEVL IV, 30-39 MIN: ICD-10-PCS | Mod: HCNC,S$GLB,, | Performed by: NURSE PRACTITIONER

## 2019-03-26 PROCEDURE — 99499 UNLISTED E&M SERVICE: CPT | Mod: HCNC,S$GLB,, | Performed by: NURSE PRACTITIONER

## 2019-03-26 PROCEDURE — 99214 OFFICE O/P EST MOD 30 MIN: CPT | Mod: HCNC,S$GLB,, | Performed by: NURSE PRACTITIONER

## 2019-03-26 PROCEDURE — 99999 PR PBB SHADOW E&M-EST. PATIENT-LVL III: ICD-10-PCS | Mod: PBBFAC,HCNC,, | Performed by: NURSE PRACTITIONER

## 2019-03-26 PROCEDURE — 99499 RISK ADDL DX/OHS AUDIT: ICD-10-PCS | Mod: HCNC,S$GLB,, | Performed by: NURSE PRACTITIONER

## 2019-03-26 PROCEDURE — 1101F PT FALLS ASSESS-DOCD LE1/YR: CPT | Mod: HCNC,CPTII,S$GLB, | Performed by: NURSE PRACTITIONER

## 2019-03-26 RX ORDER — FUROSEMIDE 40 MG/1
40 TABLET ORAL DAILY
Qty: 90 TABLET | Refills: 3 | Status: SHIPPED | OUTPATIENT
Start: 2019-03-26 | End: 2019-04-25

## 2019-03-26 RX ORDER — CARVEDILOL 6.25 MG/1
6.25 TABLET ORAL 2 TIMES DAILY WITH MEALS
Qty: 180 TABLET | Refills: 3 | Status: SHIPPED | OUTPATIENT
Start: 2019-03-26 | End: 2019-06-24 | Stop reason: SDUPTHER

## 2019-03-26 NOTE — PATIENT INSTRUCTIONS
Decrease the furosemide to 1 time a day.  Salt restriction of 2,000mg a day. Weigh yourself every morning after you go to the restroom.  Take an extra dose of Lasix (furosemide) if weight increases 2 or more pounds in a 24 hr period, or 5 pounds over a 7 day period. Contact our office if weight does not return to baseline within 1-2 days.    Please let me know how your breathing and leg swelling is doing in 1 week.

## 2019-03-26 NOTE — PROGRESS NOTES
Mr. Taylor is a patient of Dr. Pool and was last seen in [facility] 2/7/2019.      Subjective:   Patient ID:  Jeniffer Taylor is a 89 y.o. male who presents for follow-up of Acute on chronic systolic heart failure due to valvular dise (6 week f/u )    Problems:  HFrEF, EF 33%  Moderate aortic stenosis, Aortic valve area is 0.94 cm2; peak velocity is 3.1 m/s; mean gradient is 20.38 mmHg  Mild mitral valve stenosis and moderate MR  CAD s/p MI and stents  DM type II  PVD  HTN    HPI  Mr. Taylor is in clinic today for routine follow up.  Patient denies chest pain with exertion or at rest, palpitations, SOB, PRATT, dizziness, syncope, edema, orthopnea, PND, or claudication.  He was seen by valve clinic who determined he is not yet a candidate for replacing aortic valve.  Valve team would like him to get an angiogram prior to any valve replacement when he meets criteria for replacement.     Review of Systems   Constitution: Negative for decreased appetite, diaphoresis, malaise/fatigue, weight gain and weight loss.   Eyes: Negative for visual disturbance.   Cardiovascular: Negative for chest pain, claudication, dyspnea on exertion, irregular heartbeat, leg swelling, near-syncope, orthopnea, palpitations, paroxysmal nocturnal dyspnea and syncope.        Denies chest pressure   Respiratory: Negative for cough, hemoptysis, shortness of breath, sleep disturbances due to breathing and snoring.    Endocrine: Negative for cold intolerance and heat intolerance.   Hematologic/Lymphatic: Negative for bleeding problem. Does not bruise/bleed easily.   Musculoskeletal: Negative for myalgias.   Gastrointestinal: Negative for bloating, abdominal pain, anorexia, change in bowel habit, constipation, diarrhea, nausea and vomiting.   Neurological: Negative for difficulty with concentration, disturbances in coordination, excessive daytime sleepiness, dizziness, headaches, light-headedness, loss of balance, numbness and weakness.    Psychiatric/Behavioral: The patient does not have insomnia.        Allergies and current medications updated and reviewed:  Review of patient's allergies indicates:  No Known Allergies  Current Outpatient Medications   Medication Sig    amLODIPine (NORVASC) 10 MG tablet Take 1 tablet (10 mg total) by mouth once daily.    aspirin (ECOTRIN) 81 MG EC tablet Take 81 mg by mouth. 1 Tablet, Delayed Release (E.C.) Oral Every morning    atorvastatin (LIPITOR) 80 MG tablet Take 1 tablet (80 mg total) by mouth once daily.    BD ALCOHOL SWABS PadM     bicalutamide (CASODEX) 50 MG Tab TAKE 1 TABLET EVERY DAY    blood sugar diagnostic Strp Use daily    fish oil-omega-3 fatty acids 300-1,000 mg capsule Take 1 capsule by mouth once daily.    furosemide (LASIX) 40 MG tablet Take 1 tablet (40 mg total) by mouth 2 (two) times daily.    garlic 1,000 mg Cap Take 2 tablets by mouth once daily.    glipiZIDE (GLUCOTROL) 2.5 MG TR24 Take 1 tablet (2.5 mg total) by mouth once daily.    hydroCHLOROthiazide (HYDRODIURIL) 12.5 MG Tab Take 1 tablet (12.5 mg total) by mouth once daily.    lancets (LANCETS,ULTRA THIN) Misc Use daily    latanoprost 0.005 % ophthalmic solution Place 1 drop into both eyes every evening.    lisinopril (PRINIVIL,ZESTRIL) 40 MG tablet Take 1 tablet (40 mg total) by mouth once daily.    moxifloxacin (AVELOX) 400 mg tablet Take 1 tablet (400 mg total) by mouth once daily.    multivitamin capsule Take 1 capsule by mouth once daily.    naproxen sodium (ANAPROX) 220 MG tablet Take 220 mg by mouth 2 (two) times daily with meals.    nitroGLYCERIN (NITROSTAT) 0.4 MG SL tablet Place 1 tablet (0.4 mg total) under the tongue every 5 (five) minutes as needed for Chest pain.    blood-glucose meter kit Use as instructed    carvedilol (COREG) 6.25 MG tablet Take 1 tablet (6.25 mg total) by mouth 2 (two) times daily with meals.     Current Facility-Administered Medications   Medication    leuprolide (6 month)  "SyKt 45 mg       Objective:     Right Arm BP - Sittin/67 (19 1459)  Left Arm BP - Sittin/73 (19 1459)    BP (!) 158/73 (BP Location: Left arm, Patient Position: Sitting, BP Method: Medium (Automatic))   Pulse (!) 57   Ht 5' 8" (1.727 m)   Wt 71.5 kg (157 lb 10.1 oz)   BMI 23.97 kg/m²       Physical Exam   Constitutional: He is oriented to person, place, and time. Vital signs are normal. He appears well-developed and well-nourished. He is active. No distress.   HENT:   Head: Normocephalic and atraumatic.   Eyes: Conjunctivae and lids are normal. No scleral icterus.   Neck: Neck supple. Normal carotid pulses, no hepatojugular reflux and no JVD present. Carotid bruit is not present.   Cardiovascular: Normal rate, regular rhythm, S1 normal, S2 normal and intact distal pulses. PMI is not displaced. Exam reveals no gallop and no friction rub.   No murmur heard.  Pulses:       Carotid pulses are 2+ on the right side, and 2+ on the left side.       Radial pulses are 2+ on the right side, and 2+ on the left side.        Dorsalis pedis pulses are 2+ on the right side, and 2+ on the left side.        Posterior tibial pulses are 1+ on the right side, and 1+ on the left side.   Pulmonary/Chest: Effort normal and breath sounds normal. No respiratory distress. He has no decreased breath sounds. He has no wheezes. He has no rhonchi. He has no rales. He exhibits no tenderness.   Abdominal: Soft. Normal appearance and bowel sounds are normal. He exhibits no distension, no fluid wave, no abdominal bruit, no ascites and no pulsatile midline mass. There is no hepatosplenomegaly. There is no tenderness.   Musculoskeletal: He exhibits no edema.   Neurological: He is alert and oriented to person, place, and time. Gait normal.   Skin: Skin is warm, dry and intact. No rash noted. He is not diaphoretic. Nails show no clubbing.   Psychiatric: He has a normal mood and affect. His speech is normal and behavior is " normal. Judgment and thought content normal. Cognition and memory are normal.   Nursing note and vitals reviewed.      Chemistry        Component Value Date/Time     03/13/2019 0750     03/13/2019 0750    K 3.7 03/13/2019 0750    K 3.7 03/13/2019 0750     03/13/2019 0750     03/13/2019 0750    CO2 26 03/13/2019 0750    CO2 26 03/13/2019 0750    BUN 35 (H) 03/13/2019 0750    BUN 35 (H) 03/13/2019 0750    CREATININE 1.6 (H) 03/13/2019 0750    CREATININE 1.6 (H) 03/13/2019 0750     (H) 03/13/2019 0750     (H) 03/13/2019 0750        Component Value Date/Time    CALCIUM 10.3 03/13/2019 0750    CALCIUM 10.3 03/13/2019 0750    ALKPHOS 99 03/13/2019 0750    AST 26 03/13/2019 0750    ALT 18 03/13/2019 0750    BILITOT 0.5 03/13/2019 0750    ESTGFRAFRICA 43.2 (A) 03/13/2019 0750    ESTGFRAFRICA 43.2 (A) 03/13/2019 0750    EGFRNONAA 37.4 (A) 03/13/2019 0750    EGFRNONAA 37.4 (A) 03/13/2019 0750        Lab Results   Component Value Date    HGBA1C 6.1 (H) 01/13/2019       Recent Labs   Lab 12/06/17  0839  01/13/19  1750  03/13/19  0750   WHITE BLOOD CELL COUNT  --    < > 9.25   < > 8.30  8.30   HEMOGLOBIN  --    < > 9.8 L   < > 11.9 L  11.9 L   HEMATOCRIT  --    < > 28.1 L   < > 32.7 L  32.7 L   MCV  --    < > 78 L   < > 78 L  78 L   PLATELETS  --    < > 145 L   < > 131 L  131 L   BNP  --   --  826 H  --   --    TSH 1.870  --   --   --  1.740   CHOLESTEROL 142  --   --   --   --    HDL 44  --   --   --   --    LDL CHOLESTEROL 76.8  --   --   --   --    TRIGLYCERIDES 106  --   --   --   --    HDL/CHOLESTEROL RATIO 31.0  --   --   --   --     < > = values in this interval not displayed.     Lab Results   Component Value Date    IRON 26 (L) 01/14/2019    TIBC 241 (L) 01/14/2019    FERRITIN 233 01/14/2019       Recent Labs   Lab 03/13/19  0750   INR 1.0        Test(s) Reviewed  I have reviewed the following in detail:  [] Stress test   [] Angiography   [x] Echocardiogram   [x] Labs   []  Other:         Assessment/Plan:   1. Chronic combined systolic and diastolic heart failure  Euvolemic on exam. NYHA class I sx. BMP to assess jump in CRT    - Basic metabolic panel; Future    2. Ischemic cardiomyopathy    - furosemide (LASIX) 40 MG tablet; Take 1 tablet (40 mg total) by mouth once daily. Take a second dose for wt gain >2 pounds/day or >5 pounds/week  Dispense: 90 tablet; Refill: 3    3. Coronary artery disease involving native coronary artery of native heart without angina pectoris  Asymptomatic. Taking high intensity statin and ASA. No changes.      4. History of MI (myocardial infarction)      5. Dyslipidemia  LDL not at goal <70 in 2017. Repeat lipids.     6. Essential hypertension  BP not at goal <130/80. Requested 2 week BP log since home BPs are 130s.       7. Nonrheumatic aortic valve stenosis  2/6 musical murmur. Repeat echo in 9/2019 to re-evaluate.    - Transthoracic echo (TTE) complete (Cupid Only); Future    8. Controlled type 2 diabetes mellitus with stage 2 chronic kidney disease, without long-term current use of insulin  A1C at goal <7. F/U with PCP as planned      9. BERNARDO (acute kidney injury)  Sharp rise in CRT. Repeat bmp. Decrease lasix to once daily 40 mg.     - Basic metabolic panel; Future    10. Thrombocytopenia  ,000       The patient was discussed and examined by Dr. Pool    Follow up in about 3 months (around 6/26/2019).

## 2019-04-02 ENCOUNTER — DOCUMENTATION ONLY (OUTPATIENT)
Dept: CARDIOLOGY | Facility: CLINIC | Age: 84
End: 2019-04-02

## 2019-04-02 ENCOUNTER — TELEPHONE (OUTPATIENT)
Dept: CARDIOLOGY | Facility: CLINIC | Age: 84
End: 2019-04-02

## 2019-04-02 ENCOUNTER — LAB VISIT (OUTPATIENT)
Dept: LAB | Facility: HOSPITAL | Age: 84
End: 2019-04-02
Payer: MEDICARE

## 2019-04-02 DIAGNOSIS — I10 ESSENTIAL HYPERTENSION: ICD-10-CM

## 2019-04-02 DIAGNOSIS — N17.9 AKI (ACUTE KIDNEY INJURY): ICD-10-CM

## 2019-04-02 DIAGNOSIS — I50.42 CHRONIC COMBINED SYSTOLIC AND DIASTOLIC HEART FAILURE: ICD-10-CM

## 2019-04-02 LAB
ANION GAP SERPL CALC-SCNC: 9 MMOL/L (ref 8–16)
BUN SERPL-MCNC: 27 MG/DL (ref 8–23)
CALCIUM SERPL-MCNC: 9.9 MG/DL (ref 8.7–10.5)
CHLORIDE SERPL-SCNC: 103 MMOL/L (ref 95–110)
CO2 SERPL-SCNC: 28 MMOL/L (ref 23–29)
CREAT SERPL-MCNC: 1.4 MG/DL (ref 0.5–1.4)
EST. GFR  (AFRICAN AMERICAN): 50.8 ML/MIN/1.73 M^2
EST. GFR  (NON AFRICAN AMERICAN): 43.9 ML/MIN/1.73 M^2
GLUCOSE SERPL-MCNC: 130 MG/DL (ref 70–110)
POTASSIUM SERPL-SCNC: 4.2 MMOL/L (ref 3.5–5.1)
SODIUM SERPL-SCNC: 140 MMOL/L (ref 136–145)

## 2019-04-02 PROCEDURE — 36415 COLL VENOUS BLD VENIPUNCTURE: CPT | Mod: HCNC

## 2019-04-02 PROCEDURE — 80048 BASIC METABOLIC PNL TOTAL CA: CPT | Mod: HCNC

## 2019-04-02 RX ORDER — HYDROCHLOROTHIAZIDE 25 MG/1
25 TABLET ORAL DAILY
Qty: 90 TABLET | Refills: 3 | Status: SHIPPED | OUTPATIENT
Start: 2019-04-02 | End: 2019-06-10

## 2019-04-02 NOTE — PROGRESS NOTES
BERNARDO improved.  CRT down from 1.6 to 1.4.  Discussed results with Dr. Pool.  No great options from here for his bp.  He is maxed on his ACEI and CCB. His low HR prevents increasing his coreg.  Will increase HCTZ to 25 mg and repeat bmp in 2-3 weeks.  Alternate options maybe to try hydralazine or clonidine TID or to add minoxidil but none are great options.

## 2019-04-02 NOTE — PROGRESS NOTES
Mr. Taylor reports stable weights and no SOB.  He had his bmp done this morning and results are pending.  His BP is again elevated at 172/79.  However, pt did not take any of his medications this morning.  He does not have a home monitor.  Encouraged to get an OMRON arm cuff and check his bp 3-4 times a week and call with his readings in 2 weeks.

## 2019-04-02 NOTE — TELEPHONE ENCOUNTER
----- Message from Nelida Phelps NP sent at 4/2/2019  3:37 PM CDT -----  Maira,  Can you please let him know that Dr. Pool would like him to increase the HCTZ to 25 mg a day.  He can take 2 of the 12.5 mg tablets a day until he runs out and then start the new prescription.  He needs a repeat bmp in 2-3 weeks.  I entered the orders.  Can you please schedule the bmp and let the patient know of the changes?  Thanks!  Nelida

## 2019-04-16 ENCOUNTER — LAB VISIT (OUTPATIENT)
Dept: LAB | Facility: HOSPITAL | Age: 84
End: 2019-04-16
Payer: MEDICARE

## 2019-04-16 DIAGNOSIS — I10 ESSENTIAL HYPERTENSION: ICD-10-CM

## 2019-04-16 LAB
ANION GAP SERPL CALC-SCNC: 11 MMOL/L (ref 8–16)
BUN SERPL-MCNC: 43 MG/DL (ref 8–23)
CALCIUM SERPL-MCNC: 9.7 MG/DL (ref 8.7–10.5)
CHLORIDE SERPL-SCNC: 100 MMOL/L (ref 95–110)
CO2 SERPL-SCNC: 29 MMOL/L (ref 23–29)
CREAT SERPL-MCNC: 1.7 MG/DL (ref 0.5–1.4)
EST. GFR  (AFRICAN AMERICAN): 40.2 ML/MIN/1.73 M^2
EST. GFR  (NON AFRICAN AMERICAN): 34.7 ML/MIN/1.73 M^2
GLUCOSE SERPL-MCNC: 107 MG/DL (ref 70–110)
POTASSIUM SERPL-SCNC: 4 MMOL/L (ref 3.5–5.1)
SODIUM SERPL-SCNC: 140 MMOL/L (ref 136–145)

## 2019-04-16 PROCEDURE — 80048 BASIC METABOLIC PNL TOTAL CA: CPT | Mod: HCNC

## 2019-04-16 PROCEDURE — 36415 COLL VENOUS BLD VENIPUNCTURE: CPT | Mod: HCNC

## 2019-04-25 DIAGNOSIS — I25.5 ISCHEMIC CARDIOMYOPATHY: Chronic | ICD-10-CM

## 2019-04-25 DIAGNOSIS — N17.9 AKI (ACUTE KIDNEY INJURY): Primary | ICD-10-CM

## 2019-04-25 RX ORDER — FUROSEMIDE 40 MG/1
40 TABLET ORAL DAILY PRN
Qty: 90 TABLET | Refills: 3 | Status: SHIPPED | OUTPATIENT
Start: 2019-04-25 | End: 2019-06-10

## 2019-04-25 NOTE — PROGRESS NOTES
BUN and CRT again elevated with HCTZ 25 mg despite decreasing furosemide from BID to daily.  Will stop daily furosemide and make it PRN wt gain >3 lbs/day or >5 lbs/wk.  Repeat bmp in 2 weeks.

## 2019-05-06 ENCOUNTER — TELEPHONE (OUTPATIENT)
Dept: CARDIOLOGY | Facility: CLINIC | Age: 84
End: 2019-05-06

## 2019-05-06 NOTE — TELEPHONE ENCOUNTER
Spoke to daughter and patient and explained why he needed another blood test.  No one is sure if he is taking the 25 mg of HCTZ so they will check and let me know so we can reschedule if we need to.

## 2019-05-06 NOTE — TELEPHONE ENCOUNTER
----- Message from Radha John sent at 5/6/2019 12:22 PM CDT -----  Contact: Marisela Carbajal pt daughter [phone]  Pt daughter would like to know why her father is having so many lab test ordered?    Thanks

## 2019-05-07 ENCOUNTER — LAB VISIT (OUTPATIENT)
Dept: LAB | Facility: HOSPITAL | Age: 84
End: 2019-05-07
Payer: MEDICARE

## 2019-05-07 DIAGNOSIS — N17.9 AKI (ACUTE KIDNEY INJURY): ICD-10-CM

## 2019-05-07 LAB
ANION GAP SERPL CALC-SCNC: 10 MMOL/L (ref 8–16)
BUN SERPL-MCNC: 40 MG/DL (ref 8–23)
CALCIUM SERPL-MCNC: 9.9 MG/DL (ref 8.7–10.5)
CHLORIDE SERPL-SCNC: 102 MMOL/L (ref 95–110)
CO2 SERPL-SCNC: 28 MMOL/L (ref 23–29)
CREAT SERPL-MCNC: 1.6 MG/DL (ref 0.5–1.4)
EST. GFR  (AFRICAN AMERICAN): 43.2 ML/MIN/1.73 M^2
EST. GFR  (NON AFRICAN AMERICAN): 37.4 ML/MIN/1.73 M^2
GLUCOSE SERPL-MCNC: 122 MG/DL (ref 70–110)
POTASSIUM SERPL-SCNC: 3.9 MMOL/L (ref 3.5–5.1)
SODIUM SERPL-SCNC: 140 MMOL/L (ref 136–145)

## 2019-05-07 PROCEDURE — 36415 COLL VENOUS BLD VENIPUNCTURE: CPT | Mod: HCNC

## 2019-05-07 PROCEDURE — 80048 BASIC METABOLIC PNL TOTAL CA: CPT | Mod: HCNC

## 2019-05-10 RX ORDER — CALCIUM CITRATE/VITAMIN D3 200MG-6.25
TABLET ORAL
Qty: 100 STRIP | Refills: 12 | Status: ON HOLD | OUTPATIENT
Start: 2019-05-10 | End: 2019-10-02 | Stop reason: HOSPADM

## 2019-05-21 ENCOUNTER — TELEPHONE (OUTPATIENT)
Dept: CARDIOLOGY | Facility: CLINIC | Age: 84
End: 2019-05-21

## 2019-05-21 DIAGNOSIS — N17.9 AKI (ACUTE KIDNEY INJURY): Primary | ICD-10-CM

## 2019-05-21 DIAGNOSIS — I50.9 CONGESTIVE HEART FAILURE, UNSPECIFIED HF CHRONICITY, UNSPECIFIED HEART FAILURE TYPE: Primary | ICD-10-CM

## 2019-05-21 DIAGNOSIS — I25.10 CORONARY ARTERY DISEASE, ANGINA PRESENCE UNSPECIFIED, UNSPECIFIED VESSEL OR LESION TYPE, UNSPECIFIED WHETHER NATIVE OR TRANSPLANTED HEART: ICD-10-CM

## 2019-05-28 ENCOUNTER — LAB VISIT (OUTPATIENT)
Dept: LAB | Facility: HOSPITAL | Age: 84
End: 2019-05-28
Payer: MEDICARE

## 2019-05-28 DIAGNOSIS — I50.9 CONGESTIVE HEART FAILURE, UNSPECIFIED HF CHRONICITY, UNSPECIFIED HEART FAILURE TYPE: ICD-10-CM

## 2019-05-28 DIAGNOSIS — I25.10 CORONARY ARTERY DISEASE, ANGINA PRESENCE UNSPECIFIED, UNSPECIFIED VESSEL OR LESION TYPE, UNSPECIFIED WHETHER NATIVE OR TRANSPLANTED HEART: ICD-10-CM

## 2019-05-28 LAB
ANION GAP SERPL CALC-SCNC: 14 MMOL/L (ref 8–16)
BUN SERPL-MCNC: 47 MG/DL (ref 8–23)
CALCIUM SERPL-MCNC: 10 MG/DL (ref 8.7–10.5)
CHLORIDE SERPL-SCNC: 101 MMOL/L (ref 95–110)
CO2 SERPL-SCNC: 25 MMOL/L (ref 23–29)
CREAT SERPL-MCNC: 2.1 MG/DL (ref 0.5–1.4)
EST. GFR  (AFRICAN AMERICAN): 31.1 ML/MIN/1.73 M^2
EST. GFR  (NON AFRICAN AMERICAN): 26.9 ML/MIN/1.73 M^2
GLUCOSE SERPL-MCNC: 123 MG/DL (ref 70–110)
POTASSIUM SERPL-SCNC: 4 MMOL/L (ref 3.5–5.1)
SODIUM SERPL-SCNC: 140 MMOL/L (ref 136–145)

## 2019-05-28 PROCEDURE — 80048 BASIC METABOLIC PNL TOTAL CA: CPT | Mod: HCNC

## 2019-05-28 PROCEDURE — 36415 COLL VENOUS BLD VENIPUNCTURE: CPT | Mod: HCNC

## 2019-05-29 ENCOUNTER — PATIENT OUTREACH (OUTPATIENT)
Dept: ADMINISTRATIVE | Facility: HOSPITAL | Age: 84
End: 2019-05-29

## 2019-05-29 ENCOUNTER — TELEPHONE (OUTPATIENT)
Dept: CARDIOLOGY | Facility: CLINIC | Age: 84
End: 2019-05-29

## 2019-05-29 DIAGNOSIS — I11.0 HYPERTENSIVE HEART DISEASE WITH HEART FAILURE: Primary | Chronic | ICD-10-CM

## 2019-05-29 NOTE — TELEPHONE ENCOUNTER
"Called patient with results. Spoke with daughter Ms Mari Carbajal, who said she is an RN. She said patient did not stop Lasix as instructed , he " got confused ". Instead he stopped Coreg. She will have him stop all diuretics and hold the Lisinopril as Dr. Ambrocio instructed. Will repeat lab ( BMP) on Tues 6/4 ( daughter could not bring him on Mon 6/3 ).     Notes recorded by Tova Ambrocio MD on 5/29/2019 at 3:40 PM CDT  Worsening renal function despite stopping lasix. Please ensure he is off of all diuretics and hold lisinopril. Repeat BMP on Monday. Patient of Dr. Pool.  "

## 2019-06-04 ENCOUNTER — LAB VISIT (OUTPATIENT)
Dept: LAB | Facility: HOSPITAL | Age: 84
End: 2019-06-04
Payer: MEDICARE

## 2019-06-04 DIAGNOSIS — I11.0 HYPERTENSIVE HEART DISEASE WITH HEART FAILURE: Chronic | ICD-10-CM

## 2019-06-04 LAB
ANION GAP SERPL CALC-SCNC: 14 MMOL/L (ref 8–16)
BUN SERPL-MCNC: 35 MG/DL (ref 8–23)
CALCIUM SERPL-MCNC: 9.8 MG/DL (ref 8.7–10.5)
CHLORIDE SERPL-SCNC: 108 MMOL/L (ref 95–110)
CO2 SERPL-SCNC: 19 MMOL/L (ref 23–29)
CREAT SERPL-MCNC: 1.5 MG/DL (ref 0.5–1.4)
EST. GFR  (AFRICAN AMERICAN): 46.7 ML/MIN/1.73 M^2
EST. GFR  (NON AFRICAN AMERICAN): 40.4 ML/MIN/1.73 M^2
GLUCOSE SERPL-MCNC: 99 MG/DL (ref 70–110)
POTASSIUM SERPL-SCNC: 4.8 MMOL/L (ref 3.5–5.1)
SODIUM SERPL-SCNC: 141 MMOL/L (ref 136–145)

## 2019-06-04 PROCEDURE — 80048 BASIC METABOLIC PNL TOTAL CA: CPT | Mod: HCNC

## 2019-06-04 PROCEDURE — 36415 COLL VENOUS BLD VENIPUNCTURE: CPT | Mod: HCNC

## 2019-06-10 ENCOUNTER — TELEPHONE (OUTPATIENT)
Dept: CARDIOLOGY | Facility: CLINIC | Age: 84
End: 2019-06-10

## 2019-06-10 ENCOUNTER — OFFICE VISIT (OUTPATIENT)
Dept: INTERNAL MEDICINE | Facility: CLINIC | Age: 84
End: 2019-06-10
Payer: MEDICARE

## 2019-06-10 DIAGNOSIS — E04.1 THYROID NODULE: ICD-10-CM

## 2019-06-10 DIAGNOSIS — I10 HYPERTENSION, UNSPECIFIED TYPE: Primary | ICD-10-CM

## 2019-06-10 DIAGNOSIS — E11.9 DIABETES MELLITUS WITHOUT COMPLICATION: ICD-10-CM

## 2019-06-10 PROCEDURE — 99215 OFFICE O/P EST HI 40 MIN: CPT | Mod: HCNC,S$GLB,, | Performed by: INTERNAL MEDICINE

## 2019-06-10 PROCEDURE — 1101F PR PT FALLS ASSESS DOC 0-1 FALLS W/OUT INJ PAST YR: ICD-10-PCS | Mod: HCNC,CPTII,S$GLB, | Performed by: INTERNAL MEDICINE

## 2019-06-10 PROCEDURE — 99999 PR PBB SHADOW E&M-EST. PATIENT-LVL V: CPT | Mod: PBBFAC,HCNC,, | Performed by: INTERNAL MEDICINE

## 2019-06-10 PROCEDURE — 99999 PR PBB SHADOW E&M-EST. PATIENT-LVL V: ICD-10-PCS | Mod: PBBFAC,HCNC,, | Performed by: INTERNAL MEDICINE

## 2019-06-10 PROCEDURE — 1101F PT FALLS ASSESS-DOCD LE1/YR: CPT | Mod: HCNC,CPTII,S$GLB, | Performed by: INTERNAL MEDICINE

## 2019-06-10 PROCEDURE — 99215 PR OFFICE/OUTPT VISIT, EST, LEVL V, 40-54 MIN: ICD-10-PCS | Mod: HCNC,S$GLB,, | Performed by: INTERNAL MEDICINE

## 2019-06-10 NOTE — TELEPHONE ENCOUNTER
----- Message from Cat Randolph sent at 6/10/2019 12:16 PM CDT -----  Contact: Pt parag Guerra   Pt returning call. Please call pt parag Guerra @ [phone]. Thank you.

## 2019-06-10 NOTE — TELEPHONE ENCOUNTER
Spoke with Dr Pool about the patient because he is having swelling in his legs.  Dr Pool said to give the dose of 40 mg of Lasix PRN for the swelling.  Patient will be coming in on June 24 for follow up visit.

## 2019-06-14 RX ORDER — AMLODIPINE BESYLATE 10 MG/1
10 TABLET ORAL DAILY
Qty: 90 TABLET | Refills: 0 | Status: SHIPPED | OUTPATIENT
Start: 2019-06-14 | End: 2019-09-23 | Stop reason: SDUPTHER

## 2019-06-15 VITALS
HEIGHT: 68 IN | HEART RATE: 62 BPM | WEIGHT: 158.94 LBS | BODY MASS INDEX: 24.09 KG/M2 | OXYGEN SATURATION: 99 % | SYSTOLIC BLOOD PRESSURE: 138 MMHG | DIASTOLIC BLOOD PRESSURE: 76 MMHG

## 2019-06-16 NOTE — PROGRESS NOTES
Subjective:       Patient ID: Jeniffer Taylor is a 89 y.o. male.    Chief Complaint: Hypertension    HPI  He returns for management of hypertension.  He has had hypertension for over a year.  Current treatment has included medications outlined in medication list.  He denies chest pain or shortness of breath.  No palpitations.  Denies left arm or neck pain.    PAST MEDICAL HISTORY: Hypertension, diabetes with retinopathy and neuropathy ,  hyperlipidemia, congestive heart failure, prostate cancer, aortic stenosis, anemia, glaucoma, lumbar spinal stenosis, thyroid nodule,  coronary artery disease. He had a colonoscopy February 2011     MEDICATIONS: GlucotrolXL 2.5 mg daily,lisinopril 40mg daily ,Lipitor 80 mg daily,xalatan ,aspirin one by mouth daily,  Norvasc 10 mg daily ,casodex, coreg 6.25 mg b.i.d.    ALLERGIES: No known drug allergies.     Review of Systems   Constitutional: Negative for chills, fatigue, fever and unexpected weight change.   Respiratory: Negative for chest tightness and shortness of breath.    Cardiovascular: Negative for chest pain and palpitations.   Gastrointestinal: Negative for abdominal pain and blood in stool.   Neurological: Negative for dizziness, syncope, numbness and headaches.       Objective:      Physical Exam   HENT:   Right Ear: External ear normal.   Left Ear: External ear normal.   Nose: Nose normal.   Mouth/Throat: Oropharynx is clear and moist.   Eyes: Pupils are equal, round, and reactive to light.   Neck: Normal range of motion.   Cardiovascular: Normal rate and regular rhythm.   Murmur heard.  Pulmonary/Chest: Breath sounds normal.   Abdominal: He exhibits no distension. There is no hepatomegaly. There is no tenderness.   Lymphadenopathy:     He has no cervical adenopathy.     He has no axillary adenopathy.   Neurological: He has normal strength and normal reflexes. No cranial nerve deficit or sensory deficit.       Assessment/Plan       Assessment and plan:  Hypertension:   Check CMP, lipid panel, A1c, CBC.  Schedule thyroid ultrasound

## 2019-06-18 DIAGNOSIS — H40.1132 PRIMARY OPEN ANGLE GLAUCOMA OF BOTH EYES, MODERATE STAGE: Primary | ICD-10-CM

## 2019-06-18 PROCEDURE — 99499 UNLISTED E&M SERVICE: CPT | Mod: S$PBB,,, | Performed by: OPTOMETRIST

## 2019-06-18 PROCEDURE — 99499 RISK ADDL DX/OHS AUDIT: ICD-10-PCS | Mod: S$PBB,,, | Performed by: OPTOMETRIST

## 2019-06-18 NOTE — PROGRESS NOTES
Assessment /Plan     For exam results, see Encounter Report.    Primary open angle glaucoma of both eyes, moderate stage  -     OCT - Optic Nerve; Future      orders

## 2019-06-20 ENCOUNTER — OFFICE VISIT (OUTPATIENT)
Dept: OPTOMETRY | Facility: CLINIC | Age: 84
End: 2019-06-20
Payer: MEDICARE

## 2019-06-20 ENCOUNTER — CLINICAL SUPPORT (OUTPATIENT)
Dept: OPHTHALMOLOGY | Facility: CLINIC | Age: 84
End: 2019-06-20
Payer: MEDICARE

## 2019-06-20 ENCOUNTER — LAB VISIT (OUTPATIENT)
Dept: LAB | Facility: HOSPITAL | Age: 84
End: 2019-06-20
Attending: INTERNAL MEDICINE
Payer: MEDICARE

## 2019-06-20 DIAGNOSIS — I10 HYPERTENSION, UNSPECIFIED TYPE: ICD-10-CM

## 2019-06-20 DIAGNOSIS — H40.1132 PRIMARY OPEN ANGLE GLAUCOMA OF BOTH EYES, MODERATE STAGE: ICD-10-CM

## 2019-06-20 DIAGNOSIS — E11.9 DIABETES MELLITUS WITHOUT COMPLICATION: ICD-10-CM

## 2019-06-20 DIAGNOSIS — E11.311 DIABETIC MACULAR EDEMA, RIGHT EYE: Primary | ICD-10-CM

## 2019-06-20 LAB
ALBUMIN SERPL BCP-MCNC: 4 G/DL (ref 3.5–5.2)
ALP SERPL-CCNC: 100 U/L (ref 55–135)
ALT SERPL W/O P-5'-P-CCNC: 15 U/L (ref 10–44)
ANION GAP SERPL CALC-SCNC: 10 MMOL/L (ref 8–16)
AST SERPL-CCNC: 27 U/L (ref 10–40)
BASOPHILS # BLD AUTO: 0.03 K/UL (ref 0–0.2)
BASOPHILS NFR BLD: 0.4 % (ref 0–1.9)
BILIRUB SERPL-MCNC: 0.7 MG/DL (ref 0.1–1)
BUN SERPL-MCNC: 22 MG/DL (ref 8–23)
CALCIUM SERPL-MCNC: 9.5 MG/DL (ref 8.7–10.5)
CHLORIDE SERPL-SCNC: 106 MMOL/L (ref 95–110)
CHOLEST SERPL-MCNC: 136 MG/DL (ref 120–199)
CHOLEST/HDLC SERPL: 3 {RATIO} (ref 2–5)
CO2 SERPL-SCNC: 25 MMOL/L (ref 23–29)
CREAT SERPL-MCNC: 1.4 MG/DL (ref 0.5–1.4)
DIFFERENTIAL METHOD: ABNORMAL
EOSINOPHIL # BLD AUTO: 0.1 K/UL (ref 0–0.5)
EOSINOPHIL NFR BLD: 1.4 % (ref 0–8)
ERYTHROCYTE [DISTWIDTH] IN BLOOD BY AUTOMATED COUNT: 15.7 % (ref 11.5–14.5)
EST. GFR  (AFRICAN AMERICAN): 50.8 ML/MIN/1.73 M^2
EST. GFR  (NON AFRICAN AMERICAN): 43.9 ML/MIN/1.73 M^2
ESTIMATED AVG GLUCOSE: 126 MG/DL (ref 68–131)
GLUCOSE SERPL-MCNC: 117 MG/DL (ref 70–110)
HBA1C MFR BLD HPLC: 6 % (ref 4–5.6)
HCT VFR BLD AUTO: 30.8 % (ref 40–54)
HDLC SERPL-MCNC: 45 MG/DL (ref 40–75)
HDLC SERPL: 33.1 % (ref 20–50)
HGB BLD-MCNC: 10.9 G/DL (ref 14–18)
IMM GRANULOCYTES # BLD AUTO: 0.02 K/UL (ref 0–0.04)
IMM GRANULOCYTES NFR BLD AUTO: 0.2 % (ref 0–0.5)
LDLC SERPL CALC-MCNC: 76 MG/DL (ref 63–159)
LYMPHOCYTES # BLD AUTO: 1.1 K/UL (ref 1–4.8)
LYMPHOCYTES NFR BLD: 12.7 % (ref 18–48)
MCH RBC QN AUTO: 29 PG (ref 27–31)
MCHC RBC AUTO-ENTMCNC: 35.4 G/DL (ref 32–36)
MCV RBC AUTO: 82 FL (ref 82–98)
MONOCYTES # BLD AUTO: 0.7 K/UL (ref 0.3–1)
MONOCYTES NFR BLD: 7.8 % (ref 4–15)
NEUTROPHILS # BLD AUTO: 6.5 K/UL (ref 1.8–7.7)
NEUTROPHILS NFR BLD: 77.5 % (ref 38–73)
NONHDLC SERPL-MCNC: 91 MG/DL
NRBC BLD-RTO: 0 /100 WBC
PLATELET # BLD AUTO: 145 K/UL (ref 150–350)
PMV BLD AUTO: 11.2 FL (ref 9.2–12.9)
POTASSIUM SERPL-SCNC: 4.7 MMOL/L (ref 3.5–5.1)
PROT SERPL-MCNC: 7.1 G/DL (ref 6–8.4)
RBC # BLD AUTO: 3.76 M/UL (ref 4.6–6.2)
SODIUM SERPL-SCNC: 141 MMOL/L (ref 136–145)
TRIGL SERPL-MCNC: 75 MG/DL (ref 30–150)
WBC # BLD AUTO: 8.36 K/UL (ref 3.9–12.7)

## 2019-06-20 PROCEDURE — 83036 HEMOGLOBIN GLYCOSYLATED A1C: CPT | Mod: HCNC

## 2019-06-20 PROCEDURE — 99999 PR PBB SHADOW E&M-EST. PATIENT-LVL II: CPT | Mod: PBBFAC,HCNC,, | Performed by: OPTOMETRIST

## 2019-06-20 PROCEDURE — 99999 PR PBB SHADOW E&M-EST. PATIENT-LVL II: ICD-10-PCS | Mod: PBBFAC,HCNC,, | Performed by: OPTOMETRIST

## 2019-06-20 PROCEDURE — 92083 HUMPHREY VISUAL FIELD - OU - BOTH EYES: ICD-10-PCS | Mod: HCNC,S$GLB,, | Performed by: OPTOMETRIST

## 2019-06-20 PROCEDURE — 80053 COMPREHEN METABOLIC PANEL: CPT | Mod: HCNC

## 2019-06-20 PROCEDURE — 92012 PR EYE EXAM, EST PATIENT,INTERMED: ICD-10-PCS | Mod: HCNC,S$GLB,, | Performed by: OPTOMETRIST

## 2019-06-20 PROCEDURE — 80061 LIPID PANEL: CPT | Mod: HCNC

## 2019-06-20 PROCEDURE — 85025 COMPLETE CBC W/AUTO DIFF WBC: CPT | Mod: HCNC

## 2019-06-20 PROCEDURE — 36415 COLL VENOUS BLD VENIPUNCTURE: CPT | Mod: HCNC

## 2019-06-20 PROCEDURE — 92083 EXTENDED VISUAL FIELD XM: CPT | Mod: HCNC,S$GLB,, | Performed by: OPTOMETRIST

## 2019-06-20 PROCEDURE — 92012 INTRM OPH EXAM EST PATIENT: CPT | Mod: HCNC,S$GLB,, | Performed by: OPTOMETRIST

## 2019-06-20 PROCEDURE — 92133 CPTRZD OPH DX IMG PST SGM ON: CPT | Mod: HCNC,S$GLB,, | Performed by: OPTOMETRIST

## 2019-06-20 PROCEDURE — 92133 POSTERIOR SEGMENT OCT OPTIC NERVE(OCULAR COHERENCE TOMOGRAPHY) - OU - BOTH EYES: ICD-10-PCS | Mod: HCNC,S$GLB,, | Performed by: OPTOMETRIST

## 2019-06-20 NOTE — PROGRESS NOTES
hvf done;rel/fix fair od poor os./chart checked for latex allergy.-[facility]

## 2019-06-20 NOTE — PROGRESS NOTES
Assessment HPI     Glaucoma      Additional comments: 3 month IOP ck, HVF, OCT              Comments     Last eye exam was 3/21/19 with Dr. Jiménez.    Latanoprost QHS OU          Last edited by Padmini Walker on 6/20/2019  8:54 AM. (History)            Assessment /Plan     For exam results, see Encounter Report.    Diabetic macular edema, right eye    Primary open angle glaucoma of both eyes, moderate stage  -     OCT - Optic Nerve            1.  Continue Latanoprost QHS OU-good response.  Refills sent to pharmacy.  All testing stable OU.    DFE: 11/13/18  Photos: 2/13/17  HVF: 6/20/19  OCT: 6/20/19  Gonio: UTT due to patient cooperation  Pachy: 526 OD, 512 OS  Initial IOPs: 22 OD, 22 OS  MHx: DM, HTN  FHx: none  2.  Due for follow-up with Dr. Hall.  Will schedule at next visit.              RTC 4 months for iop check.

## 2019-06-24 ENCOUNTER — HOSPITAL ENCOUNTER (OUTPATIENT)
Dept: RADIOLOGY | Facility: HOSPITAL | Age: 84
Discharge: HOME | End: 2019-06-24
Attending: INTERNAL MEDICINE
Payer: MEDICARE

## 2019-06-24 ENCOUNTER — OFFICE VISIT (OUTPATIENT)
Dept: CARDIOLOGY | Facility: CLINIC | Age: 84
End: 2019-06-24
Payer: MEDICARE

## 2019-06-24 VITALS
HEIGHT: 72 IN | WEIGHT: 157.88 LBS | OXYGEN SATURATION: 100 % | DIASTOLIC BLOOD PRESSURE: 59 MMHG | SYSTOLIC BLOOD PRESSURE: 127 MMHG | BODY MASS INDEX: 21.38 KG/M2 | HEART RATE: 56 BPM

## 2019-06-24 DIAGNOSIS — E04.1 THYROID NODULE: ICD-10-CM

## 2019-06-24 DIAGNOSIS — E78.5 DYSLIPIDEMIA: Chronic | ICD-10-CM

## 2019-06-24 DIAGNOSIS — I25.10 CORONARY ARTERY DISEASE INVOLVING NATIVE CORONARY ARTERY OF NATIVE HEART WITHOUT ANGINA PECTORIS: Chronic | ICD-10-CM

## 2019-06-24 DIAGNOSIS — E11.49 TYPE II DIABETES MELLITUS WITH NEUROLOGICAL MANIFESTATIONS: ICD-10-CM

## 2019-06-24 DIAGNOSIS — D69.6 THROMBOCYTOPENIA: ICD-10-CM

## 2019-06-24 DIAGNOSIS — I35.0 NONRHEUMATIC AORTIC VALVE STENOSIS: ICD-10-CM

## 2019-06-24 DIAGNOSIS — N18.30 CKD (CHRONIC KIDNEY DISEASE), STAGE III: ICD-10-CM

## 2019-06-24 DIAGNOSIS — I10 ESSENTIAL HYPERTENSION: ICD-10-CM

## 2019-06-24 DIAGNOSIS — I25.2 HISTORY OF MI (MYOCARDIAL INFARCTION): ICD-10-CM

## 2019-06-24 DIAGNOSIS — I25.5 ISCHEMIC CARDIOMYOPATHY: Chronic | ICD-10-CM

## 2019-06-24 DIAGNOSIS — I50.42 CHRONIC COMBINED SYSTOLIC AND DIASTOLIC HEART FAILURE: Primary | ICD-10-CM

## 2019-06-24 PROCEDURE — 76536 US SOFT TISSUE HEAD NECK THYROID: ICD-10-PCS | Mod: 26,HCNC,, | Performed by: INTERNAL MEDICINE

## 2019-06-24 PROCEDURE — 99214 PR OFFICE/OUTPT VISIT, EST, LEVL IV, 30-39 MIN: ICD-10-PCS | Mod: HCNC,S$GLB,, | Performed by: NURSE PRACTITIONER

## 2019-06-24 PROCEDURE — 99999 PR PBB SHADOW E&M-EST. PATIENT-LVL III: ICD-10-PCS | Mod: PBBFAC,HCNC,, | Performed by: NURSE PRACTITIONER

## 2019-06-24 PROCEDURE — 1101F PR PT FALLS ASSESS DOC 0-1 FALLS W/OUT INJ PAST YR: ICD-10-PCS | Mod: HCNC,CPTII,S$GLB, | Performed by: NURSE PRACTITIONER

## 2019-06-24 PROCEDURE — 99214 OFFICE O/P EST MOD 30 MIN: CPT | Mod: HCNC,S$GLB,, | Performed by: NURSE PRACTITIONER

## 2019-06-24 PROCEDURE — 99999 PR PBB SHADOW E&M-EST. PATIENT-LVL III: CPT | Mod: PBBFAC,HCNC,, | Performed by: NURSE PRACTITIONER

## 2019-06-24 PROCEDURE — 99499 RISK ADDL DX/OHS AUDIT: ICD-10-PCS | Mod: HCNC,S$GLB,, | Performed by: NURSE PRACTITIONER

## 2019-06-24 PROCEDURE — 76536 US EXAM OF HEAD AND NECK: CPT | Mod: TC,HCNC

## 2019-06-24 PROCEDURE — 76536 US EXAM OF HEAD AND NECK: CPT | Mod: 26,HCNC,, | Performed by: INTERNAL MEDICINE

## 2019-06-24 PROCEDURE — 1101F PT FALLS ASSESS-DOCD LE1/YR: CPT | Mod: HCNC,CPTII,S$GLB, | Performed by: NURSE PRACTITIONER

## 2019-06-24 PROCEDURE — 99499 UNLISTED E&M SERVICE: CPT | Mod: HCNC,S$GLB,, | Performed by: NURSE PRACTITIONER

## 2019-06-24 RX ORDER — CARVEDILOL 6.25 MG/1
6.25 TABLET ORAL 2 TIMES DAILY WITH MEALS
Qty: 180 TABLET | Refills: 3 | Status: ON HOLD | OUTPATIENT
Start: 2019-06-24 | End: 2019-10-02 | Stop reason: HOSPADM

## 2019-06-24 RX ORDER — FUROSEMIDE 40 MG/1
40 TABLET ORAL DAILY
Status: ON HOLD | COMMUNITY
End: 2019-09-12 | Stop reason: HOSPADM

## 2019-06-24 RX ORDER — LOSARTAN POTASSIUM 50 MG/1
50 TABLET ORAL DAILY
Qty: 90 TABLET | Refills: 3 | Status: ON HOLD | OUTPATIENT
Start: 2019-06-24 | End: 2019-09-12 | Stop reason: HOSPADM

## 2019-06-24 NOTE — PROGRESS NOTES
Mr. Taylor is a patient of Dr. Pool and was last seen in [facility] 3/26/2019.    Subjective:   Patient ID:  Jeniffer Taylor is a 89 y.o. male who presents for follow-up of Congestive Heart Failure (3 month f/u )    Problems:  HFrEF, EF 33%  Moderate aortic stenosis, Aortic valve area 1.00 cm2; peak velocity is 3.2 m/s; mean gradient is 23 mmHg  Mild mitral valve stenosis and moderate MR  CAD s/p MI and stents  DM type II  PVD  HTN    HPI  Mr. Taylor is in clinic today for routine follow up.  His lisinopril was held starting on 6/4 because his renal function had decreased.  His CRT peaked at 2.1 and has returned to his baseline of 1.4.  He is no longer taking the HCTZ and is only taking the furosemide PRN wt gain.  Patient denies chest pain with exertion or at rest, palpitations, SOB, PRATT, dizziness, syncope, edema, orthopnea, PND, or claudication.  Reports no routine exercise.      Review of Systems   Constitution: Negative for decreased appetite, diaphoresis, malaise/fatigue, weight gain and weight loss.   Eyes: Negative for visual disturbance.   Cardiovascular: Negative for chest pain, claudication, dyspnea on exertion, irregular heartbeat, leg swelling, near-syncope, orthopnea, palpitations, paroxysmal nocturnal dyspnea and syncope.        Denies chest pressure   Respiratory: Negative for cough, hemoptysis, shortness of breath, sleep disturbances due to breathing and snoring.    Endocrine: Negative for cold intolerance and heat intolerance.   Hematologic/Lymphatic: Negative for bleeding problem. Does not bruise/bleed easily.   Musculoskeletal: Negative for myalgias.   Gastrointestinal: Negative for bloating, abdominal pain, anorexia, change in bowel habit, constipation, diarrhea, nausea and vomiting.   Neurological: Negative for difficulty with concentration, disturbances in coordination, excessive daytime sleepiness, dizziness, headaches, light-headedness, loss of balance, numbness and weakness.    Psychiatric/Behavioral: The patient does not have insomnia.        Allergies and current medications updated and reviewed:  Review of patient's allergies indicates:  No Known Allergies  Current Outpatient Medications   Medication Sig    amLODIPine (NORVASC) 10 MG tablet TAKE 1 TABLET (10 MG TOTAL) BY MOUTH ONCE DAILY.    aspirin (ECOTRIN) 81 MG EC tablet Take 81 mg by mouth. 1 Tablet, Delayed Release (E.C.) Oral Every morning    atorvastatin (LIPITOR) 80 MG tablet Take 1 tablet (80 mg total) by mouth once daily.    BD ALCOHOL SWABS PadM     bicalutamide (CASODEX) 50 MG Tab TAKE 1 TABLET EVERY DAY    fish oil-omega-3 fatty acids 300-1,000 mg capsule Take 1 capsule by mouth once daily.    furosemide (LASIX) 40 MG tablet Take 40 mg by mouth once daily.    garlic 1,000 mg Cap Take 2 tablets by mouth once daily.    glipiZIDE (GLUCOTROL) 2.5 MG TR24 Take 1 tablet (2.5 mg total) by mouth once daily.    lancets (LANCETS,ULTRA THIN) Misc Use daily    latanoprost 0.005 % ophthalmic solution Place 1 drop into both eyes every evening.    multivitamin capsule Take 1 capsule by mouth once daily.    nitroGLYCERIN (NITROSTAT) 0.4 MG SL tablet Place 1 tablet (0.4 mg total) under the tongue every 5 (five) minutes as needed for Chest pain.    TRUE METRIX GLUCOSE TEST STRIP Strp TEST EVERY DAY    blood-glucose meter kit Use as instructed    carvedilol (COREG) 6.25 MG tablet Take 1 tablet (6.25 mg total) by mouth 2 (two) times daily with meals.     Current Facility-Administered Medications   Medication    leuprolide (6 month) SyKt 45 mg       Objective:     Right Arm BP - Sittin/61 (19 0816)  Left Arm BP - Sittin/59 (19 0816)    BP (!) 127/59 (BP Location: Left arm, Patient Position: Sitting, BP Method: Medium (Automatic))   Pulse (!) 56   Ht 6' (1.829 m)   Wt 71.6 kg (157 lb 13.6 oz)   SpO2 100%   BMI 21.41 kg/m²       Physical Exam   Constitutional: He is oriented to person, place, and  time. Vital signs are normal. He appears well-developed and well-nourished. He is active. No distress.   HENT:   Head: Normocephalic and atraumatic.   Eyes: Conjunctivae and lids are normal. No scleral icterus.   Neck: Neck supple. Normal carotid pulses, no hepatojugular reflux and no JVD present. Carotid bruit is not present.   Cardiovascular: Normal rate, regular rhythm, S1 normal, S2 normal and intact distal pulses. PMI is not displaced. Exam reveals no gallop and no friction rub.   Murmur heard.   Musical midsystolic murmur is present with a grade of 2/6 at the upper right sternal border radiating to the neck.  Pulses:       Carotid pulses are 2+ on the right side, and 2+ on the left side.       Radial pulses are 2+ on the right side, and 2+ on the left side.        Dorsalis pedis pulses are 2+ on the right side, and 2+ on the left side.        Posterior tibial pulses are 1+ on the right side, and 1+ on the left side.   Pulmonary/Chest: Effort normal and breath sounds normal. No respiratory distress. He has no decreased breath sounds. He has no wheezes. He has no rhonchi. He has no rales. He exhibits no tenderness.   Abdominal: Soft. Normal appearance and bowel sounds are normal. He exhibits no distension, no fluid wave, no abdominal bruit, no ascites and no pulsatile midline mass. There is no hepatosplenomegaly. There is no tenderness.   Musculoskeletal: He exhibits no edema.   Neurological: He is alert and oriented to person, place, and time. Gait normal.   Skin: Skin is warm, dry and intact. No rash noted. He is not diaphoretic. Nails show no clubbing.   Psychiatric: He has a normal mood and affect. His speech is normal and behavior is normal. Judgment and thought content normal. Cognition and memory are normal.   Nursing note and vitals reviewed.      Chemistry        Component Value Date/Time     06/20/2019 0751    K 4.7 06/20/2019 0751     06/20/2019 0751    CO2 25 06/20/2019 0751    BUN 22  06/20/2019 0751    CREATININE 1.4 06/20/2019 0751     (H) 06/20/2019 0751        Component Value Date/Time    CALCIUM 9.5 06/20/2019 0751    ALKPHOS 100 06/20/2019 0751    AST 27 06/20/2019 0751    ALT 15 06/20/2019 0751    BILITOT 0.7 06/20/2019 0751    ESTGFRAFRICA 50.8 (A) 06/20/2019 0751    EGFRNONAA 43.9 (A) 06/20/2019 0751        Lab Results   Component Value Date    HGBA1C 6.0 (H) 06/20/2019     Recent Labs   Lab 01/13/19  1750  03/13/19  0750 06/20/19  0751   WBC 9.25   < > 8.30  8.30 8.36   Hemoglobin 9.8 L   < > 11.9 L  11.9 L 10.9 L   Hematocrit 28.1 L   < > 32.7 L  32.7 L 30.8 L   Mean Corpuscular Volume 78 L   < > 78 L  78 L 82   Platelets 145 L   < > 131 L  131 L 145 L    H  --   --   --    TSH  --   --  1.740  --    Cholesterol  --   --   --  136   HDL  --   --   --  45   LDL Cholesterol  --   --   --  76.0   Triglycerides  --   --   --  75   Hdl/Cholesterol Ratio  --   --   --  33.1    < > = values in this interval not displayed.       Recent Labs   Lab 03/13/19  0750   INR 1.0        Test(s) Reviewed  I have reviewed the following in detail:  [] Stress test   [] Angiography   [x] Echocardiogram   [x] Labs   [] Other:         Assessment/Plan:   1. Chronic combined systolic and diastolic heart failure  Euvolemic on exam.  Continue carvedilol 6.25 mg BID.  Start ARB.  BMP in 2 weeks and requested he monitor his bp.      - carvedilol (COREG) 6.25 MG tablet; Take 1 tablet (6.25 mg total) by mouth 2 (two) times daily with meals.  Dispense: 180 tablet; Refill: 3  - losartan (COZAAR) 50 MG tablet; Take 1 tablet (50 mg total) by mouth once daily.  Dispense: 90 tablet; Refill: 3  - Basic metabolic panel; Future    2. Ischemic cardiomyopathy    - carvedilol (COREG) 6.25 MG tablet; Take 1 tablet (6.25 mg total) by mouth 2 (two) times daily with meals.  Dispense: 180 tablet; Refill: 3  - losartan (COZAAR) 50 MG tablet; Take 1 tablet (50 mg total) by mouth once daily.  Dispense: 90 tablet;  Refill: 3  - Basic metabolic panel; Future    3. Coronary artery disease involving native coronary artery of native heart without angina pectoris  Asymptomatic. Taking high intensity statin and ASA. No changes.      4. History of MI (myocardial infarction)      5. Essential hypertension  BP at goal <130/80.  Start losartan 50 mg for HF not bp.  If he becomes hypotensive or gets lightheaded, would decrease amlodipine to 5 mg.     - carvedilol (COREG) 6.25 MG tablet; Take 1 tablet (6.25 mg total) by mouth 2 (two) times daily with meals.  Dispense: 180 tablet; Refill: 3    6. Dyslipidemia  LDL 76. No changes      7. Nonrheumatic aortic valve stenosis  2/6 musical murmur.     8. CKD (chronic kidney disease), stage III      9. Type II diabetes mellitus with neurological manifestations  A1C at goal <7. F/U with PCP as planned    10. Thrombocytopenia  ,000       Patient was discussed with but not examined by Dr. Pool    Follow up in about 6 months (around 12/24/2019).

## 2019-06-29 ENCOUNTER — TELEPHONE (OUTPATIENT)
Dept: INTERNAL MEDICINE | Facility: CLINIC | Age: 84
End: 2019-06-29

## 2019-06-29 DIAGNOSIS — E04.1 THYROID NODULE: Primary | ICD-10-CM

## 2019-06-29 NOTE — TELEPHONE ENCOUNTER
Please contact patient and inform him his ultrasound reveals he does have a nodule in his thyroid.  I would like for him to see endocrinology.  Order in.  Please schedule

## 2019-07-01 ENCOUNTER — TELEPHONE (OUTPATIENT)
Dept: INTERNAL MEDICINE | Facility: CLINIC | Age: 84
End: 2019-07-01

## 2019-07-01 NOTE — TELEPHONE ENCOUNTER
Daughter buck notified of ultrasound results. Understanding verbalized. Daughter stated endocrine had called her about scheduling the appt, but she has to get her father(pt) first to see when does he want to come in.

## 2019-07-01 NOTE — TELEPHONE ENCOUNTER
----- Message from Gerardo White sent at 7/1/2019  8:29 AM CDT -----  Contact: Marisela Carbajal (Daughter) [phone]  Marisela Carbajal (Daughter) [phone] called in reference to ultrasound test results.

## 2019-07-07 ENCOUNTER — HOSPITAL ENCOUNTER (EMERGENCY)
Facility: HOSPITAL | Age: 84
Discharge: HOME | End: 2019-07-07
Attending: EMERGENCY MEDICINE
Payer: MEDICARE

## 2019-07-07 VITALS
BODY MASS INDEX: 23.95 KG/M2 | DIASTOLIC BLOOD PRESSURE: 77 MMHG | RESPIRATION RATE: 17 BRPM | WEIGHT: 158 LBS | TEMPERATURE: 98 F | SYSTOLIC BLOOD PRESSURE: 149 MMHG | HEIGHT: 68 IN | OXYGEN SATURATION: 100 % | HEART RATE: 66 BPM

## 2019-07-07 DIAGNOSIS — M79.89 LEG SWELLING: ICD-10-CM

## 2019-07-07 DIAGNOSIS — R60.0 BILATERAL LEG EDEMA: Primary | ICD-10-CM

## 2019-07-07 LAB
ALBUMIN SERPL BCP-MCNC: 4.1 G/DL (ref 3.5–5.2)
ALP SERPL-CCNC: 105 U/L (ref 55–135)
ALT SERPL W/O P-5'-P-CCNC: 20 U/L (ref 10–44)
ANION GAP SERPL CALC-SCNC: 12 MMOL/L (ref 8–16)
AST SERPL-CCNC: 31 U/L (ref 10–40)
BASOPHILS # BLD AUTO: 0.03 K/UL (ref 0–0.2)
BASOPHILS NFR BLD: 0.4 % (ref 0–1.9)
BILIRUB SERPL-MCNC: 0.4 MG/DL (ref 0.1–1)
BNP SERPL-MCNC: 291 PG/ML (ref 0–99)
BUN SERPL-MCNC: 23 MG/DL (ref 8–23)
CALCIUM SERPL-MCNC: 9.6 MG/DL (ref 8.7–10.5)
CHLORIDE SERPL-SCNC: 105 MMOL/L (ref 95–110)
CO2 SERPL-SCNC: 21 MMOL/L (ref 23–29)
CREAT SERPL-MCNC: 1.4 MG/DL (ref 0.5–1.4)
DIFFERENTIAL METHOD: ABNORMAL
EOSINOPHIL # BLD AUTO: 0.2 K/UL (ref 0–0.5)
EOSINOPHIL NFR BLD: 1.8 % (ref 0–8)
ERYTHROCYTE [DISTWIDTH] IN BLOOD BY AUTOMATED COUNT: 14.9 % (ref 11.5–14.5)
EST. GFR  (AFRICAN AMERICAN): 50.8 ML/MIN/1.73 M^2
EST. GFR  (NON AFRICAN AMERICAN): 43.9 ML/MIN/1.73 M^2
GLUCOSE SERPL-MCNC: 119 MG/DL (ref 70–110)
HCT VFR BLD AUTO: 31.4 % (ref 40–54)
HGB BLD-MCNC: 11.1 G/DL (ref 14–18)
IMM GRANULOCYTES # BLD AUTO: 0.02 K/UL (ref 0–0.04)
IMM GRANULOCYTES NFR BLD AUTO: 0.2 % (ref 0–0.5)
LYMPHOCYTES # BLD AUTO: 1.3 K/UL (ref 1–4.8)
LYMPHOCYTES NFR BLD: 16 % (ref 18–48)
MAGNESIUM SERPL-MCNC: 2.3 MG/DL (ref 1.6–2.6)
MCH RBC QN AUTO: 29.2 PG (ref 27–31)
MCHC RBC AUTO-ENTMCNC: 35.4 G/DL (ref 32–36)
MCV RBC AUTO: 83 FL (ref 82–98)
MONOCYTES # BLD AUTO: 0.9 K/UL (ref 0.3–1)
MONOCYTES NFR BLD: 10.7 % (ref 4–15)
NEUTROPHILS # BLD AUTO: 5.8 K/UL (ref 1.8–7.7)
NEUTROPHILS NFR BLD: 70.9 % (ref 38–73)
NRBC BLD-RTO: 0 /100 WBC
PHOSPHATE SERPL-MCNC: 2.9 MG/DL (ref 2.7–4.5)
PLATELET # BLD AUTO: 151 K/UL (ref 150–350)
PMV BLD AUTO: 11.4 FL (ref 9.2–12.9)
POTASSIUM SERPL-SCNC: 3.9 MMOL/L (ref 3.5–5.1)
PROT SERPL-MCNC: 7.3 G/DL (ref 6–8.4)
RBC # BLD AUTO: 3.8 M/UL (ref 4.6–6.2)
SODIUM SERPL-SCNC: 138 MMOL/L (ref 136–145)
WBC # BLD AUTO: 8.13 K/UL (ref 3.9–12.7)

## 2019-07-07 PROCEDURE — 80053 COMPREHEN METABOLIC PANEL: CPT | Mod: HCNC

## 2019-07-07 PROCEDURE — 83880 ASSAY OF NATRIURETIC PEPTIDE: CPT | Mod: HCNC

## 2019-07-07 PROCEDURE — 99285 PR EMERGENCY DEPT VISIT,LEVEL V: ICD-10-PCS | Mod: HCNC,,, | Performed by: EMERGENCY MEDICINE

## 2019-07-07 PROCEDURE — 96374 THER/PROPH/DIAG INJ IV PUSH: CPT | Mod: HCNC

## 2019-07-07 PROCEDURE — 93010 ELECTROCARDIOGRAM REPORT: CPT | Mod: HCNC,,, | Performed by: INTERNAL MEDICINE

## 2019-07-07 PROCEDURE — 83735 ASSAY OF MAGNESIUM: CPT | Mod: HCNC

## 2019-07-07 PROCEDURE — 84100 ASSAY OF PHOSPHORUS: CPT | Mod: HCNC

## 2019-07-07 PROCEDURE — 93005 ELECTROCARDIOGRAM TRACING: CPT | Mod: HCNC

## 2019-07-07 PROCEDURE — 85025 COMPLETE CBC W/AUTO DIFF WBC: CPT | Mod: HCNC

## 2019-07-07 PROCEDURE — 93010 EKG 12-LEAD: ICD-10-PCS | Mod: HCNC,,, | Performed by: INTERNAL MEDICINE

## 2019-07-07 PROCEDURE — 99285 EMERGENCY DEPT VISIT HI MDM: CPT | Mod: HCNC,,, | Performed by: EMERGENCY MEDICINE

## 2019-07-07 PROCEDURE — 25000003 PHARM REV CODE 250: Mod: HCNC | Performed by: STUDENT IN AN ORGANIZED HEALTH CARE EDUCATION/TRAINING PROGRAM

## 2019-07-07 PROCEDURE — 63600175 PHARM REV CODE 636 W HCPCS: Mod: HCNC | Performed by: STUDENT IN AN ORGANIZED HEALTH CARE EDUCATION/TRAINING PROGRAM

## 2019-07-07 PROCEDURE — 99284 EMERGENCY DEPT VISIT MOD MDM: CPT | Mod: 25,HCNC

## 2019-07-07 RX ORDER — CARVEDILOL 3.12 MG/1
3.12 TABLET ORAL ONCE
Status: COMPLETED | OUTPATIENT
Start: 2019-07-07 | End: 2019-07-07

## 2019-07-07 RX ORDER — FUROSEMIDE 10 MG/ML
60 INJECTION INTRAMUSCULAR; INTRAVENOUS
Status: COMPLETED | OUTPATIENT
Start: 2019-07-07 | End: 2019-07-07

## 2019-07-07 RX ADMIN — CARVEDILOL 3.12 MG: 3.12 TABLET, FILM COATED ORAL at 08:07

## 2019-07-07 RX ADMIN — FUROSEMIDE 60 MG: 10 INJECTION, SOLUTION INTRAVENOUS at 08:07

## 2019-07-08 NOTE — DISCHARGE INSTRUCTIONS
- increase lasix to two tablets daily for 3 days then return to regular dose  - keep legs elevated  - purchase compression stockings over-the-counter an apply to bilateral lower extremities  - follow-up with PCP

## 2019-07-08 NOTE — ED PROVIDER NOTES
"Encounter Date: 7/7/2019       History     Chief Complaint   Patient presents with    Leg Swelling     BLE x "months". Denies SOB, pain.      Patient is a 89 yo m w/ PMH significant for HTN/CHF/HLD/MI/PVD/aortic stenosis/DM/CKD3/mitral valve replacement/aortic valve replacement.    Recently patient reports that he has experience increased swelling in both of his legs the past week. He denies pain in his legs/cp/palp/sob/n/v/lightheadedness/dizziness. The patient reports that now that his legs feel heavier and is having increased difficulty ambulating the past week, him and his family express that their primary concern is his reduced mobility.     He reports that his dose of lasix 40 mg bid, was adjusted two weeks ago to lasix 40mg PRN due to reduce kidney function. Additionally, patient was stopped on hydrochlorothiazide and restarted on lisinopril.    Patient has had no changes in diet or other medications.    DDx includes but is not limited to fluid overload/CHF exacerbation/medication changes/DVT/acute on chronic bilateral edema.  Will order CMP/BNP/EKG/Mag/Phos.    Patient follows with  in Cardiology.        Review of patient's allergies indicates:  No Known Allergies  Past Medical History:   Diagnosis Date    Anemia     Arthritis     Cancer     Prostate    Cataract     CHF (congestive heart failure)     Chronic kidney disease     Coronary artery disease     Diabetes mellitus     Diabetic retinopathy     Disorder of kidney and ureter     ckd 2    Encounter for blood transfusion     Glaucoma     High cholesterol     Hypertension     Myocardial infarction     Prostate cancer     PVD (peripheral vascular disease)     Spinal stenosis     Status post cataract extraction and insertion of intraocular lens of left eye 11/13/2015     Past Surgical History:   Procedure Laterality Date    CARDIAC CATHETERIZATION      15 to 20 years ago x1 stent and 4/7/2014 x1 stent    CATARACT EXTRACTION " Bilateral     Dr. Ferguson    CATHETERIZATION, HEART, LEFT N/A 4/7/2014    Performed by Raz Cox MD at [facility] CATH LAB    EYE SURGERY      HERNIA REPAIR      INSERTION-INTRAOCULAR LENS (IOL) Left 11/12/2015    Performed by Blanka Ferguson MD at [facility] OR [facility]    INSERTION-INTRAOCULAR LENS (IOL) Right 10/1/2015    Performed by Blanka Ferguson MD at [facility] OR [facility]    PHACOEMULSIFICATION-ASPIRATION-CATARACT Left 11/12/2015    Performed by Blanka Ferguson MD at [facility] OR [facility]    PHACOEMULSIFICATION-ASPIRATION-CATARACT Right 10/1/2015    Performed by Blanka Ferguson MD at [facility] OR [facility]     Family History   Problem Relation Age of Onset    Cancer Father     No Known Problems Mother     No Known Problems Son     Breast cancer Sister     Diabetes Sister     Hypertension Sister     No Known Problems Brother     No Known Problems Maternal Aunt     No Known Problems Maternal Uncle     No Known Problems Paternal Aunt     No Known Problems Paternal Uncle     No Known Problems Maternal Grandmother     No Known Problems Maternal Grandfather     No Known Problems Paternal Grandmother     No Known Problems Paternal Grandfather     No Known Problems Daughter     Diabetes Brother     Heart disease Neg Hx     Heart attack Neg Hx     Amblyopia Neg Hx     Blindness Neg Hx     Cataracts Neg Hx     Glaucoma Neg Hx     Macular degeneration Neg Hx     Retinal detachment Neg Hx     Strabismus Neg Hx     Stroke Neg Hx     Thyroid disease Neg Hx      Social History     Tobacco Use    Smoking status: Never Smoker    Smokeless tobacco: Former User   Substance Use Topics    Alcohol use: No    Drug use: No     Review of Systems   Constitutional: Negative for chills, diaphoresis, fatigue and fever.   HENT: Negative for sore throat.    Eyes: Negative for photophobia and visual disturbance.   Respiratory: Negative for cough, chest tightness and shortness of breath.    Cardiovascular: Positive for  leg swelling (bilateral knees down). Negative for chest pain and palpitations.   Gastrointestinal: Positive for constipation. Negative for abdominal distention, abdominal pain, blood in stool, nausea and vomiting.   Musculoskeletal: Negative for gait problem, neck pain and neck stiffness.   Skin: Negative for color change.   Neurological: Positive for weakness. Negative for dizziness, syncope, light-headedness, numbness and headaches.   Psychiatric/Behavioral: Negative for confusion.       Physical Exam     Initial Vitals [07/07/19 1815]   BP Pulse Resp Temp SpO2   (!) 161/74 64 18 98.1 °F (36.7 °C) 100 %      MAP       --         Physical Exam    Constitutional: He appears well-nourished. No distress.   HENT:   Head: Normocephalic and atraumatic.   Eyes: EOM are normal. Pupils are equal, round, and reactive to light.   Neck: Normal range of motion.   Cardiovascular: Normal rate, regular rhythm, S1 normal and S2 normal. Exam reveals no S3.    Murmur heard.   Systolic murmur is present with a grade of 3/6.  Pulmonary/Chest: Effort normal and breath sounds normal. No respiratory distress. Rales: fine crackles bilaterally.   Abdominal: Soft. Bowel sounds are normal. He exhibits no distension. There is no tenderness.   Neurological: He is alert and oriented to person, place, and time. No cranial nerve deficit or sensory deficit.   Skin: Skin is warm.   Psychiatric: He has a normal mood and affect.         ED Course   Procedures  Labs Reviewed   CBC W/ AUTO DIFFERENTIAL - Abnormal; Notable for the following components:       Result Value    RBC 3.80 (*)     Hemoglobin 11.1 (*)     Hematocrit 31.4 (*)     RDW 14.9 (*)     Lymph% 16.0 (*)     All other components within normal limits    Narrative:     ADD ON MAGNESIUM AND PHOS PER DR JASSI THOMAS/ORDER# [id number] AND   [id number] @ 20:00 7/7/19   B-TYPE NATRIURETIC PEPTIDE - Abnormal; Notable for the following components:     (*)     All other components within  normal limits    Narrative:     ADD ON MAGNESIUM AND PHOS PER DR JASSI THOMAS/ORDER# [id number] AND   [id number] @ 20:00 7/7/19   COMPREHENSIVE METABOLIC PANEL - Abnormal; Notable for the following components:    CO2 21 (*)     Glucose 119 (*)     eGFR if  50.8 (*)     eGFR if non  43.9 (*)     All other components within normal limits   MAGNESIUM   PHOSPHORUS   MAGNESIUM     EKG Readings: (Independently Interpreted)   EKG:  Sinus rhythm at 67 with a first-degree AV block, PVCs, LVH, no ischemic changes       Imaging Results    None          Medical Decision Making:   Initial Assessment:   Patient is a 89 yo m w/ PMH significant for HTN/CHF/HLD/MI/PVD/aortic stenosis/DM/CKD3/mitral valve replacement/aortic valve replacement.    Patient follows with  in Cardiology.    Recently patient reports that he has experience increased swelling in both of his legs the past week. He denies pain in his legs/cp/palp/sob/n/v/lightheadedness/dizziness. The patient reports that now that his legs feel heavier and is having increased difficulty ambulating the past week, him and his family express that their primary concern is his reduced mobility.     He reports that his dose of lasix 40 mg bid, was adjusted two weeks ago to lasix 40mg PRN due to reduce kidney function. Additionally, patient was stopped on hydrochlorothiazide and restarted on lisinopril.    Patient has had no changes in diet or other medications.    DDx includes but is not limited to fluid overload/CHF exacerbation/medication changes/DVT/acute on chronic bilateral edema. Clinical presentation seems inconsistent with DVT as there is no calf pain, erythema. Clinical presentation seems inconsistent with CHF exacerbation   Will order CMP/BNP/EKG/Mag/Phos.  Independently Interpreted Test(s):   I have ordered and independently interpreted EKG Reading(s) - see prior notes  Clinical Tests:   Lab Tests: Ordered  Medical Tests: Ordered  ED  Management:  7:56 PM  Patient's EKG shows PVC's  Added on mag and phos    8:00  Doppler of left foot shows 2+ pedal and PT pulses    8:41 PM   Patient started on IV Lasix 60mg    8:50 PM  Patient states he's due for his carvedilol 3.25mg, ordered for him. Advised nurse to have him take it with food    9:15 PM  Sign-off given to B pod resident    If labs result and no changes in kidney function or other studies then patient is ok to be discharged and to take lasix 40mg bid for the next 3 days, in addition to use of compression stockings at home. Patient is to follow up with PCP this week about medication adjustments.        Other:   I discussed test(s) with the performing physician.              Attending Attestation:   Physician Attestation Statement for Resident:  As the supervising MD   Physician Attestation Statement: I have personally seen and examined this patient.   I agree with the above history. -: Emergent evaluation 89-year-old male hx of  HTN, CHF, HLD, PVD, aortic stenosis, DM, CKD3, mitral valve replacement, aortic valve replacement.  presenting with bilateral lower extremity edema in setting of recent lasix adjustment.  Denies shortness of breath, orthopnea, exertional dyspnea, or productive cough   As the supervising MD I agree with the above PE.   -: General:  Elderly male no acute distress.  Lungs:  Clear to auscultation bilaterally  Cardiac:  Holosystolic murmur  Abdomen:  Soft nontender  Extremities:  1+ edema to the knees bilaterally, no calf asymmetry or tenderness, negative Homans   As the supervising MD I agree with the above treatment, course, plan, and disposition.   -: Patient was treated with Lasix 60 mg IV  Labs reviewed with CKD at baseline.  Mag and phos within normal limits.  BNP is low at to 100s.    Discussed compression stockings, leg elevation, increase Lasix to 2 tablets the next 3 days and return to regular dose.    Family expresses understanding.  Patient stable for discharge     I have reviewed and agree with the residents interpretation of the following: lab data and EKG.                       Clinical Impression:       ICD-10-CM ICD-9-CM   1. Leg swelling M79.89 729.81                                Susan Ren MD  Resident  07/07/19 2118       Susan Ren MD  Resident  07/07/19 2119       Francy Méndez MD  07/07/19 2132

## 2019-07-08 NOTE — ED TRIAGE NOTES
89 year old came in with family reporting recently having lasix decreased last week and noticed more swelling to bilateral foot. Pt reported always having swelling but its more tight then usual. Denies cp/SOB.

## 2019-07-23 ENCOUNTER — OFFICE VISIT (OUTPATIENT)
Dept: CARDIOLOGY | Facility: CLINIC | Age: 84
End: 2019-07-23
Payer: MEDICARE

## 2019-07-23 ENCOUNTER — TELEPHONE (OUTPATIENT)
Dept: INTERNAL MEDICINE | Facility: CLINIC | Age: 84
End: 2019-07-23

## 2019-07-23 VITALS
HEART RATE: 48 BPM | DIASTOLIC BLOOD PRESSURE: 60 MMHG | SYSTOLIC BLOOD PRESSURE: 130 MMHG | BODY MASS INDEX: 22.52 KG/M2 | WEIGHT: 148.56 LBS | HEIGHT: 68 IN

## 2019-07-23 DIAGNOSIS — E11.22 CONTROLLED TYPE 2 DIABETES MELLITUS WITH STAGE 3 CHRONIC KIDNEY DISEASE, WITHOUT LONG-TERM CURRENT USE OF INSULIN: ICD-10-CM

## 2019-07-23 DIAGNOSIS — I25.5 ISCHEMIC CARDIOMYOPATHY: Primary | Chronic | ICD-10-CM

## 2019-07-23 DIAGNOSIS — I50.9 CONGESTIVE HEART FAILURE, UNSPECIFIED HF CHRONICITY, UNSPECIFIED HEART FAILURE TYPE: Primary | ICD-10-CM

## 2019-07-23 DIAGNOSIS — I25.10 CORONARY ARTERY DISEASE INVOLVING NATIVE CORONARY ARTERY OF NATIVE HEART WITHOUT ANGINA PECTORIS: Chronic | ICD-10-CM

## 2019-07-23 DIAGNOSIS — I35.0 NONRHEUMATIC AORTIC VALVE STENOSIS: ICD-10-CM

## 2019-07-23 DIAGNOSIS — I25.2 HISTORY OF MI (MYOCARDIAL INFARCTION): ICD-10-CM

## 2019-07-23 DIAGNOSIS — E78.5 DYSLIPIDEMIA: Chronic | ICD-10-CM

## 2019-07-23 DIAGNOSIS — I50.42 CHRONIC COMBINED SYSTOLIC AND DIASTOLIC HEART FAILURE: ICD-10-CM

## 2019-07-23 DIAGNOSIS — N18.30 CONTROLLED TYPE 2 DIABETES MELLITUS WITH STAGE 3 CHRONIC KIDNEY DISEASE, WITHOUT LONG-TERM CURRENT USE OF INSULIN: ICD-10-CM

## 2019-07-23 DIAGNOSIS — I10 ESSENTIAL HYPERTENSION: ICD-10-CM

## 2019-07-23 PROCEDURE — 99999 PR PBB SHADOW E&M-EST. PATIENT-LVL III: ICD-10-PCS | Mod: PBBFAC,HCNC,, | Performed by: INTERNAL MEDICINE

## 2019-07-23 PROCEDURE — 99214 PR OFFICE/OUTPT VISIT, EST, LEVL IV, 30-39 MIN: ICD-10-PCS | Mod: HCNC,S$GLB,, | Performed by: INTERNAL MEDICINE

## 2019-07-23 PROCEDURE — 1101F PT FALLS ASSESS-DOCD LE1/YR: CPT | Mod: HCNC,CPTII,S$GLB, | Performed by: INTERNAL MEDICINE

## 2019-07-23 PROCEDURE — 99999 PR PBB SHADOW E&M-EST. PATIENT-LVL III: CPT | Mod: PBBFAC,HCNC,, | Performed by: INTERNAL MEDICINE

## 2019-07-23 PROCEDURE — 1101F PR PT FALLS ASSESS DOC 0-1 FALLS W/OUT INJ PAST YR: ICD-10-PCS | Mod: HCNC,CPTII,S$GLB, | Performed by: INTERNAL MEDICINE

## 2019-07-23 PROCEDURE — 99214 OFFICE O/P EST MOD 30 MIN: CPT | Mod: HCNC,S$GLB,, | Performed by: INTERNAL MEDICINE

## 2019-07-23 RX ORDER — NAPROXEN SODIUM 220 MG
220 TABLET ORAL 2 TIMES DAILY WITH MEALS
Status: ON HOLD | COMMUNITY
End: 2019-09-09 | Stop reason: HOSPADM

## 2019-07-23 NOTE — TELEPHONE ENCOUNTER
Daughter is requesting a bed side commode and an order for  services. Pt will see you on tomorrow.    Please Advise

## 2019-07-23 NOTE — PROGRESS NOTES
Subjective:   Patient ID:  Jeniffer Taylor is a 89 y.o. male who presents for follow up of No chief complaint on file.    Problems:  HFrEF, EF 33%  Moderate aortic stenosis, Aortic valve area 1.00 cm2; peak velocity is 3.2 m/s; mean gradient is 23 mmHg  Mild mitral valve stenosis and moderate MR  CAD s/p MI and stents  DM type II  PVD  HTN     HPI:  Back for one month f/u because of leg swelling and weakness.    No bowel/bladder incontinence.    He has compression stockings.    Nelida Phelps' June 2019 HPI:  Mr. Taylor is in clinic today for routine follow up.  His lisinopril was held starting on 6/4 because his renal function had decreased.  His CRT peaked at 2.1 and has returned to his baseline of 1.4.  He is no longer taking the HCTZ and is only taking the furosemide PRN wt gain.  Patient denies chest pain with exertion or at rest, palpitations, SOB, PRATT, dizziness, syncope, edema, orthopnea, PND, or claudication.  Reports no routine exercise.      Patient Active Problem List   Diagnosis    Diabetic polyneuropathy associated with type 2 diabetes mellitus    Essential hypertension    Dyslipidemia    CAD (coronary artery disease)    Corns and callosities    Prostate CA    Hypertensive heart disease with heart failure    Type II diabetes mellitus with neurological manifestations    Ischemic cardiomyopathy    Diabetic macular edema, right eye    Acute on chronic systolic heart failure    Aortic stenosis    MS (mitral stenosis)    Anemia    Thrombocytopenia    Senile nuclear sclerosis    History of MI (myocardial infarction)    Hypertensive retinopathy of both eyes    CKD (chronic kidney disease), stage III    Controlled type 2 diabetes mellitus with stage 3 chronic kidney disease, without long-term current use of insulin    Diabetes mellitus with glaucoma    Primary open angle glaucoma (POAG) of both eyes    Controlled type 2 diabetes mellitus with both eyes affected by mild nonproliferative  retinopathy and macular edema, without long-term current use of insulin    Frequent falls    Pneumonia of right upper lobe due to infectious organism    Chronic combined systolic and diastolic heart failure       Current Outpatient Medications   Medication Sig    amLODIPine (NORVASC) 10 MG tablet TAKE 1 TABLET (10 MG TOTAL) BY MOUTH ONCE DAILY.    aspirin (ECOTRIN) 81 MG EC tablet Take 81 mg by mouth. 1 Tablet, Delayed Release (E.C.) Oral Every morning    atorvastatin (LIPITOR) 80 MG tablet Take 1 tablet (80 mg total) by mouth once daily.    BD ALCOHOL SWABS PadM     bicalutamide (CASODEX) 50 MG Tab TAKE 1 TABLET EVERY DAY    blood-glucose meter kit Use as instructed    carvedilol (COREG) 6.25 MG tablet Take 1 tablet (6.25 mg total) by mouth 2 (two) times daily with meals.    fish oil-omega-3 fatty acids 300-1,000 mg capsule Take 1 capsule by mouth once daily.    furosemide (LASIX) 40 MG tablet Take 40 mg by mouth once daily.    garlic 1,000 mg Cap Take 2 tablets by mouth once daily.    glipiZIDE (GLUCOTROL) 2.5 MG TR24 Take 1 tablet (2.5 mg total) by mouth once daily.    lancets (LANCETS,ULTRA THIN) Misc Use daily    latanoprost 0.005 % ophthalmic solution Place 1 drop into both eyes every evening.    losartan (COZAAR) 50 MG tablet Take 1 tablet (50 mg total) by mouth once daily.    multivitamin capsule Take 1 capsule by mouth once daily.    nitroGLYCERIN (NITROSTAT) 0.4 MG SL tablet Place 1 tablet (0.4 mg total) under the tongue every 5 (five) minutes as needed for Chest pain.    TRUE METRIX GLUCOSE TEST STRIP Strp TEST EVERY DAY     Current Facility-Administered Medications   Medication    leuprolide (6 month) SyKt 45 mg       ROS  Objective:   Physical Exam   Constitutional: He is oriented to person, place, and time. He appears well-developed and well-nourished.   HENT:   Head: Normocephalic and atraumatic.   Mouth/Throat: Oropharynx is clear and moist.   Eyes: Conjunctivae and EOM are  normal. No scleral icterus.   Neck: Normal range of motion. Neck supple. No JVD present.   Cardiovascular: Normal rate, regular rhythm, normal heart sounds and intact distal pulses. Exam reveals no gallop and no friction rub.   No murmur heard.  Pulmonary/Chest: Effort normal and breath sounds normal. He has no wheezes. He has no rales.   Abdominal: Soft. Bowel sounds are normal. He exhibits no distension. There is no tenderness.   Musculoskeletal: Normal range of motion. He exhibits edema (1+ edema isolated to the bilateral feet/ankles and lower legs).   Neurological: He is alert and oriented to person, place, and time.   Patellar reflexes are 1-2+.  He has 4/5 evenly distributed throughout his lower extremities, including his proximal lower extremity muscles.   Skin: Skin is warm and dry. No rash noted. No erythema.   Psychiatric: He has a normal mood and affect. His behavior is normal. Judgment and thought content normal.   Vitals reviewed.      Lab Results   Component Value Date    WBC 8.13 07/07/2019    HGB 11.1 (L) 07/07/2019    HCT 31.4 (L) 07/07/2019    MCV 83 07/07/2019     07/07/2019     Lab Results   Component Value Date    IRON 26 (L) 01/14/2019    TIBC 241 (L) 01/14/2019    FERRITIN 233 01/14/2019           Chemistry        Component Value Date/Time     07/07/2019 2038    K 3.9 07/07/2019 2038     07/07/2019 2038    CO2 21 (L) 07/07/2019 2038    BUN 23 07/07/2019 2038    CREATININE 1.4 07/07/2019 2038     (H) 07/07/2019 2038        Component Value Date/Time    CALCIUM 9.6 07/07/2019 2038    ALKPHOS 105 07/07/2019 2038    AST 31 07/07/2019 2038    ALT 20 07/07/2019 2038    BILITOT 0.4 07/07/2019 2038    ESTGFRAFRICA 50.8 (A) 07/07/2019 2038    EGFRNONAA 43.9 (A) 07/07/2019 2038            Lab Results   Component Value Date    CHOL 136 06/20/2019    CHOL 142 12/06/2017    CHOL 143 12/21/2016     Lab Results   Component Value Date    HDL 45 06/20/2019    HDL 44 12/06/2017    HDL  43 12/21/2016     Lab Results   Component Value Date    LDLCALC 76.0 06/20/2019    LDLCALC 76.8 12/06/2017    LDLCALC 81.4 12/21/2016     Lab Results   Component Value Date    TRIG 75 06/20/2019    TRIG 106 12/06/2017    TRIG 93 12/21/2016     Lab Results   Component Value Date    CHOLHDL 33.1 06/20/2019    CHOLHDL 31.0 12/06/2017    CHOLHDL 30.1 12/21/2016       Lab Results   Component Value Date    TSH 1.740 03/13/2019       Lab Results   Component Value Date    HGBA1C 6.0 (H) 06/20/2019       Assessment:     1. Ischemic cardiomyopathy    2. Coronary artery disease involving native coronary artery of native heart without angina pectoris    3. Nonrheumatic aortic valve stenosis    4. Chronic combined systolic and diastolic heart failure    5. Essential hypertension    6. Controlled type 2 diabetes mellitus with stage 3 chronic kidney disease, without long-term current use of insulin    7. History of MI (myocardial infarction)    8. Dyslipidemia        Plan:     Continue current medicines.    I do not understand what is causing his bilateral equal lower extremity weakness that he claims to be fairly acute, but he appears to have at least fairly normal patellar reflexes and does not have new back pain or bowel/bladder dysfunction.  His upper extremity strength is quite good for his age.  This is not a cardiovascular matter.  His swelling is isolated to his feet and ankles mostly and is not terribly notable.  It's certainly venous insufficiency, which should be treated with leg elevation and compression stockings.  His weight is actually down.    Diet/exercise goals reinforced.    F/U 6 months

## 2019-07-23 NOTE — TELEPHONE ENCOUNTER
----- Message from Nida Guillermo sent at 7/23/2019 11:36 AM CDT -----  Contact: Marisela/ Pt's Daughter/ [phone]  Pt's daughter states that she is calling to speak with someone in regards to the pt,. She states that both of the pt's feet are swollen and he can barely walk. She states that the pt needs to be seen by  on today. Pt has an appt scheduled for tomorrow but she states that the pt needs to be seen today. Please call back and advise as soon as possible.      Thanks

## 2019-07-23 NOTE — TELEPHONE ENCOUNTER
----- Message from Telma Marinelli RN sent at 7/23/2019 11:07 AM CDT -----  Contact: Marc pinedaamie Guerra Cell# [phone]      ----- Message -----  From: Nicole Telles  Sent: 7/23/2019  10:50 AM  To: Tien PAINTER Staff    Patient's daughter Mari Dduley would like to put in an order for a bed side commode, home health care services, and she would like to speak with you about a living will for the patient please.

## 2019-07-23 NOTE — TELEPHONE ENCOUNTER
Patient is currently seeing   Cardiologist today at this time.     Please advise! Appointment with you is scheduled for tomorrow at 9 am.

## 2019-07-24 ENCOUNTER — TELEPHONE (OUTPATIENT)
Dept: INTERNAL MEDICINE | Facility: CLINIC | Age: 84
End: 2019-07-24

## 2019-07-24 ENCOUNTER — OFFICE VISIT (OUTPATIENT)
Dept: INTERNAL MEDICINE | Facility: CLINIC | Age: 84
End: 2019-07-24
Payer: MEDICARE

## 2019-07-24 DIAGNOSIS — R53.81 PHYSICAL DECONDITIONING: Primary | ICD-10-CM

## 2019-07-24 DIAGNOSIS — M48.061 SPINAL STENOSIS OF LUMBAR REGION, UNSPECIFIED WHETHER NEUROGENIC CLAUDICATION PRESENT: ICD-10-CM

## 2019-07-24 PROCEDURE — 99999 PR PBB SHADOW E&M-EST. PATIENT-LVL V: ICD-10-PCS | Mod: PBBFAC,HCNC,, | Performed by: INTERNAL MEDICINE

## 2019-07-24 PROCEDURE — 99215 OFFICE O/P EST HI 40 MIN: CPT | Mod: HCNC,S$GLB,, | Performed by: INTERNAL MEDICINE

## 2019-07-24 PROCEDURE — 99215 PR OFFICE/OUTPT VISIT, EST, LEVL V, 40-54 MIN: ICD-10-PCS | Mod: HCNC,S$GLB,, | Performed by: INTERNAL MEDICINE

## 2019-07-24 PROCEDURE — 99999 PR PBB SHADOW E&M-EST. PATIENT-LVL V: CPT | Mod: PBBFAC,HCNC,, | Performed by: INTERNAL MEDICINE

## 2019-07-24 PROCEDURE — 1101F PT FALLS ASSESS-DOCD LE1/YR: CPT | Mod: HCNC,CPTII,S$GLB, | Performed by: INTERNAL MEDICINE

## 2019-07-24 PROCEDURE — 1101F PR PT FALLS ASSESS DOC 0-1 FALLS W/OUT INJ PAST YR: ICD-10-PCS | Mod: HCNC,CPTII,S$GLB, | Performed by: INTERNAL MEDICINE

## 2019-07-24 NOTE — TELEPHONE ENCOUNTER
----- Message from Louis Salinas sent at 7/24/2019  8:53 AM CDT -----  Contact: Patient [phone]  He is running a little late is on the way, might be 15 mins past 9. Please call and advise if you will still see him.    Thank you

## 2019-07-25 ENCOUNTER — TELEPHONE (OUTPATIENT)
Dept: INTERNAL MEDICINE | Facility: CLINIC | Age: 84
End: 2019-07-25

## 2019-07-25 NOTE — TELEPHONE ENCOUNTER
Informed diogenes perry that the order for PT was in. Coordinator stated that the order would need to be faxed. PT order faxed to [phone].

## 2019-07-25 NOTE — TELEPHONE ENCOUNTER
I put in home health orders for physical therapy yesterday. But he really needs to see physical medicine as well

## 2019-07-25 NOTE — TELEPHONE ENCOUNTER
Lexy from [facility] stated daughter is requesting PT to be done in the home. Stated pt can't do the physical medicine outpatient appt. Please put in order for PT.    Please Advise

## 2019-07-26 ENCOUNTER — TELEPHONE (OUTPATIENT)
Dept: INTERNAL MEDICINE | Facility: CLINIC | Age: 84
End: 2019-07-26

## 2019-07-26 DIAGNOSIS — R53.81 PHYSICAL DECONDITIONING: Primary | ICD-10-CM

## 2019-07-26 NOTE — TELEPHONE ENCOUNTER
----- Message from Keeley Berkowitz sent at 7/26/2019  4:15 PM CDT -----  Contact: Lucina [facility] [phone]  Lucina called to get orders for OT.  Please advise

## 2019-07-26 NOTE — TELEPHONE ENCOUNTER
Spoke with Lucina from [facility] and informed her that the order was in for OT for the patient. She voiced an verbal understanding.

## 2019-07-28 VITALS
TEMPERATURE: 99 F | HEIGHT: 68 IN | DIASTOLIC BLOOD PRESSURE: 62 MMHG | WEIGHT: 148.56 LBS | OXYGEN SATURATION: 99 % | SYSTOLIC BLOOD PRESSURE: 130 MMHG | BODY MASS INDEX: 22.52 KG/M2 | HEART RATE: 62 BPM

## 2019-07-28 NOTE — PROGRESS NOTES
Subjective:       Patient ID: Jeniffer Taylor is a 89 y.o. male.    Chief Complaint: Back Pain    HPI  He has a history of lumbar spinal stenosis.  Complains of lower back pain radiating to his legs  Review of Systems   Constitutional: Negative for chills, fatigue, fever and unexpected weight change.   Respiratory: Negative for chest tightness and shortness of breath.    Cardiovascular: Negative for chest pain and palpitations.   Gastrointestinal: Negative for abdominal pain and blood in stool.   Neurological: Negative for dizziness, syncope, numbness and headaches.       Objective:      Physical Exam   HENT:   Right Ear: External ear normal.   Left Ear: External ear normal.   Nose: Nose normal.   Mouth/Throat: Oropharynx is clear and moist.   Eyes: Pupils are equal, round, and reactive to light.   Neck: Normal range of motion.   Cardiovascular: Normal rate and regular rhythm.   Murmur heard.  Pulmonary/Chest: Breath sounds normal.   Abdominal: He exhibits no distension. There is no hepatomegaly. There is no tenderness.   Lymphadenopathy:     He has no cervical adenopathy.     He has no axillary adenopathy.   Neurological: He has normal strength and normal reflexes. No cranial nerve deficit or sensory deficit.      lumbar spine without tenderness or redness  Assessment/Plan       Assessment and plan:  Lumbar spinal stenosis:  Schedule MRI of lumbar spine and Physical Medicine appointment.  He was here for urgent care only appointment.  He will return to clinic for a physical

## 2019-07-29 ENCOUNTER — TELEPHONE (OUTPATIENT)
Dept: INTERNAL MEDICINE | Facility: CLINIC | Age: 84
End: 2019-07-29

## 2019-07-29 NOTE — TELEPHONE ENCOUNTER
----- Message from Ananya Cantrell sent at 7/29/2019  2:09 PM CDT -----  Contact: daughter/buck/[phone]  Pt daughter called in regards to getting an orders for a portable motorize wheel chair. The one they can drive his self/order for occupational therapy toilet seat with handrails. It goes over the toilet.       Please advise      
I can give the order for the toilet seat, but for the motorized wheelchair, needs to see physical medicine for an evaluation. I had placed an order for physical medicine when he was in the office 7-24-19, but it looks like the appt was never scheduled.    See order for toilet seat  
Spoke with pt 's daughter and informed her of Dr. Chauhan message. Pt's daughter agreed verbally. Also left the number for PT.   
Health Care Proxy (HCP)
anxiety

## 2019-07-30 ENCOUNTER — TELEPHONE (OUTPATIENT)
Dept: PHYSICAL MEDICINE AND REHAB | Facility: CLINIC | Age: 84
End: 2019-07-30

## 2019-07-30 ENCOUNTER — TELEPHONE (OUTPATIENT)
Dept: INTERNAL MEDICINE | Facility: CLINIC | Age: 84
End: 2019-07-30

## 2019-07-30 NOTE — TELEPHONE ENCOUNTER
----- Message from Amanda Gutiérrez sent at 7/30/2019 11:06 AM CDT -----  Contact: Lucina/CINDY [facility] [phone]  Requesting order for OT Evaluation.    Please call and advise.    Thank You

## 2019-07-30 NOTE — TELEPHONE ENCOUNTER
Spoke with Lucina  From [facility] and informed her of Dr. Chauhan message and she stated an verbal understanding.

## 2019-07-30 NOTE — TELEPHONE ENCOUNTER
Patient scheduled for 10/30/19 wheelchair eval.  Reminder letter mailed to patient.        ----- Message from Jonna Saucedo sent at 7/30/2019  8:20 AM CDT -----  Contact: Marisela (dtr) @ [phone]  Calling to schedule an appt with Dr. Meier per the referral from Dr. Chauhan' also needing a motorized wheelchair eval. Asking to be seen sooner than 12/6 next available in Epic. Please call.

## 2019-07-31 ENCOUNTER — OFFICE VISIT (OUTPATIENT)
Dept: PODIATRY | Facility: CLINIC | Age: 84
End: 2019-07-31
Payer: MEDICARE

## 2019-07-31 ENCOUNTER — HOSPITAL ENCOUNTER (OUTPATIENT)
Dept: RADIOLOGY | Facility: HOSPITAL | Age: 84
Discharge: HOME | End: 2019-07-31
Attending: INTERNAL MEDICINE
Payer: MEDICARE

## 2019-07-31 VITALS
SYSTOLIC BLOOD PRESSURE: 128 MMHG | WEIGHT: 148 LBS | BODY MASS INDEX: 22.43 KG/M2 | HEIGHT: 68 IN | DIASTOLIC BLOOD PRESSURE: 58 MMHG | HEART RATE: 58 BPM

## 2019-07-31 DIAGNOSIS — M48.061 SPINAL STENOSIS OF LUMBAR REGION, UNSPECIFIED WHETHER NEUROGENIC CLAUDICATION PRESENT: ICD-10-CM

## 2019-07-31 DIAGNOSIS — R60.0 EDEMA OF BOTH LEGS: ICD-10-CM

## 2019-07-31 DIAGNOSIS — B35.1 ONYCHOMYCOSIS DUE TO DERMATOPHYTE: ICD-10-CM

## 2019-07-31 DIAGNOSIS — I87.2 VENOUS INSUFFICIENCY: Primary | ICD-10-CM

## 2019-07-31 DIAGNOSIS — E11.42 DIABETIC POLYNEUROPATHY ASSOCIATED WITH TYPE 2 DIABETES MELLITUS: ICD-10-CM

## 2019-07-31 DIAGNOSIS — I25.10 CORONARY ARTERY DISEASE INVOLVING NATIVE CORONARY ARTERY OF NATIVE HEART WITHOUT ANGINA PECTORIS: Primary | ICD-10-CM

## 2019-07-31 PROCEDURE — 1101F PR PT FALLS ASSESS DOC 0-1 FALLS W/OUT INJ PAST YR: ICD-10-PCS | Mod: HCNC,CPTII,S$GLB, | Performed by: PODIATRIST

## 2019-07-31 PROCEDURE — 99999 PR PBB SHADOW E&M-EST. PATIENT-LVL III: CPT | Mod: PBBFAC,HCNC,, | Performed by: PODIATRIST

## 2019-07-31 PROCEDURE — 99999 PR PBB SHADOW E&M-EST. PATIENT-LVL III: ICD-10-PCS | Mod: PBBFAC,HCNC,, | Performed by: PODIATRIST

## 2019-07-31 PROCEDURE — 11721 DEBRIDE NAIL 6 OR MORE: CPT | Mod: HCNC,Q9,S$GLB, | Performed by: PODIATRIST

## 2019-07-31 PROCEDURE — 72148 MRI LUMBAR SPINE WITHOUT CONTRAST: ICD-10-PCS | Mod: 26,HCNC,, | Performed by: RADIOLOGY

## 2019-07-31 PROCEDURE — 99203 OFFICE O/P NEW LOW 30 MIN: CPT | Mod: 25,HCNC,S$GLB, | Performed by: PODIATRIST

## 2019-07-31 PROCEDURE — 72148 MRI LUMBAR SPINE W/O DYE: CPT | Mod: 26,HCNC,, | Performed by: RADIOLOGY

## 2019-07-31 PROCEDURE — 11721 PR DEBRIDEMENT OF NAILS, 6 OR MORE: ICD-10-PCS | Mod: HCNC,Q9,S$GLB, | Performed by: PODIATRIST

## 2019-07-31 PROCEDURE — 72148 MRI LUMBAR SPINE W/O DYE: CPT | Mod: TC,HCNC

## 2019-07-31 PROCEDURE — 1101F PT FALLS ASSESS-DOCD LE1/YR: CPT | Mod: HCNC,CPTII,S$GLB, | Performed by: PODIATRIST

## 2019-07-31 PROCEDURE — 99203 PR OFFICE/OUTPT VISIT, NEW, LEVL III, 30-44 MIN: ICD-10-PCS | Mod: 25,HCNC,S$GLB, | Performed by: PODIATRIST

## 2019-07-31 RX ORDER — AMMONIUM LACTATE 12 G/100G
CREAM TOPICAL
Qty: 140 G | Refills: 11 | Status: ON HOLD | OUTPATIENT
Start: 2019-07-31 | End: 2019-10-02 | Stop reason: HOSPADM

## 2019-07-31 NOTE — LETTER
August 5, 2019      Shari Chauhan MD  [address]  [place] [address]           [facility]  [address]  [facility] [address]  Phone: [phone]          Patient: Jeniffer Taylor   MR Number: [id number]   YOB: 1928   Date of Visit: 7/31/2019       Dear Dr. Shari Chauhan:    Thank you for referring Jeniffer Taylor to me for evaluation. Attached you will find relevant portions of my assessment and plan of care.    If you have questions, please do not hesitate to call me. I look forward to following Jeniffer Taylor along with you.    Sincerely,    Michele Ahmadi, DPMIRANDA    Enclosure  CC:  No Recipients    If you would like to receive this communication electronically, please contact [email or web address] or [phone] to request more information on [email or web address] Link access.    For providers and/or their staff who would like to refer a patient to [facility], please contact us through our one-stop-shop provider referral line, [facility], at [phone].    If you feel you have received this communication in error or would no longer like to receive these types of communications, please e-mail [email or web address]

## 2019-08-05 ENCOUNTER — TELEPHONE (OUTPATIENT)
Dept: CARDIOLOGY | Facility: CLINIC | Age: 84
End: 2019-08-05

## 2019-08-05 DIAGNOSIS — C61 PROSTATE CANCER: Primary | ICD-10-CM

## 2019-08-06 ENCOUNTER — TELEPHONE (OUTPATIENT)
Dept: HOME HEALTH SERVICES | Facility: HOSPITAL | Age: 84
End: 2019-08-06
Payer: MEDICARE

## 2019-08-06 NOTE — PROGRESS NOTES
Subjective:      Patient ID: Jeniffer Taylor is a 89 y.o. male.    Chief Complaint: Type II or unspecified type diabetes mellitus with periphera (bilateral pcp Dr Baez 7/24/19) and Numbness (both feet)    Jeniffer is a 89 y.o. male who presents to the clinic for evaluation and treatment of high risk feet. Jeniffer has a past medical history of Anemia, Arthritis, Cancer, Cataract, CHF (congestive heart failure), Chronic kidney disease, Coronary artery disease, Diabetes mellitus, Diabetic retinopathy, Disorder of kidney and ureter, Encounter for blood transfusion, Glaucoma, High cholesterol, Hypertension, Myocardial infarction, Prostate cancer, PVD (peripheral vascular disease), Spinal stenosis, and Status post cataract extraction and insertion of intraocular lens of left eye (11/13/2015). The patient's chief complaint is long, thick toenails. This patient has documented high risk feet requiring routine maintenance secondary to diabetes mellitis and those secondary complications of diabetes, as mentioned..    PCP: Shari Chauhan MD    Date Last Seen by PCP:   Chief Complaint   Patient presents with    Type II or unspecified type diabetes mellitus with periphera     bilateral pcp Dr Baez 7/24/19    Numbness     both feet         Current shoe gear:  Affected Foot: Rx diabetic extra depth shoes and custom accommodative insoles     Unaffected Foot: Rx diabetic extra depth shoes and custom accommodative insoles    Hemoglobin A1C   Date Value Ref Range Status   06/20/2019 6.0 (H) 4.0 - 5.6 % Final     Comment:     ADA Screening Guidelines:  5.7-6.4%  Consistent with prediabetes  >or=6.5%  Consistent with diabetes  High levels of fetal hemoglobin interfere with the HbA1C  assay. Heterozygous hemoglobin variants (HbS, HgC, etc)do  not significantly interfere with this assay.   However, presence of multiple variants may affect accuracy.     01/13/2019 6.1 (H) 4.0 - 5.6 % Final     Comment:     ADA Screening  Guidelines:  5.7-6.4%  Consistent with prediabetes  >or=6.5%  Consistent with diabetes  High levels of fetal hemoglobin interfere with the HbA1C  assay. Heterozygous hemoglobin variants (HbS, HgC, etc)do  not significantly interfere with this assay.   However, presence of multiple variants may affect accuracy.     08/03/2018 6.1 (H) 4.0 - 5.6 % Final     Comment:     ADA Screening Guidelines:  5.7-6.4%  Consistent with prediabetes  >or=6.5%  Consistent with diabetes  High levels of fetal hemoglobin interfere with the HbA1C  assay. Heterozygous hemoglobin variants (HbS, HgC, etc)do  not significantly interfere with this assay.   However, presence of multiple variants may affect accuracy.         Review of Systems   Constitution: Negative for chills and fever.   HENT: Negative for congestion and tinnitus.    Eyes: Negative for double vision and visual disturbance.   Cardiovascular: Negative for chest pain and claudication.   Respiratory: Negative for hemoptysis and shortness of breath.    Endocrine: Negative for cold intolerance and heat intolerance.   Hematologic/Lymphatic: Negative for adenopathy and bleeding problem.   Skin: Positive for color change, dry skin and nail changes.   Musculoskeletal: Positive for stiffness. Negative for myalgias.   Gastrointestinal: Negative for nausea and vomiting.   Genitourinary: Negative for dysuria and hematuria.   Neurological: Positive for numbness and paresthesias.   Psychiatric/Behavioral: Negative for altered mental status and suicidal ideas.   Allergic/Immunologic: Negative for environmental allergies and persistent infections.           Objective:      Physical Exam   Constitutional: He is oriented to person, place, and time. He appears well-developed and well-nourished.   Cardiovascular:   Pulses:       Dorsalis pedis pulses are 0 on the right side, and 0 on the left side.        Posterior tibial pulses are 1+ on the right side, and 1+ on the left side.   Pulmonary/Chest:  Effort normal.   Musculoskeletal: Normal range of motion.   Anterior, lateral, and posterior muscle groups bilateral lower extremities show strength 4 over 5 symmetrically. Inspection and palpation of the joints and bones reveal no crepitus or joint effusion. No tenderness upon palpation. Mild plantar flexor contractures noted to digits 2 through 5 bilaterally.  Angle and base of gait are normal.   Feet:   Right Foot:   Skin Integrity: Positive for callus and dry skin.   Left Foot:   Skin Integrity: Positive for callus and dry skin.   Neurological: He is alert and oriented to person, place, and time. He displays atrophy and abnormal reflex. A sensory deficit is present.   Reflex Scores:       Patellar reflexes are 1+ on the right side and 1+ on the left side.       Achilles reflexes are 1+ on the right side and 1+ on the left side.  Sharp, dull, light touch sensation are intact bilaterally.  Proprioceptive sensation is intact to both lower extremities.  Vibratory sensation and Conover Kelli monofilament exam shows intact protective sensation to plantar toes 1 through 5 bilaterally. Deep tendon reflexes to the patellar tendons is 1 over 4 bilaterally symmetrical.  Deep tendon reflexes to the Achilles tendon is 1 over 4 bilaterally symmetrical. No ankle clonus or Babinski reflex noted bilaterally. Coordination is fair to both lower extremities.     Skin: Skin is warm and dry. Capillary refill takes 2 to 3 seconds. There is pallor.   Skin turgor is decreased bilaterally. Skin texture is thin dry and atrophic. Nail plates 1 through 5 bilaterally show thickening discoloration subungual debris and tenderness upon palpation. No lesions or rashes or open wounds appreciated both lower extremities on palpation and examination.   Psychiatric: He has a normal mood and affect.   Vitals reviewed.            Assessment:       Encounter Diagnoses   Name Primary?    Venous insufficiency Yes    Diabetic polyneuropathy associated  with type 2 diabetes mellitus     Onychomycosis due to dermatophyte     Edema of both legs          Plan:       Jeniffer was seen today for type ii or unspecified type diabetes mellitus with periphera and numbness.    Diagnoses and all orders for this visit:    Venous insufficiency  -     Ambulatory Referral to Vascular Surgery    Diabetic polyneuropathy associated with type 2 diabetes mellitus    Onychomycosis due to dermatophyte    Edema of both legs    Other orders  -     ammonium lactate 12 % Crea; Apply twice daily to affected parts both feet as needed.      I counseled the patient on his conditions, their implications and medical management.    Routine Foot Care    Performed by:  Michele Ahmadi. DPM  Authorized by:  Patient     Consent Done?:  Yes (Verbal)     Nail Care Type:  Debride  Location(s): All  (Left 1st Toe, Left 3rd Toe, Left 2nd Toe, Left 4th Toe, Left 5th Toe, Right 1st Toe, Right 2nd Toe, Right 3rd Toe, Right 4th Toe and Right 5th Toe)  Patient tolerance:  Patient tolerated the procedure well with no immediate complications     With patient's permission, the toenails mentioned above were aggressively reduced and debrided using a nail nipper, removing all offending nail and debris. The patient will continue to monitor the areas daily, inspect the feet, wear protective shoe gear when ambulatory, and moisturizer to maintain skin integrity.      Callus Care Type: Debride    With patient's permission, the calluses/hyperkeratotic lesions mentioned above were aggressively reduced and debrided using a number 15 blade. The patient will continue to monitor the areas daily, inspect the feet, wear protective shoe gear when ambulatory, and moisturizer to maintain skin integrity.         Shoe inspection. Diabetic Foot Education. Patient reminded of the importance of good nutrition and blood sugar control to help prevent podiatric complications of diabetes. Patient instructed on proper foot hygeine. We  discussed wearing proper shoe gear, daily foot inspections and Diabetic foot education in detail.    Return to clinic in 3-6 months or sooner if problems arise   .

## 2019-08-12 ENCOUNTER — EXTERNAL HOME HEALTH (OUTPATIENT)
Dept: HOME HEALTH SERVICES | Facility: HOSPITAL | Age: 84
End: 2019-08-12
Payer: MEDICARE

## 2019-08-14 ENCOUNTER — TELEPHONE (OUTPATIENT)
Dept: INTERNAL MEDICINE | Facility: CLINIC | Age: 84
End: 2019-08-14

## 2019-08-14 DIAGNOSIS — M48.00 SPINAL STENOSIS, UNSPECIFIED SPINAL REGION: Primary | ICD-10-CM

## 2019-08-14 NOTE — TELEPHONE ENCOUNTER
Spoke with daughter alfonzo. Daughter informed of MRI results, understanding verbalized. Pain management appt scheduled for September 4th. Also informed daughter to keep appt with physical medicine in October.

## 2019-08-14 NOTE — TELEPHONE ENCOUNTER
----- Message from Luly Cintron sent at 8/14/2019 11:51 AM CDT -----  Contact: Marleen/[facility]/[phone]  Caregiver needs results of MRI on the pt's spine. Pt also needs a referral for surgery on back. Pt's caregiver initial did not want a home health nurse in the home now she wants orders for one. Pt also has a small sore on top of his left foot. Please call and advise.        Thank You

## 2019-08-14 NOTE — TELEPHONE ENCOUNTER
His MRI reveals spinal stenosis. I do not think surgery is a good idea at his age.I had referred him to physical medicine, but it looks like his appt isn't until October!    Please schedule pain management appointment. Please advise him to still keep that Physical Medicine appt, however.    Home health orders are already in

## 2019-08-16 DIAGNOSIS — I35.0 AORTIC VALVE STENOSIS, ETIOLOGY OF CARDIAC VALVE DISEASE UNSPECIFIED: Primary | ICD-10-CM

## 2019-08-16 DIAGNOSIS — I87.2 VENOUS INSUFFICIENCY: Primary | ICD-10-CM

## 2019-08-17 NOTE — TELEPHONE ENCOUNTER
----- Message from Ananya Cantrell sent at 8/16/2019  3:40 PM CDT -----  Contact: care giver/daughter/buck/[phone]  Pt care giver daughter called in regards to getting a Rx refill for     glipizide (GLUCOTROL) 2.5 MG TR24 90 tablet 1 3/7/2019  No Take 1 tablet (2.5 mg total) by mouth once daily.    And other med's. She said the dr would know the med's the pt needs. I tried to get the rest but she didn't know the rest.     [facility] [phone]  Please advise

## 2019-08-19 ENCOUNTER — TELEPHONE (OUTPATIENT)
Dept: INTERNAL MEDICINE | Facility: CLINIC | Age: 84
End: 2019-08-19

## 2019-08-19 RX ORDER — GLIPIZIDE 2.5 MG/1
2.5 TABLET, EXTENDED RELEASE ORAL DAILY
Qty: 90 TABLET | Refills: 0 | Status: SHIPPED | OUTPATIENT
Start: 2019-08-19 | End: 2019-08-19 | Stop reason: SDUPTHER

## 2019-08-19 RX ORDER — GLIPIZIDE 2.5 MG/1
2.5 TABLET, EXTENDED RELEASE ORAL DAILY
Qty: 90 TABLET | Refills: 0 | Status: ON HOLD | OUTPATIENT
Start: 2019-08-19 | End: 2019-10-02 | Stop reason: HOSPADM

## 2019-08-19 NOTE — TELEPHONE ENCOUNTER
----- Message from Ananya Cantrell sent at 8/19/2019  8:04 AM CDT -----  Contact: daughter-in-law/kristy/[phone]/after 8:30 am. [phone]  Pt daughter-in-law called in regards to getting a Rx refill for     glipizide (GLUCOTROL) 2.5 MG TR24 90 tablet 1 3/7/2019  No  Sig - Route: Take 1 tablet (2.5 mg total) by mouth once daily.    [facility] [phone]   Please advise

## 2019-08-19 NOTE — TELEPHONE ENCOUNTER
----- Message from Meena Thornton sent at 8/19/2019  9:48 AM CDT -----  Contact: [facility] [phone]  Type: Home Health (orders, updates, clarifications, etc.)    Home Health Agency/Nurse: [facility]     Phone number: [phone]    Reason for call: Need to have changed 2twice for 3 weeks extentsion and Nursing evaluation.    Comments:    Please call and advise  Thank you

## 2019-08-20 ENCOUNTER — TELEPHONE (OUTPATIENT)
Dept: INTERNAL MEDICINE | Facility: CLINIC | Age: 84
End: 2019-08-20

## 2019-08-20 NOTE — TELEPHONE ENCOUNTER
----- Message from Ananya Cantrell sent at 8/20/2019 10:11 AM CDT -----  Contact: physical therapy/alexandra/[phone]  physical therapy called in regards to getting a order for physical therapy extinction Twice a week for 3 weeks and an Rn evaluation.     Please advise

## 2019-08-20 NOTE — TELEPHONE ENCOUNTER
Spoke with Lucina to inform her that I already gave the Ok to Maxine from [facility]. She verbally agreed.

## 2019-08-20 NOTE — TELEPHONE ENCOUNTER
----- Message from Carie Arce sent at 8/20/2019 10:11 AM CDT -----  Contact: Marleen/[facility]/[phone]  Type:Home Health(orders,updates,clarifications,etc)    Home Health Agency/Nurse:    Phone number: [phone]    Reason for call:    Comments: patient needs a rolling walker. order can go to [facility]. Needs to be fax at [phone] (they won't accept the order through [employer]). Thank you!!!

## 2019-08-20 NOTE — TELEPHONE ENCOUNTER
Left message for Maxine at [facility] to inform her that the message was sent to Dr. Chauhan and she will get back to either Jaqui RENDON or EDGAR with an answer and we will call her back as soon as possible.

## 2019-08-20 NOTE — TELEPHONE ENCOUNTER
----- Message from Louis Salinas sent at 8/20/2019 11:32 AM CDT -----  Contact: [facility] Maxine [phone]  Maxine called and asked me to rafael this urgent    Physical therapy called in regards to getting a order for physical therapy extinction Twice a week for 3 weeks and an RN evaluation.     Thank you

## 2019-08-20 NOTE — TELEPHONE ENCOUNTER
Spoke with Maxine and informed her of Dr. Chauhan message. She OK 'd the orders for extended PT and Rn evaluation. Maxine agreed verbally.

## 2019-08-30 ENCOUNTER — PES CALL (OUTPATIENT)
Dept: ADMINISTRATIVE | Facility: CLINIC | Age: 84
End: 2019-08-30

## 2019-09-04 ENCOUNTER — OFFICE VISIT (OUTPATIENT)
Dept: PAIN MEDICINE | Facility: CLINIC | Age: 84
End: 2019-09-04
Payer: MEDICARE

## 2019-09-04 VITALS
HEIGHT: 68 IN | RESPIRATION RATE: 18 BRPM | DIASTOLIC BLOOD PRESSURE: 78 MMHG | BODY MASS INDEX: 22.5 KG/M2 | HEART RATE: 64 BPM | SYSTOLIC BLOOD PRESSURE: 148 MMHG | TEMPERATURE: 98 F

## 2019-09-04 DIAGNOSIS — M48.062 SPINAL STENOSIS OF LUMBAR REGION WITH NEUROGENIC CLAUDICATION: Primary | ICD-10-CM

## 2019-09-04 PROCEDURE — 99999 PR PBB SHADOW E&M-EST. PATIENT-LVL III: ICD-10-PCS | Mod: PBBFAC,HCNC,, | Performed by: ANESTHESIOLOGY

## 2019-09-04 PROCEDURE — 99999 PR PBB SHADOW E&M-EST. PATIENT-LVL III: CPT | Mod: PBBFAC,HCNC,, | Performed by: ANESTHESIOLOGY

## 2019-09-04 PROCEDURE — 99204 OFFICE O/P NEW MOD 45 MIN: CPT | Mod: HCNC,S$GLB,, | Performed by: ANESTHESIOLOGY

## 2019-09-04 PROCEDURE — 1101F PT FALLS ASSESS-DOCD LE1/YR: CPT | Mod: HCNC,CPTII,S$GLB, | Performed by: ANESTHESIOLOGY

## 2019-09-04 PROCEDURE — 1101F PR PT FALLS ASSESS DOC 0-1 FALLS W/OUT INJ PAST YR: ICD-10-PCS | Mod: HCNC,CPTII,S$GLB, | Performed by: ANESTHESIOLOGY

## 2019-09-04 PROCEDURE — 99204 PR OFFICE/OUTPT VISIT, NEW, LEVL IV, 45-59 MIN: ICD-10-PCS | Mod: HCNC,S$GLB,, | Performed by: ANESTHESIOLOGY

## 2019-09-04 RX ORDER — DICLOFENAC SODIUM 10 MG/G
2 GEL TOPICAL 4 TIMES DAILY
Qty: 1 TUBE | Refills: 2 | Status: ON HOLD | OUTPATIENT
Start: 2019-09-04 | End: 2019-10-02 | Stop reason: HOSPADM

## 2019-09-04 NOTE — PROGRESS NOTES
Chronic Pain - New Consult    Referring Physician: Shari Chauhan MD    Chief Complaint:   Chief Complaint   Patient presents with    Arm Pain     left     HPI: Mr. Taylor is a 89 y/o gentleman with aortic stenosis, CAD, CHF (EF 35%), CKD III, HTN, DM II, lumbar spinal stenosis who presents to clinic today for progressive lower extremity weakness and associate lower extremity muscle cramps. Patient states he began experiencing progressive, bilateral LE weakness about 3 months ago, which has resulted in the patient becoming wheel-chair bound about 2 months ago. He states prior to this he was active without functional limitation. He also experiences numbness in bilateral feet, muscle cramping in his thighs and calves, and has progressive LE pitting edema. He denies low back pain, or radicular type pain in either of  his lower extremities. MRI suggestive of spinal stenosis at L1-L5 with severe stenosis at L4-L5. The patient does endorses intermittent bowel and bladder incontinence, however he states this does not happen often. He otherwise denies fevers, chills, CP, SOB, n/v/d. In addition to his weakness, the patient endorses mild, intermittent left deltoid soreness, 3/10 in severity, which is associated with movement and relieved with rest.      SUBJECTIVE:    Mr. Taylor is a 89 y/o gentleman with aortic stenosis, CAD, CHF (EF 35%), CKD III, HTN, DM II, lumbar spinal stenosis who presents to clinic today for progressive lower extremity weakness and associate lower extremity muscle cramps. Patient states he began experiencing progressive, bilateral LE weakness about 3 months ago, which has resulted in the patient becoming wheel-chair bound about 2 months ago. He states prior to this he was active without functional limitation. He also experiences numbness in bilateral feet, muscle cramping in his thighs and calves, and has progressive LE pitting edema. He denies low back pain, or radicular type pain in  either of  his lower extremities. MRI suggestive of spinal stenosis at L1-L5 with severe stenosis at L4-L5. The patient does endorses intermittent bowel and bladder incontinence, however he states this does not happen often. He otherwise denies fevers, chills, CP, SOB, n/v/d.         Patient denies night fever/night sweats and significant weight loss.    Physical Therapy/Home Exercise: no      Pain Disability Index Review:  No flowsheet data found.    Pain Medications:    - Adjuvant Medications: Naproxen  - Others: none     report:  Not applicable    Pain Procedures: none    Imaging:    MRI lumbar spine without contrast 7/31/19:    Impression       Examination is limited secondary to patient motion artifact.    1.  Moderate to severe multilevel degenerative changes, most severe at L2-L3 through L4-L5 resulting in variable moderate to severe spinal canal stenosis and neural foraminal narrowing, as detailed above.    2.  Bladder is fluid filled and demonstrates a small diverticulum at the fundus.  Correlate for symptoms of bladder outlet obstruction.    3.  Fusiform aneurysmal dilatation of the infrarenal aorta, measuring 3.2 cm, better demonstrated on prior CT 03/13/2019.           Past Medical History:   Diagnosis Date    Anemia     Arthritis     Cancer     Prostate    Cataract     CHF (congestive heart failure)     Chronic kidney disease     Coronary artery disease     Diabetes mellitus     Diabetic retinopathy     Disorder of kidney and ureter     ckd 2    Encounter for blood transfusion     Glaucoma     High cholesterol     Hypertension     Myocardial infarction     Prostate cancer     PVD (peripheral vascular disease)     Spinal stenosis     Status post cataract extraction and insertion of intraocular lens of left eye 11/13/2015     Past Surgical History:   Procedure Laterality Date    CARDIAC CATHETERIZATION      15 to 20 years ago x1 stent and 4/7/2014 x1 stent    CATARACT EXTRACTION  Bilateral     Dr. Ferguson    CATHETERIZATION, HEART, LEFT N/A 4/7/2014    Performed by Raz Cox MD at [facility] CATH LAB    EYE SURGERY      HERNIA REPAIR      INSERTION-INTRAOCULAR LENS (IOL) Left 11/12/2015    Performed by Blanka Ferguson MD at [facility] OR 1ST FLR    INSERTION-INTRAOCULAR LENS (IOL) Right 10/1/2015    Performed by Blanka Ferguson [facility] at [facility] OR 1ST FLR    PHACOEMULSIFICATION-ASPIRATION-CATARACT Left 11/12/2015    Performed by Blanka [facility] at [facility] OR 1ST FLR    PHACOEMULSIFICATION-ASPIRATION-CATARACT Right 10/1/2015    Performed by Blanka Ferguson MD at [facility] OR [address] [place] [address]     Social History     Socioeconomic History    Marital status:      Spouse name: Not on file    Number of children: Not on file    Years of education: Not on file    Highest education level: Not on file   Occupational History    Not on file   Social Needs    Financial resource strain: Not on file    Food insecurity:     Worry: Not on file     Inability: Not on file    Transportation needs:     Medical: Not on file     Non-medical: Not on file   Tobacco Use    Smoking status: Never Smoker    Smokeless tobacco: Former User   Substance and Sexual Activity    Alcohol use: No    Drug use: No    Sexual activity: Never   Lifestyle    Physical activity:     Days per week: Not on file     Minutes per session: Not on file    Stress: Not on file   Relationships    Social connections:     Talks on phone: Not on file     Gets together: Not on file     Attends Buddhism service: Not on file     Active member of club or organization: Not on file     Attends meetings of clubs or organizations: Not on file     Relationship status: Not on file   Other Topics Concern    Not on file   Social History Narrative    Not on file     Family History   Problem Relation Age of Onset    Cancer Father     No Known Problems Mother     No Known Problems Son     Breast cancer Sister     Diabetes Sister      Hypertension Sister     No Known Problems Brother     No Known Problems Maternal Aunt     No Known Problems Maternal Uncle     No Known Problems Paternal Aunt     No Known Problems Paternal Uncle     No Known Problems Maternal Grandmother     No Known Problems Maternal Grandfather     No Known Problems Paternal Grandmother     No Known Problems Paternal Grandfather     No Known Problems Daughter     Diabetes Brother     Heart disease Neg Hx     Heart attack Neg Hx     Amblyopia Neg Hx     Blindness Neg Hx     Cataracts Neg Hx     Glaucoma Neg Hx     Macular degeneration Neg Hx     Retinal detachment Neg Hx     Strabismus Neg Hx     Stroke Neg Hx     Thyroid disease Neg Hx        Review of patient's allergies indicates:  No Known Allergies    Current Outpatient Medications   Medication Sig    amLODIPine (NORVASC) 10 MG tablet TAKE 1 TABLET (10 MG TOTAL) BY MOUTH ONCE DAILY.    ammonium lactate 12 % Crea Apply twice daily to affected parts both feet as needed.    aspirin (ECOTRIN) 81 MG EC tablet Take 81 mg by mouth. 1 Tablet, Delayed Release (E.C.) Oral Every morning    atorvastatin (LIPITOR) 80 MG tablet Take 1 tablet (80 mg total) by mouth once daily.    BD ALCOHOL SWABS PadM     bicalutamide (CASODEX) 50 MG Tab TAKE 1 TABLET EVERY DAY    blood-glucose meter kit Use as instructed    carvedilol (COREG) 6.25 MG tablet Take 1 tablet (6.25 mg total) by mouth 2 (two) times daily with meals.    diclofenac sodium (VOLTAREN) 1 % Gel Apply 2 g topically 4 (four) times daily.    fish oil-omega-3 fatty acids 300-1,000 mg capsule Take 1 capsule by mouth once daily.    furosemide (LASIX) 40 MG tablet Take 40 mg by mouth once daily.    garlic 1,000 mg Cap Take 2 tablets by mouth once daily.    glipiZIDE (GLUCOTROL) 2.5 MG TR24 Take 1 tablet (2.5 mg total) by mouth once daily.    lancets (LANCETS,ULTRA THIN) Misc Use daily    latanoprost 0.005 % ophthalmic solution Place 1 drop into both eyes  "every evening.    losartan (COZAAR) 50 MG tablet Take 1 tablet (50 mg total) by mouth once daily.    multivitamin capsule Take 1 capsule by mouth once daily.    naproxen sodium (ANAPROX) 220 MG tablet Take 220 mg by mouth 2 (two) times daily with meals.    nitroGLYCERIN (NITROSTAT) 0.4 MG SL tablet Place 1 tablet (0.4 mg total) under the tongue every 5 (five) minutes as needed for Chest pain.    TRUE METRIX GLUCOSE TEST STRIP Strp TEST EVERY DAY     Current Facility-Administered Medications   Medication    leuprolide (6 month) SyKt 45 mg       REVIEW OF SYSTEMS:    GENERAL:  No weight loss, malaise or fevers.  HEENT:  Negative for frequent or significant headaches.  NECK:  Negative for lumps, goiter, pain and significant neck swelling.  RESPIRATORY:  Negative for cough, wheezing or shortness of breath.  CARDIOVASCULAR: Positive for leg swelling.  Negative for chest pain or palpitations.  GI:  Negative for abdominal discomfort, blood in stools or black stools. + for occasional bowel and bladder incontinence.   MUSCULOSKELETAL:  See HPI.  SKIN:  Negative for lesions, rash, and itching.  PSYCH:  Positive for sleep disturbance. Negative for mood disorder and recent psychosocial stressors.  HEMATOLOGY/LYMPHOLOGY:  Negative for prolonged bleeding, bruising easily or swollen nodes.  NEURO:   No history of headaches, syncope, paralysis, seizures or tremors.  All other reviewed and negative other than HPI.    OBJECTIVE:    BP (!) 148/78   Pulse 64   Temp 98.1 °F (36.7 °C)   Resp 18   Ht 5' 8" (1.727 m)   BMI 22.50 kg/m²     PHYSICAL EXAMINATION:    General appearance: Well appearing, in no acute distress, alert and oriented x3.  Psych:  Mood and affect appropriate.  Skin: Skin color, texture, turgor normal, no rashes or lesions, in both upper and lower body.  Head/face:  Normocephalic, atraumatic. No palpable lymph nodes.  Neck: No pain to palpation over the cervical paraspinous muscles. Spurling Negative. No pain " with neck flexion, extension, or lateral flexion.   Cor: RRR. Harsh, systolic murmur heard.   Pulm: CTA  GI:  Soft and non-tender.  Back: Straight leg raising in the sitting position is negative to radicular pain. No pain to palpation over the spine or costovertebral angles. Normal range of motion without pain reproduction.  Extremities: Peripheral joint ROM is full and  without obvious instability or laxity in all four extremities. Left deltoid mild TTP. Full range of motion in bilateral shoulders without pain.  No deformities, or skin discoloration. 3+ lower extremity pitting edema bilaterally. Good capillary refill.  Musculoskeletal: Shoulder, hip, sacroiliac and knee provocative maneuvers are negative. Bilateral upper extremity strength 5/5. Bilateral hip flexors 2/5, knee extension and flexion 2/5, foot dorsi/plantar flexion 2/5. Bilateral LE muscular atrophy appreciated.  Neuro: Bilateral patellar reflexes 2+. Bilateral achilles reflexes absent. Plantar response are downgoing. No loss of sensation is noted.  Gait: Uses wheelchair for ambulation.    ASSESSMENT: 89 y.o. year old male with bilateral lower extremity weakness consistent with severe lumbar spinal stenosis. Patient also has left shoulder pain consistent with myofascial pain.     1. Spinal stenosis of lumbar region with neurogenic claudication  Ambulatory consult to Physical Therapy    Ambulatory Referral to Spine Surgery     2) Myofascial pain of left shoulder     PLAN:     - Will refer patient for neurosurgery evaluation given the severity of his spinal stenosis at L4/L5, however patient is likely high risk for surgery given his age and comorbidities and states he is not amenable to surgery at this time.  - Offered patient epidural steroid injection vs MILD procedure, however he states he is currently not amenable to these options but will consider them after discussion with his family.  - Will prescribe home PT to help with mobility and  deconditioning  - Prescribed Topical Voltaren gel 1% QID as needed to help with myofascial pain of left shoulder.   - Patient was instructed to RTC as needed or if he decides he would like to try the interventional options offered to him.   - Counseled patient regarding the importance of physical therapy.    The above plan and management options were discussed at length with patient. Patient is in agreement with the above and verbalized understanding. It will be communicated with the referring physician via electronic record, fax, or mail.    Godwin Urias MD  [facility]  CA-2  09/04/2019    I have reviewed and concur with the resident's history, physical, assessment, and plan.  I have personally interviewed and examined the patient at bedside.  See below addendum for my evaluation and additional findings.  [age] with bilateral lower extremity weakness consistent with severe lumbar spinal stenosis.  Patient denies any pain in the lower back or extremities. Lumbar spine results were reviewed suggested lumbar epidural steroid injection versus  MILD procedure patient wants to defer these procedures at this point and may consider them in the future after discussion with his family.  We will also refer him to Neurosurgery to evaluate for possible surgical intervention.  He will referred to physical therapy as well. Patient reports left shoulder pain consistent with myofascial pain.  We will order Voltaren gel percent apply painful area as needed.      Aram Parkinson MD

## 2019-09-04 NOTE — LETTER
September 4, 2019      Shari Chauhan MD  [address]  [facility] [address]           [facility] [place] [facility] [address]  [address]  [facility] [address]  Phone: [phone]  Fax: [phone]          Patient: Jeniffer Taylor   MR Number: [id number]   YOB: 1928   Date of Visit: 9/4/2019       Dear Dr. Shari Chauhan:    Thank you for referring Jeniffer Taylor to me for evaluation. Attached you will find relevant portions of my assessment and plan of care.    If you have questions, please do not hesitate to call me. I look forward to following Jeniffer Taylor along with you.    Sincerely,    Aram Parkinson MD    Enclosure  CC:  No Recipients    If you would like to receive this communication electronically, please contact [email or web address] or [phone] to request more information on [employer] access.    For providers and/or their staff who would like to refer a patient to [facility], please contact us through our one-stop-shop provider referral line, [facility], at [phone].    If you feel you have received this communication in error or would no longer like to receive these types of communications, please e-mail [email or web address]

## 2019-09-05 ENCOUNTER — TELEPHONE (OUTPATIENT)
Dept: INTERNAL MEDICINE | Facility: CLINIC | Age: 84
End: 2019-09-05

## 2019-09-05 NOTE — TELEPHONE ENCOUNTER
----- Message from Keeley Berkowitz sent at 9/5/2019 11:41 AM CDT -----  Contact: Jesus [phone]  Jesus called to get orders for new open wounds would like to treat srinivas pimentel.

## 2019-09-05 NOTE — TELEPHONE ENCOUNTER
Spoke with Jesus and informed her of  message and gave her the Ok. She also stated she will call back if it looks like his wounds are not getting better.

## 2019-09-06 ENCOUNTER — TELEPHONE (OUTPATIENT)
Dept: VASCULAR SURGERY | Facility: CLINIC | Age: 84
End: 2019-09-06

## 2019-09-06 NOTE — TELEPHONE ENCOUNTER
----- Message from Damaris Loera sent at 9/6/2019  4:11 PM CDT -----  Contact: [facility]  Caller is asking to speak with the nurse concerning the patient, as she needs some information pertaining to the patient     [phone]    Please contact the caller

## 2019-09-06 NOTE — TELEPHONE ENCOUNTER
"Spoke with Jesus ([facility]) states " patient has wounds going up his legs and can Dr Matos refer patient to wound care". I explained " the patient is a referral to Vascular Surgery (Dr Matos) and he has not seen the patient." Jesus states " she will contact Dr JACINTO Chauhan office to get a referral to wound care." Dr Matos notified, states " have patient PCP refer patient to wound before appointment with vascular surgery."  "

## 2019-09-09 ENCOUNTER — HOSPITAL ENCOUNTER (INPATIENT)
Facility: HOSPITAL | Age: 84
LOS: 3 days | Discharge: HOME HEALTH | DRG: 291 | End: 2019-09-12
Attending: EMERGENCY MEDICINE | Admitting: EMERGENCY MEDICINE
Payer: MEDICARE

## 2019-09-09 ENCOUNTER — TELEPHONE (OUTPATIENT)
Dept: HOME HEALTH SERVICES | Facility: HOSPITAL | Age: 84
End: 2019-09-09

## 2019-09-09 DIAGNOSIS — D69.6 THROMBOCYTOPENIA: ICD-10-CM

## 2019-09-09 DIAGNOSIS — I25.10 CORONARY ARTERY DISEASE INVOLVING NATIVE CORONARY ARTERY OF NATIVE HEART WITHOUT ANGINA PECTORIS: Chronic | ICD-10-CM

## 2019-09-09 DIAGNOSIS — I50.43 ACUTE ON CHRONIC COMBINED SYSTOLIC AND DIASTOLIC HEART FAILURE: ICD-10-CM

## 2019-09-09 DIAGNOSIS — I50.43 ACUTE ON CHRONIC COMBINED SYSTOLIC AND DIASTOLIC CONGESTIVE HEART FAILURE: Primary | ICD-10-CM

## 2019-09-09 DIAGNOSIS — I25.2 HISTORY OF MI (MYOCARDIAL INFARCTION): ICD-10-CM

## 2019-09-09 DIAGNOSIS — L89.152 PRESSURE INJURY OF SACRAL REGION, STAGE 2: ICD-10-CM

## 2019-09-09 DIAGNOSIS — N18.9 ACUTE KIDNEY INJURY SUPERIMPOSED ON CHRONIC KIDNEY DISEASE: ICD-10-CM

## 2019-09-09 DIAGNOSIS — N17.9 ACUTE KIDNEY INJURY SUPERIMPOSED ON CHRONIC KIDNEY DISEASE: ICD-10-CM

## 2019-09-09 DIAGNOSIS — I82.411 ACUTE DEEP VEIN THROMBOSIS (DVT) OF FEMORAL VEIN OF RIGHT LOWER EXTREMITY: ICD-10-CM

## 2019-09-09 DIAGNOSIS — R07.9 CHEST PAIN: ICD-10-CM

## 2019-09-09 PROBLEM — D72.829 LEUKOCYTOSIS: Status: ACTIVE | Noted: 2019-09-09

## 2019-09-09 LAB
ALBUMIN SERPL BCP-MCNC: 3.8 G/DL (ref 3.5–5.2)
ALP SERPL-CCNC: 106 U/L (ref 55–135)
ALT SERPL W/O P-5'-P-CCNC: 15 U/L (ref 10–44)
ANION GAP SERPL CALC-SCNC: 13 MMOL/L (ref 8–16)
APTT BLDCRRT: 25.5 SEC (ref 21–32)
ASCENDING AORTA: 2.83 CM
AST SERPL-CCNC: 23 U/L (ref 10–40)
AV INDEX (PROSTH): 0.23
AV MEAN GRADIENT: 32 MMHG
AV PEAK GRADIENT: 51 MMHG
AV VALVE AREA: 0.82 CM2
AV VELOCITY RATIO: 0.25
BASOPHILS # BLD AUTO: 0.03 K/UL (ref 0–0.2)
BASOPHILS # BLD AUTO: 0.04 K/UL (ref 0–0.2)
BASOPHILS NFR BLD: 0.2 % (ref 0–1.9)
BASOPHILS NFR BLD: 0.4 % (ref 0–1.9)
BILIRUB SERPL-MCNC: 0.5 MG/DL (ref 0.1–1)
BILIRUB UR QL STRIP: NEGATIVE
BNP SERPL-MCNC: 552 PG/ML (ref 0–99)
BSA FOR ECHO PROCEDURE: 1.97 M2
BUN SERPL-MCNC: 48 MG/DL (ref 8–23)
CALCIUM SERPL-MCNC: 8.9 MG/DL (ref 8.7–10.5)
CHLORIDE SERPL-SCNC: 104 MMOL/L (ref 95–110)
CLARITY UR REFRACT.AUTO: CLEAR
CO2 SERPL-SCNC: 23 MMOL/L (ref 23–29)
COLOR UR AUTO: YELLOW
CREAT SERPL-MCNC: 1.9 MG/DL (ref 0.5–1.4)
CV ECHO LV RWT: 0.4 CM
DIFFERENTIAL METHOD: ABNORMAL
DIFFERENTIAL METHOD: ABNORMAL
DOP CALC AO PEAK VEL: 3.57 M/S
DOP CALC AO VTI: 96.68 CM
DOP CALC LVOT AREA: 3.6 CM2
DOP CALC LVOT DIAMETER: 2.15 CM
DOP CALC LVOT PEAK VEL: 0.88 M/S
DOP CALC LVOT STROKE VOLUME: 79.03 CM3
DOP CALCLVOT PEAK VEL VTI: 21.78 CM
ECHO LV POSTERIOR WALL: 1.15 CM (ref 0.6–1.1)
EOSINOPHIL # BLD AUTO: 0.3 K/UL (ref 0–0.5)
EOSINOPHIL # BLD AUTO: 0.3 K/UL (ref 0–0.5)
EOSINOPHIL NFR BLD: 2.4 % (ref 0–8)
EOSINOPHIL NFR BLD: 2.8 % (ref 0–8)
ERYTHROCYTE [DISTWIDTH] IN BLOOD BY AUTOMATED COUNT: 14.2 % (ref 11.5–14.5)
ERYTHROCYTE [DISTWIDTH] IN BLOOD BY AUTOMATED COUNT: 14.4 % (ref 11.5–14.5)
EST. GFR  (AFRICAN AMERICAN): 35.1 ML/MIN/1.73 M^2
EST. GFR  (NON AFRICAN AMERICAN): 30.4 ML/MIN/1.73 M^2
FRACTIONAL SHORTENING: 22 % (ref 28–44)
GLUCOSE SERPL-MCNC: 129 MG/DL (ref 70–110)
GLUCOSE UR QL STRIP: NEGATIVE
HCT VFR BLD AUTO: 28.4 % (ref 40–54)
HCT VFR BLD AUTO: 30.6 % (ref 40–54)
HGB BLD-MCNC: 10.5 G/DL (ref 14–18)
HGB BLD-MCNC: 9.9 G/DL (ref 14–18)
HGB UR QL STRIP: NEGATIVE
IMM GRANULOCYTES # BLD AUTO: 0.05 K/UL (ref 0–0.04)
IMM GRANULOCYTES # BLD AUTO: 0.07 K/UL (ref 0–0.04)
IMM GRANULOCYTES NFR BLD AUTO: 0.4 % (ref 0–0.5)
IMM GRANULOCYTES NFR BLD AUTO: 0.5 % (ref 0–0.5)
INR PPP: 1.1 (ref 0.8–1.2)
INTERVENTRICULAR SEPTUM: 0.99 CM (ref 0.6–1.1)
KETONES UR QL STRIP: NEGATIVE
LA MAJOR: 6.08 CM
LA MINOR: 6.11 CM
LA WIDTH: 4.72 CM
LEFT ATRIUM SIZE: 5.36 CM
LEFT ATRIUM VOLUME INDEX: 67.2 ML/M2
LEFT ATRIUM VOLUME: 131.07 CM3
LEFT INTERNAL DIMENSION IN SYSTOLE: 4.5 CM (ref 2.1–4)
LEFT VENTRICLE DIASTOLIC VOLUME INDEX: 83.31 ML/M2
LEFT VENTRICLE DIASTOLIC VOLUME: 162.6 ML
LEFT VENTRICLE MASS INDEX: 128 G/M2
LEFT VENTRICLE SYSTOLIC VOLUME INDEX: 47.3 ML/M2
LEFT VENTRICLE SYSTOLIC VOLUME: 92.23 ML
LEFT VENTRICULAR INTERNAL DIMENSION IN DIASTOLE: 5.74 CM (ref 3.5–6)
LEFT VENTRICULAR MASS: 250.35 G
LEUKOCYTE ESTERASE UR QL STRIP: NEGATIVE
LYMPHOCYTES # BLD AUTO: 1 K/UL (ref 1–4.8)
LYMPHOCYTES # BLD AUTO: 1 K/UL (ref 1–4.8)
LYMPHOCYTES NFR BLD: 7.1 % (ref 18–48)
LYMPHOCYTES NFR BLD: 9 % (ref 18–48)
MAGNESIUM SERPL-MCNC: 2.2 MG/DL (ref 1.6–2.6)
MCH RBC QN AUTO: 27.6 PG (ref 27–31)
MCH RBC QN AUTO: 27.9 PG (ref 27–31)
MCHC RBC AUTO-ENTMCNC: 34.3 G/DL (ref 32–36)
MCHC RBC AUTO-ENTMCNC: 34.9 G/DL (ref 32–36)
MCV RBC AUTO: 80 FL (ref 82–98)
MCV RBC AUTO: 80 FL (ref 82–98)
MONOCYTES # BLD AUTO: 0.8 K/UL (ref 0.3–1)
MONOCYTES # BLD AUTO: 1 K/UL (ref 0.3–1)
MONOCYTES NFR BLD: 6.2 % (ref 4–15)
MONOCYTES NFR BLD: 9 % (ref 4–15)
NEUTROPHILS # BLD AUTO: 11.4 K/UL (ref 1.8–7.7)
NEUTROPHILS # BLD AUTO: 8.8 K/UL (ref 1.8–7.7)
NEUTROPHILS NFR BLD: 78.4 % (ref 38–73)
NEUTROPHILS NFR BLD: 83.6 % (ref 38–73)
NITRITE UR QL STRIP: NEGATIVE
NRBC BLD-RTO: 0 /100 WBC
NRBC BLD-RTO: 0 /100 WBC
PH UR STRIP: 5 [PH] (ref 5–8)
PHOSPHATE SERPL-MCNC: 3.6 MG/DL (ref 2.7–4.5)
PLATELET # BLD AUTO: 131 K/UL (ref 150–350)
PLATELET # BLD AUTO: 145 K/UL (ref 150–350)
PMV BLD AUTO: 11.1 FL (ref 9.2–12.9)
PMV BLD AUTO: 11.4 FL (ref 9.2–12.9)
POTASSIUM SERPL-SCNC: 4.1 MMOL/L (ref 3.5–5.1)
PROT SERPL-MCNC: 7.3 G/DL (ref 6–8.4)
PROT UR QL STRIP: NEGATIVE
PROTHROMBIN TIME: 11.2 SEC (ref 9–12.5)
RA MAJOR: 5 CM
RA PRESSURE: 3 MMHG
RA WIDTH: 3.89 CM
RBC # BLD AUTO: 3.55 M/UL (ref 4.6–6.2)
RBC # BLD AUTO: 3.81 M/UL (ref 4.6–6.2)
RIGHT VENTRICULAR END-DIASTOLIC DIMENSION: 3.4 CM
SINUS: 3.12 CM
SODIUM SERPL-SCNC: 140 MMOL/L (ref 136–145)
SP GR UR STRIP: 1.01 (ref 1–1.03)
STJ: 2.84 CM
TDI LATERAL: 0.06 M/S
TDI SEPTAL: 0.05 M/S
TDI: 0.06 M/S
TRICUSPID ANNULAR PLANE SYSTOLIC EXCURSION: 2.2 CM
TROPONIN I SERPL DL<=0.01 NG/ML-MCNC: 0.06 NG/ML (ref 0–0.03)
TROPONIN I SERPL DL<=0.01 NG/ML-MCNC: 0.1 NG/ML (ref 0–0.03)
TROPONIN I SERPL DL<=0.01 NG/ML-MCNC: 0.14 NG/ML (ref 0–0.03)
TROPONIN I SERPL DL<=0.01 NG/ML-MCNC: 0.16 NG/ML (ref 0–0.03)
URN SPEC COLLECT METH UR: NORMAL
WBC # BLD AUTO: 11.29 K/UL (ref 3.9–12.7)
WBC # BLD AUTO: 13.59 K/UL (ref 3.9–12.7)

## 2019-09-09 PROCEDURE — 84484 ASSAY OF TROPONIN QUANT: CPT | Mod: 91,HCNC

## 2019-09-09 PROCEDURE — 63600175 PHARM REV CODE 636 W HCPCS: Mod: HCNC

## 2019-09-09 PROCEDURE — 36415 COLL VENOUS BLD VENIPUNCTURE: CPT | Mod: HCNC

## 2019-09-09 PROCEDURE — 25000003 PHARM REV CODE 250: Mod: HCNC | Performed by: PHYSICIAN ASSISTANT

## 2019-09-09 PROCEDURE — 85025 COMPLETE CBC W/AUTO DIFF WBC: CPT | Mod: 91,HCNC

## 2019-09-09 PROCEDURE — 99220 PR INITIAL OBSERVATION CARE,LEVL III: ICD-10-PCS | Mod: HCNC,,, | Performed by: PHYSICIAN ASSISTANT

## 2019-09-09 PROCEDURE — 99285 PR EMERGENCY DEPT VISIT,LEVEL V: ICD-10-PCS | Mod: HCNC,,, | Performed by: EMERGENCY MEDICINE

## 2019-09-09 PROCEDURE — 94761 N-INVAS EAR/PLS OXIMETRY MLT: CPT | Mod: HCNC

## 2019-09-09 PROCEDURE — 83880 ASSAY OF NATRIURETIC PEPTIDE: CPT | Mod: HCNC

## 2019-09-09 PROCEDURE — 11000001 HC ACUTE MED/SURG PRIVATE ROOM: Mod: HCNC

## 2019-09-09 PROCEDURE — 63600175 PHARM REV CODE 636 W HCPCS: Mod: HCNC | Performed by: EMERGENCY MEDICINE

## 2019-09-09 PROCEDURE — G0378 HOSPITAL OBSERVATION PER HR: HCPCS | Mod: HCNC

## 2019-09-09 PROCEDURE — 81003 URINALYSIS AUTO W/O SCOPE: CPT | Mod: HCNC

## 2019-09-09 PROCEDURE — 99220 PR INITIAL OBSERVATION CARE,LEVL III: CPT | Mod: HCNC,,, | Performed by: PHYSICIAN ASSISTANT

## 2019-09-09 PROCEDURE — 85730 THROMBOPLASTIN TIME PARTIAL: CPT | Mod: HCNC

## 2019-09-09 PROCEDURE — 83735 ASSAY OF MAGNESIUM: CPT | Mod: HCNC

## 2019-09-09 PROCEDURE — 99285 EMERGENCY DEPT VISIT HI MDM: CPT | Mod: HCNC,,, | Performed by: EMERGENCY MEDICINE

## 2019-09-09 PROCEDURE — 87040 BLOOD CULTURE FOR BACTERIA: CPT | Mod: 59,HCNC

## 2019-09-09 PROCEDURE — 93005 ELECTROCARDIOGRAM TRACING: CPT | Mod: HCNC

## 2019-09-09 PROCEDURE — 85610 PROTHROMBIN TIME: CPT | Mod: HCNC

## 2019-09-09 PROCEDURE — 93010 ELECTROCARDIOGRAM REPORT: CPT | Mod: HCNC,,, | Performed by: INTERNAL MEDICINE

## 2019-09-09 PROCEDURE — 96374 THER/PROPH/DIAG INJ IV PUSH: CPT | Mod: HCNC

## 2019-09-09 PROCEDURE — 63600175 PHARM REV CODE 636 W HCPCS: Mod: HCNC | Performed by: PHYSICIAN ASSISTANT

## 2019-09-09 PROCEDURE — 93010 EKG 12-LEAD: ICD-10-PCS | Mod: HCNC,76,, | Performed by: INTERNAL MEDICINE

## 2019-09-09 PROCEDURE — 80053 COMPREHEN METABOLIC PANEL: CPT | Mod: HCNC

## 2019-09-09 PROCEDURE — 84100 ASSAY OF PHOSPHORUS: CPT | Mod: HCNC

## 2019-09-09 PROCEDURE — 99285 EMERGENCY DEPT VISIT HI MDM: CPT | Mod: 25,HCNC

## 2019-09-09 RX ORDER — CLOPIDOGREL BISULFATE 75 MG/1
300 TABLET ORAL ONCE
Status: DISCONTINUED | OUTPATIENT
Start: 2019-09-09 | End: 2019-09-09

## 2019-09-09 RX ORDER — ACETAMINOPHEN 325 MG/1
650 TABLET ORAL EVERY 4 HOURS PRN
Status: DISCONTINUED | OUTPATIENT
Start: 2019-09-09 | End: 2019-09-12 | Stop reason: HOSPADM

## 2019-09-09 RX ORDER — IBUPROFEN 200 MG
24 TABLET ORAL
Status: DISCONTINUED | OUTPATIENT
Start: 2019-09-09 | End: 2019-09-12 | Stop reason: HOSPADM

## 2019-09-09 RX ORDER — INSULIN ASPART 100 [IU]/ML
0-5 INJECTION, SOLUTION INTRAVENOUS; SUBCUTANEOUS
Status: DISCONTINUED | OUTPATIENT
Start: 2019-09-09 | End: 2019-09-12 | Stop reason: HOSPADM

## 2019-09-09 RX ORDER — ATORVASTATIN CALCIUM 20 MG/1
80 TABLET, FILM COATED ORAL DAILY
Status: DISCONTINUED | OUTPATIENT
Start: 2019-09-09 | End: 2019-09-12 | Stop reason: HOSPADM

## 2019-09-09 RX ORDER — GLUCAGON 1 MG
1 KIT INJECTION
Status: DISCONTINUED | OUTPATIENT
Start: 2019-09-09 | End: 2019-09-12 | Stop reason: HOSPADM

## 2019-09-09 RX ORDER — ASPIRIN 81 MG/1
81 TABLET ORAL DAILY
Status: DISCONTINUED | OUTPATIENT
Start: 2019-09-10 | End: 2019-09-12 | Stop reason: HOSPADM

## 2019-09-09 RX ORDER — HEPARIN SODIUM,PORCINE/D5W 25000/250
18 INTRAVENOUS SOLUTION INTRAVENOUS CONTINUOUS
Status: DISCONTINUED | OUTPATIENT
Start: 2019-09-09 | End: 2019-09-11

## 2019-09-09 RX ORDER — CLOPIDOGREL BISULFATE 75 MG/1
75 TABLET ORAL DAILY
Status: DISCONTINUED | OUTPATIENT
Start: 2019-09-10 | End: 2019-09-09

## 2019-09-09 RX ORDER — NITROGLYCERIN 0.4 MG/1
0.4 TABLET SUBLINGUAL EVERY 5 MIN PRN
Status: DISCONTINUED | OUTPATIENT
Start: 2019-09-09 | End: 2019-09-12 | Stop reason: HOSPADM

## 2019-09-09 RX ORDER — FUROSEMIDE 10 MG/ML
40 INJECTION INTRAMUSCULAR; INTRAVENOUS 2 TIMES DAILY
Status: DISCONTINUED | OUTPATIENT
Start: 2019-09-09 | End: 2019-09-12

## 2019-09-09 RX ORDER — LATANOPROST 50 UG/ML
1 SOLUTION/ DROPS OPHTHALMIC NIGHTLY
Status: DISCONTINUED | OUTPATIENT
Start: 2019-09-09 | End: 2019-09-12 | Stop reason: HOSPADM

## 2019-09-09 RX ORDER — HEPARIN SODIUM 5000 [USP'U]/ML
5000 INJECTION, SOLUTION INTRAVENOUS; SUBCUTANEOUS EVERY 8 HOURS
Status: DISCONTINUED | OUTPATIENT
Start: 2019-09-09 | End: 2019-09-09

## 2019-09-09 RX ORDER — IBUPROFEN 200 MG
16 TABLET ORAL
Status: DISCONTINUED | OUTPATIENT
Start: 2019-09-09 | End: 2019-09-12 | Stop reason: HOSPADM

## 2019-09-09 RX ORDER — SODIUM CHLORIDE 0.9 % (FLUSH) 0.9 %
10 SYRINGE (ML) INJECTION
Status: DISCONTINUED | OUTPATIENT
Start: 2019-09-09 | End: 2019-09-12 | Stop reason: HOSPADM

## 2019-09-09 RX ORDER — CARVEDILOL 6.25 MG/1
6.25 TABLET ORAL 2 TIMES DAILY WITH MEALS
Status: DISCONTINUED | OUTPATIENT
Start: 2019-09-09 | End: 2019-09-12 | Stop reason: HOSPADM

## 2019-09-09 RX ORDER — AMLODIPINE BESYLATE 10 MG/1
10 TABLET ORAL DAILY
Status: DISCONTINUED | OUTPATIENT
Start: 2019-09-09 | End: 2019-09-12 | Stop reason: HOSPADM

## 2019-09-09 RX ORDER — FUROSEMIDE 10 MG/ML
40 INJECTION INTRAMUSCULAR; INTRAVENOUS
Status: COMPLETED | OUTPATIENT
Start: 2019-09-09 | End: 2019-09-09

## 2019-09-09 RX ORDER — ONDANSETRON 8 MG/1
8 TABLET, ORALLY DISINTEGRATING ORAL EVERY 8 HOURS PRN
Status: DISCONTINUED | OUTPATIENT
Start: 2019-09-09 | End: 2019-09-12 | Stop reason: HOSPADM

## 2019-09-09 RX ORDER — HEPARIN SODIUM,PORCINE/D5W 25000/250
12 INTRAVENOUS SOLUTION INTRAVENOUS CONTINUOUS
Status: DISCONTINUED | OUTPATIENT
Start: 2019-09-09 | End: 2019-09-09

## 2019-09-09 RX ORDER — BICALUTAMIDE 50 MG/1
50 TABLET, FILM COATED ORAL DAILY
Status: DISCONTINUED | OUTPATIENT
Start: 2019-09-09 | End: 2019-09-12 | Stop reason: HOSPADM

## 2019-09-09 RX ADMIN — LATANOPROST 1 DROP: 50 SOLUTION OPHTHALMIC at 09:09

## 2019-09-09 RX ADMIN — BICALUTAMIDE 50 MG: 50 TABLET ORAL at 03:09

## 2019-09-09 RX ADMIN — HEPARIN SODIUM AND DEXTROSE 18 UNITS/KG/HR: 10000; 5 INJECTION INTRAVENOUS at 11:09

## 2019-09-09 RX ADMIN — FUROSEMIDE 40 MG: 10 INJECTION, SOLUTION INTRAMUSCULAR; INTRAVENOUS at 05:09

## 2019-09-09 RX ADMIN — CARVEDILOL 6.25 MG: 6.25 TABLET, FILM COATED ORAL at 03:09

## 2019-09-09 RX ADMIN — AMLODIPINE BESYLATE 10 MG: 10 TABLET ORAL at 03:09

## 2019-09-09 RX ADMIN — ATORVASTATIN CALCIUM 80 MG: 20 TABLET, FILM COATED ORAL at 03:09

## 2019-09-09 RX ADMIN — FUROSEMIDE 40 MG: 10 INJECTION, SOLUTION INTRAMUSCULAR; INTRAVENOUS at 06:09

## 2019-09-09 NOTE — H&P
[facility] Medicine  History & Physical    Patient Name: Jeniffer Taylor  MRN: [id number]  Admission Date: 9/9/2019  Attending Physician: Kavita Rosa MD   Primary Care Provider: Shari Chauhna MD    [facility] Medicine Team: [facility] MED J Sarah Florian PA-C     Patient information was obtained from patient, past medical records and ER records.     Subjective:     Principal Problem:Acute on chronic combined systolic and diastolic congestive heart failure    Chief Complaint:   Chief Complaint   Patient presents with    Chest Pain     Pt arrives via EMS c/o chest pain 5/10. Pt states chest pain has subsided after being given nitroglycerin PTA. Positive heart hx.        HPI: Mr. Taylor is a pleasant 89 y/o AAM with HTN, CHF, HLD, hx of MI, PVD, AS, DM, and CKD Stage 3 presenting to the ED with chest pain.  Chest pain woke him at around 2:30 in the morning, located in the center of his chest and does not radiate.  He describes it as a pressure on his chest.  He took 4 aspirins at home with minimal relief.  He rated the initial pain as a 5/10 and is currently in no pain.  He received one spray of NTG from EMS.  He states he has not had any pain like this before, although he does not remember what his previous MIs have felt like because they were so long ago.  Additionally, he complains of fluid on BLE.  He states his legs have been like this for 3-4 months and he can no longer stand or walk because of this.  He is compliant with Lasix 40mg BID and wears compression stockings at home.  He admits to chest pain, SOB, edema, and productive cough with clear/yellow phlegm; denies fatigue fevers, chills, abdominal pain, N/V/D, headache, diaphoresis.    ED: hypertensive, leukocytosis w/ left shift on arrival; 88% O2 sat on RA  on arrival, placed on 4L NC - reports no oxygen use at home.  NTG spray x 1 with full relief of chest pain.  Lasix 40 mg IVP given. Cr elevated 1.9 (BL 1.4) Troponin  0.060>>0.102>>0.135, continue trending. . UA unremarkable. CXR stable, unchanged from Jan 2019 nothing bilateral pleural plaque and bilateral pulmonary opacicites. EKG unchanged from previous, SR with PACs.    Past Medical History:   Diagnosis Date    Anemia     Arthritis     Cancer     Prostate    Cataract     CHF (congestive heart failure)     Chronic kidney disease     Coronary artery disease     Diabetes mellitus     Diabetic retinopathy     Disorder of kidney and ureter     ckd 2    Encounter for blood transfusion     Glaucoma     High cholesterol     Hypertension     Myocardial infarction     Prostate cancer     PVD (peripheral vascular disease)     Spinal stenosis     Status post cataract extraction and insertion of intraocular lens of left eye 11/13/2015       Past Surgical History:   Procedure Laterality Date    CARDIAC CATHETERIZATION      15 to 20 years ago x1 stent and 4/7/2014 x1 stent    CATARACT EXTRACTION Bilateral     Dr. Ferguson    CATHETERIZATION, HEART, LEFT N/A 4/7/2014    Performed by Raz Cox MD at [facility] CATH LAB    EYE SURGERY      HERNIA REPAIR      INSERTION-INTRAOCULAR LENS (IOL) Left 11/12/2015    Performed by Blanka Ferguson MD at [facility] OR 1ST FLR    INSERTION-INTRAOCULAR LENS (IOL) Right 10/1/2015    Performed by Blanka Ferguson [facility] at [facility] OR 1ST FLR    PHACOEMULSIFICATION-ASPIRATION-CATARACT Left 11/12/2015    Performed by Blanka Ferguson [facility] at [facility] OR 1ST FLR    PHACOEMULSIFICATION-ASPIRATION-CATARACT Right 10/1/2015    Performed by Blanka Ferguson MD at [facility] OR 1ST FLR       Review of patient's allergies indicates:  No Known Allergies    Current Facility-Administered Medications on File Prior to Encounter   Medication    leuprolide (6 month) SyKt 45 mg     Current Outpatient Medications on File Prior to Encounter   Medication Sig    amLODIPine (NORVASC) 10 MG tablet TAKE 1 TABLET (10 MG TOTAL) BY MOUTH ONCE DAILY.    aspirin  (ECOTRIN) 81 MG EC tablet Take 81 mg by mouth. 1 Tablet, Delayed Release (E.C.) Oral Every morning    atorvastatin (LIPITOR) 80 MG tablet Take 1 tablet (80 mg total) by mouth once daily.    BD ALCOHOL SWABS PadM     bicalutamide (CASODEX) 50 MG Tab TAKE 1 TABLET EVERY DAY    diclofenac sodium (VOLTAREN) 1 % Gel Apply 2 g topically 4 (four) times daily.    fish oil-omega-3 fatty acids 300-1,000 mg capsule Take 1 capsule by mouth once daily.    furosemide (LASIX) 40 MG tablet Take 40 mg by mouth once daily.    garlic 1,000 mg Cap Take 2 tablets by mouth once daily.    glipiZIDE (GLUCOTROL) 2.5 MG TR24 Take 1 tablet (2.5 mg total) by mouth once daily.    latanoprost 0.005 % ophthalmic solution Place 1 drop into both eyes every evening.    losartan (COZAAR) 50 MG tablet Take 1 tablet (50 mg total) by mouth once daily.    multivitamin capsule Take 1 capsule by mouth once daily.    naproxen sodium (ANAPROX) 220 MG tablet Take 220 mg by mouth 2 (two) times daily with meals.    ammonium lactate 12 % Crea Apply twice daily to affected parts both feet as needed.    blood-glucose meter kit Use as instructed    carvedilol (COREG) 6.25 MG tablet Take 1 tablet (6.25 mg total) by mouth 2 (two) times daily with meals.    lancets (LANCETS,ULTRA THIN) Misc Use daily    nitroGLYCERIN (NITROSTAT) 0.4 MG SL tablet Place 1 tablet (0.4 mg total) under the tongue every 5 (five) minutes as needed for Chest pain.    TRUE METRIX GLUCOSE TEST STRIP Strp TEST EVERY DAY     Family History     Problem Relation (Age of Onset)    Breast cancer Sister    Cancer Father    Diabetes Sister, Brother    Hypertension Sister    No Known Problems Mother, Son, Brother, Maternal Aunt, Maternal Uncle, Paternal Aunt, Paternal Uncle, Maternal Grandmother, Maternal Grandfather, Paternal Grandmother, Paternal Grandfather, Daughter        Tobacco Use    Smoking status: Never Smoker    Smokeless tobacco: Former User   Substance and Sexual  Activity    Alcohol use: No    Drug use: No    Sexual activity: Never     Review of Systems   Constitutional: Positive for activity change. Negative for appetite change, chills, diaphoresis, fatigue and fever.   HENT: Negative for congestion, sinus pressure, sore throat and trouble swallowing.    Eyes: Negative for discharge and visual disturbance.   Respiratory: Positive for cough and shortness of breath. Negative for wheezing.    Cardiovascular: Positive for chest pain and leg swelling. Negative for palpitations.   Gastrointestinal: Negative for abdominal distention, abdominal pain, constipation, diarrhea, nausea and vomiting.   Genitourinary: Negative for difficulty urinating, dysuria and hematuria.        Pt recently incontinent - unaware when urinating x 3-4 weeks per son   Musculoskeletal: Positive for arthralgias (shoulder pain (chronic)) and gait problem (unable to stand/walk secondary to leg swelling). Negative for myalgias.   Skin: Positive for wound. Negative for pallor and rash.   Neurological: Positive for weakness. Negative for dizziness, syncope, light-headedness and headaches.   Psychiatric/Behavioral: Negative for agitation and confusion. The patient is not nervous/anxious.      Objective:     Vital Signs (Most Recent):  Temp: 98.1 °F (36.7 °C) (09/09/19 0725)  Pulse: (!) 53 (09/09/19 1022)  Resp: 17 (09/09/19 0446)  BP: (!) 162/70 (09/09/19 1022)  SpO2: 99 % (09/09/19 1022) Vital Signs (24h Range):  Temp:  [98.1 °F (36.7 °C)-99.1 °F (37.3 °C)] 98.1 °F (36.7 °C)  Pulse:  [53-69] 53  Resp:  [17] 17  SpO2:  [95 %-100 %] 99 %  BP: (121-177)/(65-87) 162/70     Weight: 67.1 kg (148 lb)  Body mass index is 22.5 kg/m².    Physical Exam   Constitutional: He is oriented to person, place, and time. He appears well-developed and well-nourished. No distress.   HENT:   Head: Normocephalic and atraumatic.   Eyes: Conjunctivae and EOM are normal.   Neck: Normal range of motion. Neck supple.   Cardiovascular:  Normal rate and regular rhythm.   Murmur heard.  Pulmonary/Chest: Effort normal and breath sounds normal. No respiratory distress.   Abdominal: Soft. Bowel sounds are normal. He exhibits no distension. There is no tenderness.   Musculoskeletal: Normal range of motion. He exhibits edema (BLE  ). He exhibits no tenderness.   Strength ROM 3/5 all extremities   Neurological: He is alert and oriented to person, place, and time.   Skin: Skin is warm and dry. Capillary refill takes 2 to 3 seconds.   Bilateral lower extremities with scattered ulcers; excoriations noted around left leg ulcers; consistent with venous stasis  Noted stage 2 decubitus ulcer to sacral area   Psychiatric: He has a normal mood and affect. His behavior is normal.         CRANIAL NERVES     CN III, IV, VI   Extraocular motions are normal.        Significant Labs:   A1C:   Recent Labs   Lab 06/20/19  0751   HGBA1C 6.0*     CBC:   Recent Labs   Lab 09/09/19  0454   WBC 13.59*   HGB 10.5*   HCT 30.6*   *     CMP:   Recent Labs   Lab 09/09/19  0454      K 4.1      CO2 23   *   BUN 48*   CREATININE 1.9*   CALCIUM 8.9   PROT 7.3   ALBUMIN 3.8   BILITOT 0.5   ALKPHOS 106   AST 23   ALT 15   ANIONGAP 13   EGFRNONAA 30.4*     Cardiac Markers:   Recent Labs   Lab 09/09/19  0454   *     Magnesium:   Recent Labs   Lab 09/09/19  0454   MG 2.2     Troponin:   Recent Labs   Lab 09/09/19  0454 09/09/19  0755   TROPONINI 0.060* 0.102*     Urine Studies:   Recent Labs   Lab 09/09/19  0816   COLORU Yellow   APPEARANCEUA Clear   PHUR 5.0   SPECGRAV 1.010   PROTEINUA Negative   GLUCUA Negative   KETONESU Negative   BILIRUBINUA Negative   OCCULTUA Negative   NITRITE Negative   LEUKOCYTESUR Negative     All pertinent labs within the past 24 hours have been reviewed.    Significant Imaging: I have reviewed all pertinent imaging results/findings within the past 24 hours.    Assessment/Plan:     * Acute on chronic combined systolic and  diastolic congestive heart failure  Ischemic cardiomyopathy  Hypertensive heart disease with heart failure    Echo 1/2019: mod LV enlargement; est. EF 33%; severe eccentric LV hypertrophy; local segmental wall abnormalities; severe atrial enlargement; low normal RV systolic function; mild R atrial enlargement; mod to severe aortic valve stenosis; mod mitral sclerosis; mod leaflet calcification of mitral valve; mod MR; mild MS  -TTE ordered  - (July 291)  -Trop .060>>0.135; continue trend  - recent DSE negative for ischemia, likely elevated troponin due to overload, but if continues to uptrend, may need additional ischemic workup and / or ACS  -continue amlodopine and carvedilol; hold losartan due to BERNARDO  -Lasix 40 mg IVP given, continue BID, follow response  -cardiac diet  -Strict I/Os, daily weights  - LE US and ABIs ordered to further eval LE swelling    CAD (coronary artery disease)  History of MI    -continue ASA, atorvastatin  - troponin trending up, likely in setting of fluid overload  - trop 0.06>>0.102>>0.135, flat  - chest pain story classic with improvement with NTG  - if uptrend continues, may need ACS or ischemic eval    CKD (chronic kidney disease), stage III  Acute kidney injury    -Cr 1.9 on admission, BL 1.4  -likely cardiorenal  -monitor response to lasix 40mg IV  -hold losartan in setting of BERNARDO  -renally dose medications  - daily BMP    Frequent falls  Lumbar spinal stenosis    - follows with pain management, MRI showed severe stenosis at L2-L5  - PT/OT consult  - new 3 month debility at home, using wheelchair  - fall precautions    Leukocytosis  Stage 2 decubitus    WBC of 13 on admission, unclear etiology, afebrile  - CXR with bilateral opacities, stable from previous  - UA unremarkable; blood cultures pending  - possibly related to stage 2 decubitus sore or open wounds on legs  - wound care consulted    Thrombocytopenia  - platelets 145 on admission, baseline  - daily CBC  - monitor with  heparin DVT ppx    Aortic stenosis  Mitral stenosis    -follows with cardiology; last seen 7/23/19  - DSE in March 2019 showed moderate AS, therefore was not a TAVR candidate with Dr. Lovell  - continue to monitor    Type II diabetes mellitus with neurological manifestations  controlled on glipizide at home  -low dose SSI during stay    Prostate CA  - Leuprolide q6 months as scheduled  - follows with Urology    Essential hypertension  -BP goal <150/<90  -continue amlodipine 10mg  - continue carvedilol 6.25mg BID  -hold losartan in setting of BERNARDO  -monitor      VTE Risk Mitigation (From admission, onward)        Ordered     heparin (porcine) injection 5,000 Units  Every 8 hours      09/09/19 1541     Place BERT hose  Until discontinued      09/09/19 0815     Place sequential compression device  Until discontinued      09/09/19 0815     IP VTE HIGH RISK PATIENT  Once      09/09/19 0815             Sarah Florian PA-C  Department of Hospital Medicine   [facility]

## 2019-09-09 NOTE — NURSING
Admitted from ED, oriented to room and use of call bell. Incontinent of urine x3 upon arrival from ED, care provided. St II to sacrum noted, barrier cream, off loading provided. Wound care consult placed, as pt also has venous stasis ulcers to BLE. On tele - SB 40s - BP elevated @ time of admit - AM meds given per MD order - BP trending down, currently <180 SYS. Family updated re: POC prior to leaving for the day. Echo completed. Trops trending, as they are continuing to climb. Next due 1600. Will give PM lasix as scheduled. Tolerating diet as ordered. Bed alarm for safety. See flowsheet for detail.     9/9/2019 3:45 PM

## 2019-09-09 NOTE — ED NOTES
Pt resting comfortably in bed on cardiac, bp and o2 monitor. Pt family at bedside and aware pt is admitted to the hospital. No s/s respiratory distress. Pt food tray ordered.

## 2019-09-09 NOTE — ED PROVIDER NOTES
"Source of History:  Patient    Chief complaint:  Chest Pain (Pt arrives via EMS c/o chest pain 5/10. Pt states chest pain has subsided after being given nitroglycerin PTA. Positive heart hx.)    HPI:  Jeniffer Taylor is a 89 y.o. male PMH significant for HTN/CHF/HLD/MI/PVD/aortic stenosis/DM/CKD3 presenting to emergency department with complaint of chest pain. Patient states the pain woke him from sleep and lasted approximately 45 minutes. It was constant, nonradiating There was associated shortness of breath. Patient states "I was not paying attention" when asked the quality of the pain. Denies fevers, chill, cough. Legs are more swollen than usual. No sweating, vomiting, dizziness, syncope.     He is not sure whether this feels like previous MI. Pain has completely resolved at this time.    ROS: As per HPI and below:  Review of Systems   Constitutional: Negative for fever.   HENT: Negative for sore throat.    Eyes: Negative for double vision.   Respiratory: Positive for shortness of breath. Negative for cough.    Cardiovascular: Positive for chest pain.   Gastrointestinal: Negative for abdominal pain and vomiting.   Genitourinary: Negative for dysuria.   Musculoskeletal: Negative for falls.   Skin: Negative for rash.   Neurological: Negative for headaches.     Review of patient's allergies indicates:  No Known Allergies    No current facility-administered medications on file prior to encounter.      Current Outpatient Medications on File Prior to Encounter   Medication Sig Dispense Refill    amLODIPine (NORVASC) 10 MG tablet TAKE 1 TABLET (10 MG TOTAL) BY MOUTH ONCE DAILY. 90 tablet 0    aspirin (ECOTRIN) 81 MG EC tablet Take 81 mg by mouth. 1 Tablet, Delayed Release (E.C.) Oral Every morning      atorvastatin (LIPITOR) 80 MG tablet Take 1 tablet (80 mg total) by mouth once daily. 90 tablet 1    BD ALCOHOL SWABS PadM       bicalutamide (CASODEX) 50 MG Tab TAKE 1 TABLET EVERY DAY 90 tablet 3    diclofenac " sodium (VOLTAREN) 1 % Gel Apply 2 g topically 4 (four) times daily. 1 Tube 2    fish oil-omega-3 fatty acids 300-1,000 mg capsule Take 1 capsule by mouth once daily.      furosemide (LASIX) 40 MG tablet Take 40 mg by mouth once daily.      garlic 1,000 mg Cap Take 2 tablets by mouth once daily.      glipiZIDE (GLUCOTROL) 2.5 MG TR24 Take 1 tablet (2.5 mg total) by mouth once daily. 90 tablet 0    latanoprost 0.005 % ophthalmic solution Place 1 drop into both eyes every evening. 3 Bottle 3    losartan (COZAAR) 50 MG tablet Take 1 tablet (50 mg total) by mouth once daily. 90 tablet 3    multivitamin capsule Take 1 capsule by mouth once daily.      ammonium lactate 12 % Crea Apply twice daily to affected parts both feet as needed. 140 g 11    blood-glucose meter kit Use as instructed 1 each 0    carvedilol (COREG) 6.25 MG tablet Take 1 tablet (6.25 mg total) by mouth 2 (two) times daily with meals. 180 tablet 3    lancets (LANCETS,ULTRA THIN) Misc Use daily 100 each 12    nitroGLYCERIN (NITROSTAT) 0.4 MG SL tablet Place 1 tablet (0.4 mg total) under the tongue every 5 (five) minutes as needed for Chest pain. 100 tablet 5    TRUE METRIX GLUCOSE TEST STRIP Strp TEST EVERY  strip 12       PMH:  As per HPI and below:  Past Medical History:   Diagnosis Date    Anemia     Arthritis     Cancer     Prostate    Cataract     CHF (congestive heart failure)     Chronic kidney disease     Coronary artery disease     Diabetes mellitus     Diabetic retinopathy     Disorder of kidney and ureter     ckd 2    Encounter for blood transfusion     Glaucoma     High cholesterol     Hypertension     Myocardial infarction     Prostate cancer     PVD (peripheral vascular disease)     Spinal stenosis     Status post cataract extraction and insertion of intraocular lens of left eye 11/13/2015     Past Surgical History:   Procedure Laterality Date    CARDIAC CATHETERIZATION      15 to 20 years ago x1 stent  and 4/7/2014 x1 stent    CATARACT EXTRACTION Bilateral     Dr. Ferguson    CATHETERIZATION, HEART, LEFT N/A 4/7/2014    Performed by [facility] CATH LAB    EYE SURGERY      HERNIA REPAIR      INSERTION-INTRAOCULAR LENS (IOL) Left 11/12/2015    Performed by Blanka [facility] at [facility] OR 1ST FLR    INSERTION-INTRAOCULAR LENS (IOL) Right 10/1/2015    [facility] OR [facility]ST [facility]LR    PHACOEMULSIFICATION-ASPIRATION-CATARACT Left 11/12/2015    [facility]formed [facility] OR 1ST FLR    PHACOEMULSIFICATION-ASPIRATION-CATARACT Right 10/1/2015    Performed by Blanka Ferguson MD at [facility] OR [address] [place] [address]       Social History     Socioeconomic History    Marital status:      Spouse name: Not on file    Number of children: Not on file    Years of education: Not on file    Highest education level: Not on file   Occupational History    Not on file   Social Needs    Financial resource strain: Not on file    Food insecurity:     Worry: Not on file     Inability: Not on file    Transportation needs:     Medical: Not on file     Non-medical: Not on file   Tobacco Use    Smoking status: Never Smoker    Smokeless tobacco: Former User   Substance and Sexual Activity    Alcohol use: No    Drug use: No    Sexual activity: Never   Lifestyle    Physical activity:     Days per week: Not on file     Minutes per session: Not on file    Stress: Not on file   Relationships    Social connections:     Talks on phone: Not on file     Gets together: Not on file     Attends [employer] service: Not on file     Active member of club or organization: Not on file     Attends meetings of clubs or organizations: Not on file     Relationship status: Not on file   Other Topics Concern    Not on file   Social History Narrative    Not on file       Family History   Problem Relation Age of Onset    Cancer Father     No Known Problems Mother     No Known Problems Son     Breast  cancer Sister     Diabetes Sister     Hypertension Sister     No Known Problems Brother     No Known Problems Maternal Aunt     No Known Problems Maternal Uncle     No Known Problems Paternal Aunt     No Known Problems Paternal Uncle     No Known Problems Maternal Grandmother     No Known Problems Maternal Grandfather     No Known Problems Paternal Grandmother     No Known Problems Paternal Grandfather     No Known Problems Daughter     Diabetes Brother     Heart disease Neg Hx     Heart attack Neg Hx     Amblyopia Neg Hx     Blindness Neg Hx     Cataracts Neg Hx     Glaucoma Neg Hx     Macular degeneration Neg Hx     Retinal detachment Neg Hx     Strabismus Neg Hx     Stroke Neg Hx     Thyroid disease Neg Hx        Physical Exam:    Vitals:    09/10/19 1235   BP: (!) 163/74   Pulse: 62   Resp: 17   Temp: 97.6 °F (36.4 °C)     Gen: No acute distress.  Mental Status:  Alert and oriented x 3.  Appropriate, conversant.  Skin: Warm, dry. No rashes seen.  Eyes: No conjunctival injection.  ENT: Oropharynx clear.    Pulm: Bibasilar crackles. Satting 92% on room air. Good air movement.  No wheezes. No increased WOB.  CV: Regular rate and rhythm.  No murmurs. +JVD +B/L LE edema  Abd: Soft.  Not distended.  Nontender.  No guarding.  No rebound.  MSK: Good range of motion all joints.  No deformities.  Neck supple.  No meningismus.  Neuro: Motor intact.  Sensation intact.  Cerebellar intact.  Cranial nerves intact.      Initial Impression:  89 yo M with chest pain.    Laboratory Studies:  Labs Reviewed   CBC W/ AUTO DIFFERENTIAL - Abnormal; Notable for the following components:       Result Value    WBC 13.59 (*)     RBC 3.81 (*)     Hemoglobin 10.5 (*)     Hematocrit 30.6 (*)     Mean Corpuscular Volume 80 (*)     Platelets 145 (*)     Gran # (ANC) 11.4 (*)     Immature Grans (Abs) 0.07 (*)     Gran% 83.6 (*)     Lymph% 7.1 (*)     All other components within normal limits   COMPREHENSIVE METABOLIC  PANEL - Abnormal; Notable for the following components:    Glucose 129 (*)     BUN, Bld 48 (*)     Creatinine 1.9 (*)     eGFR if  35.1 (*)     eGFR if non  30.4 (*)     All other components within normal limits   TROPONIN I - Abnormal; Notable for the following components:    Troponin I 0.060 (*)     All other components within normal limits   B-TYPE NATRIURETIC PEPTIDE - Abnormal; Notable for the following components:     (*)     All other components within normal limits   TROPONIN I - Abnormal; Notable for the following components:    Troponin I 0.102 (*)     All other components within normal limits   MAGNESIUM   PHOSPHORUS   URINALYSIS, REFLEX TO URINE CULTURE    Narrative:     Preferred Collection Type->Urine, Clean Catch   MAGNESIUM    Narrative:     ADD ON MG PHOS PER, DR. STEF DAUGHERTY PA-C @ 09:03  09/09/2019   [id number]   PHOSPHORUS    Narrative:     ADD ON MG PHOS PER, DR. STEF DAUGHERTY PA-C @ 09:03  09/09/2019   [id number]   POCT GLUCOSE MONITORING CONTINUOUS       EKG (independently interpreted by me):  NSR, rate 63, +LVH, no STEMI. Lateral and inferior T wave flattening similar to previous.    X-rays (independently interpreted by me): stable from previous    Chart reviewed. Hx CAD.    Imaging Results          X-Ray Chest 1 View (Final result)  Result time 09/09/19 05:14:03    Final result by Michele Soto MD (09/09/19 05:14:03)                 Impression:      Stable abnormal radiographic appearance with suggestion of mild improved aeration at the right base.      Electronically signed by: Michele Soto  Date:    09/09/2019  Time:    05:14             Narrative:    EXAMINATION:  XR CHEST 1 VIEW    CLINICAL HISTORY:  Chest pain, unspecified    TECHNIQUE:  Single frontal view of the chest was performed.    COMPARISON:  January 13, 2019    FINDINGS:  Single chest view is submitted.  There is mild diminished depth of inspiration and mild rotation,  when accounting for this the cardiomediastinal silhouette appears stable.  There are prominent areas of pleural plaque noted bilaterally, appearing similar.  Bilateral pulmonary opacities are again noted, overall appearance is similar, there is no evidence for significant interval detrimental change there may be mild improved aeration at the right base.                                Medications Given:  Medications   sodium chloride 0.9% flush 10 mL (has no administration in time range)   acetaminophen tablet 650 mg (has no administration in time range)   ondansetron disintegrating tablet 8 mg (has no administration in time range)   glucose chewable tablet 16 g (has no administration in time range)   glucose chewable tablet 24 g (has no administration in time range)   glucagon (human recombinant) injection 1 mg (has no administration in time range)   insulin aspart U-100 pen 0-5 Units (has no administration in time range)   furosemide injection 40 mg (40 mg Intravenous Given 9/10/19 0850)   dextrose 10% (D10W) Bolus (has no administration in time range)   dextrose 10% (D10W) Bolus (has no administration in time range)   amLODIPine tablet 10 mg (10 mg Oral Given 9/10/19 0851)   aspirin EC tablet 81 mg (81 mg Oral Given 9/10/19 0852)   atorvastatin tablet 80 mg (80 mg Oral Given 9/10/19 0852)   bicalutamide tablet 50 mg (50 mg Oral Not Given 9/10/19 0900)   carvedilol tablet 6.25 mg (6.25 mg Oral Given 9/10/19 0852)   latanoprost 0.005 % ophthalmic solution 1 drop (1 drop Both Eyes Given 9/9/19 2134)   nitroGLYCERIN SL tablet 0.4 mg (has no administration in time range)   heparin 25,000 units in dextrose 5% 250 mL (100 units/mL) infusion HIGH INTENSITY nomogram - [facility] (19.137 Units/kg/hr × 74.2 kg (Adjusted) Intravenous New Bag 9/10/19 0849)   heparin 25,000 units in dextrose 5% (100 units/ml) IV bolus from bag - ADDITIONAL PRN BOLUS - 60 units/kg (has no administration in time range)   heparin 25,000 units in dextrose  5% (100 units/ml) IV bolus from bag - ADDITIONAL PRN BOLUS - 30 units/kg (has no administration in time range)   furosemide injection 40 mg (40 mg Intravenous Given 9/9/19 0640)   heparin 25,000 units in dextrose 5% (100 units/ml) IV bolus from bag INITIAL BOLUS (5,936 Units Intravenous Bolus from Bag 9/9/19 9427)       Discussed with: NIKITA    MDM:    89 y.o. male with history CAD, CHF presenting to ED w/ resolved chest pain. He is AF, stable, nontoxic, mildly hypoxic.     DDX: ACS, CHF exacerbation, PNA, PTX, MSK pain. Doubt PE, would nit further pursue workup.    EKG nonischemic. Initial troponin similar to previous. Will trend. HEART score elevated, needs ACS rule out.    CXR stable. C/w CHF exacerbation, has crackles/JVD/LE edema. Will give lasix.     D/w hospitalist.    Diagnostic Impression:    1. Acute on chronic combined systolic and diastolic congestive heart failure    2. Chest pain    3. Acute on chronic combined systolic and diastolic heart failure               Angelita House MD  09/10/19 5004

## 2019-09-09 NOTE — ED NOTES
Patient resting in stretcher with NAD noted. VSS, following commands, and speech clear and appropriate. All belongings within reach. Patient denies any pain at this time. Patient updated on plan of care and all questions addressed. Safety precautions remain in place. Patient son remains at bedside.

## 2019-09-09 NOTE — ASSESSMENT & PLAN NOTE
Acute kidney injury    -Cr 1.9 on admission, BL 1.4  -likely cardiorenal  -monitor response to lasix 40mg IV  -hold losartan in setting of BERNARDO  -renally dose medications  - daily BMP

## 2019-09-09 NOTE — ASSESSMENT & PLAN NOTE
History of MI    -continue ASA, atorvastatin  - troponin trending up, likely in setting of fluid overload  - trop 0.06>>0.102>>0.135, flat  - chest pain story classic with improvement with NTG  - if uptrend continues, may need ACS or ischemic eval

## 2019-09-09 NOTE — SUBJECTIVE & OBJECTIVE
Past Medical History:   Diagnosis Date    Anemia     Arthritis     Cancer     Prostate    Cataract     CHF (congestive heart failure)     Chronic kidney disease     Coronary artery disease     Diabetes mellitus     Diabetic retinopathy     Disorder of kidney and ureter     ckd 2    Encounter for blood transfusion     Glaucoma     High cholesterol     Hypertension     Myocardial infarction     Prostate cancer     PVD (peripheral vascular disease)     Spinal stenosis     Status post cataract extraction and insertion of intraocular lens of left eye 11/13/2015       Past Surgical History:   Procedure Laterality Date    CARDIAC CATHETERIZATION      15 to 20 years ago x1 stent and 4/7/2014 x1 stent    CATARACT EXTRACTION Bilateral     Dr. Ferguson    CATHETERIZATION, HEART, LEFT N/A 4/7/2014    [facility] CATH LAB    EYE SURGERY      HERNIA REPAIR      INSERTION-INTRAOCULAR LENS (IOL) Left 11/12/2015    Performed by Bl[facility] at [facility] OR 1ST FLR    INSERTION-INTRAOCULAR LENS (IOL) Right 10/1/2015    [facility] by [facility] at [facility] OR 1ST FLR    PHACOEMULSIFICATION-ASPIRATION-CATARACT Left 11/12/2015    [facility] at [facility] OR 1ST FLR    PHACOEMULSIFICATION-ASPIRATION-CATARACT Right 10/1/2015    Performed by [facility] at [facility] OR 1ST FLR       Review of patient's allergies indicates:  No Known Allergies    Current Facility-Administered Medications on File Prior to Encounter   Medication    leuprolide (6 month) SyKt 45 mg     Current Outpatient Medications on File Prior to Encounter   Medication Sig    amLODIPine (NORVASC) 10 MG tablet TAKE 1 TABLET (10 MG TOTAL) BY MOUTH ONCE DAILY.    aspirin (ECOTRIN) 81 MG EC tablet Take 81 mg by mouth. 1 Tablet, Delayed Release (E.C.) Oral Every morning    atorvastatin (LIPITOR) 80 MG tablet Take 1 tablet (80 mg total) by mouth once daily.    BD ALCOHOL SWABS PadM      bicalutamide (CASODEX) 50 MG Tab TAKE 1 TABLET EVERY DAY    diclofenac sodium (VOLTAREN) 1 % Gel Apply 2 g topically 4 (four) times daily.    fish oil-omega-3 fatty acids 300-1,000 mg capsule Take 1 capsule by mouth once daily.    furosemide (LASIX) 40 MG tablet Take 40 mg by mouth once daily.    garlic 1,000 mg Cap Take 2 tablets by mouth once daily.    glipiZIDE (GLUCOTROL) 2.5 MG TR24 Take 1 tablet (2.5 mg total) by mouth once daily.    latanoprost 0.005 % ophthalmic solution Place 1 drop into both eyes every evening.    losartan (COZAAR) 50 MG tablet Take 1 tablet (50 mg total) by mouth once daily.    multivitamin capsule Take 1 capsule by mouth once daily.    naproxen sodium (ANAPROX) 220 MG tablet Take 220 mg by mouth 2 (two) times daily with meals.    ammonium lactate 12 % Crea Apply twice daily to affected parts both feet as needed.    blood-glucose meter kit Use as instructed    carvedilol (COREG) 6.25 MG tablet Take 1 tablet (6.25 mg total) by mouth 2 (two) times daily with meals.    lancets (LANCETS,ULTRA THIN) Misc Use daily    nitroGLYCERIN (NITROSTAT) 0.4 MG SL tablet Place 1 tablet (0.4 mg total) under the tongue every 5 (five) minutes as needed for Chest pain.    TRUE METRIX GLUCOSE TEST STRIP Strp TEST EVERY DAY     Family History     Problem Relation (Age of Onset)    Breast cancer Sister    Cancer Father    Diabetes Sister, Brother    Hypertension Sister    No Known Problems Mother, Son, Brother, Maternal Aunt, Maternal Uncle, Paternal Aunt, Paternal Uncle, Maternal Grandmother, Maternal Grandfather, Paternal Grandmother, Paternal Grandfather, Daughter        Tobacco Use    Smoking status: Never Smoker    Smokeless tobacco: Former User   Substance and Sexual Activity    Alcohol use: No    Drug use: No    Sexual activity: Never     Review of Systems   Constitutional: Positive for activity change. Negative for appetite change, chills, diaphoresis, fatigue and fever.   HENT:  Negative for congestion, sinus pressure, sore throat and trouble swallowing.    Eyes: Negative for discharge and visual disturbance.   Respiratory: Positive for cough and shortness of breath. Negative for wheezing.    Cardiovascular: Positive for chest pain and leg swelling. Negative for palpitations.   Gastrointestinal: Negative for abdominal distention, abdominal pain, constipation, diarrhea, nausea and vomiting.   Genitourinary: Negative for difficulty urinating, dysuria and hematuria.        Pt recently incontinent - unaware when urinating x 3-4 weeks per son   Musculoskeletal: Positive for arthralgias (shoulder pain (chronic)) and gait problem (unable to stand/walk secondary to leg swelling). Negative for myalgias.   Skin: Positive for wound. Negative for pallor and rash.   Neurological: Positive for weakness. Negative for dizziness, syncope, light-headedness and headaches.   Psychiatric/Behavioral: Negative for agitation and confusion. The patient is not nervous/anxious.      Objective:     Vital Signs (Most Recent):  Temp: 98.1 °F (36.7 °C) (09/09/19 0725)  Pulse: (!) 53 (09/09/19 1022)  Resp: 17 (09/09/19 0446)  BP: (!) 162/70 (09/09/19 1022)  SpO2: 99 % (09/09/19 1022) Vital Signs (24h Range):  Temp:  [98.1 °F (36.7 °C)-99.1 °F (37.3 °C)] 98.1 °F (36.7 °C)  Pulse:  [53-69] 53  Resp:  [17] 17  SpO2:  [95 %-100 %] 99 %  BP: (121-177)/(65-87) 162/70     Weight: 67.1 kg (148 lb)  Body mass index is 22.5 kg/m².    Physical Exam   Constitutional: He is oriented to person, place, and time. He appears well-developed and well-nourished. No distress.   HENT:   Head: Normocephalic and atraumatic.   Eyes: Conjunctivae and EOM are normal.   Neck: Normal range of motion. Neck supple.   Cardiovascular: Normal rate and regular rhythm.   Murmur heard.  Pulmonary/Chest: Effort normal and breath sounds normal. No respiratory distress.   Abdominal: Soft. Bowel sounds are normal. He exhibits no distension. There is no  tenderness.   Musculoskeletal: Normal range of motion. He exhibits edema (BLE  ). He exhibits no tenderness.   Strength ROM 3/5 all extremities   Neurological: He is alert and oriented to person, place, and time.   Skin: Skin is warm and dry. Capillary refill takes 2 to 3 seconds.   Bilateral lower extremities with scattered ulcers; excoriations noted around left leg ulcers; consistent with venous stasis  Noted stage 2 decubitus ulcer to sacral area   Psychiatric: He has a normal mood and affect. His behavior is normal.         CRANIAL NERVES     CN III, IV, VI   Extraocular motions are normal.        Significant Labs:   A1C:   Recent Labs   Lab 06/20/19  0751   HGBA1C 6.0*     CBC:   Recent Labs   Lab 09/09/19 0454   WBC 13.59*   HGB 10.5*   HCT 30.6*   *     CMP:   Recent Labs   Lab 09/09/19  0454      K 4.1      CO2 23   *   BUN 48*   CREATININE 1.9*   CALCIUM 8.9   PROT 7.3   ALBUMIN 3.8   BILITOT 0.5   ALKPHOS 106   AST 23   ALT 15   ANIONGAP 13   EGFRNONAA 30.4*     Cardiac Markers:   Recent Labs   Lab 09/09/19  0454   *     Magnesium:   Recent Labs   Lab 09/09/19  0454   MG 2.2     Troponin:   Recent Labs   Lab 09/09/19 0454 09/09/19  0755   TROPONINI 0.060* 0.102*     Urine Studies:   Recent Labs   Lab 09/09/19  0816   COLORU Yellow   APPEARANCEUA Clear   PHUR 5.0   SPECGRAV 1.010   PROTEINUA Negative   GLUCUA Negative   KETONESU Negative   BILIRUBINUA Negative   OCCULTUA Negative   NITRITE Negative   LEUKOCYTESUR Negative     All pertinent labs within the past 24 hours have been reviewed.    Significant Imaging: I have reviewed all pertinent imaging results/findings within the past 24 hours.

## 2019-09-09 NOTE — ASSESSMENT & PLAN NOTE
Mitral stenosis    -follows with cardiology; last seen 7/23/19  - DSE in March 2019 showed moderate AS, therefore was not a TAVR candidate with Dr. Lovell  - continue to monitor

## 2019-09-09 NOTE — ASSESSMENT & PLAN NOTE
Stage 2 decubitus    WBC of 13 on admission, unclear etiology, afebrile  - CXR with bilateral opacities, stable from previous  - UA unremarkable; blood cultures pending  - possibly related to stage 2 decubitus sore or open wounds on legs  - wound care consulted

## 2019-09-09 NOTE — ASSESSMENT & PLAN NOTE
-BP goal <150/<90  -continue amlodipine 10mg  - continue carvedilol 6.25mg BID  -hold losartan in setting of BERNARDO  -monitor

## 2019-09-09 NOTE — ED TRIAGE NOTES
Pt arrives with EMS with CP that began around 0330 this AM, pt took 324 aspirin prior to EMS arrival. Pt given one spray of nitro and CP decreased from 5/10 to 1/10. Pt also hypoxic on EMS arrival, 88% on RA. Pt placed on 4 L NC--does NOT wear O2 at home. Pt also has bilateral leg swelling x1 month, with swelling worse in the morning. Reports that he takes 40 mg lasix at home BID, compliant with lasix. Denies fevers at home, abdominal pain, N/V    Patient Identifiers for Jeniffer Taylor checked and correct  LOC: The patient is awake, alert and aware of environment with an appropriate affect, the patient is oriented x 3 and speaking appropriate.  APPEARANCE: Patient resting comfortably and in no acute distress, patient is clean and well groomed, patient's clothing is properly fastened.  SKIN: The skin is warm and dry, patient has normal skin turgor and moist mucus membranes,no rashes or lesions.Skin Intact , No Breakdown Noted  Musculoskeletal :  Normal range of motion noted. Moves all extremeties well, No swelling or tenderness noted  RESPIRATORY: Airway is open and patent, respirations are spontaneous, patient has a normal effort and rate.  CARDIAC: Patient has a normal rate and rhythm, no periphreal edema noted, capillary refill < 3 seconds.   ABDOMEN: Soft and non tender to palpation, no distention noted.   PULSES: 2+  And symmetrical in all extremeties  NEUROLOGIC: PERRL. facial expression is symmetrical, patient moving all extremities, normal sensation in all extremities when touched with a finger.The patient is awake, alert and cooperative with a calm affect, patient is aware of environment.    Will continue to monitor

## 2019-09-09 NOTE — ED NOTES
Incontinence care provided per RN and patient family member. New brief applied. Stage 2 ulceration noted to right sacrum; barrier cream in place. Patient repositioned in stretcher and assisted to find position of comfort. Patient resting in stretcher with NAD noted. VSS, following commands, and speech clear and appropriate. All belongings within reach. Patient denies any pain at this time. Patient updated on plan of care and all questions addressed. Safety precautions remain in place. Patient daughter at bedside at this time.

## 2019-09-09 NOTE — NURSING
This RN tried to call back for report, no answer. Called #[phone], attempted to reach Sudhir ANGEL.     9/9/2019 11:13 AM

## 2019-09-09 NOTE — ASSESSMENT & PLAN NOTE
Ischemic cardiomyopathy  Hypertensive heart disease with heart failure    Echo 1/2019: mod LV enlargement; est. EF 33%; severe eccentric LV hypertrophy; local segmental wall abnormalities; severe atrial enlargement; low normal RV systolic function; mild R atrial enlargement; mod to severe aortic valve stenosis; mod mitral sclerosis; mod leaflet calcification of mitral valve; mod MR; mild MS  -TTE ordered  - (July 291)  -Trop .060>>0.135; continue trend  - recent DSE negative for ischemia, likely elevated troponin due to overload, but if continues to uptrend, may need additional ischemic workup and / or ACS  -continue amlodopine and carvedilol; hold losartan due to BERNARDO  -Lasix 40 mg IVP given, continue BID, follow response  -cardiac diet  -Strict I/Os, daily weights  - LE US and ABIs ordered to further eval LE swelling

## 2019-09-09 NOTE — ASSESSMENT & PLAN NOTE
Lumbar spinal stenosis    - follows with pain management, MRI showed severe stenosis at L2-L5  - PT/OT consult  - new 3 month debility at home, using wheelchair  - fall precautions

## 2019-09-10 PROBLEM — I82.401 ACUTE DEEP VEIN THROMBOSIS (DVT) OF RIGHT LOWER EXTREMITY: Status: ACTIVE | Noted: 2019-09-10

## 2019-09-10 PROBLEM — N17.9 ACUTE KIDNEY INJURY SUPERIMPOSED ON CHRONIC KIDNEY DISEASE: Status: ACTIVE | Noted: 2017-08-14

## 2019-09-10 PROBLEM — I70.201: Status: ACTIVE | Noted: 2019-09-10

## 2019-09-10 PROBLEM — N18.9 ACUTE KIDNEY INJURY SUPERIMPOSED ON CHRONIC KIDNEY DISEASE: Status: ACTIVE | Noted: 2017-08-14

## 2019-09-10 LAB
ANION GAP SERPL CALC-SCNC: 11 MMOL/L (ref 8–16)
APTT BLDCRRT: 95.9 SEC (ref 21–32)
APTT BLDCRRT: >150 SEC (ref 21–32)
BASOPHILS # BLD AUTO: 0.02 K/UL (ref 0–0.2)
BASOPHILS # BLD AUTO: 0.02 K/UL (ref 0–0.2)
BASOPHILS NFR BLD: 0.2 % (ref 0–1.9)
BASOPHILS NFR BLD: 0.2 % (ref 0–1.9)
BUN SERPL-MCNC: 46 MG/DL (ref 8–23)
CALCIUM SERPL-MCNC: 8.7 MG/DL (ref 8.7–10.5)
CHLORIDE SERPL-SCNC: 103 MMOL/L (ref 95–110)
CO2 SERPL-SCNC: 25 MMOL/L (ref 23–29)
CREAT SERPL-MCNC: 1.9 MG/DL (ref 0.5–1.4)
DIFFERENTIAL METHOD: ABNORMAL
DIFFERENTIAL METHOD: ABNORMAL
EOSINOPHIL # BLD AUTO: 0.3 K/UL (ref 0–0.5)
EOSINOPHIL # BLD AUTO: 0.3 K/UL (ref 0–0.5)
EOSINOPHIL NFR BLD: 2.5 % (ref 0–8)
EOSINOPHIL NFR BLD: 2.5 % (ref 0–8)
ERYTHROCYTE [DISTWIDTH] IN BLOOD BY AUTOMATED COUNT: 14.1 % (ref 11.5–14.5)
ERYTHROCYTE [DISTWIDTH] IN BLOOD BY AUTOMATED COUNT: 14.1 % (ref 11.5–14.5)
EST. GFR  (AFRICAN AMERICAN): 35.1 ML/MIN/1.73 M^2
EST. GFR  (NON AFRICAN AMERICAN): 30.4 ML/MIN/1.73 M^2
GLUCOSE SERPL-MCNC: 124 MG/DL (ref 70–110)
HCT VFR BLD AUTO: 26.5 % (ref 40–54)
HCT VFR BLD AUTO: 26.5 % (ref 40–54)
HGB BLD-MCNC: 9.4 G/DL (ref 14–18)
HGB BLD-MCNC: 9.4 G/DL (ref 14–18)
IMM GRANULOCYTES # BLD AUTO: 0.04 K/UL (ref 0–0.04)
IMM GRANULOCYTES # BLD AUTO: 0.04 K/UL (ref 0–0.04)
IMM GRANULOCYTES NFR BLD AUTO: 0.4 % (ref 0–0.5)
IMM GRANULOCYTES NFR BLD AUTO: 0.4 % (ref 0–0.5)
LYMPHOCYTES # BLD AUTO: 1.1 K/UL (ref 1–4.8)
LYMPHOCYTES # BLD AUTO: 1.1 K/UL (ref 1–4.8)
LYMPHOCYTES NFR BLD: 10.4 % (ref 18–48)
LYMPHOCYTES NFR BLD: 10.4 % (ref 18–48)
MCH RBC QN AUTO: 27.9 PG (ref 27–31)
MCH RBC QN AUTO: 27.9 PG (ref 27–31)
MCHC RBC AUTO-ENTMCNC: 35.5 G/DL (ref 32–36)
MCHC RBC AUTO-ENTMCNC: 35.5 G/DL (ref 32–36)
MCV RBC AUTO: 79 FL (ref 82–98)
MCV RBC AUTO: 79 FL (ref 82–98)
MONOCYTES # BLD AUTO: 0.9 K/UL (ref 0.3–1)
MONOCYTES # BLD AUTO: 0.9 K/UL (ref 0.3–1)
MONOCYTES NFR BLD: 8.9 % (ref 4–15)
MONOCYTES NFR BLD: 8.9 % (ref 4–15)
NEUTROPHILS # BLD AUTO: 8 K/UL (ref 1.8–7.7)
NEUTROPHILS # BLD AUTO: 8 K/UL (ref 1.8–7.7)
NEUTROPHILS NFR BLD: 77.6 % (ref 38–73)
NEUTROPHILS NFR BLD: 77.6 % (ref 38–73)
NRBC BLD-RTO: 0 /100 WBC
NRBC BLD-RTO: 0 /100 WBC
PLATELET # BLD AUTO: 129 K/UL (ref 150–350)
PLATELET # BLD AUTO: 129 K/UL (ref 150–350)
PMV BLD AUTO: 11.8 FL (ref 9.2–12.9)
PMV BLD AUTO: 11.8 FL (ref 9.2–12.9)
POCT GLUCOSE: 118 MG/DL (ref 70–110)
POCT GLUCOSE: 152 MG/DL (ref 70–110)
POCT GLUCOSE: 162 MG/DL (ref 70–110)
POCT GLUCOSE: 169 MG/DL (ref 70–110)
POTASSIUM SERPL-SCNC: 4 MMOL/L (ref 3.5–5.1)
RBC # BLD AUTO: 3.37 M/UL (ref 4.6–6.2)
RBC # BLD AUTO: 3.37 M/UL (ref 4.6–6.2)
SODIUM SERPL-SCNC: 139 MMOL/L (ref 136–145)
TROPONIN I SERPL DL<=0.01 NG/ML-MCNC: 0.14 NG/ML (ref 0–0.03)
WBC # BLD AUTO: 10.26 K/UL (ref 3.9–12.7)
WBC # BLD AUTO: 10.26 K/UL (ref 3.9–12.7)

## 2019-09-10 PROCEDURE — 63600175 PHARM REV CODE 636 W HCPCS: Mod: HCNC | Performed by: PHYSICIAN ASSISTANT

## 2019-09-10 PROCEDURE — 85025 COMPLETE CBC W/AUTO DIFF WBC: CPT | Mod: HCNC

## 2019-09-10 PROCEDURE — 85730 THROMBOPLASTIN TIME PARTIAL: CPT | Mod: HCNC

## 2019-09-10 PROCEDURE — 85730 THROMBOPLASTIN TIME PARTIAL: CPT | Mod: 91,HCNC

## 2019-09-10 PROCEDURE — 80048 BASIC METABOLIC PNL TOTAL CA: CPT | Mod: HCNC

## 2019-09-10 PROCEDURE — 63600175 PHARM REV CODE 636 W HCPCS: Mod: HCNC

## 2019-09-10 PROCEDURE — 25000003 PHARM REV CODE 250: Mod: HCNC | Performed by: PHYSICIAN ASSISTANT

## 2019-09-10 PROCEDURE — 82962 GLUCOSE BLOOD TEST: CPT | Mod: HCNC

## 2019-09-10 PROCEDURE — 84484 ASSAY OF TROPONIN QUANT: CPT | Mod: HCNC

## 2019-09-10 PROCEDURE — 99233 PR SUBSEQUENT HOSPITAL CARE,LEVL III: ICD-10-PCS | Mod: HCNC,,, | Performed by: NURSE PRACTITIONER

## 2019-09-10 PROCEDURE — 99233 SBSQ HOSP IP/OBS HIGH 50: CPT | Mod: HCNC,,, | Performed by: NURSE PRACTITIONER

## 2019-09-10 PROCEDURE — 11000001 HC ACUTE MED/SURG PRIVATE ROOM: Mod: HCNC

## 2019-09-10 PROCEDURE — 36415 COLL VENOUS BLD VENIPUNCTURE: CPT | Mod: HCNC

## 2019-09-10 PROCEDURE — 25000003 PHARM REV CODE 250: Mod: HCNC | Performed by: NURSE PRACTITIONER

## 2019-09-10 RX ORDER — HYDRALAZINE HYDROCHLORIDE 50 MG/1
50 TABLET, FILM COATED ORAL EVERY 12 HOURS
Status: DISCONTINUED | OUTPATIENT
Start: 2019-09-10 | End: 2019-09-12 | Stop reason: HOSPADM

## 2019-09-10 RX ADMIN — FUROSEMIDE 40 MG: 10 INJECTION, SOLUTION INTRAMUSCULAR; INTRAVENOUS at 06:09

## 2019-09-10 RX ADMIN — HEPARIN SODIUM AND DEXTROSE 19.14 UNITS/KG/HR: 10000; 5 INJECTION INTRAVENOUS at 02:09

## 2019-09-10 RX ADMIN — LATANOPROST 1 DROP: 50 SOLUTION OPHTHALMIC at 09:09

## 2019-09-10 RX ADMIN — CARVEDILOL 6.25 MG: 6.25 TABLET, FILM COATED ORAL at 06:09

## 2019-09-10 RX ADMIN — HYDRALAZINE HYDROCHLORIDE 50 MG: 50 TABLET ORAL at 06:09

## 2019-09-10 RX ADMIN — CARVEDILOL 6.25 MG: 6.25 TABLET, FILM COATED ORAL at 08:09

## 2019-09-10 RX ADMIN — AMLODIPINE BESYLATE 10 MG: 10 TABLET ORAL at 08:09

## 2019-09-10 RX ADMIN — HEPARIN SODIUM AND DEXTROSE 19.14 UNITS/KG/HR: 10000; 5 INJECTION INTRAVENOUS at 08:09

## 2019-09-10 RX ADMIN — ATORVASTATIN CALCIUM 80 MG: 20 TABLET, FILM COATED ORAL at 08:09

## 2019-09-10 RX ADMIN — ASPIRIN 81 MG: 81 TABLET, COATED ORAL at 08:09

## 2019-09-10 RX ADMIN — FUROSEMIDE 40 MG: 10 INJECTION, SOLUTION INTRAMUSCULAR; INTRAVENOUS at 08:09

## 2019-09-10 NOTE — HOSPITAL COURSE
Mr. Taylor was admitted 09/09 due to CHF exacerbation and LE swelling in setting of known moderate AS. TTE noted mild LVE, EF 45%, normal RV function, LV DD, severe LAE, mild to moderate mR, severe AS with  valve area is 0.82 cm2; peak velocity is 3.57 m/s; mean gradient is 32 mmHg, and CVP 3. His diuresis was initiated with IVP lasix followed by PO dosing, net negative 2 liters; LE swelling greatly improved. Renal function tolerated diuresis, SCr trending 1.9 >> 1.8. Continue amlodipine and carvedilol for BP control, no ACEi/ARB/ARNI due to BERNARDO on CKD.     Regarding LE swelling: arterial LE US performed with notation of increased velocity in the right anterior tibial artery which may reflect hemodynamically significant stenosis with pre exercise COLLINS of 0.62 on the RLE and 0.58 on the LLE indicative of moderate arterial disease >>> patient will follow up outpt with vascular as scheduled. Venous LE US noted partially occlusive DVT in the upper right femoral vein and patient was initiated on heparin gtt >>> transitioned to renal dose apixaban 09/11. Left knee xray completed due to pain; no acute pathology noted.     Disposition: home with daughter, resumed HH at [place], outpt follow up with PCP, Cardiology and Vascular follow ups as scheduled.

## 2019-09-10 NOTE — ASSESSMENT & PLAN NOTE
-BP not well controlled at current  -continue amlodipine and carvedilol  -additional agents/dose adjustments as needed   -monitor

## 2019-09-10 NOTE — ASSESSMENT & PLAN NOTE
-BERNARDO on CKD with admission SCr 1.9; baseline ~ 1.4  -likely cardiorenal in nature   -monitor with diuresis   -hold losartan in setting of BERNARDO  -avoid nephrotoxins and hypotension as able, renally dose medications  -monitor

## 2019-09-10 NOTE — ASSESSMENT & PLAN NOTE
-admitted 09/09 due to CHF exacerbation and LE swelling in setting of known moderate AS  -in the ED: hypertensive, leukocytosis w/ left shift on arrival; 88% O2 sat on RA  on arrival, placed on 4L NC - reports no oxygen use at home.  NTG spray x 1 with full relief of chest pain.  Lasix 40 mg IVP given. Cr elevated 1.9 (BL 1.4) Troponin 0.060>>0.102>>0.135, . UA unremarkable. CXR stable, unchanged from Jan 2019 nothing bilateral pleural plaque and bilateral pulmonary opacicites. EKG unchanged from previous, SR with PACs  -per chart review TTE 1/2019: mod LV enlargement; est. EF 33%; severe eccentric LV hypertrophy; local segmental wall abnormalities; severe atrial enlargement; low normal RV systolic function; mild R atrial enlargement; mod to severe aortic valve stenosis; mod mitral sclerosis; mod leaflet calcification of mitral valve; mod MR; mild MS  - stress echo in March 2019 noted Aortic valve area at baseline: 1.00 cm2; peak velocity is 3.2 m/s; mean gradient is 23 mmHg. Aortic valve area with dobutamine: 1.0 cm² with a peak velocity of 4.02 m/sec and a mean gradient of 31 mmHg  -TTE this visit noted mild LVE, EF 45%, normal RV function, LV DD, severe LAE, mild to moderate mR, severe AS with  valve area is 0.82 cm2; peak velocity is 3.57 m/s; mean gradient is 32 mmHg, and CVP 3  -diuresis was initiated with IVP lasix, currently net negative 1.1 liter  -continue amlodipine and carvedilol for BP control,  no ACEi/ARB/ARNI due to BERNARDO on CKD  -additional agents PRN for BP control  -regarding LE swelling   -arterial LE US performed with notation of increased velocity in the right anterior tibial artery which may reflect hemodynamically significant stenosis >>> patient will follow up outpt with vascular as scheduled   -venous LE US noted Partially occlusive DVT in the upper right femoral vein and patient was initiated on heparin gtt >>> transition to DOAC soon   -left knee xray pending due to patient  complaints of pain  -continue tele monitoring  -cardiac diet with fluid restriction  -strict I&Os and daily weights  -will need outpt follow up with PCP  -Cardiology and Vascular follow ups as scheduled  -consider HH arrangement at [place]

## 2019-09-10 NOTE — ASSESSMENT & PLAN NOTE
-chest pain free and HDS  -continue ASA, atorvastatin  -troponin trended  0.060 >> 0.102 >>0.135 >> 0.162 >> 0.141 in setting of hypervolemia, uncontrolled HTN and BERNARDO on CKD  -continue tele monitoring  -EKG and SL NTG PRN chest pain  -cardiac diet in house

## 2019-09-10 NOTE — PLAN OF CARE
Problem: Adult Inpatient Plan of Care  Goal: Plan of Care Review  Outcome: Ongoing (interventions implemented as appropriate)  Plan of care reviewed with pt. Pt verbalized understanding. Positioning for comfort. Incontinence care performed. Labs drawn for ptt/ heparin gtt. Wound care performed. No direct needs voiced. Nadn; report changes to oncoming shift.

## 2019-09-10 NOTE — PROGRESS NOTES
[facility] Hospital Medicine  Progress Note    Patient Name: Jeniffer Taylor  MRN: [id number]  Patient Class: IP- Inpatient   Admission Date: 9/9/2019  Length of Stay: 0 days  Attending Physician: Steve Oshea MD  Primary Care Provider: Shari Chauhan MD    [facility] Medicine Team: [facility] J Taylor Castro NP    Subjective:     Principal Problem:Acute on chronic combined systolic and diastolic congestive heart failure    HPI:  Mr. Taylor is a pleasant 89 y/o AAM with HTN, CHF, HLD, hx of MI, PVD, AS, DM, and CKD Stage 3 presenting to the ED with chest pain. Chest pain woke him at around 2:30 in the morning, located in the center of his chest and does not radiate. He describes it as a pressure on his chest. He took 4 aspirins at home with minimal relief.  He rated the initial pain as a 5/10 and is currently in no pain.  He received one spray of NTG from EMS. He states he has not had any pain like this before, although he does not remember what his previous MIs have felt like because they were so long ago. Additionally, he complains of fluid on BLE. He states his legs have been like this for 3-4 months and he can no longer stand or walk because of this. He is compliant with Lasix 40mg BID and wears compression stockings at home. He admits to chest pain, SOB, edema, and productive cough with clear/yellow phlegm; denies fatigue fevers, chills, abdominal pain, N/V/D, headache, diaphoresis. All past medical, social, and family history reviewed.     ED: hypertensive, leukocytosis w/ left shift on arrival; 88% O2 sat on RA  on arrival, placed on 4L NC - reports no oxygen use at home.  NTG spray x 1 with full relief of chest pain.  Lasix 40 mg IVP given. Cr elevated 1.9 (BL 1.4) Troponin 0.060>>0.102>>0.135, continue trending. . UA unremarkable. CXR stable, unchanged from Jan 2019 nothing bilateral pleural plaque and bilateral pulmonary opacicites. EKG unchanged from previous, SR  with PACs. The patient was admitted to the Hospital Medicine Service for further evaluation and management.     Overview/Hospital Course:  Mr. Taylor was admitted 09/09 due to CHF exacerbation and LE swelling in setting of known moderate AS. TTE noted mild LVE, EF 45%, normal RV function, LV DD, severe LAE, mild to moderate mR, severe AS with  valve area is 0.82 cm2; peak velocity is 3.57 m/s; mean gradient is 32 mmHg, and CVP 3. His diuresis was initiated with IVP lasix, currently net negative 1.1 liter. Continue amlodipine and carvedilol for BP control, no ACEi/ARB/ARNI due to BERNARDO on CKD.     Regarding LE swelling: arterial LE US performed with notation of increased velocity in the right anterior tibial artery which may reflect hemodynamically significant stenosis >>> patient will follow up outpt with vascular as scheduled. Venous LE US noted Partially occlusive DVT in the upper right femoral vein and patient was initiated on heparin gtt >>> transition to DOAC soon. Left knee xray pending due to patient complaints of pain.    Disposition plans: home when medically ready, consider HH arrangement, will need outpt follow up with PCP. Cardiology and Vascular follow ups as scheduled.     Interval History: Resting in bed, denies cough, shortness of breath, or chest pain. He does note ongoing LE swelling and main complaint is L knee pain. He was updated on US results and plan of care. He denies needs or complaints at this time.     Review of Systems   Constitutional: Positive for activity change. Negative for appetite change, chills, diaphoresis, fatigue and fever.   HENT: Negative for congestion, sinus pressure, sneezing, sore throat and trouble swallowing.    Eyes: Negative for visual disturbance.   Respiratory: Negative for cough, shortness of breath and wheezing.    Cardiovascular: Positive for leg swelling. Negative for chest pain and palpitations.   Gastrointestinal: Negative for abdominal distention, abdominal  pain, constipation, diarrhea, nausea and vomiting.   Genitourinary: Negative for decreased urine volume, difficulty urinating, dysuria, hematuria and urgency.        Pt recently incontinent - unaware when urinating x 3-4 weeks per son   Musculoskeletal: Positive for arthralgias (shoulder pain (chronic) and left knee pain) and gait problem (unable to stand/walk secondary to leg swelling). Negative for myalgias.   Skin: Positive for wound. Negative for pallor and rash.   Neurological: Positive for weakness. Negative for dizziness, syncope, light-headedness and headaches.   Psychiatric/Behavioral: Negative for agitation and confusion. The patient is not nervous/anxious.      Objective:     Vital Signs (Most Recent):  Temp: 97.6 °F (36.4 °C) (09/10/19 1235)  Pulse: (!) 56 (09/10/19 1501)  Resp: 17 (09/10/19 1235)  BP: (!) 163/74 (09/10/19 1235)  SpO2: 96 % (09/10/19 1235) Vital Signs (24h Range):  Temp:  [97.1 °F (36.2 °C)-97.9 °F (36.6 °C)] 97.6 °F (36.4 °C)  Pulse:  [55-62] 56  Resp:  [13-17] 17  SpO2:  [95 %-96 %] 96 %  BP: (160-181)/(74-76) 163/74     Weight: 79.4 kg (175 lb)  Body mass index is 25.84 kg/m².    Intake/Output Summary (Last 24 hours) at 9/10/2019 1533  Last data filed at 9/10/2019 0900  Gross per 24 hour   Intake 480 ml   Output 1000 ml   Net -520 ml      Physical Exam   Constitutional: He is oriented to person, place, and time. He appears well-developed and well-nourished. No distress.   HENT:   Head: Normocephalic and atraumatic.   Mouth/Throat: No oropharyngeal exudate.   Eyes: Conjunctivae and EOM are normal.   Neck: Normal range of motion. Neck supple. No JVD present.   Cardiovascular: Normal rate and regular rhythm.   Murmur heard.  Pulmonary/Chest: Effort normal and breath sounds normal. No respiratory distress.   Abdominal: Soft. Bowel sounds are normal. He exhibits no distension. There is no tenderness.   Musculoskeletal: He exhibits edema (2+ pitting BLE knees to feet). He exhibits no  tenderness.   Strength ROM 3/5 all extremities   Neurological: He is alert and oriented to person, place, and time.   Skin: Skin is warm and dry. Capillary refill takes 2 to 3 seconds. He is not diaphoretic.   Bilateral lower extremities tender to touch with scattered ulcers; excoriations noted around left leg ulcers; consistent with venous stasis  Noted stage 2 decubitus ulcer to sacral area   Psychiatric: He has a normal mood and affect. His behavior is normal.   Nursing note and vitals reviewed.    Significant Labs:   Blood Culture:   Recent Labs   Lab 09/09/19  0946 09/09/19  0951   LABBLOO No Growth to date  No Growth to date No Growth to date  No Growth to date     CBC:   Recent Labs   Lab 09/09/19  0454 09/09/19  2306 09/10/19  0535   WBC 13.59* 11.29 10.26  10.26   HGB 10.5* 9.9* 9.4*  9.4*   HCT 30.6* 28.4* 26.5*  26.5*   * 131* 129*  129*     CMP:   Recent Labs   Lab 09/09/19  0454 09/10/19  0535    139   K 4.1 4.0    103   CO2 23 25   * 124*   BUN 48* 46*   CREATININE 1.9* 1.9*   CALCIUM 8.9 8.7   PROT 7.3  --    ALBUMIN 3.8  --    BILITOT 0.5  --    ALKPHOS 106  --    AST 23  --    ALT 15  --    ANIONGAP 13 11   EGFRNONAA 30.4* 30.4*     Cardiac Markers:   Recent Labs   Lab 09/09/19  0454   *     Magnesium:   Recent Labs   Lab 09/09/19  0454   MG 2.2     Troponin:   Recent Labs   Lab 09/09/19  1233 09/09/19  1555 09/10/19  0535   TROPONINI 0.135* 0.162* 0.141*     Urine Studies:    Recent Labs   Lab 09/09/19  0816   COLORU Yellow   APPEARANCEUA Clear   PHUR 5.0   SPECGRAV 1.010   PROTEINUA Negative   GLUCUA Negative   KETONESU Negative   BILIRUBINUA Negative   OCCULTUA Negative   NITRITE Negative   LEUKOCYTESUR Negative     All pertinent labs within the past 24 hours have been reviewed.    Significant Imaging: I have reviewed all pertinent imaging results/findings within the past 24 hours.    Assessment/Plan:      * Acute on chronic combined systolic and diastolic  congestive heart failure  -admitted 09/09 due to CHF exacerbation and LE swelling in setting of known moderate AS  -in the ED: hypertensive, leukocytosis w/ left shift on arrival; 88% O2 sat on RA  on arrival, placed on 4L NC - reports no oxygen use at home.  NTG spray x 1 with full relief of chest pain.  Lasix 40 mg IVP given. Cr elevated 1.9 (BL 1.4) Troponin 0.060>>0.102>>0.135, . UA unremarkable. CXR stable, unchanged from Jan 2019 nothing bilateral pleural plaque and bilateral pulmonary opacicites. EKG unchanged from previous, SR with PACs  -per chart review TTE 1/2019: mod LV enlargement; est. EF 33%; severe eccentric LV hypertrophy; local segmental wall abnormalities; severe atrial enlargement; low normal RV systolic function; mild R atrial enlargement; mod to severe aortic valve stenosis; mod mitral sclerosis; mod leaflet calcification of mitral valve; mod MR; mild MS  - stress echo in March 2019 noted Aortic valve area at baseline: 1.00 cm2; peak velocity is 3.2 m/s; mean gradient is 23 mmHg. Aortic valve area with dobutamine: 1.0 cm² with a peak velocity of 4.02 m/sec and a mean gradient of 31 mmHg  -TTE this visit noted mild LVE, EF 45%, normal RV function, LV DD, severe LAE, mild to moderate mR, severe AS with  valve area is 0.82 cm2; peak velocity is 3.57 m/s; mean gradient is 32 mmHg, and CVP 3  -diuresis was initiated with IVP lasix, currently net negative 1.1 liter  -continue amlodipine and carvedilol for BP control,  no ACEi/ARB/ARNI due to BERNARDO on CKD  -additional agents PRN for BP control  -regarding LE swelling   -arterial LE US performed with notation of increased velocity in the right anterior tibial artery which may reflect hemodynamically significant stenosis >>> patient will follow up outpt with vascular as scheduled   -venous LE US noted Partially occlusive DVT in the upper right femoral vein and patient was initiated on heparin gtt >>> transition to DOAC soon   -left knee xray  pending due to patient complaints of pain  -continue tele monitoring  -cardiac diet with fluid restriction  -strict I&Os and daily weights  -will need outpt follow up with PCP  -Cardiology and Vascular follow ups as scheduled  -consider HH arrangement at [place]    Aortic stenosis  -see above  -outpt follow up with [facility] as scheduled     Ischemic cardiomyopathy  -see CHF for detailed plans    Hypertensive heart disease with heart failure  -BP not well controlled at current  -continue amlodipine and carvedilol  -additional agents/dose adjustments as needed   -monitor     Acute deep vein thrombosis (DVT) of right lower extremity  -arterial LE US performed with notation of increased velocity in the right anterior tibial artery which may reflect hemodynamically significant stenosis >>> patient will follow up outpt with vascular as scheduled  -venous LE US noted Partially occlusive DVT in the upper right femoral vein and patient was initiated on heparin gtt >>> transition to DOAC soon  -left knee xray pending due to patient complaints of pain  -elevate extremities as tolerated    Tibial artery stenosis, right  -see above     History of MI (myocardial infarction)  -see above     CAD (coronary artery disease)  -chest pain free and HDS  -continue ASA, atorvastatin  -troponin trended  0.060 >> 0.102 >>0.135 >> 0.162 >> 0.141 in setting of hypervolemia, uncontrolled HTN and BERNARDO on CKD  -continue tele monitoring  -EKG and SL NTG PRN chest pain  -cardiac diet in house    Leukocytosis  WBC of 13 on admission, unclear etiology, afebrile >>> now resolved   -CXR with bilateral opacities, stable from previous  -UA unremarkable; blood cultures with NGTD  -possibly related to stage 2 decubitus sore or open wounds on legs versus stress response in setting of CHF exacerbation and LE DVT  -wound care consulted  -monitor     Acute kidney injury superimposed on chronic kidney disease  -BERNARDO on CKD with admission SCr 1.9; baseline ~  1.4  -likely cardiorenal in nature   -monitor with diuresis   -hold losartan in setting of BERNARDO  -avoid nephrotoxins and hypotension as able, renally dose medications  -monitor     Type II diabetes mellitus with neurological manifestations  -controlled on glipizide at home  -low dose SSI during stay  -monitor accuchecks AC/HS and PRN hypoglycemic protocol   -further monitoring and management per PCP     Thrombocytopenia  -platelets 145 on admission, baseline  -no evidence of bleeding   -monitor with heparin gtt initiation   -monitor over hospital course     Prostate CA  -Leuprolide q6 months as scheduled  -follows with Urology    Frequent falls  Lumbar spinal stenosis  -follows with pain management, MRI showed severe stenosis at L2-L5  -PT/OT consult  -new 3 month debility at home, using wheelchair  -fall precautions    Pressure injury of sacral region, stage 2  -wound care following  -frequent repositioning per facility protocol     VTE Risk Mitigation (From admission, onward)        Ordered     heparin 25,000 units in dextrose 5% 250 mL (100 units/mL) infusion HIGH INTENSITY nomogram - [facility]  Continuous      09/09/19 2225     heparin 25,000 units in dextrose 5% (100 units/ml) IV bolus from bag - ADDITIONAL PRN BOLUS - 60 units/kg  As needed (PRN)      09/09/19 2225     heparin 25,000 units in dextrose 5% (100 units/ml) IV bolus from bag - ADDITIONAL PRN BOLUS - 30 units/kg  As needed (PRN)      09/09/19 2225     Place BERT hose  Until discontinued      09/09/19 0815     Place sequential compression device  Until discontinued      09/09/19 0815     IP VTE HIGH RISK PATIENT  Once      09/09/19 0815          Taylor Castro, DNP, AG-ACNP, BC  Department of Hospital Medicine  [facility]  Pager [phone]  [facility] [id number]

## 2019-09-10 NOTE — ASSESSMENT & PLAN NOTE
-controlled on glipizide at home  -low dose SSI during stay  -monitor accuchecks AC/HS and PRN hypoglycemic protocol   -further monitoring and management per PCP

## 2019-09-10 NOTE — SUBJECTIVE & OBJECTIVE
Interval History: Resting in bed, denies cough, shortness of breath, or chest pain. He does note ongoing LE swelling and main complaint is L knee pain. He was updated on US results and plan of care. He denies needs or complaints at this time.     Review of Systems   Constitutional: Positive for activity change. Negative for appetite change, chills, diaphoresis, fatigue and fever.   HENT: Negative for congestion, sinus pressure, sneezing, sore throat and trouble swallowing.    Eyes: Negative for visual disturbance.   Respiratory: Negative for cough, shortness of breath and wheezing.    Cardiovascular: Positive for leg swelling. Negative for chest pain and palpitations.   Gastrointestinal: Negative for abdominal distention, abdominal pain, constipation, diarrhea, nausea and vomiting.   Genitourinary: Negative for decreased urine volume, difficulty urinating, dysuria, hematuria and urgency.        Pt recently incontinent - unaware when urinating x 3-4 weeks per son   Musculoskeletal: Positive for arthralgias (shoulder pain (chronic) and left knee pain) and gait problem (unable to stand/walk secondary to leg swelling). Negative for myalgias.   Skin: Positive for wound. Negative for pallor and rash.   Neurological: Positive for weakness. Negative for dizziness, syncope, light-headedness and headaches.   Psychiatric/Behavioral: Negative for agitation and confusion. The patient is not nervous/anxious.      Objective:     Vital Signs (Most Recent):  Temp: 97.6 °F (36.4 °C) (09/10/19 1235)  Pulse: (!) 56 (09/10/19 1501)  Resp: 17 (09/10/19 1235)  BP: (!) 163/74 (09/10/19 1235)  SpO2: 96 % (09/10/19 1235) Vital Signs (24h Range):  Temp:  [97.1 °F (36.2 °C)-97.9 °F (36.6 °C)] 97.6 °F (36.4 °C)  Pulse:  [55-62] 56  Resp:  [13-17] 17  SpO2:  [95 %-96 %] 96 %  BP: (160-181)/(74-76) 163/74     Weight: 79.4 kg (175 lb)  Body mass index is 25.84 kg/m².    Intake/Output Summary (Last 24 hours) at 9/10/2019 6853  Last data filed at  9/10/2019 0900  Gross per 24 hour   Intake 480 ml   Output 1000 ml   Net -520 ml      Physical Exam   Constitutional: He is oriented to person, place, and time. He appears well-developed and well-nourished. No distress.   HENT:   Head: Normocephalic and atraumatic.   Mouth/Throat: No oropharyngeal exudate.   Eyes: Conjunctivae and EOM are normal.   Neck: Normal range of motion. Neck supple. No JVD present.   Cardiovascular: Normal rate and regular rhythm.   Murmur heard.  Pulmonary/Chest: Effort normal and breath sounds normal. No respiratory distress.   Abdominal: Soft. Bowel sounds are normal. He exhibits no distension. There is no tenderness.   Musculoskeletal: He exhibits edema (2+ pitting BLE knees to feet). He exhibits no tenderness.   Strength ROM 3/5 all extremities   Neurological: He is alert and oriented to person, place, and time.   Skin: Skin is warm and dry. Capillary refill takes 2 to 3 seconds. He is not diaphoretic.   Bilateral lower extremities tender to touch with scattered ulcers; excoriations noted around left leg ulcers; consistent with venous stasis  Noted stage 2 decubitus ulcer to sacral area   Psychiatric: He has a normal mood and affect. His behavior is normal.   Nursing note and vitals reviewed.    Significant Labs:   Blood Culture:   Recent Labs   Lab 09/09/19  0946 09/09/19  0951   LABBLOO No Growth to date  No Growth to date No Growth to date  No Growth to date     CBC:   Recent Labs   Lab 09/09/19  0454 09/09/19  2306 09/10/19  0535   WBC 13.59* 11.29 10.26  10.26   HGB 10.5* 9.9* 9.4*  9.4*   HCT 30.6* 28.4* 26.5*  26.5*   * 131* 129*  129*     CMP:   Recent Labs   Lab 09/09/19  0454 09/10/19  0535    139   K 4.1 4.0    103   CO2 23 25   * 124*   BUN 48* 46*   CREATININE 1.9* 1.9*   CALCIUM 8.9 8.7   PROT 7.3  --    ALBUMIN 3.8  --    BILITOT 0.5  --    ALKPHOS 106  --    AST 23  --    ALT 15  --    ANIONGAP 13 11   EGFRNONAA 30.4* 30.4*     Cardiac  Markers:   Recent Labs   Lab 09/09/19  0454   *     Magnesium:   Recent Labs   Lab 09/09/19  0454   MG 2.2     Troponin:   Recent Labs   Lab 09/09/19  1233 09/09/19  1555 09/10/19  0535   TROPONINI 0.135* 0.162* 0.141*     Urine Studies:    Recent Labs   Lab 09/09/19  0816   COLORU Yellow   APPEARANCEUA Clear   PHUR 5.0   SPECGRAV 1.010   PROTEINUA Negative   GLUCUA Negative   KETONESU Negative   BILIRUBINUA Negative   OCCULTUA Negative   NITRITE Negative   LEUKOCYTESUR Negative     All pertinent labs within the past 24 hours have been reviewed.    Significant Imaging: I have reviewed all pertinent imaging results/findings within the past 24 hours.

## 2019-09-10 NOTE — ASSESSMENT & PLAN NOTE
-platelets 145 on admission, baseline  -no evidence of bleeding   -monitor with heparin gtt initiation   -monitor over hospital course

## 2019-09-10 NOTE — ASSESSMENT & PLAN NOTE
Lumbar spinal stenosis  -follows with pain management, MRI showed severe stenosis at L2-L5  -PT/OT consult  -new 3 month debility at home, using wheelchair  -fall precautions

## 2019-09-10 NOTE — PLAN OF CARE
CM met with patient and patient's daughter, Marisela ([phone]) to complete discharge assessment and planning. Patient lives with his adult son. [facility]  when medically stable for discharge.    PCP: Shari Chauhan MD    Pharmacy:   [facility] - [place], [place] - [address]  [address] [address]  Phone: [phone] Fax: [phone]    [facility] - [place], [place] - [address]  [address]  [facility] [address]  Phone: [phone] Fax: [phone]    [facility] [address] [address]  Phone: [phone] Fax: [phone]    Payor: HU[employer] MANAGED MEDICARE / Plan: HU[employer] MEDICARE HMO / Product Type: Capitation /      09/10/19 1655   Discharge Assessment   Assessment Type Discharge Planning Assessment   Confirmed/corrected address and phone number on facesheet? Yes   Assessment information obtained from? Patient;Caregiver  (Marisela Carbajal [phone])   Communicated expected length of stay with patient/caregiver yes   Prior to hospitilization cognitive status: Alert/Oriented   Prior to hospitalization functional status: Assistive Equipment;Needs Assistance   Current cognitive status: Alert/Oriented   Lives With child(kin), adult   Able to Return to Prior Arrangements yes   Is patient able to care for self after discharge? Yes   Who are your caregiver(s) and their phone number(s)? Marisela Carbajal [phone]    Patient's perception of discharge disposition home or selfcare;home health   Readmission Within the Last 30 Days no previous admission in last 30 days   Patient currently being followed by outpatient case management? Unable to determine (comments)   Patient currently receives any other outside agency services? Yes   Name and contact number of agency or person providing outside services [facility]   Is it the patient/care giver preference to resume  care with the current outside agency? Yes   Equipment Currently Used at Home cane, straight;walker, rolling;bedside commode   Do you have any problems affording any of your prescribed medications? TBD   Is the patient taking medications as prescribed? yes   Does the patient have transportation home? Yes   Transportation Anticipated family or friend will provide   Dialysis Name and Scheduled days n/a   Does the patient receive services at the [facility]? No   Discharge Plan A Home;Home with family   Discharge Plan B Home;Home with family   DME Needed Upon Discharge  other (see comments)  (TBD)   Patient/Family in Agreement with Plan yes

## 2019-09-10 NOTE — ASSESSMENT & PLAN NOTE
WBC of 13 on admission, unclear etiology, afebrile >>> now resolved   -CXR with bilateral opacities, stable from previous  -UA unremarkable; blood cultures with NGTD  -possibly related to stage 2 decubitus sore or open wounds on legs versus stress response in setting of CHF exacerbation and LE DVT  -wound care consulted  -monitor

## 2019-09-10 NOTE — ASSESSMENT & PLAN NOTE
-arterial LE US performed with notation of increased velocity in the right anterior tibial artery which may reflect hemodynamically significant stenosis >>> patient will follow up outpt with vascular as scheduled  -venous LE US noted Partially occlusive DVT in the upper right femoral vein and patient was initiated on heparin gtt >>> transition to DOAC soon  -left knee xray pending due to patient complaints of pain  -elevate extremities as tolerated

## 2019-09-10 NOTE — NURSING
Critical ptt called of ptt > 150. Dr. Kwon with med team J notified. No new orders. Heparin gtt held per orders. Heparin to resume at 0830.

## 2019-09-10 NOTE — SIGNIFICANT EVENT
Paged by radiology regarding ultrasound read.  Noted non-occlusive DVT.  Will order heparin for treatment at this time. Further labs reviewed, noted anemia but appears stable at 10.5.  Day team for further consideration for anticoagulation.    Venu Chaparro M.D.  Assistant Staff- [facility]  Chief Medical Resident - Internal Medicine   [phone]

## 2019-09-11 PROBLEM — R23.9 ALTERATION IN SKIN INTEGRITY: Status: ACTIVE | Noted: 2019-09-11

## 2019-09-11 PROBLEM — L89.93 PRESSURE INJURY, STAGE 3: Status: ACTIVE | Noted: 2019-09-11

## 2019-09-11 LAB
ANION GAP SERPL CALC-SCNC: 12 MMOL/L (ref 8–16)
APTT BLDCRRT: 60.7 SEC (ref 21–32)
APTT BLDCRRT: 75 SEC (ref 21–32)
BASOPHILS # BLD AUTO: 0.03 K/UL (ref 0–0.2)
BASOPHILS NFR BLD: 0.3 % (ref 0–1.9)
BUN SERPL-MCNC: 44 MG/DL (ref 8–23)
CALCIUM SERPL-MCNC: 8.5 MG/DL (ref 8.7–10.5)
CHLORIDE SERPL-SCNC: 105 MMOL/L (ref 95–110)
CO2 SERPL-SCNC: 25 MMOL/L (ref 23–29)
CREAT SERPL-MCNC: 1.8 MG/DL (ref 0.5–1.4)
DIFFERENTIAL METHOD: ABNORMAL
EOSINOPHIL # BLD AUTO: 0.2 K/UL (ref 0–0.5)
EOSINOPHIL NFR BLD: 2.5 % (ref 0–8)
ERYTHROCYTE [DISTWIDTH] IN BLOOD BY AUTOMATED COUNT: 14.4 % (ref 11.5–14.5)
EST. GFR  (AFRICAN AMERICAN): 37.5 ML/MIN/1.73 M^2
EST. GFR  (NON AFRICAN AMERICAN): 32.4 ML/MIN/1.73 M^2
GLUCOSE SERPL-MCNC: 116 MG/DL (ref 70–110)
HCT VFR BLD AUTO: 26 % (ref 40–54)
HGB BLD-MCNC: 9 G/DL (ref 14–18)
IMM GRANULOCYTES # BLD AUTO: 0.04 K/UL (ref 0–0.04)
IMM GRANULOCYTES NFR BLD AUTO: 0.5 % (ref 0–0.5)
LYMPHOCYTES # BLD AUTO: 1.2 K/UL (ref 1–4.8)
LYMPHOCYTES NFR BLD: 14.3 % (ref 18–48)
MAGNESIUM SERPL-MCNC: 2.1 MG/DL (ref 1.6–2.6)
MCH RBC QN AUTO: 27.7 PG (ref 27–31)
MCHC RBC AUTO-ENTMCNC: 34.6 G/DL (ref 32–36)
MCV RBC AUTO: 80 FL (ref 82–98)
MONOCYTES # BLD AUTO: 0.8 K/UL (ref 0.3–1)
MONOCYTES NFR BLD: 9.7 % (ref 4–15)
NEUTROPHILS # BLD AUTO: 6.3 K/UL (ref 1.8–7.7)
NEUTROPHILS NFR BLD: 72.7 % (ref 38–73)
NRBC BLD-RTO: 0 /100 WBC
PLATELET # BLD AUTO: 122 K/UL (ref 150–350)
PMV BLD AUTO: 12.1 FL (ref 9.2–12.9)
POCT GLUCOSE: 134 MG/DL (ref 70–110)
POCT GLUCOSE: 138 MG/DL (ref 70–110)
POCT GLUCOSE: 150 MG/DL (ref 70–110)
POTASSIUM SERPL-SCNC: 3.7 MMOL/L (ref 3.5–5.1)
RBC # BLD AUTO: 3.25 M/UL (ref 4.6–6.2)
SODIUM SERPL-SCNC: 142 MMOL/L (ref 136–145)
WBC # BLD AUTO: 8.65 K/UL (ref 3.9–12.7)

## 2019-09-11 PROCEDURE — 97161 PT EVAL LOW COMPLEX 20 MIN: CPT | Mod: HCNC

## 2019-09-11 PROCEDURE — 25000003 PHARM REV CODE 250: Mod: HCNC | Performed by: PHYSICIAN ASSISTANT

## 2019-09-11 PROCEDURE — 99233 SBSQ HOSP IP/OBS HIGH 50: CPT | Mod: HCNC,,, | Performed by: NURSE PRACTITIONER

## 2019-09-11 PROCEDURE — 85730 THROMBOPLASTIN TIME PARTIAL: CPT | Mod: HCNC

## 2019-09-11 PROCEDURE — 63600175 PHARM REV CODE 636 W HCPCS: Mod: HCNC | Performed by: PHYSICIAN ASSISTANT

## 2019-09-11 PROCEDURE — 11000001 HC ACUTE MED/SURG PRIVATE ROOM: Mod: HCNC

## 2019-09-11 PROCEDURE — 80048 BASIC METABOLIC PNL TOTAL CA: CPT | Mod: HCNC

## 2019-09-11 PROCEDURE — 25000003 PHARM REV CODE 250: Mod: HCNC | Performed by: NURSE PRACTITIONER

## 2019-09-11 PROCEDURE — 97530 THERAPEUTIC ACTIVITIES: CPT | Mod: HCNC

## 2019-09-11 PROCEDURE — 85025 COMPLETE CBC W/AUTO DIFF WBC: CPT | Mod: HCNC

## 2019-09-11 PROCEDURE — 83735 ASSAY OF MAGNESIUM: CPT | Mod: HCNC

## 2019-09-11 PROCEDURE — 99233 PR SUBSEQUENT HOSPITAL CARE,LEVL III: ICD-10-PCS | Mod: HCNC,,, | Performed by: NURSE PRACTITIONER

## 2019-09-11 RX ORDER — LANOLIN ALCOHOL/MO/W.PET/CERES
800 CREAM (GRAM) TOPICAL 2 TIMES DAILY
Status: DISCONTINUED | OUTPATIENT
Start: 2019-09-11 | End: 2019-09-11

## 2019-09-11 RX ORDER — POTASSIUM CHLORIDE 750 MG/1
30 CAPSULE, EXTENDED RELEASE ORAL ONCE
Status: COMPLETED | OUTPATIENT
Start: 2019-09-11 | End: 2019-09-11

## 2019-09-11 RX ADMIN — HYDRALAZINE HYDROCHLORIDE 50 MG: 50 TABLET ORAL at 09:09

## 2019-09-11 RX ADMIN — ACETAMINOPHEN 650 MG: 325 TABLET ORAL at 07:09

## 2019-09-11 RX ADMIN — POTASSIUM CHLORIDE 30 MEQ: 750 CAPSULE, EXTENDED RELEASE ORAL at 12:09

## 2019-09-11 RX ADMIN — ATORVASTATIN CALCIUM 80 MG: 20 TABLET, FILM COATED ORAL at 07:09

## 2019-09-11 RX ADMIN — APIXABAN 10 MG: 5 TABLET, FILM COATED ORAL at 12:09

## 2019-09-11 RX ADMIN — CARVEDILOL 6.25 MG: 6.25 TABLET, FILM COATED ORAL at 05:09

## 2019-09-11 RX ADMIN — APIXABAN 10 MG: 5 TABLET, FILM COATED ORAL at 09:09

## 2019-09-11 RX ADMIN — CARVEDILOL 6.25 MG: 6.25 TABLET, FILM COATED ORAL at 07:09

## 2019-09-11 RX ADMIN — BICALUTAMIDE 50 MG: 50 TABLET ORAL at 07:09

## 2019-09-11 RX ADMIN — FUROSEMIDE 40 MG: 10 INJECTION, SOLUTION INTRAMUSCULAR; INTRAVENOUS at 05:09

## 2019-09-11 RX ADMIN — ASPIRIN 81 MG: 81 TABLET, COATED ORAL at 07:09

## 2019-09-11 RX ADMIN — LATANOPROST 1 DROP: 50 SOLUTION OPHTHALMIC at 09:09

## 2019-09-11 RX ADMIN — FUROSEMIDE 40 MG: 10 INJECTION, SOLUTION INTRAMUSCULAR; INTRAVENOUS at 07:09

## 2019-09-11 RX ADMIN — AMLODIPINE BESYLATE 10 MG: 10 TABLET ORAL at 07:09

## 2019-09-11 RX ADMIN — HYDRALAZINE HYDROCHLORIDE 50 MG: 50 TABLET ORAL at 07:09

## 2019-09-11 NOTE — ASSESSMENT & PLAN NOTE
-BERNARDO on CKD with admission SCr 1.9 >>> 1.8  -baseline ~ 1.4  -likely cardiorenal in nature   -monitor with diuresis   -hold losartan in setting of BERNARDO  -avoid nephrotoxins and hypotension as able, renally dose medications  -monitor

## 2019-09-11 NOTE — PROGRESS NOTES
[facility] Hospital Medicine  Progress Note    Patient Name: Jeniffer Taylor  MRN: [id number]  Patient Class: IP- Inpatient   Admission Date: 9/9/2019  Length of Stay: 1 days  Attending Physician: Steve Oshea MD  Primary Care Provider: Shari Chauhan MD    [facility] Medicine Team: [facility] J Taylor Castro NP    Subjective:     Principal Problem:Acute on chronic combined systolic and diastolic congestive heart failure    HPI:  Mr. Taylor is a pleasant 89 y/o AAM with HTN, CHF, HLD, hx of MI, PVD, AS, DM, and CKD Stage 3 presenting to the ED with chest pain. Chest pain woke him at around 2:30 in the morning, located in the center of his chest and does not radiate. He describes it as a pressure on his chest. He took 4 aspirins at home with minimal relief.  He rated the initial pain as a 5/10 and is currently in no pain.  He received one spray of NTG from EMS. He states he has not had any pain like this before, although he does not remember what his previous MIs have felt like because they were so long ago. Additionally, he complains of fluid on BLE. He states his legs have been like this for 3-4 months and he can no longer stand or walk because of this. He is compliant with Lasix 40mg BID and wears compression stockings at home. He admits to chest pain, SOB, edema, and productive cough with clear/yellow phlegm; denies fatigue fevers, chills, abdominal pain, N/V/D, headache, diaphoresis. All past medical, social, and family history reviewed.     ED: hypertensive, leukocytosis w/ left shift on arrival; 88% O2 sat on RA  on arrival, placed on 4L NC - reports no oxygen use at home.  NTG spray x 1 with full relief of chest pain.  Lasix 40 mg IVP given. Cr elevated 1.9 (BL 1.4) Troponin 0.060>>0.102>>0.135, continue trending. . UA unremarkable. CXR stable, unchanged from Jan 2019 nothing bilateral pleural plaque and bilateral pulmonary opacicites. EKG unchanged from previous, SR  with PACs. The patient was admitted to the Hospital Medicine Service for further evaluation and management.     Overview/Hospital Course:  Mr. Taylor was admitted 09/09 due to CHF exacerbation and LE swelling in setting of known moderate AS. TTE noted mild LVE, EF 45%, normal RV function, LV DD, severe LAE, mild to moderate mR, severe AS with  valve area is 0.82 cm2; peak velocity is 3.57 m/s; mean gradient is 32 mmHg, and CVP 3. His diuresis was initiated with IVP lasix, currently net negative 1.8 liter; LE swelling improving. Renal function tolerating diuresis, SCr trending 1.9 >> 1.8. Continue amlodipine and carvedilol for BP control, no ACEi/ARB/ARNI due to BERNARDO on CKD.     Regarding LE swelling: arterial LE US performed with notation of increased velocity in the right anterior tibial artery which may reflect hemodynamically significant stenosis >>> patient will follow up outpt with vascular as scheduled. Venous LE US noted partially occlusive DVT in the upper right femoral vein and patient was initiated on heparin gtt >>> transitioned to renal dose apixaban 09/11. Left knee xray completed due to pain; no acute pathology noted.     Disposition plans: home when medically ready, resume HH at [place], will need outpt follow up with PCP. Cardiology and Vascular follow ups as scheduled.     Interval History: Resting in bed, he reports feeling well today. He denies needs or complaints. Denies shoulder or left knee pain. He does note some improvement in LE swelling. He denies chest pain, palpitations, or shortness of breath. Denies any acute events or distress overnight.     Review of Systems   Constitutional: Positive for activity change. Negative for appetite change, chills, diaphoresis, fatigue and fever.   HENT: Negative for congestion, sinus pressure, sneezing, sore throat and trouble swallowing.    Eyes: Negative for visual disturbance.   Respiratory: Negative for cough, shortness of breath and wheezing.     Cardiovascular: Positive for leg swelling. Negative for chest pain and palpitations.   Gastrointestinal: Negative for abdominal distention, abdominal pain, constipation, diarrhea, nausea and vomiting.   Genitourinary: Negative for decreased urine volume, difficulty urinating, dysuria, hematuria and urgency.        Pt recently incontinent - unaware when urinating x 3-4 weeks per son   Musculoskeletal: Positive for arthralgias (shoulder pain (chronic)) and gait problem (unable to stand/walk secondary to leg swelling). Negative for myalgias.   Skin: Positive for wound. Negative for pallor and rash.   Neurological: Positive for weakness. Negative for dizziness, syncope, light-headedness and headaches.   Psychiatric/Behavioral: Negative for agitation and confusion. The patient is not nervous/anxious.      Objective:     Vital Signs (Most Recent):  Temp: 97.4 °F (36.3 °C) (09/11/19 1113)  Pulse: 64 (09/11/19 1113)  Resp: 16 (09/11/19 1113)  BP: 130/63 (09/11/19 1113)  SpO2: (!) 91 % (09/11/19 1113) Vital Signs (24h Range):  Temp:  [97.4 °F (36.3 °C)-99.6 °F (37.6 °C)] 97.4 °F (36.3 °C)  Pulse:  [51-77] 64  Resp:  [14-16] 16  SpO2:  [91 %-96 %] 91 %  BP: (130-171)/(63-71) 130/63     Weight: 79.4 kg (175 lb)  Body mass index is 25.84 kg/m².    Intake/Output Summary (Last 24 hours) at 9/11/2019 1452  Last data filed at 9/11/2019 0800  Gross per 24 hour   Intake 240 ml   Output 450 ml   Net -210 ml      Physical Exam   Constitutional: He is oriented to person, place, and time. He appears well-developed and well-nourished. No distress.   HENT:   Head: Normocephalic and atraumatic.   Mouth/Throat: No oropharyngeal exudate.   Eyes: Conjunctivae and EOM are normal.   Neck: Normal range of motion. Neck supple. No JVD present.   Cardiovascular: Normal rate and regular rhythm.   Murmur heard.  Pulmonary/Chest: Effort normal and breath sounds normal. No respiratory distress.   Abdominal: Soft. Bowel sounds are normal. He exhibits  no distension. There is no tenderness.   Musculoskeletal: He exhibits edema (2+ RLE mid thigh to foot; 1+ LLE pitting edema mid shin to foot). He exhibits no tenderness.   Strength ROM 3/5 all extremities   Neurological: He is alert and oriented to person, place, and time.   Skin: Skin is warm and dry. Capillary refill takes 2 to 3 seconds. He is not diaphoretic.   Bilateral lower extremities tender to touch with scattered ulcers; excoriations noted around left leg ulcers; consistent with venous stasis  Noted stage 2 decubitus ulcer to sacral area   Psychiatric: He has a normal mood and affect. His behavior is normal.   Nursing note and vitals reviewed.    Significant Labs:   CBC:   Recent Labs   Lab 09/09/19  2306 09/10/19  0535 09/11/19  0559   WBC 11.29 10.26  10.26 8.65   HGB 9.9* 9.4*  9.4* 9.0*   HCT 28.4* 26.5*  26.5* 26.0*   * 129*  129* 122*     CMP:   Recent Labs   Lab 09/10/19  0535 09/11/19  0600    142   K 4.0 3.7    105   CO2 25 25   * 116*   BUN 46* 44*   CREATININE 1.9* 1.8*   CALCIUM 8.7 8.5*   ANIONGAP 11 12   EGFRNONAA 30.4* 32.4*     Magnesium:   Recent Labs   Lab 09/11/19  0600   MG 2.1     Troponin:   Recent Labs   Lab 09/09/19  1555 09/10/19  0535   TROPONINI 0.162* 0.141*     Microbiology Results (last 7 days)     Procedure Component Value Units Date/Time    Blood culture [[id number]] Collected:  09/09/19 0946    Order Status:  Completed Specimen:  Blood from Peripheral, Antecubital, Left Updated:  09/11/19 1212     Blood Culture, Routine No Growth to date      No Growth to date      No Growth to date    Blood culture [[id number]] Collected:  09/09/19 0951    Order Status:  Completed Specimen:  Blood from Peripheral, Hand, Right Updated:  09/11/19 1212     Blood Culture, Routine No Growth to date      No Growth to date      No Growth to date        All pertinent labs within the past 24 hours have been reviewed.    Significant Imaging: I have reviewed all pertinent  imaging results/findings within the past 24 hours.    Assessment/Plan:      * Acute on chronic combined systolic and diastolic congestive heart failure  -admitted 09/09 due to CHF exacerbation and LE swelling in setting of known moderate AS  -in the ED: hypertensive, leukocytosis w/ left shift on arrival; 88% O2 sat on RA  on arrival, placed on 4L NC - reports no oxygen use at home.  NTG spray x 1 with full relief of chest pain.  Lasix 40 mg IVP given. Cr elevated 1.9 (BL 1.4) Troponin 0.060>>0.102>>0.135, . UA unremarkable. CXR stable, unchanged from Jan 2019 nothing bilateral pleural plaque and bilateral pulmonary opacicites. EKG unchanged from previous, SR with PACs  -per chart review TTE 1/2019: mod LV enlargement; est. EF 33%; severe eccentric LV hypertrophy; local segmental wall abnormalities; severe atrial enlargement; low normal RV systolic function; mild R atrial enlargement; mod to severe aortic valve stenosis; mod mitral sclerosis; mod leaflet calcification of mitral valve; mod MR; mild MS  - stress echo in March 2019 noted Aortic valve area at baseline: 1.00 cm2; peak velocity is 3.2 m/s; mean gradient is 23 mmHg. Aortic valve area with dobutamine: 1.0 cm² with a peak velocity of 4.02 m/sec and a mean gradient of 31 mmHg  -TTE this visit noted mild LVE, EF 45%, normal RV function, LV DD, severe LAE, mild to moderate mR, severe AS with  valve area is 0.82 cm2; peak velocity is 3.57 m/s; mean gradient is 32 mmHg, and CVP 3  -diuresis was initiated with IVP lasix, currently net negative 1.7 liter  -renal function tolerating diuresis, SCr trending 1.9 >> 1.8  -continue amlodipine and carvedilol for BP control, no ACEi/ARB/ARNI due to BERNARDO on CKD  -additional agents PRN for BP control  -regarding LE swelling   -arterial LE US performed with notation of increased velocity in the right anterior tibial artery which may reflect hemodynamically significant stenosis >>> patient will follow up outpt with  vascular as scheduled   -venous LE US noted partially occlusive DVT in the upper right femoral vein and patient was initiated on heparin gtt >>> transitioned to renal dose apixaban 09/11   -left knee xray completed due to pain; no acute pathology noted  -continue tele monitoring  -cardiac diet with fluid restriction  -strict I&Os and daily weights  -will need outpt follow up with PCP  -Cardiology and Vascular follow ups as scheduled  -consider HH arrangement at [place]    Aortic stenosis  -see above  -outpt follow up with [facility] as scheduled     Ischemic cardiomyopathy  -see CHF for detailed plans    Hypertensive heart disease with heart failure  -BP not well controlled at current  -continue amlodipine and carvedilol  -additional agents/dose adjustments as needed   -monitor     Acute deep vein thrombosis (DVT) of right lower extremity  -arterial LE US performed with notation of increased velocity in the right anterior tibial artery which may reflect hemodynamically significant stenosis >>> patient will follow up outpt with vascular as scheduled  -venous LE US noted Partially occlusive DVT in the upper right femoral vein and patient was initiated on heparin gtt >>> transitioned to renal dose apixaban 09/11  -left knee xray completed due to pain; no acute processes  -elevate extremities as tolerated    Tibial artery stenosis, right  -see above     History of MI (myocardial infarction)  -see above     CAD (coronary artery disease)  -chest pain free and HDS  -continue ASA, atorvastatin  -troponin trended  0.060 >> 0.102 >>0.135 >> 0.162 >> 0.141 in setting of hypervolemia, uncontrolled HTN and BERNARDO on CKD  -continue tele monitoring  -EKG and SL NTG PRN chest pain  -cardiac diet in house    Leukocytosis  -WBC of 13 on admission, unclear etiology, afebrile >>> now WNL  -CXR with bilateral opacities, stable from previous  -UA unremarkable; blood cultures with NGTD  -possibly related to stage 2 decubitus sore or open  wounds on legs versus stress response in setting of CHF exacerbation and LE DVT  -wound care consulted; appreciate assistance   -monitor     Acute kidney injury superimposed on chronic kidney disease  -BERNARDO on CKD with admission SCr 1.9 >>> 1.8  -baseline ~ 1.4  -likely cardiorenal in nature   -monitor with diuresis   -hold losartan in setting of BERNARDO  -avoid nephrotoxins and hypotension as able, renally dose medications  -monitor     Type II diabetes mellitus with neurological manifestations  -controlled on glipizide at home  -HgA1c June 2019 6  -low dose SSI during stay  -monitor accuchecks AC/HS and PRN hypoglycemic protocol   -further monitoring and management per PCP     Thrombocytopenia  -platelets 145 on admission, baseline  -no evidence of bleeding   -monitor with DOAC initiation   -monitor over hospital course     Prostate CA  -Leuprolide q6 months as scheduled  -follows with Urology    Frequent falls  Lumbar spinal stenosis  -follows with pain management, MRI showed severe stenosis at L2-L5  -PT/OT consult  -new 3 month debility at home, using wheelchair  -fall precautions  -resume HH with PT/OT services at [place]     Pressure injury of sacral region, stage 2  -wound care following  -frequent repositioning per facility protocol     VTE Risk Mitigation (From admission, onward)        Ordered     apixaban tablet 5 mg  2 times daily      09/11/19 1208     apixaban tablet 10 mg  2 times daily      09/11/19 1207     heparin 25,000 units in dextrose 5% 250 mL (100 units/mL) infusion HIGH INTENSITY nomogram - [facility]  Continuous      09/09/19 2225     Place BERT hose  Until discontinued      09/09/19 0815     Place sequential compression device  Until discontinued      09/09/19 0815     IP VTE HIGH RISK PATIENT  Once      09/09/19 0815          Taylor Castro, DNP, AG-ACNP, BC  Department of Hospital Medicine  [facility]  Pager [phone]  [facility] [id number]

## 2019-09-11 NOTE — ASSESSMENT & PLAN NOTE
Lumbar spinal stenosis  -follows with pain management, MRI showed severe stenosis at L2-L5  -PT/OT consult  -new 3 month debility at home, using wheelchair  -fall precautions  -resume HH with PT/OT services at [place]

## 2019-09-11 NOTE — ASSESSMENT & PLAN NOTE
-admitted 09/09 due to CHF exacerbation and LE swelling in setting of known moderate AS  -in the ED: hypertensive, leukocytosis w/ left shift on arrival; 88% O2 sat on RA  on arrival, placed on 4L NC - reports no oxygen use at home.  NTG spray x 1 with full relief of chest pain.  Lasix 40 mg IVP given. Cr elevated 1.9 (BL 1.4) Troponin 0.060>>0.102>>0.135, . UA unremarkable. CXR stable, unchanged from Jan 2019 nothing bilateral pleural plaque and bilateral pulmonary opacicites. EKG unchanged from previous, SR with PACs  -per chart review TTE 1/2019: mod LV enlargement; est. EF 33%; severe eccentric LV hypertrophy; local segmental wall abnormalities; severe atrial enlargement; low normal RV systolic function; mild R atrial enlargement; mod to severe aortic valve stenosis; mod mitral sclerosis; mod leaflet calcification of mitral valve; mod MR; mild MS  - stress echo in March 2019 noted Aortic valve area at baseline: 1.00 cm2; peak velocity is 3.2 m/s; mean gradient is 23 mmHg. Aortic valve area with dobutamine: 1.0 cm² with a peak velocity of 4.02 m/sec and a mean gradient of 31 mmHg  -TTE this visit noted mild LVE, EF 45%, normal RV function, LV DD, severe LAE, mild to moderate mR, severe AS with  valve area is 0.82 cm2; peak velocity is 3.57 m/s; mean gradient is 32 mmHg, and CVP 3  -diuresis was initiated with IVP lasix, currently net negative 1.7 liter  -renal function tolerating diuresis, SCr trending 1.9 >> 1.8  -continue amlodipine and carvedilol for BP control, no ACEi/ARB/ARNI due to BERNARDO on CKD  -additional agents PRN for BP control  -regarding LE swelling   -arterial LE US performed with notation of increased velocity in the right anterior tibial artery which may reflect hemodynamically significant stenosis >>> patient will follow up outpt with vascular as scheduled   -venous LE US noted partially occlusive DVT in the upper right femoral vein and patient was initiated on heparin gtt >>>  transitioned to renal dose apixaban 09/11   -left knee xray completed due to pain; no acute pathology noted  -continue tele monitoring  -cardiac diet with fluid restriction  -strict I&Os and daily weights  -will need outpt follow up with PCP  -Cardiology and Vascular follow ups as scheduled  -consider HH arrangement at [facility]

## 2019-09-11 NOTE — PLAN OF CARE
Problem: Physical Therapy Goal  Goal: Physical Therapy Goal  Goals to be met by: 19     Patient will increase functional independence with mobility by performin. Supine to sit with MInimal Assistance  2. Sit to supine with MInimal Assistance  3. Sitting at edge of bed x10 minutes with Stand-by Assistance and perform an activity/exercise  4. Lower extremity exercise program x15 reps per handout, with assistance as needed  5. Patient will perform scooting along edge of bed w/ min assistance x 5 excursions    PT eula. Kelsey Aden, PT 2019

## 2019-09-11 NOTE — PT/OT/SLP EVAL
Physical Therapy Evaluation    Patient Name:  Jeniffer Taylor   MRN:  [id number]    Recommendations:     Discharge Recommendations:  home health PT, home with home health   Discharge Equipment Recommendations: hospital bed(possibly a hospital bed)   Barriers to discharge: None    Assessment:     Jeniffer Taylor is a 89 y.o. male admitted with a medical diagnosis of Acute on chronic combined systolic and diastolic congestive heart failure.  He presents with the following impairments/functional limitations:  weakness, impaired endurance, impaired functional mobilty, gait instability, decreased lower extremity function, impaired cardiopulmonary response to activity, edema, impaired coordination, impaired skin, impaired balance Patient reports he has not been able to stand or walk for greater than 2 months. He does not demonstrate any active movement LLE and has trace quads and ankle on RLE; his UE strength is functional. He was able to sit EOB, scoot along EOB in sitting w/ max/TA. Sit <>supine w/ max assist.    Rehab Prognosis: Good; patient would benefit from acute skilled PT services to address these deficits and reach maximum level of function.    Recent Surgery: * No surgery found *      Plan:     During this hospitalization, patient to be seen 3 x/week to address the identified rehab impairments via therapeutic activities, therapeutic exercises and progress toward the following goals:    · Plan of Care Expires:  10/11/19    Subjective     Chief Complaint: haven't been out of this bed  Patient/Family Comments/goals: return home to [facility] [address]  Pain/Comfort:  · Pain Rating 1: 0/10  · Pain Rating Post-Intervention 1: 0/10    Patients cultural, spiritual, Sikh conflicts given the current situation:      Living Environment:  Lives w/ family in [facility] w/ threshold [place]ANI. Patient reports he could transfer bed to/from w/c without assistance and could transfer to bedside commode w/ assistance. He reports he has not been able to  stand or walk for at least two months.   Prior to admission, patients level of function was able to transfer and use w/c; assistance for self care.  Equipment used at home: bedside commode, wheelchair.  DME owned (not currently used): rolling walker.  Upon discharge, patient will have assistance from family.    Objective:     Communicated with nurse prior to session.  Patient found HOB elevated with peripheral IV  upon PT entry to room.    General Precautions: Standard, fall   Orthopedic Precautions:N/A   Braces: N/A     Exams:  · Cognitive Exam:  Patient is oriented to Person, Place, Time and Situation  · Postural Exam:  Patient presented with the following abnormalities:    · -       Rounded shoulders  · -       Posterior pelvic tilt  · Sensation: UE light touch intact; BLE patient reports light touch diminished  · Skin Integrity/Edema:  B LE edema, worse feet, breakdown and excorciated areas worse on right lower LE  · RUE ROM: WFL  · RUE Strength: WFL  · LUE ROM: WFL  · LUE Strength: WFL  · RLE ROM: PROM WFL  · RLE Strength: knee extension= trace; ankle DF/PF = trace  · LLE ROM: PROM WFL  · LLE Strength: no active movement noted    Functional Mobility:  · Bed Mobility:     · Scooting: maximal assistance/ Total assistance to scoot in sitting EOB x3 excurision to left toward HOB  · Supine to Sit: maximal assistance  · Sit to Supine: maximal assistance  · Transfers:  Unable.   · Gait: unable to stand or step  · Balance: sits EOB w/ UE support intermittently      Therapeutic Activities and Exercises:   BLE PROM performed x5 reps;  Rolling left and right w/ use of bedrails and min/mod assist for LEs to doff diaper, clean up (PCT assisting) and changing soiled pads. Assisted w/ use of urinal. Patient drawsheet of 2 to HOB and HOB elevated and patient positioned for lunch.  Patient educated on PT POC.    AM-PAC 6 CLICK MOBILITY  Total Score:8     Patient left HOB elevated with all lines intact, call button in reach and  PCT present.    GOALS:   Multidisciplinary Problems     Physical Therapy Goals        Problem: Physical Therapy Goal    Goal Priority Disciplines Outcome Goal Variances Interventions   Physical Therapy Goal     PT, PT/OT      Description:  Goals to be met by: 19     Patient will increase functional independence with mobility by performin. Supine to sit with MInimal Assistance  2. Sit to supine with MInimal Assistance  3. Sitting at edge of bed x10 minutes with Stand-by Assistance and perform an activity/exercise  4. Lower extremity exercise program x15 reps per handout, with assistance as needed  5. Patient will perform scooting along edge of bed w/ min assistance x 5 excursions                      History:     Past Medical History:   Diagnosis Date    Anemia     Arthritis     Cancer     Prostate    Cataract     CHF (congestive heart failure)     Chronic kidney disease     Coronary artery disease     Diabetes mellitus     Diabetic retinopathy     Disorder of kidney and ureter     ckd 2    Encounter for blood transfusion     Glaucoma     High cholesterol     Hypertension     Myocardial infarction     Prostate cancer     PVD (peripheral vascular disease)     Spinal stenosis     Status post cataract extraction and insertion of intraocular lens of left eye 2015       Past Surgical History:   Procedure Laterality Date    CARDIAC CATHETERIZATION      15 to 20 years ago x1 stent and 4/7/2014 x1 stent    CATARACT EXTRACTION Bilateral     Dr. Ferguson    CATHETERIZATION, HEART, LEFT N/A 2014    Performed by Raz Cox MD at [facility] CATH LAB    EYE SURGERY      HERNIA REPAIR      INSERTION-INTRAOCULAR LENS (IOL) Left 2015    Performed by Blanka Ferguson MD at [facility] OR 1ST FLR    INSERTION-INTRAOCULAR LENS (IOL) Right 10/1/2015    Performed by Blanka Ferguson [facility] at [facility] OR 1ST FLR    PHACOEMULSIFICATION-ASPIRATION-CATARACT Left 2015    Performed by Blanka STRICKLAND  MD Phyll[facility] at [facility] OR 1ST FLR    PHACOEMULSIFICATION-ASPIRATION-CATARACT Right 10/1/2015    Performed by Blanka Ferguson MD at [facility] OR 1ST FLR       Time Tracking:     PT Received On: 09/11/19  PT Start Time: 1123     PT Stop Time: 1151  PT Total Time (min): 28 min     Billable Minutes: Evaluation 15 and Therapeutic Activity 13      Kelsey Aden, PT  09/11/2019

## 2019-09-11 NOTE — PLAN OF CARE
Problem: Adult Inpatient Plan of Care  Goal: Plan of Care Review  Outcome: Ongoing (interventions implemented as appropriate)  No complaints this shift.  No change in heparin r/t therapeutic aPTT level this shift.  No signs of distress this shift.  No falls or injuries noted this shift.  Bed alarm remains on this shift.  Cardiac monitoring remains in progress this shift.  Will continue to monitor.

## 2019-09-11 NOTE — ASSESSMENT & PLAN NOTE
-platelets 145 on admission, baseline  -no evidence of bleeding   -monitor with DOAC initiation   -monitor over hospital course

## 2019-09-11 NOTE — ASSESSMENT & PLAN NOTE
-WBC of 13 on admission, unclear etiology, afebrile >>> now WNL  -CXR with bilateral opacities, stable from previous  -UA unremarkable; blood cultures with NGTD  -possibly related to stage 2 decubitus sore or open wounds on legs versus stress response in setting of CHF exacerbation and LE DVT  -wound care consulted; appreciate assistance   -monitor

## 2019-09-11 NOTE — PROGRESS NOTES
Consult received on patient's BLE and sacrum.    Stage 3 pressure injury noted to patient's sacrum, wound complicated by moisture from incontinent episodes. Recommend BID/prn cleaning with bathing cloths, apply Triad barrier cream. Waffle overlay ordered. Turn every 2hrs.    Bilateral lower extremity edema noted, weak pedal pulse palpated. Scattered healing abrasions noted to left upper shin(appears to be d/t too tight of compression stocking) and right shin/calf (appears to be d/t scratching?), scabbed ulceration noted to left dorsal foot approx 0.5cm in diameter and left shin approx 0.5cm in diameter. No drainage or odor noted. Patient with partially occlusive DVT to right upper femoral vein. ABIs were performed RLE-0.62 and LLE-0.58,suggestive of moderate arterial disease. Would not recommend compression therapy at this time. Patient scheduled for outpatient appointment with Vascular on 9/25.  Recommend leg elevation while in bed and dressed scabbed areas to BLE with foam dressings and change twice a week.    Discussed recs with Taylor Castro NP, NP approved recs. Nursing to continue care. Wound care to follow prn.     09/11/19 0901   Peripheral Neurovascular   Peripheral Neurovascular WDL ex;edema   Additional Documentation   (RLE COLLINS-0.62 LLE COLLINS-0.58)   Pulse Dorsalis Pedis   Left Dorsalis Pedis Pulse 1+ (weak)   Right Dorsalis Pedis Pulse 1+ (weak)   Edema   Leg, Left Edema 3+ (Moderate)   Leg, Right Edema 3+ (Moderate)        Pressure Injury 09/09/19 1100 midline Sacral spine Stage 3   Date First Assessed/Time First Assessed: 09/09/19 1100   Pressure Injury Present on Admission: yes  Orientation: midline  Location: Sacral spine  Is this injury device related?: No  Staging: Stage 3   Wound Image    Staging Stage 3   Drainage Amount None   Drainage Characteristics/Odor No odor   Appearance Moist;Red   Tissue loss description Full thickness   Periwound Area Macerated  (darker skin discoloration)   Wound Edges  Rolled/closed   Wound Length (cm) 2 cm   Wound Width (cm) 1 cm   Wound Depth (cm) 0.3 cm   Wound Volume (cm^3) 0.6 cm^3   Wound Surface Area (cm^2) 2 cm^2   Care   (recommend Triad barrier cream BID/prn)   Skin Interventions   Pressure Reduction Devices   (waffle overlay ordered)

## 2019-09-11 NOTE — ASSESSMENT & PLAN NOTE
-arterial LE US performed with notation of increased velocity in the right anterior tibial artery which may reflect hemodynamically significant stenosis >>> patient will follow up outpt with vascular as scheduled  -venous LE US noted Partially occlusive DVT in the upper right femoral vein and patient was initiated on heparin gtt >>> transitioned to renal dose apixaban 09/11  -left knee xray completed due to pain; no acute processes  -elevate extremities as tolerated

## 2019-09-11 NOTE — ASSESSMENT & PLAN NOTE
-controlled on glipizide at home  -HgA1c June 2019 6  -low dose SSI during stay  -monitor accuchecks AC/HS and PRN hypoglycemic protocol   -further monitoring and management per PCP

## 2019-09-11 NOTE — SUBJECTIVE & OBJECTIVE
Interval History: Resting in bed, he reports feeling well today. He denies needs or complaints. Denies shoulder or left knee pain. He does note some improvement in LE swelling. He denies chest pain, palpitations, or shortness of breath. Denies any acute events or distress overnight.     Review of Systems   Constitutional: Positive for activity change. Negative for appetite change, chills, diaphoresis, fatigue and fever.   HENT: Negative for congestion, sinus pressure, sneezing, sore throat and trouble swallowing.    Eyes: Negative for visual disturbance.   Respiratory: Negative for cough, shortness of breath and wheezing.    Cardiovascular: Positive for leg swelling. Negative for chest pain and palpitations.   Gastrointestinal: Negative for abdominal distention, abdominal pain, constipation, diarrhea, nausea and vomiting.   Genitourinary: Negative for decreased urine volume, difficulty urinating, dysuria, hematuria and urgency.        Pt recently incontinent - unaware when urinating x 3-4 weeks per son   Musculoskeletal: Positive for arthralgias (shoulder pain (chronic)) and gait problem (unable to stand/walk secondary to leg swelling). Negative for myalgias.   Skin: Positive for wound. Negative for pallor and rash.   Neurological: Positive for weakness. Negative for dizziness, syncope, light-headedness and headaches.   Psychiatric/Behavioral: Negative for agitation and confusion. The patient is not nervous/anxious.      Objective:     Vital Signs (Most Recent):  Temp: 97.4 °F (36.3 °C) (09/11/19 1113)  Pulse: 64 (09/11/19 1113)  Resp: 16 (09/11/19 1113)  BP: 130/63 (09/11/19 1113)  SpO2: (!) 91 % (09/11/19 1113) Vital Signs (24h Range):  Temp:  [97.4 °F (36.3 °C)-99.6 °F (37.6 °C)] 97.4 °F (36.3 °C)  Pulse:  [51-77] 64  Resp:  [14-16] 16  SpO2:  [91 %-96 %] 91 %  BP: (130-171)/(63-71) 130/63     Weight: 79.4 kg (175 lb)  Body mass index is 25.84 kg/m².    Intake/Output Summary (Last 24 hours) at 9/11/2019  1452  Last data filed at 9/11/2019 0800  Gross per 24 hour   Intake 240 ml   Output 450 ml   Net -210 ml      Physical Exam   Constitutional: He is oriented to person, place, and time. He appears well-developed and well-nourished. No distress.   HENT:   Head: Normocephalic and atraumatic.   Mouth/Throat: No oropharyngeal exudate.   Eyes: Conjunctivae and EOM are normal.   Neck: Normal range of motion. Neck supple. No JVD present.   Cardiovascular: Normal rate and regular rhythm.   Murmur heard.  Pulmonary/Chest: Effort normal and breath sounds normal. No respiratory distress.   Abdominal: Soft. Bowel sounds are normal. He exhibits no distension. There is no tenderness.   Musculoskeletal: He exhibits edema (2+ RLE mid thigh to foot; 1+ LLE pitting edema mid shin to foot). He exhibits no tenderness.   Strength ROM 3/5 all extremities   Neurological: He is alert and oriented to person, place, and time.   Skin: Skin is warm and dry. Capillary refill takes 2 to 3 seconds. He is not diaphoretic.   Bilateral lower extremities tender to touch with scattered ulcers; excoriations noted around left leg ulcers; consistent with venous stasis  Noted stage 2 decubitus ulcer to sacral area   Psychiatric: He has a normal mood and affect. His behavior is normal.   Nursing note and vitals reviewed.    Significant Labs:   CBC:   Recent Labs   Lab 09/09/19  2306 09/10/19  0535 09/11/19  0559   WBC 11.29 10.26  10.26 8.65   HGB 9.9* 9.4*  9.4* 9.0*   HCT 28.4* 26.5*  26.5* 26.0*   * 129*  129* 122*     CMP:   Recent Labs   Lab 09/10/19  0535 09/11/19  0600    142   K 4.0 3.7    105   CO2 25 25   * 116*   BUN 46* 44*   CREATININE 1.9* 1.8*   CALCIUM 8.7 8.5*   ANIONGAP 11 12   EGFRNONAA 30.4* 32.4*     Magnesium:   Recent Labs   Lab 09/11/19  0600   MG 2.1     Troponin:   Recent Labs   Lab 09/09/19  1555 09/10/19  0535   TROPONINI 0.162* 0.141*     Microbiology Results (last 7 days)     Procedure Component  Value Units Date/Time    Blood culture [[id number]] Collected:  09/09/19 0946    Order Status:  Completed Specimen:  Blood from Peripheral, Antecubital, Left Updated:  09/11/19 1212     Blood Culture, Routine No Growth to date      No Growth to date      No Growth to date    Blood culture [[id number]] Collected:  09/09/19 0951    Order Status:  Completed Specimen:  Blood from Peripheral, Hand, Right Updated:  09/11/19 1212     Blood Culture, Routine No Growth to date      No Growth to date      No Growth to date        All pertinent labs within the past 24 hours have been reviewed.    Significant Imaging: I have reviewed all pertinent imaging results/findings within the past 24 hours.

## 2019-09-12 VITALS
RESPIRATION RATE: 18 BRPM | BODY MASS INDEX: 26.06 KG/M2 | WEIGHT: 175.94 LBS | HEART RATE: 59 BPM | HEIGHT: 69 IN | SYSTOLIC BLOOD PRESSURE: 133 MMHG | DIASTOLIC BLOOD PRESSURE: 65 MMHG | TEMPERATURE: 98 F | OXYGEN SATURATION: 95 %

## 2019-09-12 PROBLEM — D72.829 LEUKOCYTOSIS: Status: RESOLVED | Noted: 2019-09-09 | Resolved: 2019-09-12

## 2019-09-12 LAB
ANION GAP SERPL CALC-SCNC: 11 MMOL/L (ref 8–16)
BASOPHILS # BLD AUTO: 0.03 K/UL (ref 0–0.2)
BASOPHILS NFR BLD: 0.4 % (ref 0–1.9)
BUN SERPL-MCNC: 42 MG/DL (ref 8–23)
CALCIUM SERPL-MCNC: 8.7 MG/DL (ref 8.7–10.5)
CHLORIDE SERPL-SCNC: 105 MMOL/L (ref 95–110)
CO2 SERPL-SCNC: 26 MMOL/L (ref 23–29)
CREAT SERPL-MCNC: 1.9 MG/DL (ref 0.5–1.4)
DIFFERENTIAL METHOD: ABNORMAL
EOSINOPHIL # BLD AUTO: 0.3 K/UL (ref 0–0.5)
EOSINOPHIL NFR BLD: 3.6 % (ref 0–8)
ERYTHROCYTE [DISTWIDTH] IN BLOOD BY AUTOMATED COUNT: 14.4 % (ref 11.5–14.5)
EST. GFR  (AFRICAN AMERICAN): 35.1 ML/MIN/1.73 M^2
EST. GFR  (NON AFRICAN AMERICAN): 30.4 ML/MIN/1.73 M^2
GLUCOSE SERPL-MCNC: 114 MG/DL (ref 70–110)
HCT VFR BLD AUTO: 24.5 % (ref 40–54)
HGB BLD-MCNC: 8.6 G/DL (ref 14–18)
IMM GRANULOCYTES # BLD AUTO: 0.02 K/UL (ref 0–0.04)
IMM GRANULOCYTES NFR BLD AUTO: 0.3 % (ref 0–0.5)
LYMPHOCYTES # BLD AUTO: 0.8 K/UL (ref 1–4.8)
LYMPHOCYTES NFR BLD: 10.9 % (ref 18–48)
MAGNESIUM SERPL-MCNC: 2.2 MG/DL (ref 1.6–2.6)
MCH RBC QN AUTO: 27.7 PG (ref 27–31)
MCHC RBC AUTO-ENTMCNC: 35.1 G/DL (ref 32–36)
MCV RBC AUTO: 79 FL (ref 82–98)
MONOCYTES # BLD AUTO: 0.7 K/UL (ref 0.3–1)
MONOCYTES NFR BLD: 9.1 % (ref 4–15)
NEUTROPHILS # BLD AUTO: 5.5 K/UL (ref 1.8–7.7)
NEUTROPHILS NFR BLD: 75.7 % (ref 38–73)
NRBC BLD-RTO: 0 /100 WBC
PLATELET # BLD AUTO: 128 K/UL (ref 150–350)
PMV BLD AUTO: 12.2 FL (ref 9.2–12.9)
POCT GLUCOSE: 108 MG/DL (ref 70–110)
POCT GLUCOSE: 115 MG/DL (ref 70–110)
POCT GLUCOSE: 115 MG/DL (ref 70–110)
POCT GLUCOSE: 156 MG/DL (ref 70–110)
POTASSIUM SERPL-SCNC: 3.9 MMOL/L (ref 3.5–5.1)
RBC # BLD AUTO: 3.1 M/UL (ref 4.6–6.2)
SODIUM SERPL-SCNC: 142 MMOL/L (ref 136–145)
WBC # BLD AUTO: 7.22 K/UL (ref 3.9–12.7)

## 2019-09-12 PROCEDURE — 83735 ASSAY OF MAGNESIUM: CPT | Mod: HCNC

## 2019-09-12 PROCEDURE — 36415 COLL VENOUS BLD VENIPUNCTURE: CPT | Mod: HCNC

## 2019-09-12 PROCEDURE — 99239 HOSP IP/OBS DSCHRG MGMT >30: CPT | Mod: HCNC,,, | Performed by: NURSE PRACTITIONER

## 2019-09-12 PROCEDURE — 25000003 PHARM REV CODE 250: Mod: HCNC | Performed by: PHYSICIAN ASSISTANT

## 2019-09-12 PROCEDURE — 94761 N-INVAS EAR/PLS OXIMETRY MLT: CPT | Mod: HCNC

## 2019-09-12 PROCEDURE — 25000003 PHARM REV CODE 250: Mod: HCNC | Performed by: NURSE PRACTITIONER

## 2019-09-12 PROCEDURE — 82962 GLUCOSE BLOOD TEST: CPT | Mod: HCNC

## 2019-09-12 PROCEDURE — 99239 PR HOSPITAL DISCHARGE DAY,>30 MIN: ICD-10-PCS | Mod: HCNC,,, | Performed by: NURSE PRACTITIONER

## 2019-09-12 PROCEDURE — 85025 COMPLETE CBC W/AUTO DIFF WBC: CPT | Mod: HCNC

## 2019-09-12 PROCEDURE — 80048 BASIC METABOLIC PNL TOTAL CA: CPT | Mod: HCNC

## 2019-09-12 RX ORDER — HYDRALAZINE HYDROCHLORIDE 50 MG/1
50 TABLET, FILM COATED ORAL EVERY 12 HOURS
Qty: 60 TABLET | Refills: 11 | Status: ON HOLD | OUTPATIENT
Start: 2019-09-12 | End: 2019-10-02 | Stop reason: HOSPADM

## 2019-09-12 RX ORDER — POTASSIUM CHLORIDE 750 MG/1
10 CAPSULE, EXTENDED RELEASE ORAL ONCE
Status: COMPLETED | OUTPATIENT
Start: 2019-09-12 | End: 2019-09-12

## 2019-09-12 RX ORDER — FUROSEMIDE 40 MG/1
40 TABLET ORAL 2 TIMES DAILY
Qty: 60 TABLET | Refills: 11 | Status: SHIPPED | OUTPATIENT
Start: 2019-09-12 | End: 2019-09-23 | Stop reason: SDUPTHER

## 2019-09-12 RX ORDER — FUROSEMIDE 40 MG/1
40 TABLET ORAL 2 TIMES DAILY
Status: DISCONTINUED | OUTPATIENT
Start: 2019-09-12 | End: 2019-09-12 | Stop reason: HOSPADM

## 2019-09-12 RX ADMIN — ASPIRIN 81 MG: 81 TABLET, COATED ORAL at 09:09

## 2019-09-12 RX ADMIN — HYDRALAZINE HYDROCHLORIDE 50 MG: 50 TABLET ORAL at 09:09

## 2019-09-12 RX ADMIN — CARVEDILOL 6.25 MG: 6.25 TABLET, FILM COATED ORAL at 07:09

## 2019-09-12 RX ADMIN — ATORVASTATIN CALCIUM 80 MG: 20 TABLET, FILM COATED ORAL at 09:09

## 2019-09-12 RX ADMIN — FUROSEMIDE 40 MG: 40 TABLET ORAL at 09:09

## 2019-09-12 RX ADMIN — AMLODIPINE BESYLATE 10 MG: 10 TABLET ORAL at 09:09

## 2019-09-12 RX ADMIN — BICALUTAMIDE 50 MG: 50 TABLET ORAL at 09:09

## 2019-09-12 RX ADMIN — POTASSIUM CHLORIDE 10 MEQ: 750 CAPSULE, EXTENDED RELEASE ORAL at 09:09

## 2019-09-12 RX ADMIN — APIXABAN 10 MG: 5 TABLET, FILM COATED ORAL at 09:09

## 2019-09-12 NOTE — ASSESSMENT & PLAN NOTE
-BERNARDO on CKD with admission SCr 1.9 >>> 1.8  -baseline ~ 1.4 versus above as new baseline  -likely cardiorenal in nature   -hold losartan in setting of BERNARDO  -outpt labs ordered via HH   -further monitoring and management per PCP

## 2019-09-12 NOTE — DISCHARGE SUMMARY
[facility] Hospital Medicine  Discharge Summary      Patient Name: Jeniffer Taylor  MRN: [id number]  Admission Date: 9/9/2019  Hospital Length of Stay: 2 days  Discharge Date and Time:  09/12/2019 3:42 PM  Attending Physician: Steve Oshea MD   Discharging Provider: Taylor Castro NP  Primary Care Provider: Shari Chauhan MD  Hospital Medicine Team: [facility] [place] HOSP MED J Taylor Castro NP    HPI:   Mr. Taylor is a pleasant 89 y/o AAM with HTN, CHF, HLD, hx of MI, PVD, AS, DM, and CKD Stage 3 presenting to the ED with chest pain. Chest pain woke him at around 2:30 in the morning, located in the center of his chest and does not radiate. He describes it as a pressure on his chest. He took 4 aspirins at home with minimal relief.  He rated the initial pain as a 5/10 and is currently in no pain.  He received one spray of NTG from EMS. He states he has not had any pain like this before, although he does not remember what his previous MIs have felt like because they were so long ago. Additionally, he complains of fluid on BLE. He states his legs have been like this for 3-4 months and he can no longer stand or walk because of this. He is compliant with Lasix 40mg BID and wears compression stockings at home. He admits to chest pain, SOB, edema, and productive cough with clear/yellow phlegm; denies fatigue fevers, chills, abdominal pain, N/V/D, headache, diaphoresis. All past medical, social, and family history reviewed.     ED: hypertensive, leukocytosis w/ left shift on arrival; 88% O2 sat on RA  on arrival, placed on 4L NC - reports no oxygen use at home.  NTG spray x 1 with full relief of chest pain.  Lasix 40 mg IVP given. Cr elevated 1.9 (BL 1.4) Troponin 0.060>>0.102>>0.135, continue trending. . UA unremarkable. CXR stable, unchanged from Jan 2019 nothing bilateral pleural plaque and bilateral pulmonary opacicites. EKG unchanged from previous, SR with PACs. The patient was admitted  to the Hospital Medicine Service for further evaluation and management.     * No surgery found *      Hospital Course:   Mr. Taylor was admitted 09/09 due to CHF exacerbation and LE swelling in setting of known moderate AS. TTE noted mild LVE, EF 45%, normal RV function, LV DD, severe LAE, mild to moderate mR, severe AS with  valve area is 0.82 cm2; peak velocity is 3.57 m/s; mean gradient is 32 mmHg, and CVP 3. His diuresis was initiated with IVP lasix followed by PO dosing, net negative 2 liters; LE swelling greatly improved. Renal function tolerated diuresis, SCr trending 1.9 >> 1.8. Continue amlodipine and carvedilol for BP control, no ACEi/ARB/ARNI due to BERNARDO on CKD.     Regarding LE swelling: arterial LE US performed with notation of increased velocity in the right anterior tibial artery which may reflect hemodynamically significant stenosis with pre exercise COLLINS of 0.62 on the RLE and 0.58 on the LLE indicative of moderate arterial disease >>> patient will follow up outpt with vascular as scheduled. Venous LE US noted partially occlusive DVT in the upper right femoral vein and patient was initiated on heparin gtt >>> transitioned to renal dose apixaban 09/11. Left knee xray completed due to pain; no acute pathology noted.     Disposition: home with daughter, resumed HH at [place], outpt follow up with PCP, Cardiology and Vascular follow ups as scheduled.      * Acute on chronic combined systolic and diastolic congestive heart failure  -admitted 09/09 due to CHF exacerbation and LE swelling in setting of known moderate AS  -in the ED: hypertensive, leukocytosis w/ left shift on arrival; 88% O2 sat on RA  on arrival, placed on 4L NC - reports no oxygen use at home.  NTG spray x 1 with full relief of chest pain.  Lasix 40 mg IVP given. Cr elevated 1.9 (BL 1.4) Troponin 0.060>>0.102>>0.135, . UA unremarkable. CXR stable, unchanged from Jan 2019 nothing bilateral pleural plaque and bilateral pulmonary  opacicites. EKG unchanged from previous, SR with PACs  -per chart review TTE 1/2019: mod LV enlargement; est. EF 33%; severe eccentric LV hypertrophy; local segmental wall abnormalities; severe atrial enlargement; low normal RV systolic function; mild R atrial enlargement; mod to severe aortic valve stenosis; mod mitral sclerosis; mod leaflet calcification of mitral valve; mod MR; mild MS  - stress echo in March 2019 noted Aortic valve area at baseline: 1.00 cm2; peak velocity is 3.2 m/s; mean gradient is 23 mmHg. Aortic valve area with dobutamine: 1.0 cm² with a peak velocity of 4.02 m/sec and a mean gradient of 31 mmHg  -TTE this visit noted mild LVE, EF 45%, normal RV function, LV DD, severe LAE, mild to moderate mR, severe AS with  valve area is 0.82 cm2; peak velocity is 3.57 m/s; mean gradient is 32 mmHg, and CVP 3  -diuresis was initiated with IVP lasix followed by  PO dosing, net negative 2 liters; LE swelling greatly improved  -renal function tolerating diuresis, SCr trending 1.9 >> 1.8  -continue amlodipine and carvedilol for BP control, no ACEi/ARB/ARNI due to BERNARDO on CKD  -regarding LE swelling   -arterial LE US performed with notation of increased velocity in the right anterior tibial artery which may reflect hemodynamically significant stenosis with pre exercise COLLINS of 0.62 on the RLE and 0.58 on the LLE indicative of moderate arterial disease >>> patient will follow up outpt with vascular as scheduled   -venous LE US noted partially occlusive DVT in the upper right femoral vein and patient was initiated on heparin gtt >>> transitioned to renal dose apixaban 09/11   -left knee xray completed due to pain; no acute pathology noted  -cardiac diet with fluid restriction advised  -strict I&Os and daily weights advised  -HH arrangedt at [place]  -outpt follow up with PCP, Cardiology and Vascular follow ups as scheduled    Aortic stenosis  -see above  -outpt follow up with [facility] as scheduled      Ischemic cardiomyopathy  -see CHF for detailed plans    Hypertensive heart disease with heart failure  -BP improved over hospital course   -continue amlodipine and carvedilol    Acute deep vein thrombosis (DVT) of right lower extremity  -arterial LE US performed with notation of increased velocity in the right anterior tibial artery which may reflect hemodynamically significant stenosis with pre exercise COLLINS of 0.62 on the RLE and 0.58 on the LLE indicative of moderate arterial disease   -venous LE US noted partially occlusive DVT in the upper right femoral vein and patient was initiated on heparin gtt >>> transitioned to renal dose apixaban 09/11  -left knee xray completed due to pain; no acute processes  -elevate extremities as tolerated  -outpt follow up with PCP, Cardiology and Vascular follow ups as scheduled    Tibial artery stenosis, right  -see above     History of MI (myocardial infarction)  -see above     CAD (coronary artery disease)  -chest pain free and HDS  -continue ASA, atorvastatin  -troponin trended  0.060 >> 0.102 >>0.135 >> 0.162 >> 0.141 in setting of hypervolemia, uncontrolled HTN and BERNARDO on CKD  -SL NTG PRN chest pain  -cardiac diet in house advised  -outpt follow up with PCP, Cardiology and Vascular follow ups as scheduled    Leukocytosis-resolved as of 9/12/2019  -WBC of 13 on admission, unclear etiology, afebrile >>> now WNL  -CXR with bilateral opacities, stable from previous  -UA unremarkable; blood cultures with NGTD  -possibly related to stage 2 decubitus sore or open wounds on legs versus stress response in setting of CHF exacerbation and LE DVT  -wound care consulted; appreciate assistance   -HH with wound care ordered     Acute kidney injury superimposed on chronic kidney disease  -BERNARDO on CKD with admission SCr 1.9 >>> 1.8  -baseline ~ 1.4 versus above as new baseline  -likely cardiorenal in nature   -hold losartan in setting of BERNARDO  -outpt labs ordered via HH   -further  monitoring and management per PCP     Type II diabetes mellitus with neurological manifestations  -controlled on glipizide at home >> resumed  -HgA1c June 2019 6  -further monitoring and management per PCP     Thrombocytopenia  -platelets 145 on admission, baseline  -no evidence of bleeding   -tolerated  DOAC initiation     Prostate CA  -continue bicalutamide   -Leuprolide q6 months as scheduled  -follows with Urology    Frequent falls  Lumbar spinal stenosis  -follows with pain management, MRI showed severe stenosis at L2-L5  -PT/OT followed inpatient   -new 3 month debility at home, using wheelchair  -fall precautions advised  -resumed HH with PT/OT services at [place]     Pressure injury of sacral region, stage 2  -wound care following  -frequent repositioning advised >> discussed at length with daughter      Final Active Diagnoses:    Diagnosis Date Noted POA    PRINCIPAL PROBLEM:  Acute on chronic combined systolic and diastolic congestive heart failure [I50.43] 09/09/2019 Yes    Aortic stenosis [I35.0] 03/09/2014 Yes    Ischemic cardiomyopathy [I25.5] 10/07/2013 Yes     Chronic    Hypertensive heart disease with heart failure [I11.0] 09/05/2013 Yes     Chronic    Acute deep vein thrombosis (DVT) of right lower extremity [I82.401] 09/10/2019 Unknown    Tibial artery stenosis, right [I70.201] 09/10/2019 Unknown    History of MI (myocardial infarction) [I25.2] 12/15/2016 Not Applicable    CAD (coronary artery disease) [I25.10] 08/12/2012 Yes     Chronic    Acute kidney injury superimposed on chronic kidney disease [N17.9, N18.9] 08/14/2017 Yes    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] 09/05/2013 Yes    Thrombocytopenia [D69.6] 04/23/2014 Yes    Prostate CA [C61] 06/18/2013 Yes    Frequent falls [R29.6] 06/15/2018 Not Applicable    Pressure injury of sacral region, stage 2 [L89.152] 09/09/2019 Yes      Problems Resolved During this Admission:    Diagnosis Date Noted Date  Resolved POA    Leukocytosis [D72.829] 09/09/2019 09/12/2019 Yes     Discharged Condition: fair    Disposition: Home or Self Care    Follow Up:    Patient Instructions:      CBC auto differential   Standing Status: Future Standing Exp. Date: 11/10/20     Comprehensive metabolic panel   Standing Status: Future Standing Exp. Date: 11/10/20     CBC auto differential   Standing Status: Future Standing Exp. Date: 11/10/20     Comprehensive metabolic panel   Standing Status: Future Standing Exp. Date: 11/10/20     Ambulatory referral to Outpatient Case Management   Referral Priority: Routine Referral Type: Consultation   Referral Reason: Specialty Services Required   Number of Visits Requested: 1     Diet Cardiac     Diet diabetic     Notify your health care provider if you experience any of the following:  temperature >100.4     Notify your health care provider if you experience any of the following:  persistent nausea and vomiting or diarrhea     Notify your health care provider if you experience any of the following:  severe uncontrolled pain     Notify your health care provider if you experience any of the following:  difficulty breathing or increased cough     Notify your health care provider if you experience any of the following:  severe persistent headache     Notify your health care provider if you experience any of the following:  persistent dizziness, light-headedness, or visual disturbances     Notify your health care provider if you experience any of the following:  increased confusion or weakness     Activity as tolerated     Review of Systems   Constitutional: Positive for activity change. Negative for appetite change, chills, diaphoresis, fatigue and fever.   HENT: Negative for congestion, sinus pressure, sneezing, sore throat and trouble swallowing.    Eyes: Negative for visual disturbance.   Respiratory: Negative for cough, shortness of breath and wheezing.    Cardiovascular: Positive for leg swelling  >>> much improved. Negative for chest pain and palpitations.   Gastrointestinal: Negative for abdominal distention, abdominal pain, constipation, diarrhea, nausea and vomiting.   Genitourinary: Negative for decreased urine volume, difficulty urinating, dysuria, hematuria and urgency. Pt recently incontinent - unaware when urinating x 3-4 weeks per son   Musculoskeletal: Positive for arthralgias (shoulder pain (chronic)) and gait problem (unable to stand/walk secondary to leg swelling). Negative for myalgias.   Skin: Positive for wound. Negative for pallor and rash.   Neurological: Positive for weakness. Negative for dizziness, syncope, light-headedness and headaches.   Psychiatric/Behavioral: Negative for agitation and confusion. The patient is not nervous/anxious.     Physical Exam   Constitutional: He is oriented to person, place, and time. He appears well-developed and well-nourished. No distress.   HENT:   Head: Normocephalic and atraumatic.   Mouth/Throat: No oropharyngeal exudate.   Eyes: Conjunctivae and EOM are normal.   Neck: Normal range of motion. Neck supple. No JVD present.   Cardiovascular: Normal rate and regular rhythm.   Murmur heard.  Pulmonary/Chest: Effort normal and breath sounds normal. No respiratory distress.   Abdominal: Soft. Bowel sounds are normal. He exhibits no distension. There is no tenderness.   Musculoskeletal: He exhibits edema (1+ RLE mid thigh to foot; LLE edema resolved). He exhibits no tenderness.   Strength ROM 3/5 all extremities   Neurological: He is alert and oriented to person, place, and time.   Skin: Skin is warm and dry. Capillary refill takes 2 to 3 seconds. He is not diaphoretic.   Bilateral lower extremities tender to touch with scattered ulcers; excoriations noted around left leg ulcers; consistent with venous stasis  Noted stage 2 decubitus ulcer to sacral area   Psychiatric: He has a normal mood and affect. His behavior is normal.   Nursing note and vitals  reviewed.    Significant Diagnostic Studies: Labs:   BMP:   Recent Labs   Lab 09/11/19  0600 09/12/19  0556   * 114*    142   K 3.7 3.9    105   CO2 25 26   BUN 44* 42*   CREATININE 1.8* 1.9*   CALCIUM 8.5* 8.7   MG 2.1 2.2   , CBC   Recent Labs   Lab 09/11/19  0559 09/12/19 0556   WBC 8.65 7.22   HGB 9.0* 8.6*   HCT 26.0* 24.5*   * 128*    and All labs within the past 24 hours have been reviewed    Pending Diagnostic Studies:     Procedure Component Value Units Date/Time    APTT [[id number]] Collected:  09/10/19 0535    Order Status:  Sent Lab Status:  In process Updated:  09/10/19 0536    Specimen:  Blood          Medications:  Reconciled Home Medications:      Medication List      START taking these medications    * apixaban 5 mg (74 tabs) Dspk  Commonly known as:  ELIQUIS  For the first 7 days take two 5 mg tablets twice daily.  After 7 days take one 5 mg tablet twice daily.     * apixaban 5 mg Tab  Commonly known as:  ELIQUIS  Start after completion of starter pack: Take 1 tablet (5 mg total) by mouth 2 (two) times daily.  Start taking on:  10/12/2019     hydrALAZINE 50 MG tablet  Commonly known as:  APRESOLINE  Take 1 tablet (50 mg total) by mouth every 12 (twelve) hours.         * This list has 2 medication(s) that are the same as other medications prescribed for you. Read the directions carefully, and ask your doctor or other care provider to review them with you.            CHANGE how you take these medications    furosemide 40 MG tablet  Commonly known as:  LASIX  Take 1 tablet (40 mg total) by mouth 2 (two) times daily.  What changed:  when to take this        CONTINUE taking these medications    amLODIPine 10 MG tablet  Commonly known as:  NORVASC  TAKE 1 TABLET (10 MG TOTAL) BY MOUTH ONCE DAILY.     ammonium lactate 12 % Crea  Apply twice daily to affected parts both feet as needed.     aspirin 81 MG EC tablet  Commonly known as:  ECOTRIN  Take 81 mg by mouth. 1 Tablet, Delayed  Release (E.C.) Oral Every morning     atorvastatin 80 MG tablet  Commonly known as:  LIPITOR  Take 1 tablet (80 mg total) by mouth once daily.     BD ALCOHOL SWABS Padm  Generic drug:  alcohol swabs     bicalutamide 50 MG Tab  Commonly known as:  CASODEX  TAKE 1 TABLET EVERY DAY     blood-glucose meter kit  Use as instructed     carvedilol 6.25 MG tablet  Commonly known as:  COREG  Take 1 tablet (6.25 mg total) by mouth 2 (two) times daily with meals.     diclofenac sodium 1 % Gel  Commonly known as:  VOLTAREN  Apply 2 g topically 4 (four) times daily.     fish oil-omega-3 fatty acids 300-1,000 mg capsule  Take 1 capsule by mouth once daily.     garlic 1,000 mg Cap  Take 2 tablets by mouth once daily.     glipiZIDE 2.5 MG Tr24  Commonly known as:  GLUCOTROL  Take 1 tablet (2.5 mg total) by mouth once daily.     lancets Misc  Commonly known as:  LANCETS,ULTRA THIN  Use daily     latanoprost 0.005 % ophthalmic solution  Place 1 drop into both eyes every evening.     multivitamin capsule  Take 1 capsule by mouth once daily.     nitroGLYCERIN 0.4 MG SL tablet  Commonly known as:  NITROSTAT  Place 1 tablet (0.4 mg total) under the tongue every 5 (five) minutes as needed for Chest pain.     TRUE METRIX GLUCOSE TEST STRIP Strp  Generic drug:  blood sugar diagnostic  TEST EVERY DAY        STOP taking these medications    losartan 50 MG tablet  Commonly known as:  COZAAR     naproxen sodium 220 MG tablet  Commonly known as:  ANAPROX          Indwelling Lines/Drains at time of discharge:   Lines/Drains/Airways     Pressure Ulcer                 Pressure Injury 09/09/19 1100 midline Sacral spine Stage 3 3 days              Time spent on the discharge of patient: 75 minutes  Patient was seen and examined on the date of discharge and determined to be suitable for discharge.      Taylor Castro, DNP, AG-ACNP, BC  Department of Hospital Medicine  [facility]

## 2019-09-12 NOTE — ASSESSMENT & PLAN NOTE
-arterial LE US performed with notation of increased velocity in the right anterior tibial artery which may reflect hemodynamically significant stenosis with pre exercise COLLINS of 0.62 on the RLE and 0.58 on the LLE indicative of moderate arterial disease   -venous LE US noted partially occlusive DVT in the upper right femoral vein and patient was initiated on heparin gtt >>> transitioned to renal dose apixaban 09/11  -left knee xray completed due to pain; no acute processes  -elevate extremities as tolerated  -outpt follow up with PCP, Cardiology and Vascular follow ups as scheduled

## 2019-09-12 NOTE — PLAN OF CARE
Problem: Adult Inpatient Plan of Care  Goal: Plan of Care Review  Outcome: Ongoing (interventions implemented as appropriate)  No signs of distress this shift.  No falls or injuries noted this shift.  Treated pain with prn medications.  No further complaints this shift.  Encourage frequent turning.  Cardiac monitoring in progress.  Will continue to monitor.

## 2019-09-12 NOTE — ASSESSMENT & PLAN NOTE
-controlled on glipizide at home >> resumed  -HgA1c June 2019 6  -further monitoring and management per PCP

## 2019-09-12 NOTE — ASSESSMENT & PLAN NOTE
-WBC of 13 on admission, unclear etiology, afebrile >>> now WNL  -CXR with bilateral opacities, stable from previous  -UA unremarkable; blood cultures with NGTD  -possibly related to stage 2 decubitus sore or open wounds on legs versus stress response in setting of CHF exacerbation and LE DVT  -wound care consulted; appreciate assistance   -HH with wound care ordered

## 2019-09-12 NOTE — ASSESSMENT & PLAN NOTE
-chest pain free and HDS  -continue ASA, atorvastatin  -troponin trended  0.060 >> 0.102 >>0.135 >> 0.162 >> 0.141 in setting of hypervolemia, uncontrolled HTN and BERNARDO on CKD  -SL NTG PRN chest pain  -cardiac diet in house advised  -outpt follow up with PCP, Cardiology and Vascular follow ups as scheduled

## 2019-09-12 NOTE — ASSESSMENT & PLAN NOTE
Lumbar spinal stenosis  -follows with pain management, MRI showed severe stenosis at L2-L5  -PT/OT followed inpatient   -new 3 month debility at home, using wheelchair  -fall precautions advised  -resumed HH with PT/OT services at [place]

## 2019-09-12 NOTE — ASSESSMENT & PLAN NOTE
-admitted 09/09 due to CHF exacerbation and LE swelling in setting of known moderate AS  -in the ED: hypertensive, leukocytosis w/ left shift on arrival; 88% O2 sat on RA  on arrival, placed on 4L NC - reports no oxygen use at home.  NTG spray x 1 with full relief of chest pain.  Lasix 40 mg IVP given. Cr elevated 1.9 (BL 1.4) Troponin 0.060>>0.102>>0.135, . UA unremarkable. CXR stable, unchanged from Jan 2019 nothing bilateral pleural plaque and bilateral pulmonary opacicites. EKG unchanged from previous, SR with PACs  -per chart review TTE 1/2019: mod LV enlargement; est. EF 33%; severe eccentric LV hypertrophy; local segmental wall abnormalities; severe atrial enlargement; low normal RV systolic function; mild R atrial enlargement; mod to severe aortic valve stenosis; mod mitral sclerosis; mod leaflet calcification of mitral valve; mod MR; mild MS  - stress echo in March 2019 noted Aortic valve area at baseline: 1.00 cm2; peak velocity is 3.2 m/s; mean gradient is 23 mmHg. Aortic valve area with dobutamine: 1.0 cm² with a peak velocity of 4.02 m/sec and a mean gradient of 31 mmHg  -TTE this visit noted mild LVE, EF 45%, normal RV function, LV DD, severe LAE, mild to moderate mR, severe AS with  valve area is 0.82 cm2; peak velocity is 3.57 m/s; mean gradient is 32 mmHg, and CVP 3  -diuresis was initiated with IVP lasix followed by  PO dosing, net negative 2 liters; LE swelling greatly improved  -renal function tolerating diuresis, SCr trending 1.9 >> 1.8  -continue amlodipine and carvedilol for BP control, no ACEi/ARB/ARNI due to BERNARDO on CKD  -regarding LE swelling   -arterial LE US performed with notation of increased velocity in the right anterior tibial artery which may reflect hemodynamically significant stenosis with pre exercise COLLINS of 0.62 on the RLE and 0.58 on the LLE indicative of moderate arterial disease >>> patient will follow up outpt with vascular as scheduled   -venous LE US noted  partially occlusive DVT in the upper right femoral vein and patient was initiated on heparin gtt >>> transitioned to renal dose apixaban 09/11   -left knee xray completed due to pain; no acute pathology noted  -cardiac diet with fluid restriction advised  -strict I&Os and daily weights advised  -HH arrangedt at [place]  -outpt follow up with PCP, Cardiology and Vascular follow ups as scheduled

## 2019-09-12 NOTE — PLAN OF CARE
Referral sent to [facility]  via [facility] care to resume services.     09/12/19 1222   Post-Acute Status   Post-Acute Authorization Home Health/Hospice   Home Health/Hospice Status Referrals Sent

## 2019-09-12 NOTE — PLAN OF CARE
[facility]    HOME HEALTH ORDERS  FACE TO FACE ENCOUNTER    Patient Name: Jeniffer Taylor  YOB: 1928    PCP: Shari Chauhan MD   PCP Address: [address] / [place] [address]  PCP Phone Number: [phone]  PCP Fax: [phone]    Encounter Date: 09/12/2019    Admit to Home Health    Diagnoses:  Active Hospital Problems    Diagnosis  POA    *Acute on chronic combined systolic and diastolic congestive heart failure [I50.43]  Yes     Priority: 1 - High    Aortic stenosis [I35.0]  Yes     Priority: 2      Mild in 3/2014      Ischemic cardiomyopathy [I25.5]  Yes     Priority: 3      Chronic     Presumed ischemic- EF 50% in 3/2014 with akinetic and dyskinetic wall segments.     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]  Unknown     Priority: 6     History of MI (myocardial infarction) [I25.2]  Not Applicable     Priority: 7     CAD (coronary artery disease) [I25.10]  Yes     Priority: 7      Chronic     Last PCI - prox LAD, April 2014  Small LCx known to have subtotal occlusion      Acute kidney injury superimposed on chronic kidney disease [N17.9, N18.9]  Yes     Priority: 9     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]  Yes     Priority: 10     Thrombocytopenia [D69.6]  Yes     Priority: 11     Prostate CA [C61]  Yes     Priority: 12     Frequent falls [R29.6]  Not Applicable     Priority: 13     Pressure injury of sacral region, stage 2 [L89.152]  Yes     Priority: 14       Resolved Hospital Problems    Diagnosis Date Resolved POA    Leukocytosis [D72.829] 09/12/2019 Yes     Priority: 8      Future Appointments   Date Time Provider Department Center   9/24/2019 10:00 AM Mandy Christianson PA-C [facility] UROLOGY [facility]   9/25/2019  9:00 AM VASCULAR,  LAB [facility] VASCLAB [facility]   9/25/2019 10:00 AM VASCULAR, LAB [facility]   9/25/2019 10:45 AM Venancio Matos MD [facility] VASCSUR [facility]   10/7/2019  1:45 PM ECHO, MAIN CAMPUS [facility] ECHOLAB [facility]   10/7/2019  3:00 PM INTERVENTIONAL, VALVE CLINIC [facility] CARDVAL [facility]   10/10/2019  9:00 AM Shari Chauhan MD [facility] IM [address] PCW   10/30/2019  1:40 PM Sonya Meier MD [facility] PHYSMED [facility]   11/5/2019  9:15 AM Michele Ahmadi DPM [facility] POD [facility]   12/4/2019  8:30 AM Nelida Kaufman, [facility]EL[facility]     I have seen and examined this patient face to face today. My clinical findings that support the need for the home health skilled services and home bound status are the following:  Weakness/numbness causing balance and gait disturbance due to Heart Failure and Weakness/Debility making it taxing to leave home.  Requiring assistive device to leave home due to unsteady gait caused by  Heart Failure and Weakness/Debility.  Medical restrictions requiring assistance of another human to leave home due to  Fluid/volume overload, Unstable ambulation and Decreased range of motions in extremities.    Allergies:Review of patient's allergies indicates:  No Known Allergies    Diet: cardiac diet, diabetic diet: 1800 calorie, fluid restriction: 1500 mL and 2 gram sodium diet    Activities: activity as tolerated    Nursing:   SN to complete comprehensive assessment including routine vital signs. Instruct on disease process and s/s of complications to report to MD. Review/verify medication list sent home with the patient at time of discharge  and instruct patient/caregiver as needed. Frequency may be adjusted depending on start of care date.    Notify PCP if SBP > 160 or < 90; DBP > 90 or < 50; HR > 120 or < 50; Temp > 101.    CONSULTS:    Physical Therapy to evaluate and treat. Evaluate for home safety and equipment needs; Establish/upgrade home exercise program. Perform / instruct on therapeutic  exercises, gait training, transfer training, and Range of Motion.  Occupational Therapy to evaluate and treat. Evaluate home environment for safety and equipment needs. Perform/Instruct on transfers, ADL training, ROM, and therapeutic exercises.   to evaluate for community resources/long-range planning.  Aide to provide assistance with personal care, ADLs, and vital signs.    MISCELLANEOUS CARE:  Lab instructions >>> please fax results to PCP  CBC auto differential    Complete by: Sep 19, 2019     Comprehensive metabolic panel    Complete by: Sep 19, 2019     CBC auto differential    Complete by: Sep 26, 2019     Comprehensive metabolic panel    Complete by: Sep 26, 2019     WOUND CARE ORDERS   09/11/19 0901   Peripheral Neurovascular   Peripheral Neurovascular WDL ex;edema   Additional Documentation    (RLE COLLINS-0.62 LLE COLLINS-0.58)   Pulse Dorsalis Pedis   Left Dorsalis Pedis Pulse 1+ (weak)   Right Dorsalis Pedis Pulse 1+ (weak)   Edema   Leg, Left Edema 3+ (Moderate)   Leg, Right Edema 3+ (Moderate)        Pressure Injury 09/09/19 1100 midline Sacral spine Stage 3   Date First Assessed/Time First Assessed: 09/09/19 1100   Pressure Injury Present on Admission: yes  Orientation: midline  Location: Sacral spine  Is this injury device related?: No  Staging: Stage 3   Wound Image    Staging Stage 3   Drainage Amount None   Drainage Characteristics/Odor No odor   Appearance Moist;Red   Tissue loss description Full thickness   Periwound Area Macerated  (darker skin discoloration)   Wound Edges Rolled/closed   Wound Length (cm) 2 cm   Wound Width (cm) 1 cm   Wound Depth (cm) 0.3 cm   Wound Volume (cm^3) 0.6 cm^3   Wound Surface Area (cm^2) 2 cm^2   Care    (recommend Triad barrier cream BID/prn)   Skin Interventions   Pressure Reduction Devices    (waffle overlay ordered)             Medications: Review discharge medications with patient and family and provide education.      Current Discharge Medication List       START taking these medications    Details   apixaban (ELIQUIS) 5 mg (74 tabs) DsPk For the first 7 days take two 5 mg tablets twice daily.  After 7 days take one 5 mg tablet twice daily.  Qty: 74 tablet, Refills: 0      apixaban (ELIQUIS) 5 mg Tab Start after completion of starter pack: Take 1 tablet (5 mg total) by mouth 2 (two) times daily.  Qty: 60 tablet, Refills: 2      hydrALAZINE (APRESOLINE) 50 MG tablet Take 1 tablet (50 mg total) by mouth every 12 (twelve) hours.  Qty: 60 tablet, Refills: 11         CONTINUE these medications which have CHANGED    Details   furosemide (LASIX) 40 MG tablet Take 1 tablet (40 mg total) by mouth 2 (two) times daily.  Qty: 60 tablet, Refills: 11         CONTINUE these medications which have NOT CHANGED    Details   amLODIPine (NORVASC) 10 MG tablet TAKE 1 TABLET (10 MG TOTAL) BY MOUTH ONCE DAILY.  Qty: 90 tablet, Refills: 0      aspirin (ECOTRIN) 81 MG EC tablet Take 81 mg by mouth. 1 Tablet, Delayed Release (E.C.) Oral Every morning      atorvastatin (LIPITOR) 80 MG tablet Take 1 tablet (80 mg total) by mouth once daily.  Qty: 90 tablet, Refills: 1      BD ALCOHOL SWABS PadM       bicalutamide (CASODEX) 50 MG Tab TAKE 1 TABLET EVERY DAY  Qty: 90 tablet, Refills: 3    Associated Diagnoses: Prostate cancer      diclofenac sodium (VOLTAREN) 1 % Gel Apply 2 g topically 4 (four) times daily.  Qty: 1 Tube, Refills: 2      fish oil-omega-3 fatty acids 300-1,000 mg capsule Take 1 capsule by mouth once daily.      garlic 1,000 mg Cap Take 2 tablets by mouth once daily.      glipiZIDE (GLUCOTROL) 2.5 MG TR24 Take 1 tablet (2.5 mg total) by mouth once daily.  Qty: 90 tablet, Refills: 0      latanoprost 0.005 % ophthalmic solution Place 1 drop into both eyes every evening.  Qty: 3 Bottle, Refills: 3    Associated Diagnoses: Primary open angle glaucoma of both eyes, moderate stage      multivitamin capsule Take 1 capsule by mouth once daily.      ammonium lactate 12 % Crea Apply twice  daily to affected parts both feet as needed.  Qty: 140 g, Refills: 11      blood-glucose meter kit Use as instructed  Qty: 1 each, Refills: 0      carvedilol (COREG) 6.25 MG tablet Take 1 tablet (6.25 mg total) by mouth 2 (two) times daily with meals.  Qty: 180 tablet, Refills: 3    Associated Diagnoses: Chronic combined systolic and diastolic heart failure; Ischemic cardiomyopathy; Essential hypertension      lancets (LANCETS,ULTRA THIN) Misc Use daily  Qty: 100 each, Refills: 12      nitroGLYCERIN (NITROSTAT) 0.4 MG SL tablet Place 1 tablet (0.4 mg total) under the tongue every 5 (five) minutes as needed for Chest pain.  Qty: 100 tablet, Refills: 5      TRUE METRIX GLUCOSE TEST STRIP Strp TEST EVERY DAY  Qty: 100 strip, Refills: 12         STOP taking these medications       losartan (COZAAR) 50 MG tablet Comments:   Reason for Stopping:         naproxen sodium (ANAPROX) 220 MG tablet Comments:   Reason for Stopping:             I certify that this patient is confined to his home and needs intermittent skilled nursing care, physical therapy and occupational therapy.      Taylor Castro, DNP, AG-ACNP, BC  Department of Hospital Medicine  [facility]

## 2019-09-12 NOTE — PLAN OF CARE
Problem: Adult Inpatient Plan of Care  Goal: Plan of Care Review  Patient denies pain, requires assistance with all ADL's.  Wound to sacrum cleaned and covered with mepilex.  Blood glucose levels all within WDL today. Patient had one episode of V-tach today for 15 beats, Care team notified.  Patient alert and oriented, ready for discharge.  Telemetry and IV discontinued for discharge

## 2019-09-13 ENCOUNTER — TELEPHONE (OUTPATIENT)
Dept: HOME HEALTH SERVICES | Facility: HOSPITAL | Age: 84
End: 2019-09-13

## 2019-09-13 NOTE — PLAN OF CARE
09/13/19 0706   Final Note   Assessment Type Final Discharge Note   Anticipated Discharge Disposition Home-Health   Hospital Follow Up  Appt(s) scheduled? Yes

## 2019-09-14 LAB
BACTERIA BLD CULT: NORMAL
BACTERIA BLD CULT: NORMAL

## 2019-09-16 ENCOUNTER — PATIENT OUTREACH (OUTPATIENT)
Dept: ADMINISTRATIVE | Facility: CLINIC | Age: 84
End: 2019-09-16

## 2019-09-16 NOTE — PATIENT INSTRUCTIONS
Discharge Instructions for Heart Failure  The heart is a muscle that pumps oxygen-rich blood to all parts of the body. When you have heart failure, the heart is not able to pump as well as it should. Blood and fluid may back up into the lungs (congestive heart failure), and some parts of the body dont get enough oxygen-rich blood to work normally. These problems lead to the symptoms of heart failure. Heart failure can occur due to an injury to the heart or from natural processes.  You can control symptoms of heart failure with some lifestyle changes and by following your doctor's advice.  Home care  Activity  Ask your healthcare provider about an exercise program. You can benefit from simple activities such as walking or gardening. Exercising most days of the week can make you feel better. Don't be discouraged if your progress is slow at first. Rest as needed. Stop activity if you develop symptoms such as chest pain, lightheadedness, or significant shortness of breath. Find activities that you enjoy, such as brisk walking, dancing, swimming, or gardening. These will help you stay active and strengthen your heart.  Diet  Follow a heart healthy diet. And make sure to limit the salt (sodium) in your diet. Salt causes your body to hold water. This makes your heart work harder as there is more fluid for the heart to pump. Limit your salt by doing the following:  · Limit canned, dried, packaged, and fast foods.  · Don't add salt to your food.  · Season foods with herbs instead of salt.  · Watch how much liquids you drink. Drinking too much can make heart failure worse. Talk with your health care provider about how much you should drink each day.  · Limit the amount of alcohol you drink. It may harm your heart. Women should have no more than 1 drink a day and men should have no more than 2.  · When you eat out, request that your meals have no added salt.  Tobacco  If you smoke, it's very important to quit. Smoking  increases your chances of having a heart attack by harming the blood vessels that provide oxygen to your heart. This makes heart failure worse. Quitting smoking is the number one thing you can do to improve your health. Enroll in a stop-smoking program to improve your chances of success. Talk with your healthcare provider about medicines or nicotine replacement therapy to help you quit smoking. Ask your healthcare provider about smoking cessation support groups.  Medicine  Take your medicines exactly as prescribed. Learn the names and purpose of each of your medicines. Keep an accurate medicine list and current dosages with you at all times. Don't skip doses. If you miss a dose of your medicine, take it as soon as you remember. If you miss a dose and it's almost time for your next dose, just wait and take your next dose at the normal time. Don't take a double dose. If you are unsure, call your doctor's office. Make sure not to mix up your medicines or forget what you've taken the same day.  Weight monitoring  Weigh yourself every day. A sudden weight gain can mean your heart failure is getting worse. Weigh yourself at the same time of day and in the same kind of clothes. Ideally, weigh yourself first thing in the morning after you empty your bladder, but before you eat breakfast. Your healthcare provider will show you how to track your weight. He or she will also discuss with you when you should call if you have a sudden, unexpected increase in your weight.  In general, your healthcare provider may ask you to report if your weight goes up by more than 2 pounds in 1 day,  5 pounds in 1 week, or whatever weight gain you were told by your doctor. This is a sign that you are retaining more fluid than you should be. Clues to weight gain include checking your ankles for swelling, or noticing you are short of breath when you lie down.  Follow-up care  Make a follow-up appointment as directed. Depending on the type and  severity of heart failure you have, you may need follow-up as early as 7 days from hospital discharge. Keep appointments for checkups and lab tests that are needed to check your medicines and condition.  Recognize that your health and even survival depend on your following medical recommendations.  Symptoms  Heart failure can cause a variety of symptoms, including:  · Shortness of breath  · Trouble breathing at night, especially when you lie down  · Swelling in the legs and feet or in the belly (abdomen)  · Becoming easily fatigued  · Irregular or rapid heartbeat  · Weakness or lightheadedness  · Swelling of the neck veins  It is important to know what to do if symptoms get worse or if you develop signs of worsening heart failure.     When to see your healthcare provider  Call your doctor right away if you have any of these signs of worsening heart failure:  · Sudden weight gain (more than 2 pounds in 1 day or 5 pounds in 1 week, or whatever weight gain you were told to report by your doctor)  · Trouble breathing not related to being active  · New or increased swelling of your legs or ankles  · Swelling or pain in your abdomen  · Breathing trouble at night (waking up short of breath, needing more pillows to breathe)  · Frequent coughing that doesn't go away  · Feeling much more tired than usual  Call 911  Call 911 right away if you have:  · Severe shortness of breath, such that you can't catch your breath even while resting  · Severe chest pain that does not resolve with rest or nitroglycerin  · Pink, foamy mucus with cough and shortness of breath  · A continuous rapid or irregular heartbeat  · Passing out or fainting  · Stroke symptoms such as sudden numbness or weakness on one side of your face, arm, or leg or sudden confusion, trouble speaking or vision changes   Date Last Reviewed: 3/21/2016  © 5460-9175 [email or web address]. [address] [place] [address], [place] [address]. All rights reserved. This information  is not intended as a substitute for professional medical care. Always follow your healthcare professional's instructions.        Taking a Diuretic  Your healthcare provider has prescribed a diuretic, or water pill, to help your body get rid of excess water and salt and maintain appropriate fluid balance. Taking your diuretic can help you feel better, breathe better, move more easily, and have more energy.     Take your medication early in the day at the same time each day.      The name of my diuretic is:     ________________________________________   Medicine tips  · Read the fact sheet that comes with your medicine. It tells you when and how to take it. Ask for a medicine sheet if you dont get one.  · If you take 2 or more doses each day, take the last one before dinner if you can. That way youll get up fewer times during the night to go to the bathroom. However, make sure you have enough time between doses during the day.   · If you miss a dose, take it as soon as you remember. If it is almost time for the next dose, skip the missed dose. Do not take a double dose.  · If you miss 2 or more consecutive doses, call your healthcare provider. You may be at risk for fluid buildup.  For your safety  · Follow your doctors guidelines for potassium intake. You may need to take a potassium supplement. Or, you may need to avoid potassium supplements, salt substitutes with potassium, or large amounts of high-potassium foods (such as bananas, potatoes, broccoli, and milk). Recommendations for potassium will depend on the type of diuretic you are prescribed along with your kidney function and other factors. Your healthcare provider will likely want to check your potassium level regularly while you are taking this medicine.  · Talk to your healthcare provider about whether it is safe for you to drink alcohol while taking this medicine.  · Get up slowly when you are sitting or lying down. This helps prevent dizziness  and falls due to dizziness.  · Ask your healthcare provider or pharmacist before you take any other prescription or nonprescription medicine or herbal supplements. Some of them may interact with your diuretic and keep it from working correctly.  · Limit exposure to sunlight. A diuretic may increase your sensitivity to the sun. Even brief sun exposure may cause skin rash, itching, redness, or other discoloration. It may also lead to severe sunburn. To protect your skin do the following:  ¨ Avoid direct sunlight, especially between 10 a.m. and 2 p.m., whenever possible.  ¨ Apply a daily sunblock of at least SPF 15 (or higher) to any exposed skin, including your lips.  ¨ Wear protective clothing, such as a hat and sunglasses, when you are outdoors.  ¨ Avoid sunlamps and tanning booths.  ¨ Long-sleeve shirts and pants in the summer can help protect your skin.        When to seek medical advice  Call your healthcare provider right away if any of these occur:  · Have diarrhea, constipation, nausea or vomiting.  · Lose your appetite or notice a rapid or excessive weight gain.  · Feel extremely tired or weak.  · Have shortness of breath or difficulty breathing or swallowing.  · Have numbness or tingling in your hands, feet, or lips or a ringing in your ears.  · Feel lightheaded when getting up after sitting or lying down.  · Have headaches, blurred vision, or feel a sense of confusion.  · Have muscle cramps or joint pain.  · Have chest pains or changes in your heartbeat.  · Have an excessive thirst or a dry mouth.  · Notice a skin rash.  · Gain more than 2 pounds in 1 day or 4 pounds in 1 week (ask your healthcare provider for his or her specific direction).  · Have any other unusual symptoms.   Date Last Reviewed: 6/1/2016  © 9558-7149 [employer]. [address] [place] [address], [place] [address]. All rights reserved. This information is not intended as a substitute for professional medical care. Always follow your  healthcare professional's instructions.

## 2019-09-19 ENCOUNTER — TELEPHONE (OUTPATIENT)
Dept: INTERNAL MEDICINE | Facility: CLINIC | Age: 84
End: 2019-09-19

## 2019-09-19 ENCOUNTER — LAB VISIT (OUTPATIENT)
Dept: LAB | Facility: HOSPITAL | Age: 84
End: 2019-09-19
Attending: INTERNAL MEDICINE
Payer: MEDICARE

## 2019-09-19 DIAGNOSIS — I50.43 ACUTE ON CHRONIC COMBINED SYSTOLIC AND DIASTOLIC HEART FAILURE: Primary | ICD-10-CM

## 2019-09-19 LAB
ALBUMIN SERPL BCP-MCNC: 3.4 G/DL (ref 3.5–5.2)
ALP SERPL-CCNC: 90 U/L (ref 55–135)
ALT SERPL W/O P-5'-P-CCNC: 12 U/L (ref 10–44)
ANION GAP SERPL CALC-SCNC: 10 MMOL/L (ref 8–16)
AST SERPL-CCNC: 20 U/L (ref 10–40)
BASOPHILS # BLD AUTO: 0.03 K/UL (ref 0–0.2)
BASOPHILS NFR BLD: 0.4 % (ref 0–1.9)
BILIRUB SERPL-MCNC: 0.5 MG/DL (ref 0.1–1)
BUN SERPL-MCNC: 50 MG/DL (ref 10–30)
CALCIUM SERPL-MCNC: 8.7 MG/DL (ref 8.7–10.5)
CHLORIDE SERPL-SCNC: 104 MMOL/L (ref 95–110)
CO2 SERPL-SCNC: 24 MMOL/L (ref 23–29)
CREAT SERPL-MCNC: 2.3 MG/DL (ref 0.5–1.4)
DIFFERENTIAL METHOD: ABNORMAL
EOSINOPHIL # BLD AUTO: 0.2 K/UL (ref 0–0.5)
EOSINOPHIL NFR BLD: 2.9 % (ref 0–8)
ERYTHROCYTE [DISTWIDTH] IN BLOOD BY AUTOMATED COUNT: 14.5 % (ref 11.5–14.5)
EST. GFR  (AFRICAN AMERICAN): 27.7 ML/MIN/1.73 M^2
EST. GFR  (NON AFRICAN AMERICAN): 23.9 ML/MIN/1.73 M^2
GLUCOSE SERPL-MCNC: 165 MG/DL (ref 70–110)
HCT VFR BLD AUTO: 24.6 % (ref 40–54)
HGB BLD-MCNC: 8.3 G/DL (ref 14–18)
IMM GRANULOCYTES # BLD AUTO: 0.02 K/UL (ref 0–0.04)
IMM GRANULOCYTES NFR BLD AUTO: 0.3 % (ref 0–0.5)
LYMPHOCYTES # BLD AUTO: 0.9 K/UL (ref 1–4.8)
LYMPHOCYTES NFR BLD: 12.1 % (ref 18–48)
MCH RBC QN AUTO: 27.1 PG (ref 27–31)
MCHC RBC AUTO-ENTMCNC: 33.7 G/DL (ref 32–36)
MCV RBC AUTO: 80 FL (ref 82–98)
MONOCYTES # BLD AUTO: 0.8 K/UL (ref 0.3–1)
MONOCYTES NFR BLD: 10 % (ref 4–15)
NEUTROPHILS # BLD AUTO: 5.6 K/UL (ref 1.8–7.7)
NEUTROPHILS NFR BLD: 74.3 % (ref 38–73)
NRBC BLD-RTO: 0 /100 WBC
PLATELET # BLD AUTO: 187 K/UL (ref 150–350)
PMV BLD AUTO: 12 FL (ref 9.2–12.9)
POTASSIUM SERPL-SCNC: 3.8 MMOL/L (ref 3.5–5.1)
PROT SERPL-MCNC: 6.4 G/DL (ref 6–8.4)
RBC # BLD AUTO: 3.06 M/UL (ref 4.6–6.2)
SODIUM SERPL-SCNC: 138 MMOL/L (ref 136–145)
WBC # BLD AUTO: 7.59 K/UL (ref 3.9–12.7)

## 2019-09-19 PROCEDURE — 85025 COMPLETE CBC W/AUTO DIFF WBC: CPT | Mod: HCNC

## 2019-09-19 PROCEDURE — 80053 COMPREHEN METABOLIC PANEL: CPT | Mod: HCNC

## 2019-09-19 NOTE — TELEPHONE ENCOUNTER
----- Message from Sera Christian sent at 9/19/2019  1:43 PM CDT -----  Contact: shari  Name of Who is Calling:  [facility]       What is the request in detail: Lucina was calling to get the patient physical therapy extended for  twice a week for 2 weeks       Can the clinic reply by [facility]: no       What Number to Call Back if not in [facility]: [phone]

## 2019-09-19 NOTE — TELEPHONE ENCOUNTER
Spoke with Shari to inform her that the message was sent to Dr. Chauhan and she has left the office for the day, but will return tomorrow.

## 2019-09-19 NOTE — TELEPHONE ENCOUNTER
----- Message from Ananya Cuadra sent at 9/19/2019  1:34 PM CDT -----  Contact: Lucina [facility] [phone]  Physical Therapist is trying to get an order for a sliding board . Please send the order to [facility] fax [phone]

## 2019-09-19 NOTE — TELEPHONE ENCOUNTER
----- Message from Sera Christian sent at 9/19/2019  1:40 PM CDT -----  Contact: shari  Name of Who is Calling:  [facility]       What is the request in detail: Lucina was calling to get the patient  for the patient for Pt twice a week for 2 weeks       Can the clinic reply by [facility]: no      What Number to Call Back if not in [facility]: [phone]

## 2019-09-20 ENCOUNTER — PATIENT OUTREACH (OUTPATIENT)
Dept: ADMINISTRATIVE | Facility: OTHER | Age: 84
End: 2019-09-20

## 2019-09-20 ENCOUNTER — TELEPHONE (OUTPATIENT)
Dept: HOME HEALTH SERVICES | Facility: HOSPITAL | Age: 84
End: 2019-09-20

## 2019-09-20 NOTE — TELEPHONE ENCOUNTER
Spoke with Shari from [facility] and informed her of Dr. Chauhan message and that she gave the verbal order for an OK for extended PT. Also that she did the prescription for the sliding board.

## 2019-09-21 ENCOUNTER — TELEPHONE (OUTPATIENT)
Dept: INTERNAL MEDICINE | Facility: CLINIC | Age: 84
End: 2019-09-21

## 2019-09-21 NOTE — TELEPHONE ENCOUNTER
Please contact patient and inform him that his labwork indicates a few  abnormalities . I will need to see him in  clinic to further evaluate.  Please ask if he can come in on September 24th at 8:20 to see me please

## 2019-09-23 ENCOUNTER — TELEPHONE (OUTPATIENT)
Dept: ADMINISTRATIVE | Facility: OTHER | Age: 84
End: 2019-09-23

## 2019-09-23 PROCEDURE — G0180 PR HOME HEALTH MD CERTIFICATION: ICD-10-PCS | Mod: ,,, | Performed by: INTERNAL MEDICINE

## 2019-09-23 PROCEDURE — G0180 MD CERTIFICATION HHA PATIENT: HCPCS | Mod: ,,, | Performed by: INTERNAL MEDICINE

## 2019-09-23 RX ORDER — FUROSEMIDE 40 MG/1
40 TABLET ORAL 2 TIMES DAILY
Qty: 180 TABLET | Refills: 0 | Status: ON HOLD | OUTPATIENT
Start: 2019-09-23 | End: 2019-10-02 | Stop reason: HOSPADM

## 2019-09-23 RX ORDER — AMLODIPINE BESYLATE 10 MG/1
10 TABLET ORAL DAILY
Qty: 90 TABLET | Refills: 0 | Status: ON HOLD | OUTPATIENT
Start: 2019-09-23 | End: 2019-10-02 | Stop reason: HOSPADM

## 2019-09-23 NOTE — TELEPHONE ENCOUNTER
Spoke with daughter. Informed her Dr. Chauhan would like to see pt back in clinic to further evaluate. Daughter accepted appt for Sept 24th at 8:20.

## 2019-09-23 NOTE — TELEPHONE ENCOUNTER
Wife request a call back to discuss scheduling with Spine surgery in a couple of days as she would like to speak to PCP on 9-.

## 2019-09-24 ENCOUNTER — HOSPITAL ENCOUNTER (INPATIENT)
Facility: HOSPITAL | Age: 84
LOS: 8 days | Discharge: HOSPICE | DRG: 871 | End: 2019-10-02
Attending: EMERGENCY MEDICINE | Admitting: EMERGENCY MEDICINE
Payer: MEDICARE

## 2019-09-24 ENCOUNTER — OFFICE VISIT (OUTPATIENT)
Dept: INTERNAL MEDICINE | Facility: CLINIC | Age: 84
DRG: 871 | End: 2019-09-24
Payer: MEDICARE

## 2019-09-24 DIAGNOSIS — I50.9 HEART FAILURE: ICD-10-CM

## 2019-09-24 DIAGNOSIS — R06.00 DYSPNEA, UNSPECIFIED TYPE: ICD-10-CM

## 2019-09-24 DIAGNOSIS — Z71.89 GOALS OF CARE, COUNSELING/DISCUSSION: ICD-10-CM

## 2019-09-24 DIAGNOSIS — R79.89 ELEVATED TROPONIN: ICD-10-CM

## 2019-09-24 DIAGNOSIS — R52 PAIN: ICD-10-CM

## 2019-09-24 DIAGNOSIS — R00.0 TACHYCARDIA: ICD-10-CM

## 2019-09-24 DIAGNOSIS — J96.01 ACUTE RESPIRATORY FAILURE WITH HYPOXIA: ICD-10-CM

## 2019-09-24 DIAGNOSIS — Z71.89 ADVANCE CARE PLANNING: ICD-10-CM

## 2019-09-24 DIAGNOSIS — Z51.5 PALLIATIVE CARE ENCOUNTER: ICD-10-CM

## 2019-09-24 DIAGNOSIS — K92.2 GASTROINTESTINAL HEMORRHAGE, UNSPECIFIED GASTROINTESTINAL HEMORRHAGE TYPE: Primary | ICD-10-CM

## 2019-09-24 DIAGNOSIS — I21.4 NSTEMI (NON-ST ELEVATED MYOCARDIAL INFARCTION): ICD-10-CM

## 2019-09-24 DIAGNOSIS — D64.9 ANEMIA, UNSPECIFIED TYPE: Primary | ICD-10-CM

## 2019-09-24 PROBLEM — K92.1 MELENA: Status: ACTIVE | Noted: 2019-09-24

## 2019-09-24 LAB
ABO + RH BLD: NORMAL
ALBUMIN SERPL BCP-MCNC: 3.6 G/DL (ref 3.5–5.2)
ALP SERPL-CCNC: 95 U/L (ref 55–135)
ALT SERPL W/O P-5'-P-CCNC: 14 U/L (ref 10–44)
ANION GAP SERPL CALC-SCNC: 14 MMOL/L (ref 8–16)
AST SERPL-CCNC: 29 U/L (ref 10–40)
BASOPHILS # BLD AUTO: 0.03 K/UL (ref 0–0.2)
BASOPHILS # BLD AUTO: 0.04 K/UL (ref 0–0.2)
BASOPHILS NFR BLD: 0.3 % (ref 0–1.9)
BASOPHILS NFR BLD: 0.4 % (ref 0–1.9)
BILIRUB SERPL-MCNC: 0.6 MG/DL (ref 0.1–1)
BLD GP AB SCN CELLS X3 SERPL QL: NORMAL
BUN SERPL-MCNC: 56 MG/DL (ref 10–30)
CALCIUM SERPL-MCNC: 8.9 MG/DL (ref 8.7–10.5)
CHLORIDE SERPL-SCNC: 107 MMOL/L (ref 95–110)
CO2 SERPL-SCNC: 21 MMOL/L (ref 23–29)
CREAT SERPL-MCNC: 2.2 MG/DL (ref 0.5–1.4)
DIFFERENTIAL METHOD: ABNORMAL
DIFFERENTIAL METHOD: ABNORMAL
EOSINOPHIL # BLD AUTO: 0.2 K/UL (ref 0–0.5)
EOSINOPHIL # BLD AUTO: 0.3 K/UL (ref 0–0.5)
EOSINOPHIL NFR BLD: 2.2 % (ref 0–8)
EOSINOPHIL NFR BLD: 3.4 % (ref 0–8)
ERYTHROCYTE [DISTWIDTH] IN BLOOD BY AUTOMATED COUNT: 15 % (ref 11.5–14.5)
ERYTHROCYTE [DISTWIDTH] IN BLOOD BY AUTOMATED COUNT: 15.1 % (ref 11.5–14.5)
EST. GFR  (AFRICAN AMERICAN): 29.2 ML/MIN/1.73 M^2
EST. GFR  (NON AFRICAN AMERICAN): 25.3 ML/MIN/1.73 M^2
GLUCOSE SERPL-MCNC: 127 MG/DL (ref 70–110)
HCT VFR BLD AUTO: 23.3 % (ref 40–54)
HCT VFR BLD AUTO: 26 % (ref 40–54)
HGB BLD-MCNC: 8.3 G/DL (ref 14–18)
HGB BLD-MCNC: 8.8 G/DL (ref 14–18)
IMM GRANULOCYTES # BLD AUTO: 0.03 K/UL (ref 0–0.04)
IMM GRANULOCYTES # BLD AUTO: 0.04 K/UL (ref 0–0.04)
IMM GRANULOCYTES NFR BLD AUTO: 0.3 % (ref 0–0.5)
IMM GRANULOCYTES NFR BLD AUTO: 0.4 % (ref 0–0.5)
LYMPHOCYTES # BLD AUTO: 0.9 K/UL (ref 1–4.8)
LYMPHOCYTES # BLD AUTO: 1 K/UL (ref 1–4.8)
LYMPHOCYTES NFR BLD: 10.4 % (ref 18–48)
LYMPHOCYTES NFR BLD: 9.6 % (ref 18–48)
MCH RBC QN AUTO: 27.3 PG (ref 27–31)
MCH RBC QN AUTO: 27.5 PG (ref 27–31)
MCHC RBC AUTO-ENTMCNC: 33.8 G/DL (ref 32–36)
MCHC RBC AUTO-ENTMCNC: 35.6 G/DL (ref 32–36)
MCV RBC AUTO: 77 FL (ref 82–98)
MCV RBC AUTO: 81 FL (ref 82–98)
MONOCYTES # BLD AUTO: 0.8 K/UL (ref 0.3–1)
MONOCYTES # BLD AUTO: 1 K/UL (ref 0.3–1)
MONOCYTES NFR BLD: 10.6 % (ref 4–15)
MONOCYTES NFR BLD: 8.6 % (ref 4–15)
NEUTROPHILS # BLD AUTO: 7.1 K/UL (ref 1.8–7.7)
NEUTROPHILS # BLD AUTO: 7.7 K/UL (ref 1.8–7.7)
NEUTROPHILS NFR BLD: 75 % (ref 38–73)
NEUTROPHILS NFR BLD: 78.8 % (ref 38–73)
NRBC BLD-RTO: 0 /100 WBC
NRBC BLD-RTO: 0 /100 WBC
PLATELET # BLD AUTO: 195 K/UL (ref 150–350)
PLATELET # BLD AUTO: 202 K/UL (ref 150–350)
PMV BLD AUTO: 11.5 FL (ref 9.2–12.9)
PMV BLD AUTO: 12.1 FL (ref 9.2–12.9)
POCT GLUCOSE: 113 MG/DL (ref 70–110)
POCT GLUCOSE: 128 MG/DL (ref 70–110)
POTASSIUM SERPL-SCNC: 3.8 MMOL/L (ref 3.5–5.1)
PROT SERPL-MCNC: 7 G/DL (ref 6–8.4)
RBC # BLD AUTO: 3.04 M/UL (ref 4.6–6.2)
RBC # BLD AUTO: 3.2 M/UL (ref 4.6–6.2)
SODIUM SERPL-SCNC: 142 MMOL/L (ref 136–145)
WBC # BLD AUTO: 9.51 K/UL (ref 3.9–12.7)
WBC # BLD AUTO: 9.72 K/UL (ref 3.9–12.7)

## 2019-09-24 PROCEDURE — 11000001 HC ACUTE MED/SURG PRIVATE ROOM: Mod: HCNC

## 2019-09-24 PROCEDURE — 1101F PT FALLS ASSESS-DOCD LE1/YR: CPT | Mod: HCNC,CPTII,S$GLB, | Performed by: INTERNAL MEDICINE

## 2019-09-24 PROCEDURE — 1101F PR PT FALLS ASSESS DOC 0-1 FALLS W/OUT INJ PAST YR: ICD-10-PCS | Mod: HCNC,CPTII,S$GLB, | Performed by: INTERNAL MEDICINE

## 2019-09-24 PROCEDURE — 99284 EMERGENCY DEPT VISIT MOD MDM: CPT | Mod: HCNC,,, | Performed by: EMERGENCY MEDICINE

## 2019-09-24 PROCEDURE — 80053 COMPREHEN METABOLIC PANEL: CPT | Mod: HCNC

## 2019-09-24 PROCEDURE — G0378 HOSPITAL OBSERVATION PER HR: HCPCS | Mod: HCNC

## 2019-09-24 PROCEDURE — 36415 COLL VENOUS BLD VENIPUNCTURE: CPT | Mod: HCNC

## 2019-09-24 PROCEDURE — 93010 EKG 12-LEAD: ICD-10-PCS | Mod: HCNC,,, | Performed by: INTERNAL MEDICINE

## 2019-09-24 PROCEDURE — 93010 ELECTROCARDIOGRAM REPORT: CPT | Mod: HCNC,,, | Performed by: INTERNAL MEDICINE

## 2019-09-24 PROCEDURE — 86920 COMPATIBILITY TEST SPIN: CPT | Mod: HCNC

## 2019-09-24 PROCEDURE — 99219 PR INITIAL OBSERVATION CARE,LEVL II: ICD-10-PCS | Mod: HCNC,,, | Performed by: PHYSICIAN ASSISTANT

## 2019-09-24 PROCEDURE — 99284 PR EMERGENCY DEPT VISIT,LEVEL IV: ICD-10-PCS | Mod: HCNC,,, | Performed by: EMERGENCY MEDICINE

## 2019-09-24 PROCEDURE — 99215 PR OFFICE/OUTPT VISIT, EST, LEVL V, 40-54 MIN: ICD-10-PCS | Mod: HCNC,S$GLB,, | Performed by: INTERNAL MEDICINE

## 2019-09-24 PROCEDURE — 82272 OCCULT BLD FECES 1-3 TESTS: CPT | Mod: HCNC

## 2019-09-24 PROCEDURE — 99215 OFFICE O/P EST HI 40 MIN: CPT | Mod: HCNC,S$GLB,, | Performed by: INTERNAL MEDICINE

## 2019-09-24 PROCEDURE — 25000003 PHARM REV CODE 250: Mod: HCNC | Performed by: PHYSICIAN ASSISTANT

## 2019-09-24 PROCEDURE — 86850 RBC ANTIBODY SCREEN: CPT | Mod: HCNC

## 2019-09-24 PROCEDURE — 93005 ELECTROCARDIOGRAM TRACING: CPT | Mod: HCNC

## 2019-09-24 PROCEDURE — 99999 PR PBB SHADOW E&M-EST. PATIENT-LVL V: ICD-10-PCS | Mod: PBBFAC,HCNC,, | Performed by: INTERNAL MEDICINE

## 2019-09-24 PROCEDURE — 85025 COMPLETE CBC W/AUTO DIFF WBC: CPT | Mod: HCNC

## 2019-09-24 PROCEDURE — 99285 EMERGENCY DEPT VISIT HI MDM: CPT | Mod: 25,HCNC

## 2019-09-24 PROCEDURE — 99219 PR INITIAL OBSERVATION CARE,LEVL II: CPT | Mod: HCNC,,, | Performed by: PHYSICIAN ASSISTANT

## 2019-09-24 PROCEDURE — 99999 PR PBB SHADOW E&M-EST. PATIENT-LVL V: CPT | Mod: PBBFAC,HCNC,, | Performed by: INTERNAL MEDICINE

## 2019-09-24 RX ORDER — LATANOPROST 50 UG/ML
1 SOLUTION/ DROPS OPHTHALMIC NIGHTLY
Status: DISCONTINUED | OUTPATIENT
Start: 2019-09-24 | End: 2019-10-02 | Stop reason: HOSPADM

## 2019-09-24 RX ORDER — IBUPROFEN 200 MG
16 TABLET ORAL
Status: DISCONTINUED | OUTPATIENT
Start: 2019-09-24 | End: 2019-09-30

## 2019-09-24 RX ORDER — IBUPROFEN 200 MG
24 TABLET ORAL
Status: DISCONTINUED | OUTPATIENT
Start: 2019-09-24 | End: 2019-09-30

## 2019-09-24 RX ORDER — ACETAMINOPHEN 325 MG/1
650 TABLET ORAL EVERY 6 HOURS PRN
Status: DISCONTINUED | OUTPATIENT
Start: 2019-09-24 | End: 2019-10-02 | Stop reason: HOSPADM

## 2019-09-24 RX ORDER — BICALUTAMIDE 50 MG/1
50 TABLET, FILM COATED ORAL DAILY
Status: DISCONTINUED | OUTPATIENT
Start: 2019-09-25 | End: 2019-09-27

## 2019-09-24 RX ORDER — SODIUM CHLORIDE 0.9 % (FLUSH) 0.9 %
3 SYRINGE (ML) INJECTION EVERY 8 HOURS
Status: DISCONTINUED | OUTPATIENT
Start: 2019-09-24 | End: 2019-10-02 | Stop reason: HOSPADM

## 2019-09-24 RX ORDER — ATORVASTATIN CALCIUM 20 MG/1
80 TABLET, FILM COATED ORAL DAILY
Status: DISCONTINUED | OUTPATIENT
Start: 2019-09-25 | End: 2019-09-30

## 2019-09-24 RX ORDER — SODIUM CHLORIDE 0.9 % (FLUSH) 0.9 %
10 SYRINGE (ML) INJECTION
Status: DISCONTINUED | OUTPATIENT
Start: 2019-09-24 | End: 2019-10-02 | Stop reason: HOSPADM

## 2019-09-24 RX ORDER — INSULIN ASPART 100 [IU]/ML
0-5 INJECTION, SOLUTION INTRAVENOUS; SUBCUTANEOUS
Status: DISCONTINUED | OUTPATIENT
Start: 2019-09-24 | End: 2019-09-30

## 2019-09-24 RX ORDER — ONDANSETRON 8 MG/1
8 TABLET, ORALLY DISINTEGRATING ORAL EVERY 8 HOURS PRN
Status: DISCONTINUED | OUTPATIENT
Start: 2019-09-24 | End: 2019-10-02 | Stop reason: HOSPADM

## 2019-09-24 RX ORDER — GLUCAGON 1 MG
1 KIT INJECTION
Status: DISCONTINUED | OUTPATIENT
Start: 2019-09-24 | End: 2019-09-30

## 2019-09-24 RX ORDER — ONDANSETRON 2 MG/ML
4 INJECTION INTRAMUSCULAR; INTRAVENOUS EVERY 8 HOURS PRN
Status: DISCONTINUED | OUTPATIENT
Start: 2019-09-24 | End: 2019-10-02 | Stop reason: HOSPADM

## 2019-09-24 RX ORDER — AMLODIPINE BESYLATE 10 MG/1
10 TABLET ORAL DAILY
Status: DISCONTINUED | OUTPATIENT
Start: 2019-09-25 | End: 2019-09-27

## 2019-09-24 RX ORDER — FUROSEMIDE 40 MG/1
40 TABLET ORAL 2 TIMES DAILY
Status: DISCONTINUED | OUTPATIENT
Start: 2019-09-24 | End: 2019-09-26

## 2019-09-24 RX ORDER — ASPIRIN 81 MG/1
81 TABLET ORAL DAILY
Status: DISCONTINUED | OUTPATIENT
Start: 2019-09-25 | End: 2019-09-24

## 2019-09-24 RX ADMIN — FUROSEMIDE 40 MG: 40 TABLET ORAL at 09:09

## 2019-09-24 NOTE — ASSESSMENT & PLAN NOTE
Ischemic CMP  Aortic Stenosis  - Recently admitted for CHF exacerbation 9/9-9/12  - Last echo 9/9/19 shows EF 45%, DD, severe L atrial enlargement, severe AS  - Euvolemic on exam  - Continue home lasix BID  - Strict I/Os, daily weights

## 2019-09-24 NOTE — ASSESSMENT & PLAN NOTE
Chronic sacral wound, L foot skin breakdown  - Wound care consulted   - Currently as outpatient home health wound care

## 2019-09-24 NOTE — ED TRIAGE NOTES
Pt presents to ED from clinic with low hemoglobin. Pt here 2 weeks ago for DVT in right groin, stayed 3 days on heparin and sent home with Eliquis. Denies pain, fever, and SOB. Wound to left leg dressed with kerlix.

## 2019-09-24 NOTE — ASSESSMENT & PLAN NOTE
- BP stable  - Held coreg and hydralazine in setting of melena  - Continue home norvasc  - Continue to monitor, resume meds as needed

## 2019-09-24 NOTE — ED NOTES
Patient remains on continuous cardiac monitor, automatic blood pressure cuff and continuous pulse oximeter. Resting comfortably in stretcher. VSS. Side rails up and bed in lowest position. Family at bedside. Call light in reach. Will continue to monitor.

## 2019-09-24 NOTE — NURSING
"Admitted from ED approx 1600. Incontinence care provided upon arrival. Sacral pressure ulcer cleaned and dressed w/ barrier cream, offloading provided w/ pillows. Heel wound remains dressed until wound care team comes to assess and provide rec's, per pt request as it is very painful and he will "kick like crazy" if we touch it. Menu provided to patient. Pt and family oriented to room and use of call bell. Safety maintained. See flowsheet for detail.     9/24/2019 5:19 PM   "

## 2019-09-24 NOTE — HPI
"89 year old male with a PMHx of HTN, HLD, CAD, HFrEF, AS, CKD3, and DM2 presenting to the ER from PCP clinic with son and grandson at bedside for "low hemoglobin." Patient reports noticing dark stools at home, but denies BRBPR, bloody BMs, hematuria, and hemoptysis. He denies associated SOB and PRATT. Patient also reports taking iron supplementation at home. He reports chronic fatigue, currently at his baseline. At the time of my exam, patient denies CP, SOB, abdominal pain, N/V/D, fever/chills, recent illness, dysuria/hematuria/frequency, focal weakness, syncope, and palpitations.       HDS on admission. Labs significant for H/H 8.8/26.0, chronic CKD, and . T&S ordered.  "

## 2019-09-24 NOTE — ASSESSMENT & PLAN NOTE
DVT of RLE  - HDS on admission  - H/H 8.8/26.0; Hgb ~8-9 this month, prior to this month Hgb 10-11  - Of note, patient reports taking iron supplementation at home  - Dx with DVT of RLE during last admission and subsequently started on Eliquis  - Held Eliquis on admission given melena. Discussed risks/benefits with patient and family who voiced understanding to plan of care  - CBC q6  - CLD as precaution  - If drop in H/H or episodes of bleeding, will consult GI  - Supportive care

## 2019-09-24 NOTE — ASSESSMENT & PLAN NOTE
- Renal function stable  - Last A1c 6 in 6/2019; ordered repeat  -  on admit  - Held glipizide on admit  - Low dose SSI  - Monitor BGs and adjust insulin PRN  - BMP daily

## 2019-09-24 NOTE — SUBJECTIVE & OBJECTIVE
Past Medical History:   Diagnosis Date    Anemia     Arthritis     Cancer     Prostate    Cataract     CHF (congestive heart failure)     Chronic kidney disease     Coronary artery disease     Diabetes mellitus     Diabetic retinopathy     Disorder of kidney and ureter     ckd 2    Encounter for blood transfusion     Glaucoma     High cholesterol     Hypertension     Myocardial infarction     Prostate cancer     PVD (peripheral vascular disease)     Spinal stenosis     Status post cataract extraction and insertion of intraocular lens of left eye 11/13/2015       Past Surgical History:   Procedure Laterality Date    CARDIAC CATHETERIZATION      15 to 20 years ago x1 stent and 4/7/2014 x1 stent    CATARACT EXTRACTION Bilateral     Dr. Ferguson    EYE SURGERY      HERNIA REPAIR         Review of patient's allergies indicates:  No Known Allergies    Current Facility-Administered Medications on File Prior to Encounter   Medication    leuprolide (6 month) SyKt 45 mg     Current Outpatient Medications on File Prior to Encounter   Medication Sig    [START ON 10/12/2019] apixaban (ELIQUIS) 5 mg Tab Start after completion of starter pack: Take 1 tablet (5 mg total) by mouth 2 (two) times daily.    amLODIPine (NORVASC) 10 MG tablet TAKE 1 TABLET (10 MG TOTAL) BY MOUTH ONCE DAILY.    ammonium lactate 12 % Crea Apply twice daily to affected parts both feet as needed.    apixaban (ELIQUIS) 5 mg (74 tabs) DsPk For the first 7 days take two 5 mg tablets twice daily.  After 7 days take one 5 mg tablet twice daily.    aspirin (ECOTRIN) 81 MG EC tablet Take 81 mg by mouth. 1 Tablet, Delayed Release (E.C.) Oral Every morning    atorvastatin (LIPITOR) 80 MG tablet Take 1 tablet (80 mg total) by mouth once daily.    BD ALCOHOL SWABS PadM     bicalutamide (CASODEX) 50 MG Tab TAKE 1 TABLET EVERY DAY    blood-glucose meter kit Use as instructed    carvedilol (COREG) 6.25 MG tablet Take 1 tablet (6.25 mg  total) by mouth 2 (two) times daily with meals.    diclofenac sodium (VOLTAREN) 1 % Gel Apply 2 g topically 4 (four) times daily.    fish oil-omega-3 fatty acids 300-1,000 mg capsule Take 1 capsule by mouth once daily.    furosemide (LASIX) 40 MG tablet TAKE 1 TABLET (40 MG TOTAL) BY MOUTH 2 (TWO) TIMES DAILY.    garlic 1,000 mg Cap Take 2 tablets by mouth once daily.    glipiZIDE (GLUCOTROL) 2.5 MG TR24 Take 1 tablet (2.5 mg total) by mouth once daily.    hydrALAZINE (APRESOLINE) 50 MG tablet Take 1 tablet (50 mg total) by mouth every 12 (twelve) hours.    lancets (LANCETS,ULTRA THIN) Misc Use daily    latanoprost 0.005 % ophthalmic solution Place 1 drop into both eyes every evening.    multivitamin capsule Take 1 capsule by mouth once daily.    nitroGLYCERIN (NITROSTAT) 0.4 MG SL tablet Place 1 tablet (0.4 mg total) under the tongue every 5 (five) minutes as needed for Chest pain.    TRUE METRIX GLUCOSE TEST STRIP Strp TEST EVERY DAY     Family History     Problem Relation (Age of Onset)    Breast cancer Sister    Cancer Father    Diabetes Sister, Brother    Hypertension Sister    No Known Problems Mother, Son, Brother, Maternal Aunt, Maternal Uncle, Paternal Aunt, Paternal Uncle, Maternal Grandmother, Maternal Grandfather, Paternal Grandmother, Paternal Grandfather, Daughter        Tobacco Use    Smoking status: Never Smoker    Smokeless tobacco: Former User   Substance and Sexual Activity    Alcohol use: No    Drug use: No    Sexual activity: Never     Review of Systems   Constitutional: Positive for fatigue (chronic). Negative for chills, diaphoresis and fever.   HENT: Negative for congestion, rhinorrhea, sinus pressure, sore throat and trouble swallowing.    Respiratory: Negative for cough, chest tightness, shortness of breath and wheezing.    Cardiovascular: Positive for leg swelling (chronic). Negative for chest pain and palpitations.   Gastrointestinal: Negative for abdominal distention,  abdominal pain, anal bleeding, blood in stool, diarrhea, nausea and vomiting.   Genitourinary: Negative for difficulty urinating, dysuria, flank pain, frequency and hematuria.   Musculoskeletal: Positive for gait problem (wheelchair at baseline) and myalgias (chronic). Negative for back pain and neck pain.   Skin: Positive for wound (chronic sarcal wound, skin breakdown to L foot).   Neurological: Positive for weakness (chronic). Negative for dizziness, seizures, syncope, speech difficulty and headaches.   Psychiatric/Behavioral: Negative for agitation, behavioral problems, confusion and dysphoric mood. The patient is not nervous/anxious.      Objective:     Vital Signs (Most Recent):  Temp: 98 °F (36.7 °C) (09/24/19 1025)  Pulse: 60 (09/24/19 1431)  Resp: 18 (09/24/19 1431)  BP: (!) 160/74 (09/24/19 1431)  SpO2: 98 % (09/24/19 1431) Vital Signs (24h Range):  Temp:  [98 °F (36.7 °C)] 98 °F (36.7 °C)  Pulse:  [52-71] 60  Resp:  [16-19] 18  SpO2:  [97 %-100 %] 98 %  BP: (122-160)/(64-83) 160/74     Weight: 69.9 kg (154 lb)  Body mass index is 23.42 kg/m².    Physical Exam   Constitutional: He is oriented to person, place, and time. He appears well-developed. No distress.   Frail elderly male in NAD   HENT:   Head: Normocephalic and atraumatic.   Eyes: Conjunctivae and EOM are normal. Right eye exhibits no discharge. Left eye exhibits no discharge. No scleral icterus.   Neck: Normal range of motion. Neck supple. No JVD present. No tracheal deviation present.   Cardiovascular: Normal rate, regular rhythm and intact distal pulses.   Murmur heard.  Pulmonary/Chest: Effort normal and breath sounds normal. No respiratory distress. He has no wheezes.   Abdominal: Soft. Bowel sounds are normal. He exhibits no distension. There is no tenderness.   Musculoskeletal: Normal range of motion. He exhibits edema. He exhibits no tenderness.   Neurological: He is alert and oriented to person, place, and time. No cranial nerve deficit.    Skin: Skin is warm and dry. He is not diaphoretic.   L foot wound dressed C/D/I   Psychiatric: He has a normal mood and affect. His behavior is normal.         CRANIAL NERVES     CN III, IV, VI   Extraocular motions are normal.        Significant Labs: All pertinent labs within the past 24 hours have been reviewed.    Significant Imaging: I have reviewed all pertinent imaging results/findings within the past 24 hours.

## 2019-09-24 NOTE — H&P
"[facility] Hospital Medicine  History & Physical    Patient Name: Jeniffer Taylor  MRN: [id number]  Admission Date: 9/24/2019  Attending Physician: GÉNESIS Reed MD   Primary Care Provider: Shari Chauhan MD    [facility] Medicine Team: [facility] HOSP MED E Angelita Chang PA-C     Patient information was obtained from patient, relative(s), past medical records and ER records.     Subjective:     Principal Problem:Melena    Chief Complaint:   Chief Complaint   Patient presents with    Multiple Complaints     low blood count, immobile, dark stool        HPI: 89 year old male with a PMHx of HTN, HLD, CAD, HFrEF, AS, CKD3, and DM2 presenting to the ER from PCP clinic with son and grandson at bedside for "low hemoglobin." Patient reports noticing dark stools at home, but denies BRBPR, bloody BMs, hematuria, and hemoptysis. He denies associated SOB and PRATT. Patient also reports taking iron supplementation at home. He reports chronic fatigue, currently at his baseline. At the time of my exam, patient denies CP, SOB, abdominal pain, N/V/D, fever/chills, recent illness, dysuria/hematuria/frequency, focal weakness, syncope, and palpitations.       HDS on admission. Labs significant for H/H 8.8/26.0, chronic CKD, and . T&S ordered.    Past Medical History:   Diagnosis Date    Anemia     Arthritis     Cancer     Prostate    Cataract     CHF (congestive heart failure)     Chronic kidney disease     Coronary artery disease     Diabetes mellitus     Diabetic retinopathy     Disorder of kidney and ureter     ckd 2    Encounter for blood transfusion     Glaucoma     High cholesterol     Hypertension     Myocardial infarction     Prostate cancer     PVD (peripheral vascular disease)     Spinal stenosis     Status post cataract extraction and insertion of intraocular lens of left eye 11/13/2015       Past Surgical History:   Procedure Laterality Date    CARDIAC CATHETERIZATION      15 to 20 " years ago x1 stent and 4/7/2014 x1 stent    CATARACT EXTRACTION Bilateral     Dr. Ferguson    EYE SURGERY      HERNIA REPAIR         Review of patient's allergies indicates:  No Known Allergies    Current Facility-Administered Medications on File Prior to Encounter   Medication    leuprolide (6 month) SyKt 45 mg     Current Outpatient Medications on File Prior to Encounter   Medication Sig    [START ON 10/12/2019] apixaban (ELIQUIS) 5 mg Tab Start after completion of starter pack: Take 1 tablet (5 mg total) by mouth 2 (two) times daily.    amLODIPine (NORVASC) 10 MG tablet TAKE 1 TABLET (10 MG TOTAL) BY MOUTH ONCE DAILY.    ammonium lactate 12 % Crea Apply twice daily to affected parts both feet as needed.    apixaban (ELIQUIS) 5 mg (74 tabs) DsPk For the first 7 days take two 5 mg tablets twice daily.  After 7 days take one 5 mg tablet twice daily.    aspirin (ECOTRIN) 81 MG EC tablet Take 81 mg by mouth. 1 Tablet, Delayed Release (E.C.) Oral Every morning    atorvastatin (LIPITOR) 80 MG tablet Take 1 tablet (80 mg total) by mouth once daily.    BD ALCOHOL SWABS PadM     bicalutamide (CASODEX) 50 MG Tab TAKE 1 TABLET EVERY DAY    blood-glucose meter kit Use as instructed    carvedilol (COREG) 6.25 MG tablet Take 1 tablet (6.25 mg total) by mouth 2 (two) times daily with meals.    diclofenac sodium (VOLTAREN) 1 % Gel Apply 2 g topically 4 (four) times daily.    fish oil-omega-3 fatty acids 300-1,000 mg capsule Take 1 capsule by mouth once daily.    furosemide (LASIX) 40 MG tablet TAKE 1 TABLET (40 MG TOTAL) BY MOUTH 2 (TWO) TIMES DAILY.    garlic 1,000 mg Cap Take 2 tablets by mouth once daily.    glipiZIDE (GLUCOTROL) 2.5 MG TR24 Take 1 tablet (2.5 mg total) by mouth once daily.    hydrALAZINE (APRESOLINE) 50 MG tablet Take 1 tablet (50 mg total) by mouth every 12 (twelve) hours.    lancets (LANCETS,ULTRA THIN) Misc Use daily    latanoprost 0.005 % ophthalmic solution Place 1 drop into both  eyes every evening.    multivitamin capsule Take 1 capsule by mouth once daily.    nitroGLYCERIN (NITROSTAT) 0.4 MG SL tablet Place 1 tablet (0.4 mg total) under the tongue every 5 (five) minutes as needed for Chest pain.    TRUE METRIX GLUCOSE TEST STRIP Strp TEST EVERY DAY     Family History     Problem Relation (Age of Onset)    Breast cancer Sister    Cancer Father    Diabetes Sister, Brother    Hypertension Sister    No Known Problems Mother, Son, Brother, Maternal Aunt, Maternal Uncle, Paternal Aunt, Paternal Uncle, Maternal Grandmother, Maternal Grandfather, Paternal Grandmother, Paternal Grandfather, Daughter        Tobacco Use    Smoking status: Never Smoker    Smokeless tobacco: Former User   Substance and Sexual Activity    Alcohol use: No    Drug use: No    Sexual activity: Never     Review of Systems   Constitutional: Positive for fatigue (chronic). Negative for chills, diaphoresis and fever.   HENT: Negative for congestion, rhinorrhea, sinus pressure, sore throat and trouble swallowing.    Respiratory: Negative for cough, chest tightness, shortness of breath and wheezing.    Cardiovascular: Positive for leg swelling (chronic). Negative for chest pain and palpitations.   Gastrointestinal: Negative for abdominal distention, abdominal pain, anal bleeding, blood in stool, diarrhea, nausea and vomiting.   Genitourinary: Negative for difficulty urinating, dysuria, flank pain, frequency and hematuria.   Musculoskeletal: Positive for gait problem (wheelchair at baseline) and myalgias (chronic). Negative for back pain and neck pain.   Skin: Positive for wound (chronic sarcal wound, skin breakdown to L foot).   Neurological: Positive for weakness (chronic). Negative for dizziness, seizures, syncope, speech difficulty and headaches.   Psychiatric/Behavioral: Negative for agitation, behavioral problems, confusion and dysphoric mood. The patient is not nervous/anxious.      Objective:     Vital Signs (Most  Recent):  Temp: 98 °F (36.7 °C) (09/24/19 1025)  Pulse: 60 (09/24/19 1431)  Resp: 18 (09/24/19 1431)  BP: (!) 160/74 (09/24/19 1431)  SpO2: 98 % (09/24/19 1431) Vital Signs (24h Range):  Temp:  [98 °F (36.7 °C)] 98 °F (36.7 °C)  Pulse:  [52-71] 60  Resp:  [16-19] 18  SpO2:  [97 %-100 %] 98 %  BP: (122-160)/(64-83) 160/74     Weight: 69.9 kg (154 lb)  Body mass index is 23.42 kg/m².    Physical Exam   Constitutional: He is oriented to person, place, and time. He appears well-developed. No distress.   Frail elderly male in NAD   HENT:   Head: Normocephalic and atraumatic.   Eyes: Conjunctivae and EOM are normal. Right eye exhibits no discharge. Left eye exhibits no discharge. No scleral icterus.   Neck: Normal range of motion. Neck supple. No JVD present. No tracheal deviation present.   Cardiovascular: Normal rate, regular rhythm and intact distal pulses.   Murmur heard.  Pulmonary/Chest: Effort normal and breath sounds normal. No respiratory distress. He has no wheezes.   Abdominal: Soft. Bowel sounds are normal. He exhibits no distension. There is no tenderness.   Musculoskeletal: Normal range of motion. He exhibits edema. He exhibits no tenderness.   Neurological: He is alert and oriented to person, place, and time. No cranial nerve deficit.   Skin: Skin is warm and dry. He is not diaphoretic.   L foot wound dressed C/D/I   Psychiatric: He has a normal mood and affect. His behavior is normal.         CRANIAL NERVES     CN III, IV, VI   Extraocular motions are normal.        Significant Labs: All pertinent labs within the past 24 hours have been reviewed.    Significant Imaging: I have reviewed all pertinent imaging results/findings within the past 24 hours.    Assessment/Plan:     * Melena  DVT of RLE  - HDS on admission  - H/H 8.8/26.0; Hgb ~8-9 this month, prior to this month Hgb 10-11  - Of note, patient reports taking iron supplementation at home  - Dx with DVT of RLE during last admission and subsequently  started on Eliquis  - Held Eliquis on admission given melena. Discussed risks/benefits with patient and family who voiced understanding to plan of care  - CBC q6  - CLD as precaution  - If drop in H/H or episodes of bleeding, will consult GI  - Supportive care    Hypertensive heart disease with heart failure  - BP stable  - Held coreg and hydralazine in setting of melena  - Continue home norvasc  - Continue to monitor, resume meds as needed    Chronic combined systolic and diastolic heart failure  Ischemic CMP  Aortic Stenosis  - Recently admitted for CHF exacerbation 9/9-9/12  - Last echo 9/9/19 shows EF 45%, DD, severe L atrial enlargement, severe AS  - Euvolemic on exam  - Continue home lasix BID  - Strict I/Os, daily weights    Controlled type 2 diabetes mellitus with stage 3 chronic kidney disease, without long-term current use of insulin  - Renal function stable  - Last A1c 6 in 6/2019; ordered repeat  -  on admit  - Held glipizide on admit  - Low dose SSI  - Monitor BGs and adjust insulin PRN  - BMP daily    CAD (coronary artery disease)  HLD  - Denies CP/SOB  - Will hold ASA in setting of melena  - Continue lipitor    Pressure injury of sacral region, stage 2  Chronic sacral wound, L foot skin breakdown  - Wound care consulted   - Currently as outpatient home health wound care    Prostate CA  - Continue Casodex  - Leuprolide q6 months   - Follows with urology as outpatient    VTE Risk Mitigation (From admission, onward)         Ordered     Place BERT hose  Until discontinued      09/24/19 1459     Reason for No Pharmacological VTE Prophylaxis  Once      09/24/19 1459     IP VTE HIGH RISK PATIENT  Once      09/24/19 1459     Place sequential compression device  Until discontinued      09/24/19 1355                   Angelita Chang PA-C  Department of Hospital Medicine   [facility]

## 2019-09-24 NOTE — ED PROVIDER NOTES
Encounter Date: 9/24/2019       History     Chief Complaint   Patient presents with    Multiple Complaints     low blood count, immobile, dark stool     Patient is a 89 yo man with PMHx CAD s/p stents, CKD. DM2, lumbar stenosis, recent debility to w/c and DVT recent admission 9/9 for chest pain who presents from clinic with concerns of Hgb drop. He was recently started on Apixaban for DVT in right femoral vein. He is accompanied by daughter and son who help provide history. Daughter said he has been having dark stools and some diarrhea for several weeks. He denies blood in sputum, hemoptysis,  BRBPF    Daughter offer multiple complaints.  Leg swelling: for past new months, ask if caused by heart failure  Left leg spasm: left leg flexes at knee randomly, can be painful  Anemia: Hgb low, on apixaban for DVT in recent hx        Review of patient's allergies indicates:  No Known Allergies  Past Medical History:   Diagnosis Date    Anemia     Arthritis     Cancer     Prostate    Cataract     CHF (congestive heart failure)     Chronic kidney disease     Coronary artery disease     Diabetes mellitus     Diabetic retinopathy     Disorder of kidney and ureter     ckd 2    Encounter for blood transfusion     Glaucoma     High cholesterol     Hypertension     Myocardial infarction     Prostate cancer     PVD (peripheral vascular disease)     Spinal stenosis     Status post cataract extraction and insertion of intraocular lens of left eye 11/13/2015     Past Surgical History:   Procedure Laterality Date    CARDIAC CATHETERIZATION      15 to 20 years ago x1 stent and 4/7/2014 x1 stent    CATARACT EXTRACTION Bilateral     Dr. Ferguson    EYE SURGERY      HERNIA REPAIR       Family History   Problem Relation Age of Onset    Cancer Father     No Known Problems Mother     No Known Problems Son     Breast cancer Sister     Diabetes Sister     Hypertension Sister     No Known Problems Brother     No  Known Problems Maternal Aunt     No Known Problems Maternal Uncle     No Known Problems Paternal Aunt     No Known Problems Paternal Uncle     No Known Problems Maternal Grandmother     No Known Problems Maternal Grandfather     No Known Problems Paternal Grandmother     No Known Problems Paternal Grandfather     No Known Problems Daughter     Diabetes Brother     Heart disease Neg Hx     Heart attack Neg Hx     Amblyopia Neg Hx     Blindness Neg Hx     Cataracts Neg Hx     Glaucoma Neg Hx     Macular degeneration Neg Hx     Retinal detachment Neg Hx     Strabismus Neg Hx     Stroke Neg Hx     Thyroid disease Neg Hx      Social History     Tobacco Use    Smoking status: Never Smoker    Smokeless tobacco: Former User   Substance Use Topics    Alcohol use: No    Drug use: No     Review of Systems   Constitutional: Positive for chills. Negative for fatigue and fever.   HENT: Positive for congestion. Negative for facial swelling and hearing loss.    Eyes: Negative for photophobia and visual disturbance.   Respiratory: Negative for cough, choking, chest tightness and shortness of breath.    Cardiovascular: Negative for chest pain, palpitations and leg swelling.   Gastrointestinal: Positive for diarrhea. Negative for anal bleeding and blood in stool.   Endocrine: Negative for polydipsia and polyphagia.   Genitourinary: Negative for difficulty urinating, dysuria and hematuria.   Musculoskeletal: Positive for gait problem.   Neurological: Negative for dizziness, facial asymmetry, light-headedness and headaches.   Hematological: Does not bruise/bleed easily.   Psychiatric/Behavioral: Negative for agitation.       Physical Exam     Initial Vitals [09/24/19 1025]   BP Pulse Resp Temp SpO2   138/64 (!) 54 18 98 °F (36.7 °C) 100 %      MAP       --         Physical Exam    Constitutional: He appears well-developed and well-nourished.   HENT:   Left Ear: External ear normal.   Mouth/Throat: Oropharynx is  clear and moist.   Eyes: EOM are normal. Pupils are equal, round, and reactive to light. Right eye exhibits no discharge. Left eye exhibits no discharge.   Neck: Normal range of motion. Neck supple. No thyromegaly present. No tracheal deviation present. No JVD present.   Cardiovascular: Normal rate, regular rhythm and normal heart sounds.   Pulmonary/Chest: No respiratory distress. He has no wheezes. He has no rhonchi.   Abdominal: Soft. Bowel sounds are normal. There is tenderness. There is rebound.   Genitourinary: Rectal exam shows guaiac positive stool. Guaiac positive stool. : Acceptable.  Musculoskeletal: He exhibits no edema or tenderness.   Neurological: He is alert and oriented to person, place, and time.   Skin: Capillary refill takes less than 2 seconds. No abscess noted. No erythema. No pallor.   Psychiatric: He has a normal mood and affect.         ED Course   Procedures  Labs Reviewed   CBC W/ AUTO DIFFERENTIAL - Abnormal; Notable for the following components:       Result Value    RBC 3.20 (*)     Hemoglobin 8.8 (*)     Hematocrit 26.0 (*)     Mean Corpuscular Volume 81 (*)     RDW 15.1 (*)     Lymph # 0.9 (*)     Gran% 78.8 (*)     Lymph% 9.6 (*)     All other components within normal limits   COMPREHENSIVE METABOLIC PANEL - Abnormal; Notable for the following components:    CO2 21 (*)     Glucose 127 (*)     BUN, Bld 56 (*)     Creatinine 2.2 (*)     eGFR if  29.2 (*)     eGFR if non  25.3 (*)     All other components within normal limits   TYPE & SCREEN          Imaging Results    None          Medical Decision Making:   Differential Diagnosis:   Occult GI Bleed: FOBT positive in ER 12noon 9/24, pt on apixaba    Anemia 2/2 kidney dz: unlikely given Hgb decrease in time frame    Clinical Tests:   Lab Tests: Reviewed  The following lab test(s) were unremarkable: CBC       <> Summary of Lab: Hgb 8.8  ED Management:  Anemia: CMP, CBC ordered, FOBT (+)                  Attending Attestation:   Physician Attestation Statement for Resident:  As the supervising MD   Physician Attestation Statement: I have personally seen and examined this patient.   I agree with the above history. -: 89-year-old male, multiple comorbidities including CHF, diabetes, hypertension, recent diagnosis of a DVT 2-3 weeks ago and started on apixaban.  Since that time the patient has had dark stools though no grossly bloody stools or hematemesis.  His baseline hemoglobin is 11 from July and this dropped to between 8 and 9 over this time.  No episodes of syncope.  Referred here today for evaluation.  His vital signs are stable. On his exam he does have dark stool though not melanotic and is strongly guaiac positive. Hemoglobin is 8.8 so stable over the last 1-2 weeks but this represents a change for the patient over the last couple months.  Given his age, anticoagulation multiple comorbidities, will admit to Internal Medicine for a workup of this indolent GI bleed.   As the supervising MD I agree with the above PE.    As the supervising MD I agree with the above treatment, course, plan, and disposition.  I have reviewed and agree with the residents interpretation of the following: lab data.  I have reviewed the following: old records at this facility.                       Clinical Impression:       ICD-10-CM ICD-9-CM   1. Gastrointestinal hemorrhage, unspecified gastrointestinal hemorrhage type K92.2 578.9     FOBT positive in ER 12noon 9/24  Admit to OBS.          Rosalia Bates MD  Resident  09/24/19 0921       Rosalia Bates MD  Resident  09/24/19 2357

## 2019-09-25 PROBLEM — L89.622 PRESSURE INJURY OF LEFT HEEL, STAGE 2: Status: ACTIVE | Noted: 2019-09-25

## 2019-09-25 LAB
25(OH)D3+25(OH)D2 SERPL-MCNC: 34 NG/ML (ref 30–96)
ANION GAP SERPL CALC-SCNC: 11 MMOL/L (ref 8–16)
BASOPHILS # BLD AUTO: 0.02 K/UL (ref 0–0.2)
BASOPHILS # BLD AUTO: 0.02 K/UL (ref 0–0.2)
BASOPHILS # BLD AUTO: 0.03 K/UL (ref 0–0.2)
BASOPHILS NFR BLD: 0.2 % (ref 0–1.9)
BASOPHILS NFR BLD: 0.2 % (ref 0–1.9)
BASOPHILS NFR BLD: 0.3 % (ref 0–1.9)
BUN SERPL-MCNC: 55 MG/DL (ref 10–30)
CALCIUM SERPL-MCNC: 8.5 MG/DL (ref 8.7–10.5)
CHLORIDE SERPL-SCNC: 110 MMOL/L (ref 95–110)
CO2 SERPL-SCNC: 22 MMOL/L (ref 23–29)
CREAT SERPL-MCNC: 2 MG/DL (ref 0.5–1.4)
DIFFERENTIAL METHOD: ABNORMAL
EOSINOPHIL # BLD AUTO: 0.2 K/UL (ref 0–0.5)
EOSINOPHIL # BLD AUTO: 0.2 K/UL (ref 0–0.5)
EOSINOPHIL # BLD AUTO: 0.3 K/UL (ref 0–0.5)
EOSINOPHIL NFR BLD: 2.1 % (ref 0–8)
EOSINOPHIL NFR BLD: 2.2 % (ref 0–8)
EOSINOPHIL NFR BLD: 3 % (ref 0–8)
ERYTHROCYTE [DISTWIDTH] IN BLOOD BY AUTOMATED COUNT: 14.9 % (ref 11.5–14.5)
ERYTHROCYTE [DISTWIDTH] IN BLOOD BY AUTOMATED COUNT: 15 % (ref 11.5–14.5)
ERYTHROCYTE [DISTWIDTH] IN BLOOD BY AUTOMATED COUNT: 15.1 % (ref 11.5–14.5)
EST. GFR  (AFRICAN AMERICAN): 32.8 ML/MIN/1.73 M^2
EST. GFR  (NON AFRICAN AMERICAN): 28.3 ML/MIN/1.73 M^2
ESTIMATED AVG GLUCOSE: 128 MG/DL (ref 68–131)
FERRITIN SERPL-MCNC: 294 NG/ML (ref 20–300)
GLUCOSE SERPL-MCNC: 103 MG/DL (ref 70–110)
HBA1C MFR BLD HPLC: 6.1 % (ref 4–5.6)
HCT VFR BLD AUTO: 21.7 % (ref 40–54)
HCT VFR BLD AUTO: 22.6 % (ref 40–54)
HCT VFR BLD AUTO: 22.8 % (ref 40–54)
HGB BLD-MCNC: 7.8 G/DL (ref 14–18)
HGB BLD-MCNC: 7.8 G/DL (ref 14–18)
HGB BLD-MCNC: 7.9 G/DL (ref 14–18)
IMM GRANULOCYTES # BLD AUTO: 0.03 K/UL (ref 0–0.04)
IMM GRANULOCYTES # BLD AUTO: 0.03 K/UL (ref 0–0.04)
IMM GRANULOCYTES # BLD AUTO: 0.04 K/UL (ref 0–0.04)
IMM GRANULOCYTES NFR BLD AUTO: 0.3 % (ref 0–0.5)
IMM GRANULOCYTES NFR BLD AUTO: 0.3 % (ref 0–0.5)
IMM GRANULOCYTES NFR BLD AUTO: 0.4 % (ref 0–0.5)
IRON SERPL-MCNC: 16 UG/DL (ref 45–160)
LYMPHOCYTES # BLD AUTO: 0.8 K/UL (ref 1–4.8)
LYMPHOCYTES # BLD AUTO: 0.9 K/UL (ref 1–4.8)
LYMPHOCYTES # BLD AUTO: 1 K/UL (ref 1–4.8)
LYMPHOCYTES NFR BLD: 10.2 % (ref 18–48)
LYMPHOCYTES NFR BLD: 10.3 % (ref 18–48)
LYMPHOCYTES NFR BLD: 8.7 % (ref 18–48)
MAGNESIUM SERPL-MCNC: 2.3 MG/DL (ref 1.6–2.6)
MCH RBC QN AUTO: 27.5 PG (ref 27–31)
MCH RBC QN AUTO: 27.7 PG (ref 27–31)
MCH RBC QN AUTO: 27.8 PG (ref 27–31)
MCHC RBC AUTO-ENTMCNC: 34.2 G/DL (ref 32–36)
MCHC RBC AUTO-ENTMCNC: 34.5 G/DL (ref 32–36)
MCHC RBC AUTO-ENTMCNC: 36.4 G/DL (ref 32–36)
MCV RBC AUTO: 76 FL (ref 82–98)
MCV RBC AUTO: 80 FL (ref 82–98)
MCV RBC AUTO: 80 FL (ref 82–98)
MONOCYTES # BLD AUTO: 0.7 K/UL (ref 0.3–1)
MONOCYTES # BLD AUTO: 0.8 K/UL (ref 0.3–1)
MONOCYTES # BLD AUTO: 0.9 K/UL (ref 0.3–1)
MONOCYTES NFR BLD: 8.2 % (ref 4–15)
MONOCYTES NFR BLD: 8.8 % (ref 4–15)
MONOCYTES NFR BLD: 9.8 % (ref 4–15)
NEUTROPHILS # BLD AUTO: 7 K/UL (ref 1.8–7.7)
NEUTROPHILS # BLD AUTO: 7.1 K/UL (ref 1.8–7.7)
NEUTROPHILS # BLD AUTO: 7.3 K/UL (ref 1.8–7.7)
NEUTROPHILS NFR BLD: 77.9 % (ref 38–73)
NEUTROPHILS NFR BLD: 78.2 % (ref 38–73)
NEUTROPHILS NFR BLD: 78.9 % (ref 38–73)
NRBC BLD-RTO: 0 /100 WBC
PHOSPHATE SERPL-MCNC: 4.5 MG/DL (ref 2.7–4.5)
PLATELET # BLD AUTO: 171 K/UL (ref 150–350)
PLATELET # BLD AUTO: 178 K/UL (ref 150–350)
PLATELET # BLD AUTO: 182 K/UL (ref 150–350)
PMV BLD AUTO: 11.3 FL (ref 9.2–12.9)
PMV BLD AUTO: 11.9 FL (ref 9.2–12.9)
PMV BLD AUTO: 12.1 FL (ref 9.2–12.9)
POCT GLUCOSE: 101 MG/DL (ref 70–110)
POCT GLUCOSE: 111 MG/DL (ref 70–110)
POCT GLUCOSE: 183 MG/DL (ref 70–110)
POTASSIUM SERPL-SCNC: 3.4 MMOL/L (ref 3.5–5.1)
RBC # BLD AUTO: 2.82 M/UL (ref 4.6–6.2)
RBC # BLD AUTO: 2.84 M/UL (ref 4.6–6.2)
RBC # BLD AUTO: 2.84 M/UL (ref 4.6–6.2)
SATURATED IRON: 8 % (ref 20–50)
SODIUM SERPL-SCNC: 143 MMOL/L (ref 136–145)
TOTAL IRON BINDING CAPACITY: 212 UG/DL (ref 250–450)
TRANSFERRIN SERPL-MCNC: 143 MG/DL (ref 200–375)
VIT B12 SERPL-MCNC: 743 PG/ML (ref 210–950)
WBC # BLD AUTO: 8.92 K/UL (ref 3.9–12.7)
WBC # BLD AUTO: 9.07 K/UL (ref 3.9–12.7)
WBC # BLD AUTO: 9.29 K/UL (ref 3.9–12.7)

## 2019-09-25 PROCEDURE — A4216 STERILE WATER/SALINE, 10 ML: HCPCS | Mod: HCNC | Performed by: PHYSICIAN ASSISTANT

## 2019-09-25 PROCEDURE — C9113 INJ PANTOPRAZOLE SODIUM, VIA: HCPCS | Mod: HCNC | Performed by: PHYSICIAN ASSISTANT

## 2019-09-25 PROCEDURE — 25000003 PHARM REV CODE 250: Mod: HCNC | Performed by: PHYSICIAN ASSISTANT

## 2019-09-25 PROCEDURE — 99233 PR SUBSEQUENT HOSPITAL CARE,LEVL III: ICD-10-PCS | Mod: HCNC,,, | Performed by: PHYSICIAN ASSISTANT

## 2019-09-25 PROCEDURE — 83735 ASSAY OF MAGNESIUM: CPT | Mod: HCNC

## 2019-09-25 PROCEDURE — 94761 N-INVAS EAR/PLS OXIMETRY MLT: CPT | Mod: HCNC

## 2019-09-25 PROCEDURE — 80048 BASIC METABOLIC PNL TOTAL CA: CPT | Mod: HCNC

## 2019-09-25 PROCEDURE — 94640 AIRWAY INHALATION TREATMENT: CPT | Mod: HCNC

## 2019-09-25 PROCEDURE — 85025 COMPLETE CBC W/AUTO DIFF WBC: CPT | Mod: HCNC

## 2019-09-25 PROCEDURE — 99233 SBSQ HOSP IP/OBS HIGH 50: CPT | Mod: HCNC,,, | Performed by: PHYSICIAN ASSISTANT

## 2019-09-25 PROCEDURE — 83036 HEMOGLOBIN GLYCOSYLATED A1C: CPT | Mod: HCNC

## 2019-09-25 PROCEDURE — 83540 ASSAY OF IRON: CPT | Mod: HCNC

## 2019-09-25 PROCEDURE — 99232 PR SUBSEQUENT HOSPITAL CARE,LEVL II: ICD-10-PCS | Mod: HCNC,,, | Performed by: INTERNAL MEDICINE

## 2019-09-25 PROCEDURE — 99232 SBSQ HOSP IP/OBS MODERATE 35: CPT | Mod: HCNC,,, | Performed by: INTERNAL MEDICINE

## 2019-09-25 PROCEDURE — 63600175 PHARM REV CODE 636 W HCPCS: Mod: HCNC | Performed by: PHYSICIAN ASSISTANT

## 2019-09-25 PROCEDURE — 36415 COLL VENOUS BLD VENIPUNCTURE: CPT | Mod: HCNC

## 2019-09-25 PROCEDURE — 84100 ASSAY OF PHOSPHORUS: CPT | Mod: HCNC

## 2019-09-25 PROCEDURE — 82728 ASSAY OF FERRITIN: CPT | Mod: HCNC

## 2019-09-25 PROCEDURE — 25000242 PHARM REV CODE 250 ALT 637 W/ HCPCS: Mod: HCNC | Performed by: PHYSICIAN ASSISTANT

## 2019-09-25 PROCEDURE — 82306 VITAMIN D 25 HYDROXY: CPT | Mod: HCNC

## 2019-09-25 PROCEDURE — 84484 ASSAY OF TROPONIN QUANT: CPT | Mod: HCNC

## 2019-09-25 PROCEDURE — 82607 VITAMIN B-12: CPT | Mod: HCNC

## 2019-09-25 PROCEDURE — 11000001 HC ACUTE MED/SURG PRIVATE ROOM: Mod: HCNC

## 2019-09-25 PROCEDURE — 27000221 HC OXYGEN, UP TO 24 HOURS: Mod: HCNC

## 2019-09-25 RX ORDER — IRON POLYSACCHARIDE COMPLEX 150 MG
300 CAPSULE ORAL DAILY
Status: DISCONTINUED | OUTPATIENT
Start: 2019-09-26 | End: 2019-09-27

## 2019-09-25 RX ORDER — POTASSIUM CHLORIDE 20 MEQ/1
40 TABLET, EXTENDED RELEASE ORAL ONCE
Status: COMPLETED | OUTPATIENT
Start: 2019-09-25 | End: 2019-09-25

## 2019-09-25 RX ORDER — PANTOPRAZOLE SODIUM 40 MG/10ML
40 INJECTION, POWDER, LYOPHILIZED, FOR SOLUTION INTRAVENOUS 2 TIMES DAILY
Status: DISCONTINUED | OUTPATIENT
Start: 2019-09-25 | End: 2019-10-01

## 2019-09-25 RX ORDER — PANTOPRAZOLE SODIUM 40 MG/10ML
80 INJECTION, POWDER, LYOPHILIZED, FOR SOLUTION INTRAVENOUS DAILY
Status: DISCONTINUED | OUTPATIENT
Start: 2019-09-25 | End: 2019-09-25

## 2019-09-25 RX ORDER — IPRATROPIUM BROMIDE AND ALBUTEROL SULFATE 2.5; .5 MG/3ML; MG/3ML
3 SOLUTION RESPIRATORY (INHALATION) EVERY 6 HOURS PRN
Status: DISCONTINUED | OUTPATIENT
Start: 2019-09-25 | End: 2019-09-25

## 2019-09-25 RX ORDER — IPRATROPIUM BROMIDE AND ALBUTEROL SULFATE 2.5; .5 MG/3ML; MG/3ML
3 SOLUTION RESPIRATORY (INHALATION) EVERY 4 HOURS PRN
Status: DISCONTINUED | OUTPATIENT
Start: 2019-09-25 | End: 2019-09-26

## 2019-09-25 RX ORDER — HYDROCODONE BITARTRATE AND ACETAMINOPHEN 500; 5 MG/1; MG/1
TABLET ORAL
Status: DISCONTINUED | OUTPATIENT
Start: 2019-09-25 | End: 2019-09-30

## 2019-09-25 RX ADMIN — Medication 3 ML: at 09:09

## 2019-09-25 RX ADMIN — BICALUTAMIDE 50 MG: 50 TABLET, FILM COATED ORAL at 08:09

## 2019-09-25 RX ADMIN — FUROSEMIDE 40 MG: 40 TABLET ORAL at 08:09

## 2019-09-25 RX ADMIN — IPRATROPIUM BROMIDE AND ALBUTEROL SULFATE 3 ML: .5; 3 SOLUTION RESPIRATORY (INHALATION) at 04:09

## 2019-09-25 RX ADMIN — PANTOPRAZOLE SODIUM 40 MG: 40 INJECTION, POWDER, FOR SOLUTION INTRAVENOUS at 09:09

## 2019-09-25 RX ADMIN — LATANOPROST 1 DROP: 50 SOLUTION OPHTHALMIC at 09:09

## 2019-09-25 RX ADMIN — ATORVASTATIN CALCIUM 80 MG: 20 TABLET, FILM COATED ORAL at 08:09

## 2019-09-25 RX ADMIN — IPRATROPIUM BROMIDE AND ALBUTEROL SULFATE 3 ML: .5; 3 SOLUTION RESPIRATORY (INHALATION) at 07:09

## 2019-09-25 RX ADMIN — AMLODIPINE BESYLATE 10 MG: 10 TABLET ORAL at 08:09

## 2019-09-25 RX ADMIN — FUROSEMIDE 40 MG: 40 TABLET ORAL at 09:09

## 2019-09-25 RX ADMIN — POTASSIUM CHLORIDE 40 MEQ: 1500 TABLET, EXTENDED RELEASE ORAL at 09:09

## 2019-09-25 NOTE — HOSPITAL COURSE
"Patient admitted for GIB. He was sent from PCP for low hgb 8.8>>7.8. Vitals remaining stable. Started protonix IV. Consulted GI who recommend EGD. However, patient at first adamant that he did not want scope because he was "tired of being poked and prodded". He was wanting to leave AMA, but subsequently decided to stay and pursue scope. Notified GI.     Patient began getting SOB and hypoxic 9/25 night, improved with lasix and breathing treatment. Troponin 5>>25. Consulted cardiology. Patient moved to ICU 9/26 for closer monitoring.   "

## 2019-09-25 NOTE — PLAN OF CARE
09/25/19 0905   Discharge Assessment   Assessment Type Discharge Planning Assessment   Confirmed/corrected address and phone number on facesheet? Yes   Assessment information obtained from? Patient   Expected Length of Stay (days) 2   Communicated expected length of stay with patient/caregiver yes   Prior to hospitilization cognitive status: Alert/Oriented   Prior to hospitalization functional status: Assistive Equipment   Current cognitive status: Alert/Oriented   Current Functional Status: Assistive Equipment   Lives With child(kin), adult  (son, Angel Taylor ([phone]))   Able to Return to Prior Arrangements yes   Is patient able to care for self after discharge? Yes   Patient's perception of discharge disposition home or selfcare   Readmission Within the Last 30 Days no previous admission in last 30 days   Patient currently being followed by outpatient case management? No   Patient currently receives any other outside agency services? No   Equipment Currently Used at Home wheelchair;bedside commode;cane, straight;glucometer   Do you have any problems affording any of your prescribed medications? No   Is the patient taking medications as prescribed? yes   Does the patient have transportation home? Yes   Transportation Anticipated family or friend will provide  (daughter, Marisela Carbajal ([phone]))   Does the patient receive services at the [facility]? No   Discharge Plan A Home Health  (pt current w/[facility])   Discharge Plan B Home with family   DME Needed Upon Discharge  none   Patient/Family in Agreement with Plan yes       Dx: melena  Consult: GI & wound care  Pharm: [facility], [facility]  Hosp f/u appt: Dr. Shari Chauhan (PCP) on 10/10/19 at 0900    Per pt GI is recommending an EGD but pt refused.  CM informed Nelli ([id number]) w/[facility] of patient's hospitalization. Nelli stated that they will need new orders for (NSG & PT) at time of discharge. CM informed SHANTANU Grant of above.Will continue to  follow.

## 2019-09-25 NOTE — NURSING
VSS. Tolerating diet as ordered. S/v - incontinent @ times - care provided. BLE swollen, contracted. T&R q2h for pressure injury prevention. Wound care in to see pt, dressed wounds appropriately. H&H checks continue - hemoglobin dipped below 8.0 x2, MD wanted to transfuse, GI wanted EGD for pt, pt refused both interventions. Pt claims he wants to leave AMA. MD spoke w/ pt and pt daughter regarding this request. Pt to leave AMA when daughter arrives. Safety maintained throughout. See flowsheet for detail.    9/25/2019 1:38 PM

## 2019-09-25 NOTE — NURSING
Pts daughter arrived approx 1430, with brother as well, met with multiple MDs including Dr Reed, wound care, etc. Pt to stay in hospital. Reported chest tightness in afternoon, LS +expiratory wheezes - neb tx given - placed on 2LNC. MD aware. Inc x4 loose BMs. Care provided. Family extremely particular with certain aspects of care including bathing positioning and other ADLs, encouraged adherence to POC while hospitalized. Safety maintained.     9/25/2019 5:03 PM

## 2019-09-25 NOTE — SUBJECTIVE & OBJECTIVE
Past Medical History:   Diagnosis Date    Anemia     Arthritis     Cancer     Prostate    Cataract     CHF (congestive heart failure)     Chronic kidney disease     Coronary artery disease     Diabetes mellitus     Diabetic retinopathy     Disorder of kidney and ureter     ckd 2    Encounter for blood transfusion     Glaucoma     High cholesterol     Hypertension     Myocardial infarction     Prostate cancer     PVD (peripheral vascular disease)     Spinal stenosis     Status post cataract extraction and insertion of intraocular lens of left eye 11/13/2015       Past Surgical History:   Procedure Laterality Date    CARDIAC CATHETERIZATION      15 to 20 years ago x1 stent and 4/7/2014 x1 stent    CATARACT EXTRACTION Bilateral     Dr. Ferguson    EYE SURGERY      HERNIA REPAIR         Review of patient's allergies indicates:  No Known Allergies  Family History     Problem Relation (Age of Onset)    Breast cancer Sister    Cancer Father    Diabetes Sister, Brother    Hypertension Sister    No Known Problems Mother, Son, Brother, Maternal Aunt, Maternal Uncle, Paternal Aunt, Paternal Uncle, Maternal Grandmother, Maternal Grandfather, Paternal Grandmother, Paternal Grandfather, Daughter        Tobacco Use    Smoking status: Never Smoker    Smokeless tobacco: Former User   Substance and Sexual Activity    Alcohol use: No    Drug use: No    Sexual activity: Never     Review of Systems   Constitutional: Positive for fatigue. Negative for activity change, appetite change and fever.   HENT: Negative for trouble swallowing.    Eyes: Negative for visual disturbance.   Respiratory: Negative for cough and shortness of breath.    Cardiovascular: Negative for chest pain.   Gastrointestinal: Negative for abdominal distention, abdominal pain, blood in stool, constipation, diarrhea, nausea and vomiting.   Genitourinary: Negative for flank pain.   Musculoskeletal: Negative for arthralgias and back pain.    Skin: Negative for color change.   Allergic/Immunologic: Negative for immunocompromised state.   Psychiatric/Behavioral: Negative for confusion.     Objective:     Vital Signs (Most Recent):  Temp: 98.8 °F (37.1 °C) (09/25/19 1107)  Pulse: (!) 59 (09/25/19 1107)  Resp: 16 (09/25/19 1107)  BP: (!) 143/64 (09/25/19 1107)  SpO2: (!) 93 % (09/25/19 1107) Vital Signs (24h Range):  Temp:  [98.7 °F (37.1 °C)-99.4 °F (37.4 °C)] 98.8 °F (37.1 °C)  Pulse:  [52-66] 59  Resp:  [16-20] 16  SpO2:  [93 %-98 %] 93 %  BP: (138-170)/(63-83) 143/64     Weight: 70 kg (154 lb 5.2 oz) (09/24/19 1558)  Body mass index is 23.46 kg/m².      Intake/Output Summary (Last 24 hours) at 9/25/2019 1207  Last data filed at 9/25/2019 0836  Gross per 24 hour   Intake 840 ml   Output 500 ml   Net 340 ml       Lines/Drains/Airways     Peripheral Intravenous Line                 Peripheral IV - Single Lumen 09/25/19 0615 22 G Anterior;Left Forearm less than 1 day                Physical Exam   Constitutional: He is oriented to person, place, and time. He appears well-developed and well-nourished. No distress.   HENT:   Head: Normocephalic.   Eyes: Conjunctivae are normal. No scleral icterus.   Neck: Normal range of motion. Neck supple.   Cardiovascular: Normal rate and regular rhythm.   Pulmonary/Chest: Effort normal and breath sounds normal.   Abdominal: Soft. Bowel sounds are normal. He exhibits no distension and no mass. There is no tenderness. There is no guarding.   Musculoskeletal: Normal range of motion. He exhibits edema.   Neurological: He is alert and oriented to person, place, and time.   Skin: Skin is warm and dry.   Psychiatric: He has a normal mood and affect.       Significant Labs:  CBC:   Recent Labs   Lab 09/24/19  1737 09/24/19  2328 09/25/19  0652   WBC 9.51 9.07 8.92   HGB 8.3* 7.9* 7.8*   HCT 23.3* 21.7* 22.8*    171 178     BMP:   Recent Labs   Lab 09/25/19  0652         K 3.4*      CO2 22*   BUN 55*    CREATININE 2.0*   CALCIUM 8.5*   MG 2.3     CMP:   Recent Labs   Lab 09/24/19  1205 09/25/19  0652   * 103   CALCIUM 8.9 8.5*   ALBUMIN 3.6  --    PROT 7.0  --     143   K 3.8 3.4*   CO2 21* 22*    110   BUN 56* 55*   CREATININE 2.2* 2.0*   ALKPHOS 95  --    ALT 14  --    AST 29  --    BILITOT 0.6  --      Coagulation: No results for input(s): PT, INR, APTT in the last 48 hours.    Significant Imaging:  Imaging results within the past 24 hours have been reviewed.

## 2019-09-25 NOTE — HPI
Patient is a 89-year-old male with past medical history of hypertension, hyperlipidemia, coronary artery disease, heart failure, DVT on Eliquis.  Patient presented with low hemoglobin from PCP clinic.  The patient was noted to have a hemoglobin of 7.8 from a baseline of 11 few months ago trending down slowly.  The patient denies melena, hematochezia, hematemesis.  He endorses dark brown stool but no black stools.  He endorses an old history of any EGD in the past, but does not recall having any ulcer or bleeding in the past.  Had colonoscopy many years ago and was negative per his report.

## 2019-09-25 NOTE — CONSULTS
[facility]  Gastroenterology  Consult Note    Patient Name: Jeniffer Taylor  MRN: [id number]  Admission Date: 9/24/2019  Hospital Length of Stay: 0 days  Code Status: Full Code   Attending Provider: GÉNESIS Reed MD   Consulting Provider: Tahsa Rader MD  Primary Care Physician: Shari Chauhan MD  Principal Problem:Melena    Inpatient consult to Gastroenterology  Consult performed by: Tasha Rader MD  Consult ordered by: Merari Grant PA-C        Subjective:     HPI:  Patient is a 89-year-old male with past medical history of hypertension, hyperlipidemia, coronary artery disease, heart failure, DVT on Eliquis.  Patient presented with low hemoglobin from PCP clinic.  The patient was noted to have a hemoglobin of 7.8 from a baseline of 11 few months ago trending down slowly.  The patient denies melena, hematochezia, hematemesis.  He endorses dark brown stool but no black stools.  He endorses an old history of any EGD in the past, but does not recall having any ulcer or bleeding in the past.  Had colonoscopy many years ago and was negative per his report.    Past Medical History:   Diagnosis Date    Anemia     Arthritis     Cancer     Prostate    Cataract     CHF (congestive heart failure)     Chronic kidney disease     Coronary artery disease     Diabetes mellitus     Diabetic retinopathy     Disorder of kidney and ureter     ckd 2    Encounter for blood transfusion     Glaucoma     High cholesterol     Hypertension     Myocardial infarction     Prostate cancer     PVD (peripheral vascular disease)     Spinal stenosis     Status post cataract extraction and insertion of intraocular lens of left eye 11/13/2015       Past Surgical History:   Procedure Laterality Date    CARDIAC CATHETERIZATION      15 to 20 years ago x1 stent and 4/7/2014 x1 stent    CATARACT EXTRACTION Bilateral     Dr. Ferguson    EYE SURGERY      HERNIA REPAIR         Review of patient's allergies  indicates:  No Known Allergies  Family History     Problem Relation (Age of Onset)    Breast cancer Sister    Cancer Father    Diabetes Sister, Brother    Hypertension Sister    No Known Problems Mother, Son, Brother, Maternal Aunt, Maternal Uncle, Paternal Aunt, Paternal Uncle, Maternal Grandmother, Maternal Grandfather, Paternal Grandmother, Paternal Grandfather, Daughter        Tobacco Use    Smoking status: Never Smoker    Smokeless tobacco: Former User   Substance and Sexual Activity    Alcohol use: No    Drug use: No    Sexual activity: Never     Review of Systems   Constitutional: Positive for fatigue. Negative for activity change, appetite change and fever.   HENT: Negative for trouble swallowing.    Eyes: Negative for visual disturbance.   Respiratory: Negative for cough and shortness of breath.    Cardiovascular: Negative for chest pain.   Gastrointestinal: Negative for abdominal distention, abdominal pain, blood in stool, constipation, diarrhea, nausea and vomiting.   Genitourinary: Negative for flank pain.   Musculoskeletal: Negative for arthralgias and back pain.   Skin: Negative for color change.   Allergic/Immunologic: Negative for immunocompromised state.   Psychiatric/Behavioral: Negative for confusion.     Objective:     Vital Signs (Most Recent):  Temp: 98.8 °F (37.1 °C) (09/25/19 1107)  Pulse: (!) 59 (09/25/19 1107)  Resp: 16 (09/25/19 1107)  BP: (!) 143/64 (09/25/19 1107)  SpO2: (!) 93 % (09/25/19 1107) Vital Signs (24h Range):  Temp:  [98.7 °F (37.1 °C)-99.4 °F (37.4 °C)] 98.8 °F (37.1 °C)  Pulse:  [52-66] 59  Resp:  [16-20] 16  SpO2:  [93 %-98 %] 93 %  BP: (138-170)/(63-83) 143/64     Weight: 70 kg (154 lb 5.2 oz) (09/24/19 1558)  Body mass index is 23.46 kg/m².      Intake/Output Summary (Last 24 hours) at 9/25/2019 1207  Last data filed at 9/25/2019 0836  Gross per 24 hour   Intake 840 ml   Output 500 ml   Net 340 ml       Lines/Drains/Airways     Peripheral Intravenous Line                  Peripheral IV - Single Lumen 09/25/19 0615 22 G Anterior;Left Forearm less than 1 day                Physical Exam   Constitutional: He is oriented to person, place, and time. He appears well-developed and well-nourished. No distress.   HENT:   Head: Normocephalic.   Eyes: Conjunctivae are normal. No scleral icterus.   Neck: Normal range of motion. Neck supple.   Cardiovascular: Normal rate and regular rhythm.   Pulmonary/Chest: Effort normal and breath sounds normal.   Abdominal: Soft. Bowel sounds are normal. He exhibits no distension and no mass. There is no tenderness. There is no guarding.   Musculoskeletal: Normal range of motion. He exhibits edema.   Neurological: He is alert and oriented to person, place, and time.   Skin: Skin is warm and dry.   Psychiatric: He has a normal mood and affect.       Significant Labs:  CBC:   Recent Labs   Lab 09/24/19  1737 09/24/19  2328 09/25/19  0652   WBC 9.51 9.07 8.92   HGB 8.3* 7.9* 7.8*   HCT 23.3* 21.7* 22.8*    171 178     BMP:   Recent Labs   Lab 09/25/19  0652         K 3.4*      CO2 22*   BUN 55*   CREATININE 2.0*   CALCIUM 8.5*   MG 2.3     CMP:   Recent Labs   Lab 09/24/19  1205 09/25/19  0652   * 103   CALCIUM 8.9 8.5*   ALBUMIN 3.6  --    PROT 7.0  --     143   K 3.8 3.4*   CO2 21* 22*    110   BUN 56* 55*   CREATININE 2.2* 2.0*   ALKPHOS 95  --    ALT 14  --    AST 29  --    BILITOT 0.6  --      Coagulation: No results for input(s): PT, INR, APTT in the last 48 hours.    Significant Imaging:  Imaging results within the past 24 hours have been reviewed.    Assessment/Plan:     Anemia  Patient is a 89-year-old male with past medical history of heart failure, DVT on Eliquis, who presented with anemia from PCP clinic with gradual drop in hemoglobin.  No signs of active bleeding.  The patient might need evaluation with EGD colonoscopy, however after discussion the patient declined any endoscopic evaluation either as  an inpatient or outpatient.  I explained to him the risk of ongoing source of bleeding with drop in hemoglobin (recurrent falls, syncope), but he continued to decline.    -trend H&H, transfuse as indicated  -please contact us with signs of active bleeding.  -please contact us if the patient is willing to undergo endoscopic evaluation, will likely arrange as an outpatient.    ADDENDUM:  I was contacted by the team, the patient is willing to have an upper endoscopy now.  Please keep NPO after midnight for EGD tomorrow.    Thank you for your consult. I will follow-up with patient. Please contact us if you have any additional questions.    Massiel Rader MD  Gastroenterology  [facility]

## 2019-09-25 NOTE — PROGRESS NOTES
"[facility] Hospital Medicine  Progress Note    Patient Name: Jeniffer Taylor  MRN: [id number]  Patient Class: IP- Inpatient   Admission Date: 9/24/2019  Length of Stay: 0 days  Attending Physician: GÉNESIS Reed MD  Primary Care Provider: Shari Chauhan MD    [facility] Medicine Team: [facility] HOSP MED E Merari Grant PA-C    Subjective:     Principal Problem:Melena        HPI:  89 year old male with a PMHx of HTN, HLD, CAD, HFrEF, AS, CKD3, and DM2 presenting to the ER from PCP clinic with son and grandson at bedside for "low hemoglobin." Patient reports noticing dark stools at home, but denies BRBPR, bloody BMs, hematuria, and hemoptysis. He denies associated SOB and PRATT. Patient also reports taking iron supplementation at home. He reports chronic fatigue, currently at his baseline. At the time of my exam, patient denies CP, SOB, abdominal pain, N/V/D, fever/chills, recent illness, dysuria/hematuria/frequency, focal weakness, syncope, and palpitations.       HDS on admission. Labs significant for H/H 8.8/26.0, chronic CKD, and . T&S ordered.    Overview/Hospital Course:  Patient admitted for GIB. He was sent from PCP for low hgb 8.8>>7.8. Vitals remaining stable. Started protonix IV. Consulted GI who recommend EGD. However, patient at first adamant that he did not want scope because he was "tired of being poked and prodded". He was wanting to leave AMA, but subsequently decided to stay and pursue scope. Notified GI.     Interval History: No reported events overnight. Hgb 7.8. Pt seen at bedside this AM resting in NAD. Notified that patient was refusing EGD recommended by GI and that he wished to leave. Extensively discussed risks and benefits of EGD and that he would be leaving against medical advice. He agreed to this, but wanted to call his daughter and have me explain. Spoke with daughter on the phone for about 15 minutes with patient present re-iterating my recommendation to pursue " "EGD and that he could continue to bleed, possibly resulting in death should he forgo EGD. But ultimately this is patient's choice. Pt states "that's ok, put me in the ground, im tired of being poked and probed". Offered to give pt 1 unit of blood, he refused. When presented with AMA form, he asked if we could wait till his daughter arrives to sign in. Agreed.    Notified by nursing daughter present at bedside and demanding to speak with myself and staff. Daughter reports being upset that nothing was explained to her and wish to re-cap. Reassured her that I spoke with her this AM on the phone and that I would gladly recap hospital course again. Dr. Reed at bedside, extensively re-iterated EGD process, risks and benefits. Ultimately patient deciding to pursue EGD. Notified GI    Shortly after conversation notified by nursing that patient felt some chest tightness and wheezing, ordered cardiac monitoring, CXR and duoneb    Review of Systems   Constitutional: Positive for fatigue (chronic). Negative for diaphoresis and fever.   HENT: Negative for sore throat and trouble swallowing.    Respiratory: Positive for chest tightness. Negative for cough and shortness of breath.    Cardiovascular: Positive for leg swelling (chronic). Negative for chest pain.   Gastrointestinal: Negative for abdominal pain, anal bleeding, blood in stool, diarrhea, nausea and vomiting.   Genitourinary: Negative for flank pain and frequency.   Musculoskeletal: Positive for gait problem (wheelchair at baseline) and myalgias (chronic). Negative for back pain and neck pain.   Skin: Positive for wound (chronic sarcal wound, skin breakdown to L foot).   Neurological: Positive for weakness (chronic). Negative for seizures and speech difficulty.   Psychiatric/Behavioral: Negative for agitation, behavioral problems and confusion.     Objective:     Vital Signs (Most Recent):  Temp: 97.8 °F (36.6 °C) (09/25/19 1653)  Pulse: 83 (09/25/19 1653)  Resp: (!) " 22 (09/25/19 1653)  BP: 127/66 (09/25/19 1653)  SpO2: 98 % (09/25/19 1653) Vital Signs (24h Range):  Temp:  [97.8 °F (36.6 °C)-99.4 °F (37.4 °C)] 97.8 °F (36.6 °C)  Pulse:  [59-83] 83  Resp:  [16-22] 22  SpO2:  [90 %-98 %] 98 %  BP: (127-153)/(61-69) 127/66     Weight: 70 kg (154 lb 5.2 oz)  Body mass index is 23.46 kg/m².    Intake/Output Summary (Last 24 hours) at 9/25/2019 1654  Last data filed at 9/25/2019 1527  Gross per 24 hour   Intake 1080 ml   Output 950 ml   Net 130 ml      Physical Exam   Constitutional: He is oriented to person, place, and time. No distress.   Frail elderly male in NAD   HENT:   Head: Normocephalic and atraumatic.   Eyes: EOM are normal. Right eye exhibits no discharge. Left eye exhibits no discharge.   Neck: Normal range of motion. Neck supple.   Cardiovascular: Normal rate, regular rhythm and intact distal pulses.   Murmur heard.  Pulmonary/Chest: Effort normal. No respiratory distress.   Abdominal: Soft. Bowel sounds are normal. He exhibits no distension. There is no tenderness.   Musculoskeletal: Normal range of motion. He exhibits edema.   Neurological: He is alert and oriented to person, place, and time.   Skin: Skin is warm and dry. He is not diaphoretic.   L foot wound dressed C/D/I   Psychiatric: He has a normal mood and affect. His behavior is normal.       Significant Labs: All pertinent labs within the past 24 hours have been reviewed.    Significant Imaging: I have reviewed all pertinent imaging results/findings within the past 24 hours.      Assessment/Plan:      * Melena  DVT of RLE  - HDS on admission  - H/H 8.8/26.0; Hgb ~8-9 this month, prior to this month Hgb 10-11; currently 7.8  - Of note, patient reports taking iron supplementation at home  - Dx with DVT of RLE during last admission and subsequently started on Eliquis  - Held Eliquis on admission given melena. Discussed risks/benefits with patient and family who voiced understanding to plan of care  - CBC q8  - CLD  as precaution  - If drop in H/H or episodes of bleeding, will consult GI; Hgb 8.8>7.8; pt initially refused EGD per GI and wanted to leave AMA but after discussion with daughter, myself, staff and pt he now would like to pursue EGD. Notified GI.  - Supportive care    Hypertensive heart disease with heart failure  - BP stable  - Held coreg and hydralazine in setting of melena  - Continue home norvasc  - Continue to monitor, resume meds as needed    Chronic combined systolic and diastolic heart failure  Ischemic CMP  Aortic Stenosis  - Recently admitted for CHF exacerbation 9/9-9/12  - Last echo 9/9/19 shows EF 45%, DD, severe L atrial enlargement, severe AS  - Euvolemic on exam  - Continue home lasix BID  - Strict I/Os, daily weights    Controlled type 2 diabetes mellitus with stage 3 chronic kidney disease, without long-term current use of insulin  - Renal function stable  - Last A1c 6 in 6/2019; ordered repeat  -  on admit  - Held glipizide on admit  - Low dose SSI  - Monitor BGs and adjust insulin PRN  - BMP daily    CAD (coronary artery disease)  HLD  - Denies CP/SOB  - Will hold ASA in setting of melena  - Continue lipitor    Pressure injury of sacral region, stage 2  Chronic sacral wound, L foot skin breakdown  - Wound care consulted   - Currently as outpatient home health wound care    Prostate CA  - Continue Casodex  - Leuprolide q6 months   - Follows with urology as outpatient      VTE Risk Mitigation (From admission, onward)         Ordered     Place BERT hose  Until discontinued      09/24/19 1459     Reason for No Pharmacological VTE Prophylaxis  Once      09/24/19 1459     IP VTE HIGH RISK PATIENT  Once      09/24/19 1459     Place sequential compression device  Until discontinued      09/24/19 1355                    Discussed with staff; seen by staff  Merari Grant PA-C  Department of Hospital Medicine   [facility]

## 2019-09-25 NOTE — SUBJECTIVE & OBJECTIVE
"Interval History: No reported events overnight. Hgb 7.8. Pt seen at bedside this AM resting in NAD. Notified that patient was refusing EGD recommended by GI and that he wished to leave. Extensively discussed risks and benefits of EGD and that he would be leaving against medical advice. He agreed to this, but wanted to call his daughter and have me explain. Spoke with daughter on the phone for about 15 minutes with patient present re-iterating my recommendation to pursue EGD and that he could continue to bleed, possibly resulting in death should he forgo EGD. But ultimately this is patient's choice. Pt states "that's ok, put me in the ground, im tired of being poked and probed". Offered to give pt 1 unit of blood, he refused. When presented with AMA form, he asked if we could wait till his daughter arrives to sign in. Agreed.    Notified by nursing daughter present at bedside and demanding to speak with myself and staff. Daughter reports being upset that nothing was explained to her and wish to re-cap. Reassured her that I spoke with her this AM on the phone and that I would gladly recap hospital course again. Dr. Reed at bedside, extensively re-iterated EGD process, risks and benefits. Ultimately patient deciding to pursue EGD. Notified GI    Shortly after conversation notified by nursing that patient felt some chest tightness and wheezing, ordered cardiac monitoring, CXR and duoneb    Review of Systems   Constitutional: Positive for fatigue (chronic). Negative for diaphoresis and fever.   HENT: Negative for sore throat and trouble swallowing.    Respiratory: Positive for chest tightness. Negative for cough and shortness of breath.    Cardiovascular: Positive for leg swelling (chronic). Negative for chest pain.   Gastrointestinal: Negative for abdominal pain, anal bleeding, blood in stool, diarrhea, nausea and vomiting.   Genitourinary: Negative for flank pain and frequency.   Musculoskeletal: Positive for gait " problem (wheelchair at baseline) and myalgias (chronic). Negative for back pain and neck pain.   Skin: Positive for wound (chronic sarcal wound, skin breakdown to L foot).   Neurological: Positive for weakness (chronic). Negative for seizures and speech difficulty.   Psychiatric/Behavioral: Negative for agitation, behavioral problems and confusion.     Objective:     Vital Signs (Most Recent):  Temp: 97.8 °F (36.6 °C) (09/25/19 1653)  Pulse: 83 (09/25/19 1653)  Resp: (!) 22 (09/25/19 1653)  BP: 127/66 (09/25/19 1653)  SpO2: 98 % (09/25/19 1653) Vital Signs (24h Range):  Temp:  [97.8 °F (36.6 °C)-99.4 °F (37.4 °C)] 97.8 °F (36.6 °C)  Pulse:  [59-83] 83  Resp:  [16-22] 22  SpO2:  [90 %-98 %] 98 %  BP: (127-153)/(61-69) 127/66     Weight: 70 kg (154 lb 5.2 oz)  Body mass index is 23.46 kg/m².    Intake/Output Summary (Last 24 hours) at 9/25/2019 1654  Last data filed at 9/25/2019 1527  Gross per 24 hour   Intake 1080 ml   Output 950 ml   Net 130 ml      Physical Exam   Constitutional: He is oriented to person, place, and time. No distress.   Frail elderly male in NAD   HENT:   Head: Normocephalic and atraumatic.   Eyes: EOM are normal. Right eye exhibits no discharge. Left eye exhibits no discharge.   Neck: Normal range of motion. Neck supple.   Cardiovascular: Normal rate, regular rhythm and intact distal pulses.   Murmur heard.  Pulmonary/Chest: Effort normal. No respiratory distress.   Abdominal: Soft. Bowel sounds are normal. He exhibits no distension. There is no tenderness.   Musculoskeletal: Normal range of motion. He exhibits edema.   Neurological: He is alert and oriented to person, place, and time.   Skin: Skin is warm and dry. He is not diaphoretic.   L foot wound dressed C/D/I   Psychiatric: He has a normal mood and affect. His behavior is normal.       Significant Labs: All pertinent labs within the past 24 hours have been reviewed.    Significant Imaging: I have reviewed all pertinent imaging  results/findings within the past 24 hours.

## 2019-09-25 NOTE — ASSESSMENT & PLAN NOTE
Patient is a 89-year-old male with past medical history of heart failure, DVT on Eliquis, who presented with anemia from PCP clinic with gradual drop in hemoglobin.  No signs of active bleeding.  The patient might need evaluation with EGD colonoscopy, however after discussion the patient declined any endoscopic evaluation either as an inpatient or outpatient.  I explained to him the risk of ongoing source of bleeding with drop in hemoglobin (recurrent falls, syncope), but he continued to decline.    -trend H&H, transfuse as indicated  -please contact us with signs of active bleeding.  -please contact us if the patient is willing to undergo endoscopic evaluation, will likely arrange as an outpatient.

## 2019-09-25 NOTE — PLAN OF CARE
09/25/19 0850   Post-Acute Status   Post-Acute Authorization Home Health/Hospice   Home Health/Hospice Status Awaiting Therapy Documentation

## 2019-09-25 NOTE — PROGRESS NOTES
"Consult received on patient's pressure injury to sacrum and left heel.  Patient seen early this month for pressure injury to sacrum and bilateral lower extremities. During prior admission patient presented with partially occlusive DVT to right upper femoral vein. ABIs were performed during previous admission RLE-0.62 and LLE-0.58,suggestive of moderate arterial disease.     Patient presenting to hospital this admission for low blood count and dark stool/melena.    Stage 2 and stage 3 pressure injury noted to patient's sacrum. Patient with fecal incontinence. Patient using urinal at time of assessment.  Stage 2 pressure injury noted to left medial heel.  Scattered healed and scabbed abrasion noted to bilateral lower extremities. BLE edematous.    Recommendations:  Pressure injuries to sacrum: BID/prn after incontinent episodes, clean with bathing cloths, apply zinc barrier cream.  Left heel pressure injury- twice a week, clean with sterile normal saline, dress with foam dressing.  Waffle overlay for bed- turn every 2hrs.  Wheel chair cushion for when out of bed, reposition in chair [id number], limit time up in chair to 2hrs at a time.  Elevate heels with heel boots or pillows.  Recommend in 2 weeks post discharge to follow up in outpatient wound care clinic.    Patient seen in afternoon with  and Merari FOURNIER.   Discussed recommendations with MD and PA, MD and PA approved recommendations. Answered patient's daughter's questions in regards to wound care recommendations. Patient's daughter "stated the vascular surgery appointment was cancelled by vascular surgery." Extra foam dressings provided. Waffle overlay,wheelchair cushion and heel boots ordered.  Nursing to continue care. Wound care to follow prn.       09/25/19 1134   Gastrointestinal   GI Signs/Symptoms fecal incontinence   Genitourinary   Voiding Characteristics   (voided per urinal)        Pressure Injury 09/09/19 1100 Left lower Sacral spine " Stage 3   Date First Assessed/Time First Assessed: 09/09/19 1100   Pressure Injury Present on Admission: yes  Side: Left  Orientation: lower  Location: Sacral spine  Is this injury device related?: No  Staging: Stage 3   Wound Image    Staging Stage 3   Drainage Amount None   Drainage Characteristics/Odor No odor   Appearance Moist;Pink   Tissue loss description Full thickness   Periwound Area Dry   Wound Edges Open   Wound Length (cm) 1 cm   Wound Width (cm) 1 cm   Wound Depth (cm) 0.3 cm   Wound Volume (cm^3) 0.3 cm^3   Wound Surface Area (cm^2) 1 cm^2   Care   (recommend zinc barrier cream BID/prn)        Pressure Injury 09/25/19 1134 Sacral spine Stage 2   Date First Assessed/Time First Assessed: 09/25/19 1134   Pressure Injury Present on Admission: yes  Location: Sacral spine  Staging: Stage 2   Staging Stage 2   Drainage Amount None   Drainage Characteristics/Odor No odor   Appearance Moist;Pink   Tissue loss description Partial thickness   Periwound Area Dry   Wound Length (cm) 0.5 cm   Wound Width (cm) 0.5 cm   Wound Depth (cm) 0.1 cm   Wound Volume (cm^3) 0.02 cm^3   Wound Surface Area (cm^2) 0.25 cm^2   Care   (recommend zinc barrier cream BID/prn)        Pressure Injury 09/25/19 1134 Left Heel Stage 2   Date First Assessed/Time First Assessed: 09/25/19 1134   Pressure Injury Present on Admission: yes  Side: Left  Location: Heel  Staging: Stage 2   Wound Image    Staging Stage 2   Drainage Amount Scant   Drainage Characteristics/Odor Serosanguineous;No odor   Appearance Moist;Red   Tissue loss description Partial thickness   Periwound Area Dry;Intact;Edematous  (unroofed blister edge)   Wound Edges Open   Wound Length (cm) 3.5 cm   Wound Width (cm) 4 cm   Wound Depth (cm) 0.1 cm   Wound Volume (cm^3) 1.4 cm^3   Wound Surface Area (cm^2) 14 cm^2   Care Cleansed with:;Sterile normal saline   Dressing Applied;Foam   Dressing Change Due 09/27/19

## 2019-09-25 NOTE — NURSING
Pt w/ 6-7 liquid brown stools since 1430 MD notified, stool studies ordered.    9/25/2019 6:31 PM

## 2019-09-25 NOTE — ASSESSMENT & PLAN NOTE
DVT of RLE  - HDS on admission  - H/H 8.8/26.0; Hgb ~8-9 this month, prior to this month Hgb 10-11; currently 7.8  - Of note, patient reports taking iron supplementation at home  - Dx with DVT of RLE during last admission and subsequently started on Eliquis  - Held Eliquis on admission given melena. Discussed risks/benefits with patient and family who voiced understanding to plan of care  - CBC q8  - CLD as precaution  - If drop in H/H or episodes of bleeding, will consult GI; Hgb 8.8>7.8; pt initially refused EGD per GI and wanted to leave AMA but after discussion with daughter, myself, staff and pt he now would like to pursue EGD. Notified GI.  - Supportive care

## 2019-09-26 PROBLEM — I50.33 ACUTE ON CHRONIC DIASTOLIC CHF (CONGESTIVE HEART FAILURE): Status: ACTIVE | Noted: 2019-09-09

## 2019-09-26 PROBLEM — K92.2 GI BLEED: Status: ACTIVE | Noted: 2019-09-26

## 2019-09-26 PROBLEM — I50.43 ACUTE ON CHRONIC COMBINED SYSTOLIC AND DIASTOLIC HEART FAILURE: Status: ACTIVE | Noted: 2019-03-13

## 2019-09-26 PROBLEM — J96.01 ACUTE RESPIRATORY FAILURE WITH HYPOXIA: Status: ACTIVE | Noted: 2019-09-26

## 2019-09-26 LAB
ALBUMIN SERPL BCP-MCNC: 3.2 G/DL (ref 3.5–5.2)
ALLENS TEST: ABNORMAL
ALP SERPL-CCNC: 99 U/L (ref 55–135)
ALT SERPL W/O P-5'-P-CCNC: 17 U/L (ref 10–44)
ANION GAP SERPL CALC-SCNC: 14 MMOL/L (ref 8–16)
ANISOCYTOSIS BLD QL SMEAR: SLIGHT
ANISOCYTOSIS BLD QL SMEAR: SLIGHT
AST SERPL-CCNC: 50 U/L (ref 10–40)
BACTERIA #/AREA URNS AUTO: ABNORMAL /HPF
BASOPHILS # BLD AUTO: 0 K/UL (ref 0–0.2)
BASOPHILS # BLD AUTO: 0.01 K/UL (ref 0–0.2)
BASOPHILS # BLD AUTO: 0.01 K/UL (ref 0–0.2)
BASOPHILS # BLD AUTO: 0.02 K/UL (ref 0–0.2)
BASOPHILS # BLD AUTO: 0.02 K/UL (ref 0–0.2)
BASOPHILS NFR BLD: 0 % (ref 0–1.9)
BASOPHILS NFR BLD: 0.1 % (ref 0–1.9)
BASOPHILS NFR BLD: 0.1 % (ref 0–1.9)
BASOPHILS NFR BLD: 0.2 % (ref 0–1.9)
BASOPHILS NFR BLD: 0.2 % (ref 0–1.9)
BILIRUB SERPL-MCNC: 0.5 MG/DL (ref 0.1–1)
BILIRUB UR QL STRIP: NEGATIVE
BLD PROD TYP BPU: NORMAL
BLOOD UNIT EXPIRATION DATE: NORMAL
BLOOD UNIT TYPE CODE: 5100
BLOOD UNIT TYPE: NORMAL
BNP SERPL-MCNC: 3225 PG/ML (ref 0–99)
BUN SERPL-MCNC: 58 MG/DL (ref 10–30)
CALCIUM SERPL-MCNC: 8.5 MG/DL (ref 8.7–10.5)
CHLORIDE SERPL-SCNC: 107 MMOL/L (ref 95–110)
CLARITY UR REFRACT.AUTO: ABNORMAL
CO2 SERPL-SCNC: 19 MMOL/L (ref 23–29)
CODING SYSTEM: NORMAL
COLOR UR AUTO: YELLOW
CREAT SERPL-MCNC: 2.2 MG/DL (ref 0.5–1.4)
CRYPTOSP AG STL QL IA: NEGATIVE
DELSYS: ABNORMAL
DIFFERENTIAL METHOD: ABNORMAL
DISPENSE STATUS: NORMAL
DOHLE BOD BLD QL SMEAR: PRESENT
EOSINOPHIL # BLD AUTO: 0 K/UL (ref 0–0.5)
EOSINOPHIL NFR BLD: 0 % (ref 0–8)
ERYTHROCYTE [DISTWIDTH] IN BLOOD BY AUTOMATED COUNT: 14.9 % (ref 11.5–14.5)
ERYTHROCYTE [DISTWIDTH] IN BLOOD BY AUTOMATED COUNT: 15.1 % (ref 11.5–14.5)
ERYTHROCYTE [DISTWIDTH] IN BLOOD BY AUTOMATED COUNT: 15.3 % (ref 11.5–14.5)
ERYTHROCYTE [DISTWIDTH] IN BLOOD BY AUTOMATED COUNT: 15.3 % (ref 11.5–14.5)
ERYTHROCYTE [DISTWIDTH] IN BLOOD BY AUTOMATED COUNT: 15.8 % (ref 11.5–14.5)
EST. GFR  (AFRICAN AMERICAN): 29.2 ML/MIN/1.73 M^2
EST. GFR  (NON AFRICAN AMERICAN): 25.3 ML/MIN/1.73 M^2
FLOW: 15
G LAMBLIA AG STL QL IA: NEGATIVE
GLUCOSE SERPL-MCNC: 160 MG/DL (ref 70–110)
GLUCOSE UR QL STRIP: NEGATIVE
HCO3 UR-SCNC: 20.3 MMOL/L (ref 24–28)
HCT VFR BLD AUTO: 21.5 % (ref 40–54)
HCT VFR BLD AUTO: 22.7 % (ref 40–54)
HCT VFR BLD AUTO: 24.3 % (ref 40–54)
HCT VFR BLD AUTO: 26.5 % (ref 40–54)
HCT VFR BLD AUTO: 26.8 % (ref 40–54)
HGB BLD-MCNC: 7.7 G/DL (ref 14–18)
HGB BLD-MCNC: 8.2 G/DL (ref 14–18)
HGB BLD-MCNC: 8.3 G/DL (ref 14–18)
HGB BLD-MCNC: 9 G/DL (ref 14–18)
HGB BLD-MCNC: 9.1 G/DL (ref 14–18)
HGB UR QL STRIP: ABNORMAL
HYALINE CASTS UR QL AUTO: 0 /LPF
HYPOCHROMIA BLD QL SMEAR: ABNORMAL
HYPOCHROMIA BLD QL SMEAR: ABNORMAL
IMM GRANULOCYTES # BLD AUTO: 0 K/UL (ref 0–0.04)
IMM GRANULOCYTES # BLD AUTO: 0.04 K/UL (ref 0–0.04)
IMM GRANULOCYTES # BLD AUTO: 0.05 K/UL (ref 0–0.04)
IMM GRANULOCYTES # BLD AUTO: 0.08 K/UL (ref 0–0.04)
IMM GRANULOCYTES # BLD AUTO: 0.25 K/UL (ref 0–0.04)
IMM GRANULOCYTES NFR BLD AUTO: 0 % (ref 0–0.5)
IMM GRANULOCYTES NFR BLD AUTO: 0.4 % (ref 0–0.5)
IMM GRANULOCYTES NFR BLD AUTO: 0.4 % (ref 0–0.5)
IMM GRANULOCYTES NFR BLD AUTO: 0.6 % (ref 0–0.5)
IMM GRANULOCYTES NFR BLD AUTO: 1.3 % (ref 0–0.5)
KETONES UR QL STRIP: NEGATIVE
LACTATE SERPL-SCNC: 2.1 MMOL/L (ref 0.5–2.2)
LACTATE SERPL-SCNC: 4.2 MMOL/L (ref 0.5–2.2)
LACTATE SERPL-SCNC: 5.2 MMOL/L (ref 0.5–2.2)
LACTATE SERPL-SCNC: 5.8 MMOL/L (ref 0.5–2.2)
LEUKOCYTE ESTERASE UR QL STRIP: ABNORMAL
LYMPHOCYTES # BLD AUTO: 0.3 K/UL (ref 1–4.8)
LYMPHOCYTES # BLD AUTO: 0.3 K/UL (ref 1–4.8)
LYMPHOCYTES # BLD AUTO: 0.4 K/UL (ref 1–4.8)
LYMPHOCYTES # BLD AUTO: 0.4 K/UL (ref 1–4.8)
LYMPHOCYTES # BLD AUTO: 0.5 K/UL (ref 1–4.8)
LYMPHOCYTES NFR BLD: 2 % (ref 18–48)
LYMPHOCYTES NFR BLD: 2.1 % (ref 18–48)
LYMPHOCYTES NFR BLD: 2.2 % (ref 18–48)
LYMPHOCYTES NFR BLD: 4.8 % (ref 18–48)
LYMPHOCYTES NFR BLD: 7.9 % (ref 18–48)
MAGNESIUM SERPL-MCNC: 2.2 MG/DL (ref 1.6–2.6)
MCH RBC QN AUTO: 27.8 PG (ref 27–31)
MCH RBC QN AUTO: 28 PG (ref 27–31)
MCH RBC QN AUTO: 28.2 PG (ref 27–31)
MCH RBC QN AUTO: 28.6 PG (ref 27–31)
MCH RBC QN AUTO: 28.6 PG (ref 27–31)
MCHC RBC AUTO-ENTMCNC: 33.6 G/DL (ref 32–36)
MCHC RBC AUTO-ENTMCNC: 34.2 G/DL (ref 32–36)
MCHC RBC AUTO-ENTMCNC: 34.3 G/DL (ref 32–36)
MCHC RBC AUTO-ENTMCNC: 35.8 G/DL (ref 32–36)
MCHC RBC AUTO-ENTMCNC: 36.1 G/DL (ref 32–36)
MCV RBC AUTO: 79 FL (ref 82–98)
MCV RBC AUTO: 79 FL (ref 82–98)
MCV RBC AUTO: 81 FL (ref 82–98)
MCV RBC AUTO: 82 FL (ref 82–98)
MCV RBC AUTO: 85 FL (ref 82–98)
MICROSCOPIC COMMENT: ABNORMAL
MODE: ABNORMAL
MONOCYTES # BLD AUTO: 0 K/UL (ref 0.3–1)
MONOCYTES # BLD AUTO: 0.1 K/UL (ref 0.3–1)
MONOCYTES # BLD AUTO: 0.2 K/UL (ref 0.3–1)
MONOCYTES # BLD AUTO: 0.4 K/UL (ref 0.3–1)
MONOCYTES # BLD AUTO: 0.9 K/UL (ref 0.3–1)
MONOCYTES NFR BLD: 0.4 % (ref 4–15)
MONOCYTES NFR BLD: 0.5 % (ref 4–15)
MONOCYTES NFR BLD: 1.6 % (ref 4–15)
MONOCYTES NFR BLD: 3.6 % (ref 4–15)
MONOCYTES NFR BLD: 4.6 % (ref 4–15)
NEUTROPHILS # BLD AUTO: 12.4 K/UL (ref 1.8–7.7)
NEUTROPHILS # BLD AUTO: 12.6 K/UL (ref 1.8–7.7)
NEUTROPHILS # BLD AUTO: 18.2 K/UL (ref 1.8–7.7)
NEUTROPHILS # BLD AUTO: 4.5 K/UL (ref 1.8–7.7)
NEUTROPHILS # BLD AUTO: 9.6 K/UL (ref 1.8–7.7)
NEUTROPHILS NFR BLD: 91 % (ref 38–73)
NEUTROPHILS NFR BLD: 91.7 % (ref 38–73)
NEUTROPHILS NFR BLD: 91.8 % (ref 38–73)
NEUTROPHILS NFR BLD: 95.7 % (ref 38–73)
NEUTROPHILS NFR BLD: 96.8 % (ref 38–73)
NITRITE UR QL STRIP: NEGATIVE
NRBC BLD-RTO: 0 /100 WBC
OB PNL STL: NEGATIVE
OVALOCYTES BLD QL SMEAR: ABNORMAL
PCO2 BLDA: 31.6 MMHG (ref 35–45)
PH SMN: 7.42 [PH] (ref 7.35–7.45)
PH UR STRIP: 5 [PH] (ref 5–8)
PHOSPHATE SERPL-MCNC: 5.1 MG/DL (ref 2.7–4.5)
PLATELET # BLD AUTO: 119 K/UL (ref 150–350)
PLATELET # BLD AUTO: 154 K/UL (ref 150–350)
PLATELET # BLD AUTO: 157 K/UL (ref 150–350)
PLATELET # BLD AUTO: 177 K/UL (ref 150–350)
PLATELET # BLD AUTO: 181 K/UL (ref 150–350)
PLATELET BLD QL SMEAR: ABNORMAL
PLATELET BLD QL SMEAR: ABNORMAL
PMV BLD AUTO: 10.9 FL (ref 9.2–12.9)
PMV BLD AUTO: 11.4 FL (ref 9.2–12.9)
PMV BLD AUTO: 11.6 FL (ref 9.2–12.9)
PO2 BLDA: 110 MMHG (ref 80–100)
POC BE: -4 MMOL/L
POC SATURATED O2: 98 % (ref 95–100)
POC TCO2: 21 MMOL/L (ref 23–27)
POCT GLUCOSE: 128 MG/DL (ref 70–110)
POCT GLUCOSE: 153 MG/DL (ref 70–110)
POCT GLUCOSE: 179 MG/DL (ref 70–110)
POCT GLUCOSE: 195 MG/DL (ref 70–110)
POIKILOCYTOSIS BLD QL SMEAR: SLIGHT
POLYCHROMASIA BLD QL SMEAR: ABNORMAL
POTASSIUM SERPL-SCNC: 3.3 MMOL/L (ref 3.5–5.1)
PROCALCITONIN SERPL IA-MCNC: 3.21 NG/ML
PROT SERPL-MCNC: 6.6 G/DL (ref 6–8.4)
PROT UR QL STRIP: ABNORMAL
RBC # BLD AUTO: 2.73 M/UL (ref 4.6–6.2)
RBC # BLD AUTO: 2.87 M/UL (ref 4.6–6.2)
RBC # BLD AUTO: 2.96 M/UL (ref 4.6–6.2)
RBC # BLD AUTO: 3.15 M/UL (ref 4.6–6.2)
RBC # BLD AUTO: 3.27 M/UL (ref 4.6–6.2)
RBC #/AREA URNS AUTO: 0 /HPF (ref 0–4)
RV AG STL QL IA.RAPID: NEGATIVE
SAMPLE: ABNORMAL
SITE: ABNORMAL
SODIUM SERPL-SCNC: 140 MMOL/L (ref 136–145)
SP GR UR STRIP: 1.01 (ref 1–1.03)
TARGETS BLD QL SMEAR: ABNORMAL
TARGETS BLD QL SMEAR: ABNORMAL
TRANS ERYTHROCYTES VOL PATIENT: NORMAL ML
TROPONIN I SERPL DL<=0.01 NG/ML-MCNC: 24.14 NG/ML (ref 0–0.03)
TROPONIN I SERPL DL<=0.01 NG/ML-MCNC: 25.46 NG/ML (ref 0–0.03)
TROPONIN I SERPL DL<=0.01 NG/ML-MCNC: 33.76 NG/ML (ref 0–0.03)
TROPONIN I SERPL DL<=0.01 NG/ML-MCNC: 5.44 NG/ML (ref 0–0.03)
URN SPEC COLLECT METH UR: ABNORMAL
WBC # BLD AUTO: 10.57 K/UL (ref 3.9–12.7)
WBC # BLD AUTO: 12.82 K/UL (ref 3.9–12.7)
WBC # BLD AUTO: 13.2 K/UL (ref 3.9–12.7)
WBC # BLD AUTO: 19.82 K/UL (ref 3.9–12.7)
WBC # BLD AUTO: 4.93 K/UL (ref 3.9–12.7)
WBC #/AREA STL HPF: NORMAL /[HPF]
WBC #/AREA URNS AUTO: >100 /HPF (ref 0–5)
WBC CLUMPS UR QL AUTO: ABNORMAL
WBC TOXIC VACUOLES BLD QL SMEAR: PRESENT

## 2019-09-26 PROCEDURE — 87077 CULTURE AEROBIC IDENTIFY: CPT | Mod: 59,HCNC

## 2019-09-26 PROCEDURE — 25000003 PHARM REV CODE 250: Mod: HCNC | Performed by: PHYSICIAN ASSISTANT

## 2019-09-26 PROCEDURE — 99291 CRITICAL CARE FIRST HOUR: CPT | Mod: HCNC,,, | Performed by: INTERNAL MEDICINE

## 2019-09-26 PROCEDURE — C9113 INJ PANTOPRAZOLE SODIUM, VIA: HCPCS | Mod: HCNC | Performed by: PHYSICIAN ASSISTANT

## 2019-09-26 PROCEDURE — 84145 PROCALCITONIN (PCT): CPT | Mod: HCNC

## 2019-09-26 PROCEDURE — 99223 1ST HOSP IP/OBS HIGH 75: CPT | Mod: HCNC,GC,, | Performed by: INTERNAL MEDICINE

## 2019-09-26 PROCEDURE — 63600175 PHARM REV CODE 636 W HCPCS: Mod: HCNC | Performed by: PHYSICIAN ASSISTANT

## 2019-09-26 PROCEDURE — 99900035 HC TECH TIME PER 15 MIN (STAT): Mod: HCNC

## 2019-09-26 PROCEDURE — 83880 ASSAY OF NATRIURETIC PEPTIDE: CPT | Mod: HCNC

## 2019-09-26 PROCEDURE — 93010 ELECTROCARDIOGRAM REPORT: CPT | Mod: HCNC,,, | Performed by: INTERNAL MEDICINE

## 2019-09-26 PROCEDURE — 99233 SBSQ HOSP IP/OBS HIGH 50: CPT | Mod: HCNC,,, | Performed by: PHYSICIAN ASSISTANT

## 2019-09-26 PROCEDURE — 63600175 PHARM REV CODE 636 W HCPCS: Mod: HCNC | Performed by: STUDENT IN AN ORGANIZED HEALTH CARE EDUCATION/TRAINING PROGRAM

## 2019-09-26 PROCEDURE — 36430 TRANSFUSION BLD/BLD COMPNT: CPT

## 2019-09-26 PROCEDURE — 25000242 PHARM REV CODE 250 ALT 637 W/ HCPCS: Mod: HCNC | Performed by: PHYSICIAN ASSISTANT

## 2019-09-26 PROCEDURE — 83605 ASSAY OF LACTIC ACID: CPT | Mod: HCNC

## 2019-09-26 PROCEDURE — 82272 OCCULT BLD FECES 1-3 TESTS: CPT | Mod: HCNC

## 2019-09-26 PROCEDURE — 99233 PR SUBSEQUENT HOSPITAL CARE,LEVL III: ICD-10-PCS | Mod: HCNC,,, | Performed by: PHYSICIAN ASSISTANT

## 2019-09-26 PROCEDURE — 89055 LEUKOCYTE ASSESSMENT FECAL: CPT | Mod: HCNC

## 2019-09-26 PROCEDURE — 87045 FECES CULTURE AEROBIC BACT: CPT | Mod: HCNC

## 2019-09-26 PROCEDURE — 99223 1ST HOSP IP/OBS HIGH 75: CPT | Mod: HCNC,,, | Performed by: INTERNAL MEDICINE

## 2019-09-26 PROCEDURE — P9021 RED BLOOD CELLS UNIT: HCPCS | Mod: HCNC

## 2019-09-26 PROCEDURE — 87088 URINE BACTERIA CULTURE: CPT | Mod: HCNC

## 2019-09-26 PROCEDURE — 87209 SMEAR COMPLEX STAIN: CPT | Mod: HCNC

## 2019-09-26 PROCEDURE — 89125 SPECIMEN FAT STAIN: CPT | Mod: HCNC

## 2019-09-26 PROCEDURE — 83993 ASSAY FOR CALPROTECTIN FECAL: CPT | Mod: HCNC

## 2019-09-26 PROCEDURE — 83605 ASSAY OF LACTIC ACID: CPT | Mod: 91,HCNC

## 2019-09-26 PROCEDURE — A4216 STERILE WATER/SALINE, 10 ML: HCPCS | Mod: HCNC | Performed by: PHYSICIAN ASSISTANT

## 2019-09-26 PROCEDURE — 87425 ROTAVIRUS AG IA: CPT | Mod: HCNC

## 2019-09-26 PROCEDURE — 20000000 HC ICU ROOM: Mod: HCNC

## 2019-09-26 PROCEDURE — 94640 AIRWAY INHALATION TREATMENT: CPT | Mod: HCNC

## 2019-09-26 PROCEDURE — 99223 PR INITIAL HOSPITAL CARE,LEVL III: ICD-10-PCS | Mod: HCNC,,, | Performed by: INTERNAL MEDICINE

## 2019-09-26 PROCEDURE — 93010 EKG 12-LEAD: ICD-10-PCS | Mod: HCNC,,, | Performed by: INTERNAL MEDICINE

## 2019-09-26 PROCEDURE — 94660 CPAP INITIATION&MGMT: CPT | Mod: HCNC

## 2019-09-26 PROCEDURE — 84484 ASSAY OF TROPONIN QUANT: CPT | Mod: 91,HCNC

## 2019-09-26 PROCEDURE — 36415 COLL VENOUS BLD VENIPUNCTURE: CPT | Mod: HCNC

## 2019-09-26 PROCEDURE — 82656 EL-1 FECAL QUAL/SEMIQ: CPT | Mod: HCNC

## 2019-09-26 PROCEDURE — 87184 SC STD DISK METHOD PER PLATE: CPT | Mod: HCNC

## 2019-09-26 PROCEDURE — 87338 HPYLORI STOOL AG IA: CPT | Mod: HCNC

## 2019-09-26 PROCEDURE — 27100171 HC OXYGEN HIGH FLOW UP TO 24 HOURS: Mod: HCNC

## 2019-09-26 PROCEDURE — 85025 COMPLETE CBC W/AUTO DIFF WBC: CPT | Mod: 91,HCNC

## 2019-09-26 PROCEDURE — 87186 SC STD MICRODIL/AGAR DIL: CPT | Mod: 59,HCNC

## 2019-09-26 PROCEDURE — 99291 PR CRITICAL CARE, E/M 30-74 MINUTES: ICD-10-PCS | Mod: HCNC,,, | Performed by: INTERNAL MEDICINE

## 2019-09-26 PROCEDURE — 83735 ASSAY OF MAGNESIUM: CPT | Mod: HCNC

## 2019-09-26 PROCEDURE — 87046 STOOL CULTR AEROBIC BACT EA: CPT | Mod: HCNC

## 2019-09-26 PROCEDURE — 93005 ELECTROCARDIOGRAM TRACING: CPT | Mod: HCNC

## 2019-09-26 PROCEDURE — 84100 ASSAY OF PHOSPHORUS: CPT | Mod: HCNC

## 2019-09-26 PROCEDURE — 94761 N-INVAS EAR/PLS OXIMETRY MLT: CPT | Mod: HCNC

## 2019-09-26 PROCEDURE — 27000221 HC OXYGEN, UP TO 24 HOURS: Mod: HCNC

## 2019-09-26 PROCEDURE — 87086 URINE CULTURE/COLONY COUNT: CPT | Mod: HCNC

## 2019-09-26 PROCEDURE — 81001 URINALYSIS AUTO W/SCOPE: CPT | Mod: HCNC

## 2019-09-26 PROCEDURE — 27100092 HC HIGH FLOW DELIVERY CANNULA: Mod: HCNC

## 2019-09-26 PROCEDURE — 99223 PR INITIAL HOSPITAL CARE,LEVL III: ICD-10-PCS | Mod: HCNC,GC,, | Performed by: INTERNAL MEDICINE

## 2019-09-26 PROCEDURE — 87427 SHIGA-LIKE TOXIN AG IA: CPT | Mod: 59,HCNC

## 2019-09-26 PROCEDURE — 87040 BLOOD CULTURE FOR BACTERIA: CPT | Mod: HCNC

## 2019-09-26 PROCEDURE — 87329 GIARDIA AG IA: CPT | Mod: HCNC

## 2019-09-26 PROCEDURE — 27000190 HC CPAP FULL FACE MASK W/VALVE: Mod: HCNC

## 2019-09-26 PROCEDURE — 84484 ASSAY OF TROPONIN QUANT: CPT | Mod: HCNC

## 2019-09-26 PROCEDURE — 80053 COMPREHEN METABOLIC PANEL: CPT | Mod: HCNC

## 2019-09-26 PROCEDURE — 63600175 PHARM REV CODE 636 W HCPCS: Mod: HCNC | Performed by: NURSE PRACTITIONER

## 2019-09-26 RX ORDER — LEVOFLOXACIN 5 MG/ML
750 INJECTION, SOLUTION INTRAVENOUS EVERY OTHER DAY
Status: DISCONTINUED | OUTPATIENT
Start: 2019-09-26 | End: 2019-09-26

## 2019-09-26 RX ORDER — FUROSEMIDE 10 MG/ML
80 INJECTION INTRAMUSCULAR; INTRAVENOUS ONCE
Status: COMPLETED | OUTPATIENT
Start: 2019-09-26 | End: 2019-09-26

## 2019-09-26 RX ORDER — IPRATROPIUM BROMIDE AND ALBUTEROL SULFATE 2.5; .5 MG/3ML; MG/3ML
3 SOLUTION RESPIRATORY (INHALATION) EVERY 4 HOURS PRN
Status: DISCONTINUED | OUTPATIENT
Start: 2019-09-26 | End: 2019-10-02 | Stop reason: HOSPADM

## 2019-09-26 RX ORDER — FUROSEMIDE 10 MG/ML
40 INJECTION INTRAMUSCULAR; INTRAVENOUS ONCE
Status: COMPLETED | OUTPATIENT
Start: 2019-09-26 | End: 2019-09-26

## 2019-09-26 RX ORDER — FUROSEMIDE 10 MG/ML
40 INJECTION INTRAMUSCULAR; INTRAVENOUS 2 TIMES DAILY
Status: DISCONTINUED | OUTPATIENT
Start: 2019-09-26 | End: 2019-09-26

## 2019-09-26 RX ORDER — POTASSIUM CHLORIDE 20 MEQ/1
40 TABLET, EXTENDED RELEASE ORAL ONCE
Status: COMPLETED | OUTPATIENT
Start: 2019-09-26 | End: 2019-09-26

## 2019-09-26 RX ORDER — FUROSEMIDE 10 MG/ML
40 INJECTION INTRAMUSCULAR; INTRAVENOUS ONCE AS NEEDED
Status: DISCONTINUED | OUTPATIENT
Start: 2019-09-26 | End: 2019-09-30

## 2019-09-26 RX ORDER — FUROSEMIDE 10 MG/ML
40 INJECTION INTRAMUSCULAR; INTRAVENOUS 3 TIMES DAILY
Status: DISCONTINUED | OUTPATIENT
Start: 2019-09-26 | End: 2019-09-27

## 2019-09-26 RX ADMIN — BICALUTAMIDE 50 MG: 50 TABLET, FILM COATED ORAL at 09:09

## 2019-09-26 RX ADMIN — POTASSIUM CHLORIDE 40 MEQ: 20 TABLET, EXTENDED RELEASE ORAL at 09:09

## 2019-09-26 RX ADMIN — IPRATROPIUM BROMIDE AND ALBUTEROL SULFATE 3 ML: .5; 3 SOLUTION RESPIRATORY (INHALATION) at 02:09

## 2019-09-26 RX ADMIN — Medication 3 ML: at 02:09

## 2019-09-26 RX ADMIN — FUROSEMIDE 80 MG: 10 INJECTION, SOLUTION INTRAMUSCULAR; INTRAVENOUS at 11:09

## 2019-09-26 RX ADMIN — FUROSEMIDE 40 MG: 10 INJECTION, SOLUTION INTRAMUSCULAR; INTRAVENOUS at 02:09

## 2019-09-26 RX ADMIN — PANTOPRAZOLE SODIUM 40 MG: 40 INJECTION, POWDER, FOR SOLUTION INTRAVENOUS at 09:09

## 2019-09-26 RX ADMIN — ATORVASTATIN CALCIUM 80 MG: 20 TABLET, FILM COATED ORAL at 09:09

## 2019-09-26 RX ADMIN — PIPERACILLIN AND TAZOBACTAM 4.5 G: 4; .5 INJECTION, POWDER, LYOPHILIZED, FOR SOLUTION INTRAVENOUS; PARENTERAL at 04:09

## 2019-09-26 RX ADMIN — Medication 3 ML: at 06:09

## 2019-09-26 RX ADMIN — Medication 3 ML: at 10:09

## 2019-09-26 RX ADMIN — Medication 300 MG: at 09:09

## 2019-09-26 RX ADMIN — VANCOMYCIN HYDROCHLORIDE 1500 MG: 1.5 INJECTION, POWDER, LYOPHILIZED, FOR SOLUTION INTRAVENOUS at 01:09

## 2019-09-26 RX ADMIN — FUROSEMIDE 40 MG: 10 INJECTION, SOLUTION INTRAMUSCULAR; INTRAVENOUS at 09:09

## 2019-09-26 RX ADMIN — LEVOFLOXACIN 750 MG: 750 INJECTION, SOLUTION INTRAVENOUS at 09:09

## 2019-09-26 RX ADMIN — AMLODIPINE BESYLATE 10 MG: 10 TABLET ORAL at 09:09

## 2019-09-26 NOTE — HPI
"89 year old male with a PMHx of HTN, HLD, CAD, HFrEF, AS, CKD3,DVT on eliquis and DM2 presenting to the ER from PCP clinic with son and grandson at bedside for "low hemoglobin." Patient reports noticing dark stools at home, but denies BRBPR, bloody BMs, hematuria, and hemoptysis. He denies associated SOB and PRATT. Patient also reports taking iron supplementation at home. He reports chronic fatigue, currently at his baseline. At the time of my exam, patient denies CP, SOB, abdominal pain, N/V/D, fever/chills, recent illness, dysuria/hematuria/frequency, focal weakness, syncope, and palpitations.   Patient was admitted 09/09 due to CHF exacerbation and LE swelling. Venous LE US noted partially occlusive DVT in the upper right femoral vein and patient was initiated on heparin gtt >>> transitioned to renal dose apixaban 09/11.    MICU was consulted after a rapid response today, patient was found short of breath with difficulty breathing, saturating in the 80's and a non re breather was placed, ABG was unremarkable, patient weaned down to 3L, HGB dropped from 9 to 7.7 and trop elevated from 5 to 24, patient transferred to MICU for higher level of care.     "

## 2019-09-26 NOTE — PROGRESS NOTES
Notified Med Team E, MD Sayda, that patient was having difficulty breathing, even after respiratory treatment. Pt's O2 Sats-86% on 5L N/C. Wet lung sounds clearly auscultated without stethescope. Pt coughed up tenacious thick yellowish-brownish mucus.In addition, CCRN notified and is at patient's bedside along with MD and Charge Nurse. New orders received and administered. Stool lab specimens collected per protocol and sent to lab. C-Difficile sample not collected because stool did not meet criteria. Will continue to monitor patient.

## 2019-09-26 NOTE — SUBJECTIVE & OBJECTIVE
Past Medical History:   Diagnosis Date    Anemia     Arthritis     Cancer     Prostate    Cataract     CHF (congestive heart failure)     Chronic kidney disease     Coronary artery disease     Diabetes mellitus     Diabetic retinopathy     Disorder of kidney and ureter     ckd 2    Encounter for blood transfusion     Glaucoma     High cholesterol     Hypertension     Myocardial infarction     Prostate cancer     PVD (peripheral vascular disease)     Spinal stenosis     Status post cataract extraction and insertion of intraocular lens of left eye 11/13/2015       Past Surgical History:   Procedure Laterality Date    CARDIAC CATHETERIZATION      15 to 20 years ago x1 stent and 4/7/2014 x1 stent    CATARACT EXTRACTION Bilateral     Dr. Ferguson    EYE SURGERY      HERNIA REPAIR         Review of patient's allergies indicates:  No Known Allergies    Family History     Problem Relation (Age of Onset)    Breast cancer Sister    Cancer Father    Diabetes Sister, Brother    Hypertension Sister    No Known Problems Mother, Son, Brother, Maternal Aunt, Maternal Uncle, Paternal Aunt, Paternal Uncle, Maternal Grandmother, Maternal Grandfather, Paternal Grandmother, Paternal Grandfather, Daughter        Tobacco Use    Smoking status: Never Smoker    Smokeless tobacco: Former User   Substance and Sexual Activity    Alcohol use: No    Drug use: No    Sexual activity: Never      Review of Systems   Constitutional: Positive for fatigue (chronic). Negative for diaphoresis and fever.   HENT: Negative for sore throat and trouble swallowing.    Respiratory: Positive for chest tightness. Negative for cough and shortness of breath.    Cardiovascular: Positive for leg swelling (chronic). Negative for chest pain.   Gastrointestinal: Negative for abdominal pain, anal bleeding, blood in stool, diarrhea, nausea and vomiting.   Genitourinary: Negative for flank pain and frequency.   Musculoskeletal: Positive for gait  problem (wheelchair at baseline) and myalgias (chronic). Negative for back pain and neck pain.   Skin: Positive for wound (chronic sarcal wound, skin breakdown to L foot).   Neurological: Positive for weakness (chronic). Negative for seizures and speech difficulty.   Psychiatric/Behavioral: Negative for agitation, behavioral problems and confusion.     Objective:     Vital Signs (Most Recent):  Temp: 99.5 °F (37.5 °C) (09/26/19 1215)  Pulse: 101 (09/26/19 1300)  Resp: (!) 49 (09/26/19 1300)  BP: (!) 144/68 (09/26/19 1300)  SpO2: (!) 94 % (09/26/19 1300) Vital Signs (24h Range):  Temp:  [97.1 °F (36.2 °C)-100.2 °F (37.9 °C)] 99.5 °F (37.5 °C)  Pulse:  [] 101  Resp:  [15-49] 49  SpO2:  [87 %-100 %] 94 %  BP: (102-145)/(56-76) 144/68   Weight: 82 kg (180 lb 12.4 oz)  Body mass index is 27.49 kg/m².      Intake/Output Summary (Last 24 hours) at 9/26/2019 1619  Last data filed at 9/26/2019 0600  Gross per 24 hour   Intake 360 ml   Output 250 ml   Net 110 ml       Physical Exam   Constitutional: He is oriented to person, place, and time. No distress.   Frail elderly male in NAD   HENT:   Head: Normocephalic and atraumatic.   Eyes: EOM are normal. Right eye exhibits no discharge. Left eye exhibits no discharge.   Neck: Normal range of motion. Neck supple.   Cardiovascular: Normal rate, regular rhythm and intact distal pulses.   Murmur (systolic ) heard.  Pulmonary/Chest: Effort normal. No respiratory distress.   Abdominal: Soft. Bowel sounds are normal. He exhibits no distension. There is no tenderness.   Musculoskeletal: Normal range of motion. He exhibits edema (LE).   Neurological: He is alert and oriented to person, place, and time.   Skin: Skin is warm and dry. He is not diaphoretic.   L foot wound dressed C/D/I   Psychiatric: He has a normal mood and affect. His behavior is normal.   Vitals reviewed.      Vents:     Lines/Drains/Airways     Drain            Male External Urinary Catheter 09/26/19 1100 Medium  less than 1 day          Peripheral Intravenous Line                 Peripheral IV - Single Lumen 09/25/19 0615 22 G Anterior;Left Forearm 1 day         Peripheral IV - Single Lumen 09/26/19 1045 18 G Right Antecubital less than 1 day         Peripheral IV - Single Lumen 09/26/19 1200 20 G Posterior;Right Forearm less than 1 day              Significant Labs:    CBC/Anemia Profile:  Recent Labs   Lab 09/25/19  1154  09/26/19  0301  09/26/19  0754 09/26/19  1318 09/26/19  1515   WBC 9.29   < >  --    < > 19.82* 4.93 12.82*   HGB 7.8*   < >  --    < > 7.7* 9.1* 8.3*   HCT 22.6*   < >  --    < > 21.5* 26.5* 24.3*      < >  --    < > 157 154 119*   MCV 80*   < >  --    < > 79* 81* 82   RDW 15.0*   < >  --    < > 15.1* 15.3* 15.3*   IRON 16*  --   --   --   --   --   --    FERRITIN 294  --   --   --   --   --   --    [id number] 743  --   --   --   --   --   --    OCCULTBLOOD  --   --  Negative  --   --   --   --     < > = values in this interval not displayed.        Chemistries:  Recent Labs   Lab 09/25/19  0652 09/26/19  0316    140   K 3.4* 3.3*    107   CO2 22* 19*   BUN 55* 58*   CREATININE 2.0* 2.2*   CALCIUM 8.5* 8.5*   ALBUMIN  --  3.2*   PROT  --  6.6   BILITOT  --  0.5   ALKPHOS  --  99   ALT  --  17   AST  --  50*   MG 2.3 2.2   PHOS 4.5 5.1*           Significant Imaging: I have reviewed all pertinent imaging results/findings within the past 24 hours.

## 2019-09-26 NOTE — ASSESSMENT & PLAN NOTE
Patient with Hx of  HTN, HLD, CAD, HFrEF (EF 45%), AS, CKD3, and DM2 initially admitted to hospital medicine for LGIB in setting of AC. Cardiology consulted due to NSTEMI and acute hypoxic respiratory failure due to pulmonary edema. Troponin 5--> 24> 25, BMP of 3K with pulmonary edema on CXR. EKG with non specific ST changes. 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 No ASA or heparin   - continue atrovastatin 80mg qD  - possible EGD with GI tomorrow   - serial H/H and transfuse as needed, keep HGB >8   - continue to trend troponin's until peak

## 2019-09-26 NOTE — ASSESSMENT & PLAN NOTE
DVT of RLE  - HDS on admission  - H/H 8.8/26.0; Hgb ~8-9 this month, prior to this month Hgb 10-11; currently 7.8  - Of note, patient reports taking iron supplementation at home  - Dx with DVT of RLE during last admission and subsequently started on Eliquis  - Held Eliquis on admission given melena. Discussed risks/benefits with patient and family who voiced understanding to plan of care  - CBC q8  - CLD as precaution  - If drop in H/H or episodes of bleeding, will consult GI; Hgb 8.8>7.8; pt initially refused EGD per GI and wanted to leave AMA but after discussion with daughter, myself, staff and pt he now would like to pursue EGD. Notified GI.  - Supportive care    9/26: see NSTEMI below; pt transferred to ICU for higher level of care and monitoring in setting of GIB and NSTEMI and respiratory failure with hypoxia  - hgb 7.7 this AM, ordered 1 unit of blood with lasix

## 2019-09-26 NOTE — HPI
89 year old male with a PMHx of HTN, HLD, CAD, HFrEF (EF 45%), AS, CKD3, and DM2 initially admitted to hospital medicine for LGIB in setting of AC. Cardiology consulted due to NSTEMI and acute hypoxic respiratory failure due to pulmonary edema. Patient was started on eliquis on 9/12 after a recent admission for CHF exacerbation found a DVT in right femoral vein. He has now developed melana and a drop in H/H to 7.7/21. GI has evalauted patient, who initally refused EGD, but finally agreed to have it done this afternoon. Last night patient devloped chest pain and SOB. Chest pain described as pressure, substernal, and lasting for 30 minutes. No current chest pain but does endorse SOB. Troponins elevated at 5-->24 and BNP of 3K. EKG with Nonspecific ST and T wave abnormality and no significant change from prior.     Of note, patient with history of three vessel CAD disease. Cath in 2014 showed 95% LAD lesion, 99% left Cx lesion, 95% RCA lesion and had stent to prox LAD placed at that time. Also, reports history of another sent palaced at OSH over 10 years ago. Las TTE with Ef 45%, DD, severe left atrial endearment, sever AS with area of .82, velocity of 3.57, and gradient of 32.

## 2019-09-26 NOTE — CONSULTS
[facility]  Cardiology  Consult Note    Patient Name: Jeniffer Taylor  MRN: [id number]  Admission Date: 9/24/2019  Hospital Length of Stay: 1 days  Code Status: DNR   Attending Provider: Sean Reynoso MD   Consulting Provider: Pepe Pena MD  Primary Care Physician: Shari Chauhan MD  Principal Problem:Melena    Patient information was obtained from patient, relative(s) and past medical records.     Consults  Subjective:     Chief Complaint:  NSTEMI    HPI:   89 year old male with a PMHx of HTN, HLD, CAD, HFrEF (EF 45%), AS, CKD3, and DM2 initially admitted to hospital medicine for LGIB in setting of AC. Cardiology consulted due to NSTEMI and acute hypoxic respiratory failure due to pulmonary edema. Patient was started on eliquis on 9/12 after a recent admission for CHF exacerbation found a DVT in right femoral vein. He has now developed melana and a drop in H/H to 7.7/21. GI has evalauted patient, who initally refused EGD, but finally agreed to have it done this afternoon. Last night patient devloped chest pain and SOB. Chest pain described as pressure, substernal, and lasting for 30 minutes. No current chest pain but does endorse SOB. Troponins elevated at 5-->24 and BNP of 3K. EKG with Nonspecific ST and T wave abnormality and no significant change from prior.     Of note, patient with history of three vessel CAD disease. Cath in 2014 showed 95% LAD lesion, 99% left Cx lesion, 95% RCA lesion and had stent to prox LAD placed at that time. Also, reports history of another sent palaced at OSH over 10 years ago. Las TTE with Ef 45%, DD, severe left atrial endearment, sever AS with area of .82, velocity of 3.57, and gradient of 32.     Past Medical History:   Diagnosis Date    Anemia     Arthritis     Cancer     Prostate    Cataract     CHF (congestive heart failure)     Chronic kidney disease     Coronary artery disease     Diabetes mellitus     Diabetic retinopathy     Disorder  of kidney and ureter     ckd 2    Encounter for blood transfusion     Glaucoma     High cholesterol     Hypertension     Myocardial infarction     Prostate cancer     PVD (peripheral vascular disease)     Spinal stenosis     Status post cataract extraction and insertion of intraocular lens of left eye 11/13/2015       Past Surgical History:   Procedure Laterality Date    CARDIAC CATHETERIZATION      15 to 20 years ago x1 stent and 4/7/2014 x1 stent    CATARACT EXTRACTION Bilateral     Dr. Ferguson    EYE SURGERY      HERNIA REPAIR         Review of patient's allergies indicates:  No Known Allergies    No current facility-administered medications on file prior to encounter.      Current Outpatient Medications on File Prior to Encounter   Medication Sig    [START ON 10/12/2019] apixaban (ELIQUIS) 5 mg Tab Start after completion of starter pack: Take 1 tablet (5 mg total) by mouth 2 (two) times daily.    amLODIPine (NORVASC) 10 MG tablet TAKE 1 TABLET (10 MG TOTAL) BY MOUTH ONCE DAILY.    ammonium lactate 12 % Crea Apply twice daily to affected parts both feet as needed.    apixaban (ELIQUIS) 5 mg (74 tabs) DsPk For the first 7 days take two 5 mg tablets twice daily.  After 7 days take one 5 mg tablet twice daily.    aspirin (ECOTRIN) 81 MG EC tablet Take 81 mg by mouth. 1 Tablet, Delayed Release (E.C.) Oral Every morning    atorvastatin (LIPITOR) 80 MG tablet Take 1 tablet (80 mg total) by mouth once daily.    BD ALCOHOL SWABS PadM     bicalutamide (CASODEX) 50 MG Tab TAKE 1 TABLET EVERY DAY    blood-glucose meter kit Use as instructed    carvedilol (COREG) 6.25 MG tablet Take 1 tablet (6.25 mg total) by mouth 2 (two) times daily with meals.    diclofenac sodium (VOLTAREN) 1 % Gel Apply 2 g topically 4 (four) times daily.    fish oil-omega-3 fatty acids 300-1,000 mg capsule Take 1 capsule by mouth once daily.    furosemide (LASIX) 40 MG tablet TAKE 1 TABLET (40 MG TOTAL) BY MOUTH 2 (TWO) TIMES  DAILY.    garlic 1,000 mg Cap Take 2 tablets by mouth once daily.    glipiZIDE (GLUCOTROL) 2.5 MG TR24 Take 1 tablet (2.5 mg total) by mouth once daily.    hydrALAZINE (APRESOLINE) 50 MG tablet Take 1 tablet (50 mg total) by mouth every 12 (twelve) hours.    lancets (LANCETS,ULTRA THIN) Misc Use daily    latanoprost 0.005 % ophthalmic solution Place 1 drop into both eyes every evening.    multivitamin capsule Take 1 capsule by mouth once daily.    nitroGLYCERIN (NITROSTAT) 0.4 MG SL tablet Place 1 tablet (0.4 mg total) under the tongue every 5 (five) minutes as needed for Chest pain.    TRUE METRIX GLUCOSE TEST STRIP Strp TEST EVERY DAY     Family History     Problem Relation (Age of Onset)    Breast cancer Sister    Cancer Father    Diabetes Sister, Brother    Hypertension Sister    No Known Problems Mother, Son, Brother, Maternal Aunt, Maternal Uncle, Paternal Aunt, Paternal Uncle, Maternal Grandmother, Maternal Grandfather, Paternal Grandmother, Paternal Grandfather, Daughter        Tobacco Use    Smoking status: Never Smoker    Smokeless tobacco: Former User   Substance and Sexual Activity    Alcohol use: No    Drug use: No    Sexual activity: Never     Review of Systems   Constitution: Negative for fever.   HENT: Negative for congestion.    Eyes: Negative for blurred vision.   Cardiovascular: Positive for chest pain and leg swelling. Negative for orthopnea and palpitations.   Respiratory: Positive for shortness of breath. Negative for cough.    Musculoskeletal: Negative for back pain.   Gastrointestinal: Positive for diarrhea and melena. Negative for abdominal pain.   Genitourinary: Negative for dysuria and hematuria.   Neurological: Negative for headaches.   Psychiatric/Behavioral: Negative for altered mental status.     Objective:     Vital Signs (Most Recent):  Temp: 100.2 °F (37.9 °C) (09/26/19 1200)  Pulse: 95 (09/26/19 1200)  Resp: (!) 46 (09/26/19 1200)  BP: 132/62 (09/26/19 1200)  SpO2: 95  % (09/26/19 1200) Vital Signs (24h Range):  Temp:  [97.1 °F (36.2 °C)-100.2 °F (37.9 °C)] 100.2 °F (37.9 °C)  Pulse:  [67-97] 95  Resp:  [15-46] 46  SpO2:  [87 %-100 %] 95 %  BP: (102-145)/(56-76) 132/62     Weight: 82 kg (180 lb 12.4 oz)  Body mass index is 27.49 kg/m².    SpO2: 95 %  O2 Device (Oxygen Therapy): nasal cannula      Intake/Output Summary (Last 24 hours) at 9/26/2019 1213  Last data filed at 9/26/2019 0600  Gross per 24 hour   Intake 600 ml   Output 500 ml   Net 100 ml       Lines/Drains/Airways     Drain            Male External Urinary Catheter 09/26/19 1100 Medium less than 1 day          Peripheral Intravenous Line                 Peripheral IV - Single Lumen 09/25/19 0615 22 G Anterior;Left Forearm 1 day         Peripheral IV - Single Lumen 09/26/19 1045 18 G Right Antecubital less than 1 day                Physical Exam   Constitutional: He is oriented to person, place, and time. He appears well-developed.   HENT:   Head: Normocephalic and atraumatic.   Neck: Normal range of motion.   Cardiovascular: Normal rate and regular rhythm.   Murmur (grade 2 aortic stenosis murmur noted over LUSB) heard.  Pulmonary/Chest: Effort normal. No respiratory distress.   Bibasilar crackles    Abdominal: Soft. Bowel sounds are normal. He exhibits no distension. There is no tenderness. There is no rebound and no guarding.   Musculoskeletal:   2+ pitting edema to feet   Neurological: He is alert and oriented to person, place, and time.   Skin: No rash noted.       Significant Labs:   BMP:   Recent Labs   Lab 09/25/19  0652 09/26/19  0316    160*    140   K 3.4* 3.3*    107   CO2 22* 19*   BUN 55* 58*   CREATININE 2.0* 2.2*   CALCIUM 8.5* 8.5*   MG 2.3 2.2   , CMP   Recent Labs   Lab 09/25/19  0652 09/26/19  0316    140   K 3.4* 3.3*    107   CO2 22* 19*    160*   BUN 55* 58*   CREATININE 2.0* 2.2*   CALCIUM 8.5* 8.5*   PROT  --  6.6   ALBUMIN  --  3.2*   BILITOT  --  0.5    ALKPHOS  --  99   AST  --  50*   ALT  --  17   ANIONGAP 11 14   ESTGFRAFRICA 32.8* 29.2*   EGFRNONAA 28.3* 25.3*    and Troponin   Recent Labs   Lab 09/25/19  2338 09/26/19  0754   TROPONINI 5.440* 24.136*           Assessment and Plan:     NSTEMI (non-ST elevated myocardial infarction)  89 year old male with a PMHx of HTN, HLD, CAD, HFrEF (EF 45%), AS, CKD3, and DM2 initially admitted to hospital medicine for LGIB in setting of AC. Cardiology consulted due to NSTEMI and acute hypoxic respiratory failure due to pulmonary edema. Troponin 5--> 24, BMP of 3K with pulmonary edema on CXR. EKG with non specific ST changes. GI bleed possible 2/2 heydes AVMs    - Given GIB do not recommend starting ACS protocol. No ASA or heparin   - continue atrovastatin 80mg qD  - recommend GI to evaluate and treat GI bleed  - once bleed is stabilized will reconsider treatment of NSTEMI medically or cath    - continue to trend troponin's until peak    Acute on chronic diastolic CHF (congestive heart failure)  - continue lasix 40mg IV BID  - strict I&Os  - titrate oxygen as needed    Dyslipidemia  - continue atorvastatin 80mg qD        VTE Risk Mitigation (From admission, onward)         Ordered     Place BERT hose  Until discontinued      09/24/19 1459     Reason for No Pharmacological VTE Prophylaxis  Once      09/24/19 1459     IP VTE HIGH RISK PATIENT  Once      09/24/19 1459     Place sequential compression device  Until discontinued      09/24/19 1355                Thank you for your consult. I will follow-up with patient. Please contact us if you have any additional questions.    Pepe Pena MD  Cardiology   [facility]

## 2019-09-26 NOTE — ASSESSMENT & PLAN NOTE
Aortic stenosis  EF 45% with sever AS and mild to moderate MR on 9/9/19    Chest x-ray consistent with pulm edema   BNP elevated   LE edema and basilar crackles on exam     - continue lasix 40mg IV TID  - strict I&Os  - titrate oxygen as needed

## 2019-09-26 NOTE — SUBJECTIVE & OBJECTIVE
Interval History: Overnight pt became hypoxic and increasingly short of breath, this improved with lasix. Troponin of 5. This AM STAT trop drawn, 25. Repeat EKG. Notified cardiology. Pt seen at bedside with daughter present with ICU providers present as well. At time of my exam pt is resting in bed in NAD. Code status addressed by ICU, pt agreeable to DNR. Consented pt for blood, given 1 unit with lasix. Transferred to ICU for closer monitoring in setting of NSTEMI and GIB.    Review of Systems   Constitutional: Positive for fatigue (chronic). Negative for fever.   Respiratory: Positive for chest tightness and shortness of breath. Negative for cough.    Cardiovascular: Positive for leg swelling (chronic). Negative for chest pain.   Gastrointestinal: Positive for diarrhea. Negative for blood in stool and nausea.   Genitourinary: Negative for flank pain and frequency.   Musculoskeletal: Positive for gait problem (wheelchair at baseline) and myalgias (chronic). Negative for back pain.   Skin: Positive for wound (chronic sarcal wound, skin breakdown to L foot).   Neurological: Positive for weakness (chronic). Negative for seizures and speech difficulty.   Psychiatric/Behavioral: Negative for confusion.     Objective:     Vital Signs (Most Recent):  Temp: 99.5 °F (37.5 °C) (09/26/19 1215)  Pulse: 101 (09/26/19 1300)  Resp: (!) 49 (09/26/19 1300)  BP: (!) 144/68 (09/26/19 1300)  SpO2: (!) 94 % (09/26/19 1300) Vital Signs (24h Range):  Temp:  [97.1 °F (36.2 °C)-100.2 °F (37.9 °C)] 99.5 °F (37.5 °C)  Pulse:  [] 101  Resp:  [15-49] 49  SpO2:  [87 %-100 %] 94 %  BP: (102-145)/(56-76) 144/68     Weight: 82 kg (180 lb 12.4 oz)  Body mass index is 27.49 kg/m².    Intake/Output Summary (Last 24 hours) at 9/26/2019 1614  Last data filed at 9/26/2019 0600  Gross per 24 hour   Intake 360 ml   Output 250 ml   Net 110 ml      Physical Exam   Constitutional: He is oriented to person, place, and time. No distress.   Frail elderly  male in NAD   HENT:   Head: Normocephalic and atraumatic.   Eyes: EOM are normal. Right eye exhibits no discharge. Left eye exhibits no discharge.   Neck: Normal range of motion.   Cardiovascular: Normal rate and regular rhythm.   Murmur heard.  Pulmonary/Chest: Effort normal. No respiratory distress.   Musculoskeletal: Normal range of motion. He exhibits edema.   Neurological: He is alert and oriented to person, place, and time.   Skin: Skin is warm and dry. He is not diaphoretic.   Psychiatric: He has a normal mood and affect. His behavior is normal.       Significant Labs: All pertinent labs within the past 24 hours have been reviewed.    Significant Imaging: I have reviewed all pertinent imaging results/findings within the past 24 hours.

## 2019-09-26 NOTE — ASSESSMENT & PLAN NOTE
Ischemic CMP  Aortic Stenosis  Acute hypoxic resp failure  - Recently admitted for CHF exacerbation 9/9-9/12  - Last echo 9/9/19 shows EF 45%, DD, severe L atrial enlargement, severe AS  - BLE edema on exam however pt reports this is chronic  - Continue home lasix BID  - Strict I/Os, daily weights  - pt SOB 9/25: see NSTEMI; BNP 3000s, responded to IVP lasix, scheduled IV lasix

## 2019-09-26 NOTE — TREATMENT PLAN
Brief GI note    Overnight the patient had an episode of chest pain with hypoxia.  Evaluation revealed a troponin of 24.  Hypoxia resolved the patient is currently on 3 liters/minute oxygen.  He is being transferred to the ICU.    Although the patient has a drop hemoglobin, his stool is brown with no signs of active bleeding.  It is highly unlikely that this episode is related to acute GI bleeding.  So therefore we will hold off on doing an EGD.  Patient will need an EGD prior to discharge. Please notify GI when more stable for EGD.    Addendum:  Based on cardiac evaluation, troponin elevation is thought to be type 2 NSTEMI due to GI bleeding.  The patient is not showing signs of active bleeding currently (stool checked today was brown), however we will proceed with EGD tomorrow if okay with Cardiology.  Please keep the patient NPO after midnight

## 2019-09-26 NOTE — PROGRESS NOTES
Pharmacokinetic Initial Assessment: IV Vancomycin    Assessment/Plan:    Initiate IV vancomycin with loading dose of 1500 mg once with subsequent doses when random concentrations are less than 20 mcg/mL.     Draw vancomycin random level with morning labs 9/27.    Pharmacy will continue to follow and monitor vancomycin.      Please contact pharmacy at extension [phone] with any questions regarding this assessment.     Thank you for the consult,   Sandy Rivera, PharmD, [facility]             Patient brief summary:  Jeniffer Taylor is a 89 y.o. male initiated on antimicrobial therapy with IV vancomycin for treatment of suspected sepsis    Drug Allergies:   Review of patient's allergies indicates:  No Known Allergies    Actual Body Weight:   82 kg     Renal Function:   Estimated Creatinine Clearance: 21.2 mL/min (A) (based on SCr of 2.2 mg/dL (H)).    Dialysis Method (if applicable):  N/A    CBC (last 72 hours):  Recent Labs   Lab Result Units 09/24/19  1205 09/24/19  1737 09/24/19  2328 09/25/19  0652 09/25/19  1154 09/25/19  2338 09/26/19  0316 09/26/19  0754 09/26/19  1318 09/26/19  1515   WBC K/uL 9.72 9.51 9.07 8.92 9.29 10.57 13.20* 19.82* 4.93 12.82*   Hemoglobin g/dL 8.8* 8.3* 7.9* 7.8* 7.8* 8.2* 9.0* 7.7* 9.1* 8.3*   Hemoglobin A1C %  --   --   --  6.1*  --   --   --   --   --   --    Hematocrit % 26.0* 23.3* 21.7* 22.8* 22.6* 22.7* 26.8* 21.5* 26.5* 24.3*   Platelets K/uL 202 195 171 178 182 181 177 157 154 119*   Gran% % 78.8* 75.0* 77.9* 78.9* 78.2* 91.0* 95.7* 91.8* 91.7*  --    Lymph% % 9.6* 10.4* 8.7* 10.2* 10.3* 4.8* 2.1* 2.2* 7.9*  --    Mono% % 8.6 10.6 9.8 8.2 8.8 3.6* 1.6* 4.6 0.4*  --    Eosinophil% % 2.2 3.4 3.0 2.1 2.2 0.0 0.0 0.0 0.0  --    Basophil% % 0.4 0.3 0.2 0.3 0.2 0.2 0.2 0.1 0.0  --    Differential Method  Automated Automated Automated Automated Automated Automated Automated Automated Automated  --        Metabolic Panel (last 72 hours):  Recent Labs   Lab Result Units 09/24/19  7024  09/25/19  0652 09/26/19  0316   Sodium mmol/L 142 143 140   Potassium mmol/L 3.8 3.4* 3.3*   Chloride mmol/L 107 110 107   CO2 mmol/L 21* 22* 19*   Glucose mg/dL 127* 103 160*   BUN, Bld mg/dL 56* 55* 58*   Creatinine mg/dL 2.2* 2.0* 2.2*   Albumin g/dL 3.6  --  3.2*   Total Bilirubin mg/dL 0.6  --  0.5   Alkaline Phosphatase U/L 95  --  99   AST U/L 29  --  50*   ALT U/L 14  --  17   Magnesium mg/dL  --  2.3 2.2   Phosphorus mg/dL  --  4.5 5.1*       Drug levels (last 3 results):  No results for input(s): VANCOMYCINRA, VANCOMYCINPE, VANCOMYCINTR in the last 72 hours.    Microbiologic Results:  Microbiology Results (last 7 days)     Procedure Component Value Units Date/Time    Blood culture [[id number]] Collected:  09/26/19 1313    Order Status:  Sent Specimen:  Blood from Peripheral, Forearm, Right Updated:  09/26/19 1507    Blood culture [[id number]] Collected:  09/26/19 1312    Order Status:  Sent Specimen:  Blood from Peripheral, Hand, Right Updated:  09/26/19 1436    Clostridium difficile EIA [[id number]] Collected:  09/26/19 1219    Order Status:  Sent Specimen:  Stool Updated:  09/26/19 1219    Culture, Respiratory with Gram Stain [[id number]] Collected:  09/26/19 1120    Order Status:  Sent Specimen:  Respiratory from Sputum Updated:  09/26/19 1140    Stool culture [[id number]] Collected:  09/26/19 0304    Order Status:  Sent Specimen:  Stool Updated:  09/26/19 0419    E. coli 0157 antigen [[id number]] Collected:  09/26/19 0304    Order Status:  No result Specimen:  Stool Updated:  09/26/19 7399

## 2019-09-26 NOTE — H&P
"[facility]  Critical Care Medicine  History & Physical    Patient Name: Jeniffer Taylor  MRN: [id number]  Admission Date: 9/24/2019  Hospital Length of Stay: 1 days  Code Status: DNR  Attending Physician: Sean Reynoso MD   Primary Care Provider: Shari Chauhan MD   Principal Problem: Melena    Subjective:     HPI:  89 year old male with a PMHx of HTN, HLD, CAD, HFrEF, AS, CKD3,DVT on eliquis and DM2 presenting to the ER from PCP clinic with son and grandson at bedside for "low hemoglobin." Patient reports noticing dark stools at home, but denies BRBPR, bloody BMs, hematuria, and hemoptysis. He denies associated SOB and PRATT. Patient also reports taking iron supplementation at home. He reports chronic fatigue, currently at his baseline. At the time of my exam, patient denies CP, SOB, abdominal pain, N/V/D, fever/chills, recent illness, dysuria/hematuria/frequency, focal weakness, syncope, and palpitations.   Patient was admitted 09/09 due to CHF exacerbation and LE swelling. Venous LE US noted partially occlusive DVT in the upper right femoral vein and patient was initiated on heparin gtt >>> transitioned to renal dose apixaban 09/11.    MICU was consulted after a rapid response today, patient was found short of breath with difficulty breathing, saturating in the 80's and a non re breather was placed, ABG was unremarkable, patient weaned down to 3L, HGB dropped from 9 to 7.7 and trop elevated from 5 to 24, patient transferred to MICU for higher level of care.       Hospital/ICU Course:  No notes on file     Past Medical History:   Diagnosis Date    Anemia     Arthritis     Cancer     Prostate    Cataract     CHF (congestive heart failure)     Chronic kidney disease     Coronary artery disease     Diabetes mellitus     Diabetic retinopathy     Disorder of kidney and ureter     ckd 2    Encounter for blood transfusion     Glaucoma     High cholesterol     Hypertension     " Myocardial infarction     Prostate cancer     PVD (peripheral vascular disease)     Spinal stenosis     Status post cataract extraction and insertion of intraocular lens of left eye 11/13/2015       Past Surgical History:   Procedure Laterality Date    CARDIAC CATHETERIZATION      15 to 20 years ago x1 stent and 4/7/2014 x1 stent    CATARACT EXTRACTION Bilateral     Dr. Ferguson    EYE SURGERY      HERNIA REPAIR         Review of patient's allergies indicates:  No Known Allergies    Family History     Problem Relation (Age of Onset)    Breast cancer Sister    Cancer Father    Diabetes Sister, Brother    Hypertension Sister    No Known Problems Mother, Son, Brother, Maternal Aunt, Maternal Uncle, Paternal Aunt, Paternal Uncle, Maternal Grandmother, Maternal Grandfather, Paternal Grandmother, Paternal Grandfather, Daughter        Tobacco Use    Smoking status: Never Smoker    Smokeless tobacco: Former User   Substance and Sexual Activity    Alcohol use: No    Drug use: No    Sexual activity: Never      Review of Systems   Constitutional: Positive for fatigue (chronic). Negative for diaphoresis and fever.   HENT: Negative for sore throat and trouble swallowing.    Respiratory: Positive for chest tightness. Negative for cough and shortness of breath.    Cardiovascular: Positive for leg swelling (chronic). Negative for chest pain.   Gastrointestinal: Negative for abdominal pain, anal bleeding, blood in stool, diarrhea, nausea and vomiting.   Genitourinary: Negative for flank pain and frequency.   Musculoskeletal: Positive for gait problem (wheelchair at baseline) and myalgias (chronic). Negative for back pain and neck pain.   Skin: Positive for wound (chronic sarcal wound, skin breakdown to L foot).   Neurological: Positive for weakness (chronic). Negative for seizures and speech difficulty.   Psychiatric/Behavioral: Negative for agitation, behavioral problems and confusion.     Objective:     Vital Signs  (Most Recent):  Temp: 99.5 °F (37.5 °C) (09/26/19 1215)  Pulse: 101 (09/26/19 1300)  Resp: (!) 49 (09/26/19 1300)  BP: (!) 144/68 (09/26/19 1300)  SpO2: (!) 94 % (09/26/19 1300) Vital Signs (24h Range):  Temp:  [97.1 °F (36.2 °C)-100.2 °F (37.9 °C)] 99.5 °F (37.5 °C)  Pulse:  [] 101  Resp:  [15-49] 49  SpO2:  [87 %-100 %] 94 %  BP: (102-145)/(56-76) 144/68   Weight: 82 kg (180 lb 12.4 oz)  Body mass index is 27.49 kg/m².      Intake/Output Summary (Last 24 hours) at 9/26/2019 1619  Last data filed at 9/26/2019 0600  Gross per 24 hour   Intake 360 ml   Output 250 ml   Net 110 ml       Physical Exam   Constitutional: He is oriented to person, place, and time. No distress.   Frail elderly male in NAD   HENT:   Head: Normocephalic and atraumatic.   Eyes: EOM are normal. Right eye exhibits no discharge. Left eye exhibits no discharge.   Neck: Normal range of motion. Neck supple.   Cardiovascular: Normal rate, regular rhythm and intact distal pulses.   Murmur (systolic ) heard.  Pulmonary/Chest: Effort normal. No respiratory distress.   Abdominal: Soft. Bowel sounds are normal. He exhibits no distension. There is no tenderness.   Musculoskeletal: Normal range of motion. He exhibits edema (LE).   Neurological: He is alert and oriented to person, place, and time.   Skin: Skin is warm and dry. He is not diaphoretic.   L foot wound dressed C/D/I   Psychiatric: He has a normal mood and affect. His behavior is normal.   Vitals reviewed.      Vents:     Lines/Drains/Airways     Drain            Male External Urinary Catheter 09/26/19 1100 Medium less than 1 day          Peripheral Intravenous Line                 Peripheral IV - Single Lumen 09/25/19 0615 22 G Anterior;Left Forearm 1 day         Peripheral IV - Single Lumen 09/26/19 1045 18 G Right Antecubital less than 1 day         Peripheral IV - Single Lumen 09/26/19 1200 20 G Posterior;Right Forearm less than 1 day              Significant Labs:    CBC/Anemia  Profile:  Recent Labs   Lab 09/25/19  1154  09/26/19  0301  09/26/19  0754 09/26/19  1318 09/26/19  1515   WBC 9.29   < >  --    < > 19.82* 4.93 12.82*   HGB 7.8*   < >  --    < > 7.7* 9.1* 8.3*   HCT 22.6*   < >  --    < > 21.5* 26.5* 24.3*      < >  --    < > 157 154 119*   MCV 80*   < >  --    < > 79* 81* 82   RDW 15.0*   < >  --    < > 15.1* 15.3* 15.3*   IRON 16*  --   --   --   --   --   --    FERRITIN 294  --   --   --   --   --   --    OIQUGVZW55 743  --   --   --   --   --   --    OCCULTBLOOD  --   --  Negative  --   --   --   --     < > = values in this interval not displayed.        Chemistries:  Recent Labs   Lab 09/25/19  0652 09/26/19  0316    140   K 3.4* 3.3*    107   CO2 22* 19*   BUN 55* 58*   CREATININE 2.0* 2.2*   CALCIUM 8.5* 8.5*   ALBUMIN  --  3.2*   PROT  --  6.6   BILITOT  --  0.5   ALKPHOS  --  99   ALT  --  17   AST  --  50*   MG 2.3 2.2   PHOS 4.5 5.1*           Significant Imaging: I have reviewed all pertinent imaging results/findings within the past 24 hours.    Assessment/Plan:     Cardiac/Vascular  Acute on chronic combined systolic and diastolic heart failure  Aortic stenosis  EF 45% with sever AS and mild to moderate MR on 9/9/19    Chest x-ray consistent with pulm edema   BNP elevated   LE edema and basilar crackles on exam     - continue lasix 40mg IV TID  - strict I&Os  - titrate oxygen as needed    NSTEMI (non-ST elevated myocardial infarction)  Patient with Hx of  HTN, HLD, CAD, HFrEF (EF 45%), AS, CKD3, and DM2 initially admitted to hospital medicine for LGIB in setting of AC. Cardiology consulted due to NSTEMI and acute hypoxic respiratory failure due to pulmonary edema. Troponin 5--> 24> 25, BMP of 3K with pulmonary edema on CXR. EKG with non specific ST changes. 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 No ASA or heparin   - continue atrovastatin 80mg qD  - possible EGD with GI tomorrow   - serial H/H  and transfuse as needed, keep HGB >8   - continue to trend troponin's until peak    Hypertensive heart disease with heart failure  - BP stable  - Held coreg and hydralazine in setting of melena  - Continue home norvasc  - Continue to monitor, resume meds as needed    CAD (coronary artery disease)  Coronary angiogram (4/7/14): Prox LAD PCI, 99% LCx (small vessel), 40% Ramus, 95% RCA (multiple lesions, heavily calcified).     On Asprin, coreg, atorvastatin at home    Dyslipidemia  - continue atorvastatin 80mg qD    Hematology  Acute deep vein thrombosis (DVT) of right lower extremity  venous LE US noted partially occlusive DVT in the upper right femoral vein and patient was initiated on heparin gtt >>> transitioned to renal dose apixaban 09/11    - holding eliquis insetting of GI bleed   - will consider IVC filler        Oncology  Anemia  Chronic, iron deficiency.   Stable.     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 Protonix 80mg IV bolus x 1 then 8mg/hr gtt  2. Intravascular resuscitation/support with IVFs and pRBCs as needed.  3. Serial H/H's transfuse as needed.  4. Discontinue all ASA, NSAIDs and Heparin products  5. Please correct any coagulopathy with platelets and FFP to a goal of platelets >50K and INR <2.5  6. Maintain IV access with 2 large bore IVs  7. Keep NPO   8. Plan for EGD tomorrow     Other  Pressure injury of sacral region, stage 2  Chronic sacral wound, L foot skin breakdown  - Wound care consulted        Critical secondary to NTEMI  and GI bleed      Critical care was time spent personally by me on the following activities: development of treatment plan with patient or surrogate and bedside caregivers, discussions with consultants, evaluation of patient's response to treatment, examination of patient, ordering and performing treatments and interventions, ordering and review of laboratory studies, ordering and review of radiographic studies, pulse oximetry, re-evaluation of patient's condition. This critical care time did not overlap with that of any other provider or involve time for any procedures.     LONI Huynh  Critical Care Medicine  [facility]

## 2019-09-26 NOTE — ASSESSMENT & PLAN NOTE
venous LE US noted partially occlusive DVT in the upper right femoral vein and patient was initiated on heparin gtt >>> transitioned to renal dose apixaban 09/11    - holding eliquis insetting of GI bleed   - will consider IVC filler

## 2019-09-26 NOTE — CONSULTS
Please see full H&P and Significant event note by . Patient to be admitted to MICU.      Barrie Ferrer NP  Critical Care Medicine

## 2019-09-26 NOTE — ASSESSMENT & PLAN NOTE
89 year old male with a PMHx of HTN, HLD, CAD, HFrEF (EF 45%), AS, CKD3, and DM2 initially admitted to hospital medicine for LGIB in setting of AC. Cardiology consulted due to NSTEMI and acute hypoxic respiratory failure due to pulmonary edema. Troponin 5--> 24, BMP of 3K with pulmonary edema on CXR. EKG with non specific ST changes. GI bleed possible 2/2 heydes AVMs    - Given GIB do not recommend starting ACS protocol. No ASA or heparin   - continue atrovastatin 80mg qD  - recommend GI to evaluate and treat GI bleed  - once bleed is stabilized will reconsider treatment of NSTEMI medically or cath    - continue to trend troponin's until peak

## 2019-09-26 NOTE — ASSESSMENT & PLAN NOTE
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 Protonix 80mg IV bolus x 1 then 8mg/hr gtt  2. Intravascular resuscitation/support with IVFs and pRBCs as needed.  3. Serial H/H's transfuse as needed.  4. Discontinue all ASA, NSAIDs and Heparin products  5. Please correct any coagulopathy with platelets and FFP to a goal of platelets >50K and INR <2.5  6. Maintain IV access with 2 large bore IVs  7. Keep NPO   8. Plan for EGD tomorrow

## 2019-09-26 NOTE — CARE UPDATE
"RAPID RESPONSE NURSE PROACTIVE ROUNDING NOTE     Time of Visit: 0240    Admit Date: 2019  LOS: 1  Code Status: Full Code   Date of Visit: 2019  : 9/15/1928  Age: 89 y.o.  Sex: male  Race: Black or   Bed: 4/[facility] A:   MRN: [id number]  Was the patient discharged from an ICU this admission? no   Was the patient discharged from a PACU within last 24 hours?  no  Did the patient receive conscious sedation/general anesthesia in last 24 hours?  no  Was the patient in the ED within the past 24 hours?  no  Was the patient started on NIPPV within the past 24 hours?  no  Attending Physician: GÉNESIS Reed MD  Primary Service: [facility] MED E    ASSESSMENT     Diagnosis: Melena    Abnormal Vital Signs: BP (!) 145/70   Pulse 89   Temp 97.8 °F (36.6 °C) (Oral)   Resp (!) 24   Ht 5' 8" (1.727 m)   Wt 82 kg (180 lb 12.4 oz)   SpO2 96%   BMI 27.49 kg/m²      Clinical Issues: Respiratory    Patient  has a past medical history of Anemia, Arthritis, Cancer, Cataract, CHF (congestive heart failure), Chronic kidney disease, Coronary artery disease, Diabetes mellitus, Diabetic retinopathy, Disorder of kidney and ureter, Encounter for blood transfusion, Glaucoma, High cholesterol, Hypertension, Myocardial infarction, Prostate cancer, PVD (peripheral vascular disease), Spinal stenosis, and Status post cataract extraction and insertion of intraocular lens of left eye.    Called by charge nurse regarding patient's respiratory status. Stated patient with shortness of breath and difficulty breathing, saturating in the 80's and a non re breather was placed. Upon assessment, patient awake and alert and conversing easily, patient with expiratory wheezing and crackles able to be heard even without a stethoscope. Patient saturating 96% on non re breather currently. SHANTANU Puga had already arrived at bedside. ABG unremarkable. Patient able to weaned down to 3L post ABG.       INTERVENTIONS/ RECOMMENDATIONS "     Lasix, chest x ray, ABG, troponin, lactic, and procal ordered. Condom cath placed.     Discussed plan of care with RNMadie.    PHYSICIAN ESCALATION     Yes/No  yes    Orders received and case discussed with SHANTANU Puga.    Disposition: Remain in room 624.    FOLLOW-UP     Call back the Rapid Response Nurse, [place] [phone] for additional questions or concerns.

## 2019-09-26 NOTE — SUBJECTIVE & OBJECTIVE
Past Medical History:   Diagnosis Date    Anemia     Arthritis     Cancer     Prostate    Cataract     CHF (congestive heart failure)     Chronic kidney disease     Coronary artery disease     Diabetes mellitus     Diabetic retinopathy     Disorder of kidney and ureter     ckd 2    Encounter for blood transfusion     Glaucoma     High cholesterol     Hypertension     Myocardial infarction     Prostate cancer     PVD (peripheral vascular disease)     Spinal stenosis     Status post cataract extraction and insertion of intraocular lens of left eye 11/13/2015       Past Surgical History:   Procedure Laterality Date    CARDIAC CATHETERIZATION      15 to 20 years ago x1 stent and 4/7/2014 x1 stent    CATARACT EXTRACTION Bilateral     Dr. Ferguson    EYE SURGERY      HERNIA REPAIR         Review of patient's allergies indicates:  No Known Allergies    No current facility-administered medications on file prior to encounter.      Current Outpatient Medications on File Prior to Encounter   Medication Sig    [START ON 10/12/2019] apixaban (ELIQUIS) 5 mg Tab Start after completion of starter pack: Take 1 tablet (5 mg total) by mouth 2 (two) times daily.    amLODIPine (NORVASC) 10 MG tablet TAKE 1 TABLET (10 MG TOTAL) BY MOUTH ONCE DAILY.    ammonium lactate 12 % Crea Apply twice daily to affected parts both feet as needed.    apixaban (ELIQUIS) 5 mg (74 tabs) DsPk For the first 7 days take two 5 mg tablets twice daily.  After 7 days take one 5 mg tablet twice daily.    aspirin (ECOTRIN) 81 MG EC tablet Take 81 mg by mouth. 1 Tablet, Delayed Release (E.C.) Oral Every morning    atorvastatin (LIPITOR) 80 MG tablet Take 1 tablet (80 mg total) by mouth once daily.    BD ALCOHOL SWABS PadM     bicalutamide (CASODEX) 50 MG Tab TAKE 1 TABLET EVERY DAY    blood-glucose meter kit Use as instructed    carvedilol (COREG) 6.25 MG tablet Take 1 tablet (6.25 mg total) by mouth 2 (two) times daily with meals.     diclofenac sodium (VOLTAREN) 1 % Gel Apply 2 g topically 4 (four) times daily.    fish oil-omega-3 fatty acids 300-1,000 mg capsule Take 1 capsule by mouth once daily.    furosemide (LASIX) 40 MG tablet TAKE 1 TABLET (40 MG TOTAL) BY MOUTH 2 (TWO) TIMES DAILY.    garlic 1,000 mg Cap Take 2 tablets by mouth once daily.    glipiZIDE (GLUCOTROL) 2.5 MG TR24 Take 1 tablet (2.5 mg total) by mouth once daily.    hydrALAZINE (APRESOLINE) 50 MG tablet Take 1 tablet (50 mg total) by mouth every 12 (twelve) hours.    lancets (LANCETS,ULTRA THIN) Misc Use daily    latanoprost 0.005 % ophthalmic solution Place 1 drop into both eyes every evening.    multivitamin capsule Take 1 capsule by mouth once daily.    nitroGLYCERIN (NITROSTAT) 0.4 MG SL tablet Place 1 tablet (0.4 mg total) under the tongue every 5 (five) minutes as needed for Chest pain.    TRUE METRIX GLUCOSE TEST STRIP Strp TEST EVERY DAY     Family History     Problem Relation (Age of Onset)    Breast cancer Sister    Cancer Father    Diabetes Sister, Brother    Hypertension Sister    No Known Problems Mother, Son, Brother, Maternal Aunt, Maternal Uncle, Paternal Aunt, Paternal Uncle, Maternal Grandmother, Maternal Grandfather, Paternal Grandmother, Paternal Grandfather, Daughter        Tobacco Use    Smoking status: Never Smoker    Smokeless tobacco: Former User   Substance and Sexual Activity    Alcohol use: No    Drug use: No    Sexual activity: Never     Review of Systems   Constitution: Negative for fever.   HENT: Negative for congestion.    Eyes: Negative for blurred vision.   Cardiovascular: Positive for chest pain and leg swelling. Negative for orthopnea and palpitations.   Respiratory: Positive for shortness of breath. Negative for cough.    Musculoskeletal: Negative for back pain.   Gastrointestinal: Positive for diarrhea and melena. Negative for abdominal pain.   Genitourinary: Negative for dysuria and hematuria.   Neurological: Negative  for headaches.   Psychiatric/Behavioral: Negative for altered mental status.     Objective:     Vital Signs (Most Recent):  Temp: 100.2 °F (37.9 °C) (09/26/19 1200)  Pulse: 95 (09/26/19 1200)  Resp: (!) 46 (09/26/19 1200)  BP: 132/62 (09/26/19 1200)  SpO2: 95 % (09/26/19 1200) Vital Signs (24h Range):  Temp:  [97.1 °F (36.2 °C)-100.2 °F (37.9 °C)] 100.2 °F (37.9 °C)  Pulse:  [67-97] 95  Resp:  [15-46] 46  SpO2:  [87 %-100 %] 95 %  BP: (102-145)/(56-76) 132/62     Weight: 82 kg (180 lb 12.4 oz)  Body mass index is 27.49 kg/m².    SpO2: 95 %  O2 Device (Oxygen Therapy): nasal cannula      Intake/Output Summary (Last 24 hours) at 9/26/2019 1213  Last data filed at 9/26/2019 0600  Gross per 24 hour   Intake 600 ml   Output 500 ml   Net 100 ml       Lines/Drains/Airways     Drain            Male External Urinary Catheter 09/26/19 1100 Medium less than 1 day          Peripheral Intravenous Line                 Peripheral IV - Single Lumen 09/25/19 0615 22 G Anterior;Left Forearm 1 day         Peripheral IV - Single Lumen 09/26/19 1045 18 G Right Antecubital less than 1 day                Physical Exam   Constitutional: He is oriented to person, place, and time. He appears well-developed.   HENT:   Head: Normocephalic and atraumatic.   Neck: Normal range of motion.   Cardiovascular: Normal rate and regular rhythm.   Murmur (grade 2 aortic stenosis murmur noted over LUSB) heard.  Pulmonary/Chest: Effort normal. No respiratory distress.   Bibasilar crackles    Abdominal: Soft. Bowel sounds are normal. He exhibits no distension. There is no tenderness. There is no rebound and no guarding.   Musculoskeletal:   2+ pitting edema to feet   Neurological: He is alert and oriented to person, place, and time.   Skin: No rash noted.       Significant Labs:   BMP:   Recent Labs   Lab 09/25/19  0652 09/26/19  0316    160*    140   K 3.4* 3.3*    107   CO2 22* 19*   BUN 55* 58*   CREATININE 2.0* 2.2*   CALCIUM 8.5*  8.5*   MG 2.3 2.2   , CMP   Recent Labs   Lab 09/25/19  0652 09/26/19  0316    140   K 3.4* 3.3*    107   CO2 22* 19*    160*   BUN 55* 58*   CREATININE 2.0* 2.2*   CALCIUM 8.5* 8.5*   PROT  --  6.6   ALBUMIN  --  3.2*   BILITOT  --  0.5   ALKPHOS  --  99   AST  --  50*   ALT  --  17   ANIONGAP 11 14   ESTGFRAFRICA 32.8* 29.2*   EGFRNONAA 28.3* 25.3*    and Troponin   Recent Labs   Lab 09/25/19  2338 09/26/19  0754   TROPONINI 5.440* 24.136*

## 2019-09-26 NOTE — PROGRESS NOTES
"[facility] Hospital Medicine  Progress Note    Patient Name: Jeniffer Taylor  MRN: [id number]  Patient Class: IP- Inpatient   Admission Date: 9/24/2019  Length of Stay: 1 days  Attending Physician: Sean Reynoso MD  Primary Care Provider: Shari Chauhan MD    [facility] Medicine Team: [facility] Merari Grant PA-C    Subjective:     Principal Problem:Melena        HPI:  89 year old male with a PMHx of HTN, HLD, CAD, HFrEF, AS, CKD3, and DM2 presenting to the ER from PCP clinic with son and grandson at bedside for "low hemoglobin." Patient reports noticing dark stools at home, but denies BRBPR, bloody BMs, hematuria, and hemoptysis. He denies associated SOB and PRATT. Patient also reports taking iron supplementation at home. He reports chronic fatigue, currently at his baseline. At the time of my exam, patient denies CP, SOB, abdominal pain, N/V/D, fever/chills, recent illness, dysuria/hematuria/frequency, focal weakness, syncope, and palpitations.       HDS on admission. Labs significant for H/H 8.8/26.0, chronic CKD, and . T&S ordered.    Overview/Hospital Course:  Patient admitted for GIB. He was sent from PCP for low hgb 8.8>>7.8. Vitals remaining stable. Started protonix IV. Consulted GI who recommend EGD. However, patient at first adamant that he did not want scope because he was "tired of being poked and prodded". He was wanting to leave AMA, but subsequently decided to stay and pursue scope. Notified GI.     Patient began getting SOB and hypoxic 9/25 night, improved with lasix and breathing treatment. Troponin 5>>25. Consulted cardiology. Patient moved to ICU 9/26 for closer monitoring.     Interval History: Overnight pt became hypoxic and increasingly short of breath, this improved with lasix. Troponin of 5. This AM STAT trop drawn, 25. Repeat EKG. Notified cardiology. Pt seen at bedside with daughter present with ICU providers present as well. At time of my " exam pt is resting in bed in NAD. Code status addressed by ICU, pt agreeable to DNR. Consented pt for blood, given 1 unit with lasix. Transferred to ICU for closer monitoring in setting of NSTEMI and GIB.    Review of Systems   Constitutional: Positive for fatigue (chronic). Negative for fever.   Respiratory: Positive for chest tightness and shortness of breath. Negative for cough.    Cardiovascular: Positive for leg swelling (chronic). Negative for chest pain.   Gastrointestinal: Positive for diarrhea. Negative for blood in stool and nausea.   Genitourinary: Negative for flank pain and frequency.   Musculoskeletal: Positive for gait problem (wheelchair at baseline) and myalgias (chronic). Negative for back pain.   Skin: Positive for wound (chronic sarcal wound, skin breakdown to L foot).   Neurological: Positive for weakness (chronic). Negative for seizures and speech difficulty.   Psychiatric/Behavioral: Negative for confusion.     Objective:     Vital Signs (Most Recent):  Temp: 99.5 °F (37.5 °C) (09/26/19 1215)  Pulse: 101 (09/26/19 1300)  Resp: (!) 49 (09/26/19 1300)  BP: (!) 144/68 (09/26/19 1300)  SpO2: (!) 94 % (09/26/19 1300) Vital Signs (24h Range):  Temp:  [97.1 °F (36.2 °C)-100.2 °F (37.9 °C)] 99.5 °F (37.5 °C)  Pulse:  [] 101  Resp:  [15-49] 49  SpO2:  [87 %-100 %] 94 %  BP: (102-145)/(56-76) 144/68     Weight: 82 kg (180 lb 12.4 oz)  Body mass index is 27.49 kg/m².    Intake/Output Summary (Last 24 hours) at 9/26/2019 1614  Last data filed at 9/26/2019 0600  Gross per 24 hour   Intake 360 ml   Output 250 ml   Net 110 ml      Physical Exam   Constitutional: He is oriented to person, place, and time. No distress.   Frail elderly male in NAD   HENT:   Head: Normocephalic and atraumatic.   Eyes: EOM are normal. Right eye exhibits no discharge. Left eye exhibits no discharge.   Neck: Normal range of motion.   Cardiovascular: Normal rate and regular rhythm.   Murmur heard.  Pulmonary/Chest: Effort  normal. No respiratory distress.   Musculoskeletal: Normal range of motion. He exhibits edema.   Neurological: He is alert and oriented to person, place, and time.   Skin: Skin is warm and dry. He is not diaphoretic.   Psychiatric: He has a normal mood and affect. His behavior is normal.       Significant Labs: All pertinent labs within the past 24 hours have been reviewed.    Significant Imaging: I have reviewed all pertinent imaging results/findings within the past 24 hours.      Assessment/Plan:      * Melena  DVT of RLE  - HDS on admission  - H/H 8.8/26.0; Hgb ~8-9 this month, prior to this month Hgb 10-11; currently 7.8  - Of note, patient reports taking iron supplementation at home  - Dx with DVT of RLE during last admission and subsequently started on Eliquis  - Held Eliquis on admission given melena. Discussed risks/benefits with patient and family who voiced understanding to plan of care  - CBC q8  - CLD as precaution  - If drop in H/H or episodes of bleeding, will consult GI; Hgb 8.8>7.8; pt initially refused EGD per GI and wanted to leave AMA but after discussion with daughter, myself, staff and pt he now would like to pursue EGD. Notified GI.  - Supportive care    9/26: see NSTEMI below; pt transferred to ICU for higher level of care and monitoring in setting of GIB and NSTEMI and respiratory failure with hypoxia  - hgb 7.7 this AM, ordered 1 unit of blood with lasix     NSTEMI (non-ST elevated myocardial infarction)  Pt short of breath yesterday evening, had improved with duonebs, CXR ordered  Overnight pt got hypoxic and increasingly SOB. ABG unremarkable  Given IV Lasix with good response  Trop overnight 5, repeat this AM 25. Cardiology consulted for further management  Transferred to ICU for higher level of care      Hypertensive heart disease with heart failure  - BP stable  - Held coreg and hydralazine in setting of melena  - Continue home norvasc  - Continue to monitor, resume meds as  needed    Acute on chronic combined systolic and diastolic heart failure  Ischemic CMP  Aortic Stenosis  Acute hypoxic resp failure  - Recently admitted for CHF exacerbation 9/9-9/12  - Last echo 9/9/19 shows EF 45%, DD, severe L atrial enlargement, severe AS  - BLE edema on exam however pt reports this is chronic  - Continue home lasix BID  - Strict I/Os, daily weights  - pt SOB 9/25: see NSTEMI; BNP 3000s, responded to IVP lasix, scheduled IV lasix     Controlled type 2 diabetes mellitus with stage 3 chronic kidney disease, without long-term current use of insulin  - Renal function stable  - Last A1c 6 in 6/2019; ordered repeat  -  on admit  - Held glipizide on admit  - Low dose SSI  - Monitor BGs and adjust insulin PRN  - BMP daily    CAD (coronary artery disease)  HLD  - Denies CP/SOB on admission  - Will hold ASA in setting of melena  - Continue lipitor    Pressure injury of sacral region, stage 2  Chronic sacral wound, L foot skin breakdown  - Wound care consulted   - Currently as outpatient home health wound care    Prostate CA  - Continue Casodex  - Leuprolide q6 months   - Follows with urology as outpatient      VTE Risk Mitigation (From admission, onward)         Ordered     Place BERT hose  Until discontinued      09/24/19 1459     Reason for No Pharmacological VTE Prophylaxis  Once      09/24/19 1459     IP VTE HIGH RISK PATIENT  Once      09/24/19 1459     Place sequential compression device  Until discontinued      09/24/19 1355                  Discussed with staff. Discussed with cardiology. Staff discussed with ICU.    Merari Grant PA-C  Department of Hospital Medicine   [facility]

## 2019-09-26 NOTE — ASSESSMENT & PLAN NOTE
EF 45% with sever AS and mild to moderate MR on 9/9/19    Chest x-ray consistent with pulm edema   BNP elevated   LE edema and basilar crackles on exam     - continue lasix 40mg IV TID  - strict I&Os  - titrate oxygen as needed

## 2019-09-26 NOTE — PLAN OF CARE
Called to bedside secondary to SpO2 of 87% on 4L s/p Duoneb treatment.  Patient placed on non-rebreather.  Patient seen and examined.  Crackles on exam, BLE noted.  CXR from 22:39 showed bilateral infiltrates increased.  Lasix 40mg IV given with near immediate output of 250cc urine.  Condom cath in place.  Patient reports improvement in symptoms.  ABG noted below.  Will wean back to NC.  Strict I&Os.  Labs pending.  Will change scheduled Lasix to IV.  Follow CMP closely.    ABG  Recent Labs   Lab 09/26/19  0308   PH 7.415   PO2 110*   PCO2 31.6*   HCO3 20.3*   BE -4     Gisela Alfredo, [facility], PA-C  Hospital Medicine Department  Staff:  Dr. Simon

## 2019-09-26 NOTE — ASSESSMENT & PLAN NOTE
Coronary angiogram (4/7/14): Prox LAD PCI, 99% LCx (small vessel), 40% Ramus, 95% RCA (multiple lesions, heavily calcified).     On Asprin, coreg, atorvastatin at home

## 2019-09-27 PROBLEM — R74.01 TRANSAMINITIS: Status: ACTIVE | Noted: 2019-09-27

## 2019-09-27 LAB
ALBUMIN SERPL BCP-MCNC: 2.6 G/DL (ref 3.5–5.2)
ALLENS TEST: ABNORMAL
ALP SERPL-CCNC: 77 U/L (ref 55–135)
ALT SERPL W/O P-5'-P-CCNC: 2618 U/L (ref 10–44)
ANION GAP SERPL CALC-SCNC: 21 MMOL/L (ref 8–16)
ANION GAP SERPL CALC-SCNC: 21 MMOL/L (ref 8–16)
ANISOCYTOSIS BLD QL SMEAR: SLIGHT
ANISOCYTOSIS BLD QL SMEAR: SLIGHT
ASCENDING AORTA: 2.75 CM
AST SERPL-CCNC: 3608 U/L (ref 10–40)
AV INDEX (PROSTH): 0.22
AV MEAN GRADIENT: 20 MMHG
AV PEAK GRADIENT: 34 MMHG
AV VALVE AREA: 0.76 CM2
AV VELOCITY RATIO: 0.24
BASO STIPL BLD QL SMEAR: ABNORMAL
BASOPHILS # BLD AUTO: ABNORMAL K/UL (ref 0–0.2)
BASOPHILS NFR BLD: 0 % (ref 0–1.9)
BASOPHILS NFR BLD: 0 % (ref 0–1.9)
BILIRUB SERPL-MCNC: 1.5 MG/DL (ref 0.1–1)
BSA FOR ECHO PROCEDURE: 1.98 M2
BUN SERPL-MCNC: 71 MG/DL (ref 10–30)
BUN SERPL-MCNC: 77 MG/DL (ref 10–30)
BURR CELLS BLD QL SMEAR: ABNORMAL
BURR CELLS BLD QL SMEAR: ABNORMAL
CALCIUM SERPL-MCNC: 8 MG/DL (ref 8.7–10.5)
CALCIUM SERPL-MCNC: 8.2 MG/DL (ref 8.7–10.5)
CHLORIDE SERPL-SCNC: 106 MMOL/L (ref 95–110)
CHLORIDE SERPL-SCNC: 107 MMOL/L (ref 95–110)
CO2 SERPL-SCNC: 11 MMOL/L (ref 23–29)
CO2 SERPL-SCNC: 13 MMOL/L (ref 23–29)
CREAT SERPL-MCNC: 3.4 MG/DL (ref 0.5–1.4)
CREAT SERPL-MCNC: 4 MG/DL (ref 0.5–1.4)
CV ECHO LV RWT: 0.32 CM
DELSYS: ABNORMAL
DIFFERENTIAL METHOD: ABNORMAL
DIFFERENTIAL METHOD: ABNORMAL
DOHLE BOD BLD QL SMEAR: PRESENT
DOP CALC AO PEAK VEL: 2.9 M/S
DOP CALC AO VTI: 63.5 CM
DOP CALC LVOT AREA: 3.5 CM2
DOP CALC LVOT DIAMETER: 2.1 CM
DOP CALC LVOT PEAK VEL: 0.69 M/S
DOP CALC LVOT STROKE VOLUME: 48.47 CM3
DOP CALCLVOT PEAK VEL VTI: 14 CM
E COLI SXT1 STL QL IA: NEGATIVE
E COLI SXT2 STL QL IA: NEGATIVE
E WAVE DECELERATION TIME: 219.14 MSEC
E/A RATIO: 1.44
E/E' RATIO: 27.78 M/S
ECHO LV POSTERIOR WALL: 0.93 CM (ref 0.6–1.1)
EOSINOPHIL # BLD AUTO: ABNORMAL K/UL (ref 0–0.5)
EOSINOPHIL NFR BLD: 0 % (ref 0–8)
EOSINOPHIL NFR BLD: 0 % (ref 0–8)
ERYTHROCYTE [DISTWIDTH] IN BLOOD BY AUTOMATED COUNT: 15.3 % (ref 11.5–14.5)
ERYTHROCYTE [DISTWIDTH] IN BLOOD BY AUTOMATED COUNT: 15.6 % (ref 11.5–14.5)
EST. GFR  (AFRICAN AMERICAN): 14.2 ML/MIN/1.73 M^2
EST. GFR  (AFRICAN AMERICAN): 17.2 ML/MIN/1.73 M^2
EST. GFR  (NON AFRICAN AMERICAN): 12.3 ML/MIN/1.73 M^2
EST. GFR  (NON AFRICAN AMERICAN): 14.9 ML/MIN/1.73 M^2
FIO2: 21
FRACTIONAL SHORTENING: 22 % (ref 28–44)
GLUCOSE SERPL-MCNC: 115 MG/DL (ref 70–110)
GLUCOSE SERPL-MCNC: 84 MG/DL (ref 70–110)
HCO3 UR-SCNC: 12.8 MMOL/L (ref 24–28)
HCT VFR BLD AUTO: 23.4 % (ref 40–54)
HCT VFR BLD AUTO: 24.9 % (ref 40–54)
HGB BLD-MCNC: 8.4 G/DL (ref 14–18)
HGB BLD-MCNC: 8.7 G/DL (ref 14–18)
HYPOCHROMIA BLD QL SMEAR: ABNORMAL
HYPOCHROMIA BLD QL SMEAR: ABNORMAL
IMM GRANULOCYTES # BLD AUTO: ABNORMAL K/UL (ref 0–0.04)
IMM GRANULOCYTES # BLD AUTO: ABNORMAL K/UL (ref 0–0.04)
IMM GRANULOCYTES NFR BLD AUTO: ABNORMAL % (ref 0–0.5)
IMM GRANULOCYTES NFR BLD AUTO: ABNORMAL % (ref 0–0.5)
INTERVENTRICULAR SEPTUM: 0.91 CM (ref 0.6–1.1)
LA MAJOR: 6.38 CM
LA MINOR: 7.04 CM
LA WIDTH: 4.98 CM
LACTATE SERPL-SCNC: 7.5 MMOL/L (ref 0.5–2.2)
LEFT ATRIUM SIZE: 5.56 CM
LEFT ATRIUM VOLUME INDEX: 80.6 ML/M2
LEFT ATRIUM VOLUME: 157.54 CM3
LEFT INTERNAL DIMENSION IN SYSTOLE: 4.58 CM (ref 2.1–4)
LEFT VENTRICLE DIASTOLIC VOLUME INDEX: 86.77 ML/M2
LEFT VENTRICLE DIASTOLIC VOLUME: 169.58 ML
LEFT VENTRICLE MASS INDEX: 109 G/M2
LEFT VENTRICLE SYSTOLIC VOLUME INDEX: 49.3 ML/M2
LEFT VENTRICLE SYSTOLIC VOLUME: 96.35 ML
LEFT VENTRICULAR INTERNAL DIMENSION IN DIASTOLE: 5.85 CM (ref 3.5–6)
LEFT VENTRICULAR MASS: 212.39 G
LV LATERAL E/E' RATIO: 20.83 M/S
LV SEPTAL E/E' RATIO: 41.67 M/S
LYMPHOCYTES # BLD AUTO: ABNORMAL K/UL (ref 1–4.8)
LYMPHOCYTES NFR BLD: 5.5 % (ref 18–48)
LYMPHOCYTES NFR BLD: 7 % (ref 18–48)
MAGNESIUM SERPL-MCNC: 2.2 MG/DL (ref 1.6–2.6)
MAGNESIUM SERPL-MCNC: 2.4 MG/DL (ref 1.6–2.6)
MCH RBC QN AUTO: 28.1 PG (ref 27–31)
MCH RBC QN AUTO: 28.5 PG (ref 27–31)
MCHC RBC AUTO-ENTMCNC: 34.9 G/DL (ref 32–36)
MCHC RBC AUTO-ENTMCNC: 35.9 G/DL (ref 32–36)
MCV RBC AUTO: 78 FL (ref 82–98)
MCV RBC AUTO: 82 FL (ref 82–98)
METAMYELOCYTES NFR BLD MANUAL: 2.5 %
MODE: ABNORMAL
MONOCYTES # BLD AUTO: ABNORMAL K/UL (ref 0.3–1)
MONOCYTES NFR BLD: 3 % (ref 4–15)
MONOCYTES NFR BLD: 3.5 % (ref 4–15)
MV PEAK A VEL: 0.87 M/S
MV PEAK E VEL: 1.25 M/S
MYELOCYTES NFR BLD MANUAL: 0.5 %
NEUTROPHILS NFR BLD: 74.5 % (ref 38–73)
NEUTROPHILS NFR BLD: 77 % (ref 38–73)
NEUTS BAND NFR BLD MANUAL: 13 %
NEUTS BAND NFR BLD MANUAL: 13.5 %
NRBC BLD-RTO: 0 /100 WBC
NRBC BLD-RTO: 0 /100 WBC
PCO2 BLDA: 19.7 MMHG (ref 35–45)
PH SMN: 7.42 [PH] (ref 7.35–7.45)
PHOSPHATE SERPL-MCNC: 5.5 MG/DL (ref 2.7–4.5)
PHOSPHATE SERPL-MCNC: 6.4 MG/DL (ref 2.7–4.5)
PISA TR MAX VEL: 3.1 M/S
PLATELET # BLD AUTO: 54 K/UL (ref 150–350)
PLATELET # BLD AUTO: 94 K/UL (ref 150–350)
PLATELET BLD QL SMEAR: ABNORMAL
PLATELET BLD QL SMEAR: ABNORMAL
PMV BLD AUTO: 12.2 FL (ref 9.2–12.9)
PMV BLD AUTO: 12.5 FL (ref 9.2–12.9)
PO2 BLDA: 61 MMHG (ref 80–100)
POC BE: -12 MMOL/L
POC SATURATED O2: 92 % (ref 95–100)
POC TCO2: 13 MMOL/L (ref 23–27)
POCT GLUCOSE: 100 MG/DL (ref 70–110)
POCT GLUCOSE: 101 MG/DL (ref 70–110)
POCT GLUCOSE: 124 MG/DL (ref 70–110)
POCT GLUCOSE: 52 MG/DL (ref 70–110)
POCT GLUCOSE: 53 MG/DL (ref 70–110)
POCT GLUCOSE: 84 MG/DL (ref 70–110)
POCT GLUCOSE: 86 MG/DL (ref 70–110)
POCT GLUCOSE: 89 MG/DL (ref 70–110)
POCT GLUCOSE: 90 MG/DL (ref 70–110)
POCT GLUCOSE: 91 MG/DL (ref 70–110)
POIKILOCYTOSIS BLD QL SMEAR: SLIGHT
POIKILOCYTOSIS BLD QL SMEAR: SLIGHT
POLYCHROMASIA BLD QL SMEAR: ABNORMAL
POTASSIUM SERPL-SCNC: 4.3 MMOL/L (ref 3.5–5.1)
POTASSIUM SERPL-SCNC: 4.7 MMOL/L (ref 3.5–5.1)
PROT SERPL-MCNC: 5.7 G/DL (ref 6–8.4)
PROVIDER CREDENTIALS: ABNORMAL
PROVIDER NOTIFIED: ABNORMAL
PULM VEIN S/D RATIO: 0.56
PV PEAK D VEL: 0.7 M/S
PV PEAK S VEL: 0.39 M/S
RA MAJOR: 5.21 CM
RA PRESSURE: 15 MMHG
RA WIDTH: 5.13 CM
RBC # BLD AUTO: 2.99 M/UL (ref 4.6–6.2)
RBC # BLD AUTO: 3.05 M/UL (ref 4.6–6.2)
RIGHT VENTRICULAR END-DIASTOLIC DIMENSION: 4.45 CM
RV TISSUE DOPPLER FREE WALL SYSTOLIC VELOCITY 1 (APICAL 4 CHAMBER VIEW): 8.42 CM/S
SAMPLE: ABNORMAL
SINUS: 2.98 CM
SITE: ABNORMAL
SODIUM SERPL-SCNC: 138 MMOL/L (ref 136–145)
SODIUM SERPL-SCNC: 141 MMOL/L (ref 136–145)
SP02: 99
STJ: 2.24 CM
TDI LATERAL: 0.06 M/S
TDI SEPTAL: 0.03 M/S
TDI: 0.05 M/S
TIME NOTIFIED: 850
TR MAX PG: 38 MMHG
TRICUSPID ANNULAR PLANE SYSTOLIC EXCURSION: 1.91 CM
TV REST PULMONARY ARTERY PRESSURE: 53 MMHG
VANCOMYCIN SERPL-MCNC: 18.3 UG/ML
VERBAL RESULT READBACK PERFORMED: YES
WBC # BLD AUTO: 23.15 K/UL (ref 3.9–12.7)
WBC # BLD AUTO: 24.25 K/UL (ref 3.9–12.7)
WBC TOXIC VACUOLES BLD QL SMEAR: PRESENT

## 2019-09-27 PROCEDURE — 27000221 HC OXYGEN, UP TO 24 HOURS: Mod: HCNC

## 2019-09-27 PROCEDURE — 80053 COMPREHEN METABOLIC PANEL: CPT | Mod: HCNC

## 2019-09-27 PROCEDURE — 84100 ASSAY OF PHOSPHORUS: CPT | Mod: HCNC

## 2019-09-27 PROCEDURE — 83735 ASSAY OF MAGNESIUM: CPT | Mod: 91,HCNC

## 2019-09-27 PROCEDURE — 99291 CRITICAL CARE FIRST HOUR: CPT | Mod: HCNC,,, | Performed by: INTERNAL MEDICINE

## 2019-09-27 PROCEDURE — 94660 CPAP INITIATION&MGMT: CPT | Mod: HCNC

## 2019-09-27 PROCEDURE — 94761 N-INVAS EAR/PLS OXIMETRY MLT: CPT | Mod: HCNC

## 2019-09-27 PROCEDURE — 85007 BL SMEAR W/DIFF WBC COUNT: CPT | Mod: HCNC

## 2019-09-27 PROCEDURE — 84100 ASSAY OF PHOSPHORUS: CPT | Mod: 91,HCNC

## 2019-09-27 PROCEDURE — 63600175 PHARM REV CODE 636 W HCPCS: Mod: HCNC | Performed by: INTERNAL MEDICINE

## 2019-09-27 PROCEDURE — 80202 ASSAY OF VANCOMYCIN: CPT | Mod: HCNC

## 2019-09-27 PROCEDURE — 83735 ASSAY OF MAGNESIUM: CPT | Mod: HCNC

## 2019-09-27 PROCEDURE — 83605 ASSAY OF LACTIC ACID: CPT | Mod: HCNC

## 2019-09-27 PROCEDURE — 25000003 PHARM REV CODE 250: Mod: HCNC | Performed by: PHYSICIAN ASSISTANT

## 2019-09-27 PROCEDURE — 85027 COMPLETE CBC AUTOMATED: CPT | Mod: HCNC

## 2019-09-27 PROCEDURE — 80048 BASIC METABOLIC PNL TOTAL CA: CPT | Mod: HCNC

## 2019-09-27 PROCEDURE — C9113 INJ PANTOPRAZOLE SODIUM, VIA: HCPCS | Mod: HCNC | Performed by: PHYSICIAN ASSISTANT

## 2019-09-27 PROCEDURE — 20000000 HC ICU ROOM: Mod: HCNC

## 2019-09-27 PROCEDURE — 63600175 PHARM REV CODE 636 W HCPCS: Mod: HCNC | Performed by: STUDENT IN AN ORGANIZED HEALTH CARE EDUCATION/TRAINING PROGRAM

## 2019-09-27 PROCEDURE — 87040 BLOOD CULTURE FOR BACTERIA: CPT | Mod: HCNC

## 2019-09-27 PROCEDURE — 99291 PR CRITICAL CARE, E/M 30-74 MINUTES: ICD-10-PCS | Mod: HCNC,,, | Performed by: INTERNAL MEDICINE

## 2019-09-27 PROCEDURE — 99900035 HC TECH TIME PER 15 MIN (STAT): Mod: HCNC

## 2019-09-27 PROCEDURE — A4216 STERILE WATER/SALINE, 10 ML: HCPCS | Mod: HCNC | Performed by: PHYSICIAN ASSISTANT

## 2019-09-27 PROCEDURE — 82803 BLOOD GASES ANY COMBINATION: CPT | Mod: HCNC

## 2019-09-27 PROCEDURE — 63600175 PHARM REV CODE 636 W HCPCS: Mod: HCNC | Performed by: PHYSICIAN ASSISTANT

## 2019-09-27 PROCEDURE — 36600 WITHDRAWAL OF ARTERIAL BLOOD: CPT | Mod: HCNC

## 2019-09-27 RX ORDER — MEPERIDINE HYDROCHLORIDE 50 MG/ML
12.5 INJECTION INTRAMUSCULAR; INTRAVENOUS; SUBCUTANEOUS ONCE AS NEEDED
Status: CANCELLED | OUTPATIENT
Start: 2019-09-27 | End: 2019-09-28

## 2019-09-27 RX ORDER — FENTANYL CITRATE 50 UG/ML
25 INJECTION, SOLUTION INTRAMUSCULAR; INTRAVENOUS EVERY 5 MIN PRN
Status: CANCELLED | OUTPATIENT
Start: 2019-09-27

## 2019-09-27 RX ORDER — MORPHINE SULFATE/0.9% NACL/PF 1 MG/ML
0.5 PLASTIC BAG, INJECTION (ML) INTRAVENOUS CONTINUOUS
Status: DISCONTINUED | OUTPATIENT
Start: 2019-09-27 | End: 2019-09-27

## 2019-09-27 RX ORDER — MORPHINE SULFATE 2 MG/ML
2 INJECTION, SOLUTION INTRAMUSCULAR; INTRAVENOUS
Status: DISCONTINUED | OUTPATIENT
Start: 2019-09-27 | End: 2019-09-30

## 2019-09-27 RX ORDER — MORPHINE SULFATE 1 MG/ML
0.5 INJECTION, SOLUTION INTRAVENOUS CONTINUOUS
Status: DISCONTINUED | OUTPATIENT
Start: 2019-09-27 | End: 2019-09-30

## 2019-09-27 RX ORDER — ONDANSETRON 2 MG/ML
4 INJECTION INTRAMUSCULAR; INTRAVENOUS ONCE AS NEEDED
Status: CANCELLED | OUTPATIENT
Start: 2019-09-27 | End: 2031-02-22

## 2019-09-27 RX ORDER — FUROSEMIDE 10 MG/ML
120 INJECTION INTRAMUSCULAR; INTRAVENOUS 2 TIMES DAILY
Status: DISCONTINUED | OUTPATIENT
Start: 2019-09-27 | End: 2019-09-27

## 2019-09-27 RX ORDER — HYDROMORPHONE HYDROCHLORIDE 1 MG/ML
0.2 INJECTION, SOLUTION INTRAMUSCULAR; INTRAVENOUS; SUBCUTANEOUS EVERY 5 MIN PRN
Status: CANCELLED | OUTPATIENT
Start: 2019-09-27

## 2019-09-27 RX ORDER — DIPHENHYDRAMINE HYDROCHLORIDE 50 MG/ML
25 INJECTION INTRAMUSCULAR; INTRAVENOUS EVERY 6 HOURS PRN
Status: CANCELLED | OUTPATIENT
Start: 2019-09-27

## 2019-09-27 RX ADMIN — MORPHINE SULFATE 0.5 MG/HR: 1 INJECTION, SOLUTION INTRAVENOUS at 05:09

## 2019-09-27 RX ADMIN — Medication 3 ML: at 01:09

## 2019-09-27 RX ADMIN — LATANOPROST 1 DROP: 50 SOLUTION OPHTHALMIC at 08:09

## 2019-09-27 RX ADMIN — Medication 3 ML: at 10:09

## 2019-09-27 RX ADMIN — LATANOPROST 1 DROP: 50 SOLUTION OPHTHALMIC at 12:09

## 2019-09-27 RX ADMIN — PANTOPRAZOLE SODIUM 40 MG: 40 INJECTION, POWDER, FOR SOLUTION INTRAVENOUS at 08:09

## 2019-09-27 RX ADMIN — PIPERACILLIN AND TAZOBACTAM 4.5 G: 4; .5 INJECTION, POWDER, LYOPHILIZED, FOR SOLUTION INTRAVENOUS; PARENTERAL at 07:09

## 2019-09-27 RX ADMIN — DEXTROSE 125 ML: 10 SOLUTION INTRAVENOUS at 09:09

## 2019-09-27 RX ADMIN — Medication 300 MG: at 08:09

## 2019-09-27 RX ADMIN — VANCOMYCIN HYDROCHLORIDE 750 MG: 750 INJECTION, POWDER, LYOPHILIZED, FOR SOLUTION INTRAVENOUS at 10:09

## 2019-09-27 RX ADMIN — ATORVASTATIN CALCIUM 80 MG: 20 TABLET, FILM COATED ORAL at 08:09

## 2019-09-27 RX ADMIN — PIPERACILLIN AND TAZOBACTAM 4.5 G: 4; .5 INJECTION, POWDER, LYOPHILIZED, FOR SOLUTION INTRAVENOUS; PARENTERAL at 12:09

## 2019-09-27 RX ADMIN — PIPERACILLIN AND TAZOBACTAM 4.5 G: 4; .5 INJECTION, POWDER, LYOPHILIZED, FOR SOLUTION INTRAVENOUS; PARENTERAL at 08:09

## 2019-09-27 RX ADMIN — BICALUTAMIDE 50 MG: 50 TABLET, FILM COATED ORAL at 08:09

## 2019-09-27 RX ADMIN — MORPHINE SULFATE 2 MG: 2 INJECTION, SOLUTION INTRAMUSCULAR; INTRAVENOUS at 01:09

## 2019-09-27 RX ADMIN — Medication 0.5 MG/HR: at 01:09

## 2019-09-27 NOTE — ASSESSMENT & PLAN NOTE
Acute respiratory failure with hypoxia  Aortic stenosis  EF 45% with sever AS and mild to moderate MR on 9/9/19    Chest x-ray consistent with pulm edema   BNP elevated   LE edema and basilar crackles on exam   Not responding will to lasix   - strict I&Os  - titrate oxygen as needed  - comfort care, morphine for dyspnea

## 2019-09-27 NOTE — ASSESSMENT & PLAN NOTE
venous LE US noted partially occlusive DVT in the upper right femoral vein and patient was initiated on heparin gtt >>> transitioned to renal dose apixaban 09/11    - repeated U/S 9/27 showed no DVT

## 2019-09-27 NOTE — SUBJECTIVE & OBJECTIVE
Interval History:   Overnight patient had NSVT, no afib noted. BC now positive for GNR in 2 bottles, likely urosepsis given dirty UA. Now with multiorgan failure- BP dropped to 80-90s/50s with MAPs >65, elevated sCr, and respiratory distress. He was started on bipap last night and weaned down to NC. He denies any more chest pain but does appear SOB.     Review of Systems   Constitution: Negative for fever.   HENT: Negative for congestion.    Eyes: Negative for blurred vision.   Cardiovascular: Positive for leg swelling. Negative for chest pain, orthopnea and palpitations.   Respiratory: Positive for shortness of breath. Negative for cough.    Musculoskeletal: Negative for back pain.   Gastrointestinal: Positive for diarrhea and melena. Negative for abdominal pain.   Genitourinary: Negative for dysuria and hematuria.   Neurological: Negative for headaches.   Psychiatric/Behavioral: Negative for altered mental status.     Objective:     Vital Signs (Most Recent):  Temp: 97.5 °F (36.4 °C) (09/27/19 0300)  Pulse: 68 (09/27/19 1100)  Resp: (!) 37 (09/27/19 1100)  BP: (!) 89/57 (09/27/19 1100)  SpO2: 100 % (09/27/19 1100) Vital Signs (24h Range):  Temp:  [97.5 °F (36.4 °C)-100.2 °F (37.9 °C)] 97.5 °F (36.4 °C)  Pulse:  [] 68  Resp:  [24-49] 37  SpO2:  [55 %-100 %] 100 %  BP: ()/(47-68) 89/57     Weight: 81.6 kg (180 lb)  Body mass index is 27.37 kg/m².     SpO2: 100 %  O2 Device (Oxygen Therapy): BiPAP      Intake/Output Summary (Last 24 hours) at 9/27/2019 1128  Last data filed at 9/27/2019 1000  Gross per 24 hour   Intake 595 ml   Output 1230 ml   Net -635 ml       Lines/Drains/Airways     Drain                 Urethral Catheter 09/26/19 1800 Latex less than 1 day          Peripheral Intravenous Line                 Peripheral IV - Single Lumen 09/25/19 0615 22 G Anterior;Left Forearm 2 days         Peripheral IV - Single Lumen 09/26/19 1045 18 G Right Antecubital 1 day         Peripheral IV - Single Lumen  09/26/19 1200 20 G Posterior;Right Forearm less than 1 day                Physical Exam   Constitutional: He appears well-developed.   More difficult to arouse this morning    HENT:   Head: Normocephalic and atraumatic.   Neck: Normal range of motion.   Cardiovascular: Normal rate and regular rhythm.   Murmur (grade 2 aortic stenosis murmur noted over LUSB) heard.  Pulmonary/Chest: He is in respiratory distress (mild retractions on exam).   Bibasilar crackles    Abdominal: Soft. Bowel sounds are normal. He exhibits no distension. There is no tenderness. There is no rebound and no guarding.   Musculoskeletal:   2+ pitting edema to feet   Neurological:   A&Ox2   Skin: No rash noted.       Significant Labs:   CMP   Recent Labs   Lab 09/26/19  0316 09/27/19  0115 09/27/19  0957    141 138   K 3.3* 4.3 4.7    107 106   CO2 19* 13* 11*   * 84 115*   BUN 58* 71* 77*   CREATININE 2.2* 3.4* 4.0*   CALCIUM 8.5* 8.2* 8.0*   PROT 6.6  --  5.7*   ALBUMIN 3.2*  --  2.6*   BILITOT 0.5  --  1.5*   ALKPHOS 99  --  77   AST 50*  --  3,608*   ALT 17  --  2,618*   ANIONGAP 14 21* 21*   ESTGFRAFRICA 29.2* 17.2* 14.2*   EGFRNONAA 25.3* 14.9* 12.3*   , CBC   Recent Labs   Lab 09/26/19  1515 09/27/19  0115 09/27/19  0957   WBC 12.82* 24.25* 23.15*   HGB 8.3* 8.7* 8.4*   HCT 24.3* 24.9* 23.4*   * 94* 54*    and Troponin   Recent Labs   Lab 09/26/19  0754 09/26/19  1317 09/26/19  1700   TROPONINI 24.136* 25.463* 33.763*

## 2019-09-27 NOTE — PROGRESS NOTES
Critical care team notified of patient in intermittent afib with HR rising to 160-200 and Systolic BP dropping to 60s. No new orders at this time. Will continue to monitor.

## 2019-09-27 NOTE — ASSESSMENT & PLAN NOTE
89 year old male with a PMHx of HTN, HLD, CAD, HFrEF (EF 45%), AS, CKD3, and DM2 initially admitted to hospital medicine for LGIB in setting of AC. Cardiology consulted due to NSTEMI and acute hypoxic respiratory failure due to pulmonary edema. Troponin 5--> 24, BMP of 3K with pulmonary edema on CXR. EKG with non specific ST changes. 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 Now with urosepsis - dirty UA, GNR in BC, and multiorgan failure     - Given GIB do not recommend starting ACS protocol. No ASA or heparin   - continue atrovastatin 80mg qD  - recommend GI to evaluate and treat GI bleed once stable   - once bleed is stabilized will reconsider ischemic workup  - continue to trend troponin's until peak

## 2019-09-27 NOTE — NURSING
ZENA Roach ARNALFONSO notifed that second dose of lasix results negligible.  Lawrence catheter flushed using aseptic technique with 60ml sterile water, 75ml returned.  No new orders received.

## 2019-09-27 NOTE — PLAN OF CARE
Patient seen and examined.    Patient with severe sepsis with multiorgan dysfunction and severe anemia with concern for acute-subacute blood loss anemia likely from AVMs. NSTEMi is most likely type II, in the setting of known three vessel coronary disease, severe anemia and sepsis. Risks of bleeding with anticoagulation and anti-platelet agents outweigh the benefits. Also with HF exacerbation newly depressed EF, recommend continued diuresis to improve respiratory status, but avoid aggressive diuresis in the setting of severe aortic stenosis.     Agree with supportive care.     Nato Hughes DO  Cardiology Fellow PGY-6

## 2019-09-27 NOTE — PROGRESS NOTES
MD Reynoso at bedside, updated on patients increased work of breathing and increased inspiratory wheezing. New order to place patient on BiPAP. Will continue to monitor.

## 2019-09-27 NOTE — PLAN OF CARE
Patient in intermittent afib with HR in 160-200, critical care team notified. O2 saturations 100% on BiPAP. Afebrile. AAOX4, pulses palpable. Abdomen soft and nontender. 1 unit RBC given for low H&H. Minimal urine output throughout shift, 1BM. Team notified of all patient changes. POC reviewed with pt and daughter, no questions/comments at this time. Will continue to monitor.

## 2019-09-27 NOTE — NURSING
MARU Mensah notified of blood cultures (+) for Gm (-) rods, new orders for lactic acid and another set of blood cultures.  Also notified of no UOP results since lasix given.  Pt titrated from BiPAP 15/5 to 2L NC O2 (when pt keeps in on) with excellent SpO2.  Daughter at bedside updated on plan of care, understanding verbalized, questions addressed.

## 2019-09-27 NOTE — ASSESSMENT & PLAN NOTE
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 Protonix 80mg IV bolus x 1 then 8mg/hr gtt  2. Intravascular resuscitation/support with IVFs and pRBCs as needed.  3. Serial H/H's transfuse as needed.  4. Discontinue all ASA, NSAIDs and Heparin products  5. Please correct any coagulopathy with platelets and FFP to a goal of platelets >50K and INR <2.5  6. Maintain IV access with 2 large bore IVs  - GI held off on EGD given his unstable condition.

## 2019-09-27 NOTE — NURSING
Dr. Johnson from [facility] notifed of pt's BP, came to bedside for evaluation, new orders pending.

## 2019-09-27 NOTE — NURSING
Report received from RN. Pt transported to SICU [id number] with portable telemetryNasal Cannula. Pt connected to ICU monitor Wall O2. Critical care called and made aware of patient arrival.  Pt assessed, immediate needs met. Family brought to bedside, updated on the patient's current condition and PoC for remainder of shift. . All questions answered, emotional support provided.     Admit Skin Note: Sacral wounds assessed, heel wound assessed, see flowsheets.

## 2019-09-27 NOTE — HOSPITAL COURSE
"Patient admitted for GIB. He was sent from PCP for low hgb 8.8>>7.8. Vitals remaining stable. Started protonix IV. Consulted GI who recommend EGD. However, patient at first adamant that he did not want scope because he was "tired of being poked and prodded". He was wanting to leave AMA, but subsequently decided to stay and pursue scope. Notified GI.Patient began getting SOB and hypoxic 9/25 night, improved with lasix and breathing treatment. Troponin 5>>25. Consulted cardiology. Patient moved to ICU 9/26 for closer monitoring.9/27 patient didn't responeded appropriately to lasix, kidney function worsening, having increase work of breathing and metabolic acidosis, GI held off on EGD given his unstable condition, cardiology recommended no ACS protocol given his GI bleed, had goals of care discussion with patient daughter, her goal is to keep him comfort and continue current course of treatment with no escalation. 9/26 patient showing some improvement, RR improved and lactic acidosis resolved, had goals of care discussion with the family and they decided to continue current level of care with no escaluation9/28. 9/29 worsening BERNARDO with low urine output, patient will be stepped down to hospital medicine to continue current treatment with comfort measures.  "

## 2019-09-27 NOTE — ASSESSMENT & PLAN NOTE
Patient with Hx of  HTN, HLD, CAD, HFrEF (EF 45%), AS, CKD3, and DM2 initially admitted to hospital medicine for LGIB in setting of AC. Cardiology consulted due to NSTEMI and acute hypoxic respiratory failure due to pulmonary edema. Troponin 5--> 24> 25>>33, BMP of 3K with pulmonary edema on CXR. EKG with non specific ST changes. 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 No ASA or heparin   - continue atrovastatin 80mg qD  - d/c troponin per cards recs  GI held off on EGD given his unstable condition, cardiology recommended no ACS protocol given his GI bleed.    comfort care with no escalation

## 2019-09-27 NOTE — TREATMENT PLAN
Brief GI note    Patient with AFib with RVR overnight.  Hypoxic overnight requiring BiPAP.  Currently on nasal cannula.    Blood cultures growing gram-negative rods x2.    No ongoing signs of bleeding, and hemoglobin is stable.    Given lack of active signs of bleeding and patient's clinical status, will cancel the upper endoscopy today.    If the patient clinically improves, we will plan an upper endoscopy prior to discharge. Please contact GI when the patient is stable enough for an EGD for evaluation.    Please contact us with further questions or concerns

## 2019-09-27 NOTE — NURSING TRANSFER
Nursing Transfer Note      9/26/2019     Transfer To: [facility] [id number], from 624    Transfer via bed    Transfer with 3L NC to O2, cardiac monitoringk    Transported by RN    Medicines sent: Yes    Chart send with patient: Yes    Notified: daughter    Patient reassessed at: 9/26/19; 1050    Upon arrival to floor: cardiac monitor applied, patient oriented to room, call bell in reach and bed in lowest position

## 2019-09-27 NOTE — PROGRESS NOTES
"[facility]  Critical Care Medicine  Progress Note    Patient Name: Jeniffer Taylor  MRN: [id number]  Admission Date: 9/24/2019  Hospital Length of Stay: 2 days  Code Status: DNR  Attending Provider: Sean Reynoso MD  Primary Care Provider: Shari Chauhan MD   Principal Problem: Melena    Subjective:     HPI:  89 year old male with a PMHx of HTN, HLD, CAD, HFrEF, AS, CKD3,DVT on eliquis and DM2 presenting to the ER from PCP clinic with son and grandson at bedside for "low hemoglobin." Patient reports noticing dark stools at home, but denies BRBPR, bloody BMs, hematuria, and hemoptysis. He denies associated SOB and PRATT. Patient also reports taking iron supplementation at home. He reports chronic fatigue, currently at his baseline. At the time of my exam, patient denies CP, SOB, abdominal pain, N/V/D, fever/chills, recent illness, dysuria/hematuria/frequency, focal weakness, syncope, and palpitations.   Patient was admitted 09/09 due to CHF exacerbation and LE swelling. Venous LE US noted partially occlusive DVT in the upper right femoral vein and patient was initiated on heparin gtt >>> transitioned to renal dose apixaban 09/11.    MICU was consulted after a rapid response today, patient was found short of breath with difficulty breathing, saturating in the 80's and a non re breather was placed, ABG was unremarkable, patient weaned down to 3L, HGB dropped from 9 to 7.7 and trop elevated from 5 to 24, patient transferred to MICU for higher level of care.       Hospital/ICU Course:  Patient admitted for GIB. He was sent from PCP for low hgb 8.8>>7.8. Vitals remaining stable. Started protonix IV. Consulted GI who recommend EGD. However, patient at first adamant that he did not want scope because he was "tired of being poked and prodded". He was wanting to leave AMA, but subsequently decided to stay and pursue scope. Notified GI.Patient began getting SOB and hypoxic 9/25 night, improved with lasix " and breathing treatment. Troponin 5>>25. Consulted cardiology. Patient moved to ICU 9/26 for closer monitoring.9/27 patient didn't responeded appropriately to lasix, kidney function worsening, having increase work of breathing and metabolic acidosis, GI held off on EGD given his unstable condition, cardiology recommended no ACS protocol given his GI bleed, had goals of care discussion with patient daughter, her goal is to keep him comfort and continue current course of treatment with no escalation.           Interval History: multiorgan failure, will focus on comfort care per family request.    Review of Systems   Constitutional: Positive for fatigue (chronic). Negative for diaphoresis and fever.   HENT: Positive for voice change. Negative for sore throat and trouble swallowing.    Respiratory: Negative for cough, chest tightness and shortness of breath.    Cardiovascular: Positive for leg swelling (chronic). Negative for chest pain.   Gastrointestinal: Negative for abdominal pain, anal bleeding, blood in stool, diarrhea, nausea and vomiting.   Genitourinary: Negative for flank pain and frequency.   Musculoskeletal: Positive for gait problem (wheelchair at baseline) and myalgias (chronic). Negative for back pain and neck pain.   Skin: Positive for wound (chronic sarcal wound, skin breakdown to L foot).   Neurological: Positive for weakness (chronic). Negative for seizures and speech difficulty.   Psychiatric/Behavioral: Positive for decreased concentration. Negative for agitation, behavioral problems and confusion.     Objective:     Vital Signs (Most Recent):  Temp: 98.7 °F (37.1 °C) (09/27/19 0730)  Pulse: 66 (09/27/19 1245)  Resp: (!) 34 (09/27/19 1245)  BP: (!) 99/56 (09/27/19 1245)  SpO2: 100 % (09/27/19 1245) Vital Signs (24h Range):  Temp:  [97.5 °F (36.4 °C)-99.2 °F (37.3 °C)] 98.7 °F (37.1 °C)  Pulse:  [62-88] 66  Resp:  [24-41] 34  SpO2:  [55 %-100 %] 100 %  BP: ()/(47-63) 99/56   Weight: 81.6 kg  (180 lb)  Body mass index is 27.37 kg/m².      Intake/Output Summary (Last 24 hours) at 9/27/2019 1322  Last data filed at 9/27/2019 1200  Gross per 24 hour   Intake 595 ml   Output 1235 ml   Net -640 ml       Physical Exam   Constitutional: He is oriented to person, place, and time. No distress.   Frail elderly male in NAD   HENT:   Head: Normocephalic and atraumatic.   dysarthria    Eyes: EOM are normal. Right eye exhibits no discharge. Left eye exhibits no discharge.   Neck: Normal range of motion. Neck supple.   Cardiovascular: Normal rate, regular rhythm and intact distal pulses.   Murmur (systolic ) heard.  Pulmonary/Chest: Effort normal. No respiratory distress.   Abdominal: Soft. Bowel sounds are normal. He exhibits no distension. There is no tenderness.   Musculoskeletal: Normal range of motion. He exhibits edema (LE).   Neurological: He is alert and oriented to person, place, and time.   somnolent    Skin: Skin is warm and dry. He is not diaphoretic.   L foot wound dressed C/D/I   Psychiatric: He has a normal mood and affect. His behavior is normal.   Vitals reviewed.      Vents:  Oxygen Concentration (%): 30 (09/27/19 1236)  Lines/Drains/Airways     Drain                 Urethral Catheter 09/26/19 1800 Latex less than 1 day          Peripheral Intravenous Line                 Peripheral IV - Single Lumen 09/25/19 0615 22 G Anterior;Left Forearm 2 days         Peripheral IV - Single Lumen 09/26/19 1045 18 G Right Antecubital 1 day              Significant Labs:    CBC/Anemia Profile:  Recent Labs   Lab 09/26/19  0301  09/26/19  1515 09/27/19  0115 09/27/19  0957   WBC  --    < > 12.82* 24.25* 23.15*   HGB  --    < > 8.3* 8.7* 8.4*   HCT  --    < > 24.3* 24.9* 23.4*   PLT  --    < > 119* 94* 54*   MCV  --    < > 82 82 78*   RDW  --    < > 15.3* 15.3* 15.6*   OCCULTBLOOD Negative  --   --   --   --     < > = values in this interval not displayed.        Chemistries:  Recent Labs   Lab 09/26/19  0316  09/27/19  0115 09/27/19  0957    141 138   K 3.3* 4.3 4.7    107 106   CO2 19* 13* 11*   BUN 58* 71* 77*   CREATININE 2.2* 3.4* 4.0*   CALCIUM 8.5* 8.2* 8.0*   ALBUMIN 3.2*  --  2.6*   PROT 6.6  --  5.7*   BILITOT 0.5  --  1.5*   ALKPHOS 99  --  77   ALT 17  --  2,618*   AST 50*  --  3,608*   MG 2.2 2.2 2.4   PHOS 5.1* 5.5* 6.4*         Significant Imaging: I have reviewed all pertinent imaging results/findings within the past 24 hours.      ABG  Recent Labs   Lab 09/27/19  0849   PH 7.421   PO2 61*   PCO2 19.7*   HCO3 12.8*   BE -12     Assessment/Plan:     Cardiac/Vascular  Acute on chronic combined systolic and diastolic heart failure  Acute respiratory failure with hypoxia  Aortic stenosis  EF 45% with sever AS and mild to moderate MR on 9/9/19    Chest x-ray consistent with pulm edema   BNP elevated   LE edema and basilar crackles on exam   Not responding will to lasix   - strict I&Os  - titrate oxygen as needed  - comfort care, morphine for dyspnea     NSTEMI (non-ST elevated myocardial infarction)  Patient with Hx of  HTN, HLD, CAD, HFrEF (EF 45%), AS, CKD3, and DM2 initially admitted to hospital medicine for LGIB in setting of AC. Cardiology consulted due to NSTEMI and acute hypoxic respiratory failure due to pulmonary edema. Troponin 5--> 24> 25>>33, BMP of 3K with pulmonary edema on CXR. EKG with non specific ST changes. 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 No ASA or heparin   - continue atrovastatin 80mg qD  - d/c troponin per cards recs  GI held off on EGD given his unstable condition, cardiology recommended no ACS protocol given his GI bleed.    comfort care with no escalation     Hypertensive heart disease with heart failure  - BP stable  - Held coreg and hydralazine in setting of melena  - Continue home norvasc  - Continue to monitor, resume meds as needed    CAD (coronary artery disease)  Coronary angiogram (4/7/14): Prox LAD PCI,  99% LCx (small vessel), 40% Ramus, 95% RCA (multiple lesions, heavily calcified).     On Asprin, coreg, atorvastatin at home    Dyslipidemia  - continue atorvastatin 80mg qD    Hematology  Acute deep vein thrombosis (DVT) of right lower extremity  venous LE US noted partially occlusive DVT in the upper right femoral vein and patient was initiated on heparin gtt >>> transitioned to renal dose apixaban 09/11    - repeated U/S 9/27 showed no DVT        Oncology  Anemia  Chronic, iron deficiency.   Stable.   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 Protonix 80mg IV bolus x 1 then 8mg/hr gtt  2. Intravascular resuscitation/support with IVFs and pRBCs as needed.  3. Serial H/H's transfuse as needed.  4. Discontinue all ASA, NSAIDs and Heparin products  5. Please correct any coagulopathy with platelets and FFP to a goal of platelets >50K and INR <2.5  6. Maintain IV access with 2 large bore IVs  - GI held off on EGD given his unstable condition.    Other  Pressure injury of sacral region, stage 2  Chronic sacral wound, L foot skin breakdown  - Wound care consulted        Critical Care Daily Checklist:    A: Awake: RASS Goal/Actual Goal:    Actual: Salazar Agitation Sedation Scale (RASS): Alert and calm   B: Spontaneous Breathing Trial Performed?     C: SAT & SBT Coordinated?  na                      D: Delirium: CAM-ICU Overall CAM-ICU: Negative   E: Early Mobility Performed? No   F: Feeding Goal:     Status:     Current Diet Order   Procedures    Diet clear liquid      AS: Analgesia/Sedation Morphine    T: Thromboembolic Prophylaxis Gi bleed    H: HOB > 300 No   U: Stress Ulcer Prophylaxis (if needed) na   G: Glucose Control iss   B: Bowel Function Stool Occurrence: 1   I: Indwelling Catheter (Lines & Lawrence) Necessity peripheral    D: De-escalation of Antimicrobials/Pharmacotherapies rociphin    Plan for the day/ETD comfort care     Code Status:  Family/Goals of Care: DNR         Critical secondary to acute respiratory failure      Critical care was time spent personally by me on the following activities: development of treatment plan with patient or surrogate and bedside caregivers, discussions with consultants, evaluation of patient's response to treatment, examination of patient, ordering and performing treatments and interventions, ordering and review of laboratory studies, ordering and review of radiographic studies, pulse oximetry, re-evaluation of patient's condition. This critical care time did not overlap with that of any other provider or involve time for any procedures.     LONI Huynh  Critical Care Medicine  [facility]

## 2019-09-27 NOTE — SUBJECTIVE & OBJECTIVE
Interval History: multiorgan failure, will focus on comfort care per family request.    Review of Systems   Constitutional: Positive for fatigue (chronic). Negative for diaphoresis and fever.   HENT: Positive for voice change. Negative for sore throat and trouble swallowing.    Respiratory: Negative for cough, chest tightness and shortness of breath.    Cardiovascular: Positive for leg swelling (chronic). Negative for chest pain.   Gastrointestinal: Negative for abdominal pain, anal bleeding, blood in stool, diarrhea, nausea and vomiting.   Genitourinary: Negative for flank pain and frequency.   Musculoskeletal: Positive for gait problem (wheelchair at baseline) and myalgias (chronic). Negative for back pain and neck pain.   Skin: Positive for wound (chronic sarcal wound, skin breakdown to L foot).   Neurological: Positive for weakness (chronic). Negative for seizures and speech difficulty.   Psychiatric/Behavioral: Positive for decreased concentration. Negative for agitation, behavioral problems and confusion.     Objective:     Vital Signs (Most Recent):  Temp: 98.7 °F (37.1 °C) (09/27/19 0730)  Pulse: 66 (09/27/19 1245)  Resp: (!) 34 (09/27/19 1245)  BP: (!) 99/56 (09/27/19 1245)  SpO2: 100 % (09/27/19 1245) Vital Signs (24h Range):  Temp:  [97.5 °F (36.4 °C)-99.2 °F (37.3 °C)] 98.7 °F (37.1 °C)  Pulse:  [62-88] 66  Resp:  [24-41] 34  SpO2:  [55 %-100 %] 100 %  BP: ()/(47-63) 99/56   Weight: 81.6 kg (180 lb)  Body mass index is 27.37 kg/m².      Intake/Output Summary (Last 24 hours) at 9/27/2019 1322  Last data filed at 9/27/2019 1200  Gross per 24 hour   Intake 595 ml   Output 1235 ml   Net -640 ml       Physical Exam   Constitutional: He is oriented to person, place, and time. No distress.   Frail elderly male in NAD   HENT:   Head: Normocephalic and atraumatic.   dysarthria    Eyes: EOM are normal. Right eye exhibits no discharge. Left eye exhibits no discharge.   Neck: Normal range of motion. Neck  supple.   Cardiovascular: Normal rate, regular rhythm and intact distal pulses.   Murmur (systolic ) heard.  Pulmonary/Chest: Effort normal. No respiratory distress.   Abdominal: Soft. Bowel sounds are normal. He exhibits no distension. There is no tenderness.   Musculoskeletal: Normal range of motion. He exhibits edema (LE).   Neurological: He is alert and oriented to person, place, and time.   somnolent    Skin: Skin is warm and dry. He is not diaphoretic.   L foot wound dressed C/D/I   Psychiatric: He has a normal mood and affect. His behavior is normal.   Vitals reviewed.      Vents:  Oxygen Concentration (%): 30 (09/27/19 1236)  Lines/Drains/Airways     Drain                 Urethral Catheter 09/26/19 1800 Latex less than 1 day          Peripheral Intravenous Line                 Peripheral IV - Single Lumen 09/25/19 0615 22 G Anterior;Left Forearm 2 days         Peripheral IV - Single Lumen 09/26/19 1045 18 G Right Antecubital 1 day              Significant Labs:    CBC/Anemia Profile:  Recent Labs   Lab 09/26/19  0301  09/26/19  1515 09/27/19  0115 09/27/19  0957   WBC  --    < > 12.82* 24.25* 23.15*   HGB  --    < > 8.3* 8.7* 8.4*   HCT  --    < > 24.3* 24.9* 23.4*   PLT  --    < > 119* 94* 54*   MCV  --    < > 82 82 78*   RDW  --    < > 15.3* 15.3* 15.6*   OCCULTBLOOD Negative  --   --   --   --     < > = values in this interval not displayed.        Chemistries:  Recent Labs   Lab 09/26/19  0316 09/27/19  0115 09/27/19  0957    141 138   K 3.3* 4.3 4.7    107 106   CO2 19* 13* 11*   BUN 58* 71* 77*   CREATININE 2.2* 3.4* 4.0*   CALCIUM 8.5* 8.2* 8.0*   ALBUMIN 3.2*  --  2.6*   PROT 6.6  --  5.7*   BILITOT 0.5  --  1.5*   ALKPHOS 99  --  77   ALT 17  --  2,618*   AST 50*  --  3,608*   MG 2.2 2.2 2.4   PHOS 5.1* 5.5* 6.4*         Significant Imaging: I have reviewed all pertinent imaging results/findings within the past 24 hours.

## 2019-09-27 NOTE — ASSESSMENT & PLAN NOTE
Patient now requiring bipap due to CHF with pulmonary edema. On exam, he is volume overloaded    - continue diuresis with lasix 100 - 120mg IV BID to improve respiratory function; however, do not over diuresis in setting of sepsis and severe aortic stenosis.   - strict I&Os

## 2019-09-27 NOTE — PROGRESS NOTES
Pharmacokinetic Assessment Follow Up: IV Vancomycin    Vancomycin serum concentration assessment(s):    Vancomycin random level resulted this morning at 18.3 mcg/mL approximately 11.5 hours after the previous dose. BERNARDO continues to worsen.     Drug levels (last 3 results):  Recent Labs   Lab Result Units 09/27/19  0115   Vancomycin, Random ug/mL 18.3     Vancomycin Regimen Plan:    Administer vancomycin  mg once and redose when the random level is less than 20 mcg/mL., Next level to be drawn with morning labs on 9/28.    Pharmacy will continue to follow and monitor vancomycin.    Please contact pharmacy at extension [phone] for questions regarding this assessment.    Thank you for the consult,   Sandy Rivera, PharmD, BCCCP             Patient brief summary:  Jeniffer Taylor is a 89 y.o. male initiated on antimicrobial therapy with IV vancomycin for treatment of sepsis    Drug Allergies:   Review of patient's allergies indicates:  No Known Allergies    Actual Body Weight:   81.6 kg    Renal Function:   Estimated Creatinine Clearance: 11.6 mL/min (A) (based on SCr of 4 mg/dL (H)).    Dialysis Method (if applicable):  N/A    CBC (last 72 hours):  Recent Labs   Lab Result Units 09/24/19  1205 09/24/19  1737 09/24/19  2328 09/25/19  0652 09/25/19  1154 09/25/19  2338 09/26/19  0316 09/26/19  0754 09/26/19  1318 09/26/19  1515 09/27/19  0115 09/27/19  0957   WBC K/uL 9.72 9.51 9.07 8.92 9.29 10.57 13.20* 19.82* 4.93 12.82* 24.25* 23.15*   Hemoglobin g/dL 8.8* 8.3* 7.9* 7.8* 7.8* 8.2* 9.0* 7.7* 9.1* 8.3* 8.7* 8.4*   Hemoglobin A1C %  --   --   --  6.1*  --   --   --   --   --   --   --   --    Hematocrit % 26.0* 23.3* 21.7* 22.8* 22.6* 22.7* 26.8* 21.5* 26.5* 24.3* 24.9* 23.4*   Platelets K/uL 202 195 171 178 182 181 177 157 154 119* 94* 54*   Gran% % 78.8* 75.0* 77.9* 78.9* 78.2* 91.0* 95.7* 91.8* 91.7* 96.8* 74.5* 77.0*   Lymph% % 9.6* 10.4* 8.7* 10.2* 10.3* 4.8* 2.1* 2.2* 7.9* 2.0* 5.5* 7.0*   Mono% % 8.6 10.6  9.8 8.2 8.8 3.6* 1.6* 4.6 0.4* 0.5* 3.5* 3.0*   Eosinophil% % 2.2 3.4 3.0 2.1 2.2 0.0 0.0 0.0 0.0 0.0 0.0 0.0   Basophil% % 0.4 0.3 0.2 0.3 0.2 0.2 0.2 0.1 0.0 0.1 0.0 0.0   Differential Method  Automated Automated Automated Automated Automated Automated Automated Automated Automated Automated Manual Manual       Metabolic Panel (last 72 hours):  Recent Labs   Lab Result Units 09/24/19  1205 09/25/19  0652 09/26/19  0316 09/26/19  1941 09/27/19  0115 09/27/19  0957   Sodium mmol/L 142 143 140  --  141 138   Potassium mmol/L 3.8 3.4* 3.3*  --  4.3 4.7   Chloride mmol/L 107 110 107  --  107 106   CO2 mmol/L 21* 22* 19*  --  13* 11*   Glucose mg/dL 127* 103 160*  --  84 115*   Glucose, UA   --   --   --  Negative  --   --    BUN, Bld mg/dL 56* 55* 58*  --  71* 77*   Creatinine mg/dL 2.2* 2.0* 2.2*  --  3.4* 4.0*   Albumin g/dL 3.6  --  3.2*  --   --  2.6*   Total Bilirubin mg/dL 0.6  --  0.5  --   --  1.5*   Alkaline Phosphatase U/L 95  --  99  --   --  77   AST U/L 29  --  50*  --   --  3,608*   ALT U/L 14  --  17  --   --  2,618*   Magnesium mg/dL  --  2.3 2.2  --  2.2 2.4   Phosphorus mg/dL  --  4.5 5.1*  --  5.5* 6.4*       Vancomycin Administrations:  vancomycin given in the last 96 hours                   vancomycin 750 mg in dextrose 5 % 250 mL IVPB (ready to mix system) (mg) 750 mg New Bag 09/27/19 1053    vancomycin 1.5 g in dextrose 5 % 250 mL IVPB (ready to mix) (mg) 1,500 mg New Bag 09/26/19 1346                Microbiologic Results:  Microbiology Results (last 7 days)     Procedure Component Value Units Date/Time    Blood culture [[id number]] Collected:  09/26/19 1312    Order Status:  Completed Specimen:  Blood from Peripheral, Hand, Right Updated:  09/27/19 0422     Blood Culture, Routine Gram stain aer bottle: Gram negative rods       Positive results previously called 09/27/2019  04:21    Blood culture [[id number]] Collected:  09/27/19 0348    Order Status:  Sent Specimen:  Blood from Peripheral,  Forearm, Right Updated:  09/27/19 0359    Blood culture [[id number]] Collected:  09/27/19 0330    Order Status:  Sent Specimen:  Blood from Peripheral, Wrist, Left Updated:  09/27/19 0359    Blood culture [[id number]] Collected:  09/26/19 1313    Order Status:  Completed Specimen:  Blood from Peripheral, Forearm, Right Updated:  09/27/19 0307     Blood Culture, Routine Gram stain aer bottle: Gram negative rods       Results called to and read back by:Nelli Whiting RN 09/27/2019  03:07    Urine culture [[id number]] Collected:  09/26/19 1941    Order Status:  No result Specimen:  Urine Updated:  09/26/19 2042    Clostridium difficile EIA [[id number]] Collected:  09/26/19 1219    Order Status:  Sent Specimen:  Stool Updated:  09/26/19 1219    Culture, Respiratory with Gram Stain [[id number]] Collected:  09/26/19 1120    Order Status:  Sent Specimen:  Respiratory from Sputum Updated:  09/26/19 1140    Stool culture [[id number]] Collected:  09/26/19 0304    Order Status:  Sent Specimen:  Stool Updated:  09/26/19 0419    E. coli 0157 antigen [[id number]] Collected:  09/26/19 0304    Order Status:  No result Specimen:  Stool Updated:  09/26/19 0410

## 2019-09-27 NOTE — PROGRESS NOTES
[facility]  Cardiology  Progress Note    Patient Name: Jeniffer Taylor  MRN: [id number]  Admission Date: 9/24/2019  Hospital Length of Stay: 2 days  Code Status: DNR   Attending Physician: Sean Reynoso MD   Primary Care Physician: Shari Chauhan MD  Expected Discharge Date: 9/29/2019  Principal Problem:Melena    Subjective:     Hospital Course:   No notes on file    Interval History:   Overnight patient had NSVT, no afib noted. BC now positive for GNR in 2 bottles, likely urosepsis given dirty UA. Now with multiorgan failure- BP dropped to 80-90s/50s with MAPs >65, elevated sCr, and respiratory distress. He was started on bipap last night and weaned down to NC. He denies any more chest pain but does appear SOB.     Review of Systems   Constitution: Negative for fever.   HENT: Negative for congestion.    Eyes: Negative for blurred vision.   Cardiovascular: Positive for leg swelling. Negative for chest pain, orthopnea and palpitations.   Respiratory: Positive for shortness of breath. Negative for cough.    Musculoskeletal: Negative for back pain.   Gastrointestinal: Positive for diarrhea and melena. Negative for abdominal pain.   Genitourinary: Negative for dysuria and hematuria.   Neurological: Negative for headaches.   Psychiatric/Behavioral: Negative for altered mental status.     Objective:     Vital Signs (Most Recent):  Temp: 97.5 °F (36.4 °C) (09/27/19 0300)  Pulse: 68 (09/27/19 1100)  Resp: (!) 37 (09/27/19 1100)  BP: (!) 89/57 (09/27/19 1100)  SpO2: 100 % (09/27/19 1100) Vital Signs (24h Range):  Temp:  [97.5 °F (36.4 °C)-100.2 °F (37.9 °C)] 97.5 °F (36.4 °C)  Pulse:  [] 68  Resp:  [24-49] 37  SpO2:  [55 %-100 %] 100 %  BP: ()/(47-68) 89/57     Weight: 81.6 kg (180 lb)  Body mass index is 27.37 kg/m².     SpO2: 100 %  O2 Device (Oxygen Therapy): BiPAP      Intake/Output Summary (Last 24 hours) at 9/27/2019 1128  Last data filed at 9/27/2019 1000  Gross per 24 hour    Intake 595 ml   Output 1230 ml   Net -635 ml       Lines/Drains/Airways     Drain                 Urethral Catheter 09/26/19 1800 Latex less than 1 day          Peripheral Intravenous Line                 Peripheral IV - Single Lumen 09/25/19 0615 22 G Anterior;Left Forearm 2 days         Peripheral IV - Single Lumen 09/26/19 1045 18 G Right Antecubital 1 day         Peripheral IV - Single Lumen 09/26/19 1200 20 G Posterior;Right Forearm less than 1 day                Physical Exam   Constitutional: He appears well-developed.   More difficult to arouse this morning    HENT:   Head: Normocephalic and atraumatic.   Neck: Normal range of motion.   Cardiovascular: Normal rate and regular rhythm.   Murmur (grade 2 aortic stenosis murmur noted over LUSB) heard.  Pulmonary/Chest: He is in respiratory distress (mild retractions on exam).   Bibasilar crackles    Abdominal: Soft. Bowel sounds are normal. He exhibits no distension. There is no tenderness. There is no rebound and no guarding.   Musculoskeletal:   2+ pitting edema to feet   Neurological:   A&Ox2   Skin: No rash noted.       Significant Labs:   CMP   Recent Labs   Lab 09/26/19  0316 09/27/19  0115 09/27/19  0957    141 138   K 3.3* 4.3 4.7    107 106   CO2 19* 13* 11*   * 84 115*   BUN 58* 71* 77*   CREATININE 2.2* 3.4* 4.0*   CALCIUM 8.5* 8.2* 8.0*   PROT 6.6  --  5.7*   ALBUMIN 3.2*  --  2.6*   BILITOT 0.5  --  1.5*   ALKPHOS 99  --  77   AST 50*  --  3,608*   ALT 17  --  2,618*   ANIONGAP 14 21* 21*   ESTGFRAFRICA 29.2* 17.2* 14.2*   EGFRNONAA 25.3* 14.9* 12.3*   , CBC   Recent Labs   Lab 09/26/19  1515 09/27/19  0115 09/27/19  0957   WBC 12.82* 24.25* 23.15*   HGB 8.3* 8.7* 8.4*   HCT 24.3* 24.9* 23.4*   * 94* 54*    and Troponin   Recent Labs   Lab 09/26/19  0754 09/26/19  1317 09/26/19  1700   TROPONINI 24.136* 25.463* 33.763*         Assessment and Plan:       NSTEMI (non-ST elevated myocardial infarction)  89 year old male with  a PMHx of HTN, HLD, CAD, HFrEF (EF 45%), AS, CKD3, and DM2 initially admitted to hospital medicine for LGIB in setting of AC. Cardiology consulted due to NSTEMI and acute hypoxic respiratory failure due to pulmonary edema. Troponin 5--> 24, BMP of 3K with pulmonary edema on CXR. EKG with non specific ST changes. 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 Now with urosepsis - dirty UA, GNR in BC, and multiorgan failure     - Given GIB do not recommend starting ACS protocol. No ASA or heparin   - continue atrovastatin 80mg qD  - recommend GI to evaluate and treat GI bleed once stable   - once bleed is stabilized will reconsider ischemic workup  - continue to trend troponin's until peak    Acute on chronic diastolic CHF (congestive heart failure)  Patient now requiring bipap due to CHF with pulmonary edema. On exam, he is volume overloaded    - continue diuresis with lasix 100 - 120mg IV BID to improve respiratory function; however, do not over diuresis in setting of sepsis and severe aortic stenosis.   - strict I&Os    Dyslipidemia  - continue atorvastatin 80mg qD    Will sign off at this time.     VTE Risk Mitigation (From admission, onward)         Ordered     Place BERT hose  Until discontinued      09/24/19 1459     Reason for No Pharmacological VTE Prophylaxis  Once      09/24/19 1459     IP VTE HIGH RISK PATIENT  Once      09/24/19 1459     Place sequential compression device  Until discontinued      09/24/19 7811                Pepe , MD  Cardiology  [facility]

## 2019-09-28 PROBLEM — N39.0 UTI (URINARY TRACT INFECTION): Status: ACTIVE | Noted: 2019-09-28

## 2019-09-28 PROBLEM — R78.81 BACTEREMIA: Status: ACTIVE | Noted: 2019-09-28

## 2019-09-28 LAB
ALBUMIN SERPL BCP-MCNC: 2.6 G/DL (ref 3.5–5.2)
ALP SERPL-CCNC: 99 U/L (ref 55–135)
ALT SERPL W/O P-5'-P-CCNC: 3788 U/L (ref 10–44)
ANION GAP SERPL CALC-SCNC: 17 MMOL/L (ref 8–16)
ANISOCYTOSIS BLD QL SMEAR: SLIGHT
AST SERPL-CCNC: 5146 U/L (ref 10–40)
BASOPHILS # BLD AUTO: ABNORMAL K/UL (ref 0–0.2)
BASOPHILS NFR BLD: 0 % (ref 0–1.9)
BILIRUB SERPL-MCNC: 1.5 MG/DL (ref 0.1–1)
BUN SERPL-MCNC: 91 MG/DL (ref 10–30)
BURR CELLS BLD QL SMEAR: ABNORMAL
CALCIUM SERPL-MCNC: 7.9 MG/DL (ref 8.7–10.5)
CHLORIDE SERPL-SCNC: 108 MMOL/L (ref 95–110)
CO2 SERPL-SCNC: 17 MMOL/L (ref 23–29)
CREAT SERPL-MCNC: 4.9 MG/DL (ref 0.5–1.4)
DIFFERENTIAL METHOD: ABNORMAL
EOSINOPHIL # BLD AUTO: ABNORMAL K/UL (ref 0–0.5)
EOSINOPHIL NFR BLD: 0 % (ref 0–8)
ERYTHROCYTE [DISTWIDTH] IN BLOOD BY AUTOMATED COUNT: 15.4 % (ref 11.5–14.5)
EST. GFR  (AFRICAN AMERICAN): 11.1 ML/MIN/1.73 M^2
EST. GFR  (NON AFRICAN AMERICAN): 9.6 ML/MIN/1.73 M^2
GLUCOSE SERPL-MCNC: 86 MG/DL (ref 70–110)
HCT VFR BLD AUTO: 22.6 % (ref 40–54)
HGB BLD-MCNC: 7.9 G/DL (ref 14–18)
HYPOCHROMIA BLD QL SMEAR: ABNORMAL
IMM GRANULOCYTES # BLD AUTO: ABNORMAL K/UL (ref 0–0.04)
IMM GRANULOCYTES NFR BLD AUTO: ABNORMAL % (ref 0–0.5)
LACTATE SERPL-SCNC: 1.9 MMOL/L (ref 0.5–2.2)
LYMPHOCYTES # BLD AUTO: ABNORMAL K/UL (ref 1–4.8)
LYMPHOCYTES NFR BLD: 1 % (ref 18–48)
MAGNESIUM SERPL-MCNC: 2.5 MG/DL (ref 1.6–2.6)
MCH RBC QN AUTO: 28 PG (ref 27–31)
MCHC RBC AUTO-ENTMCNC: 35 G/DL (ref 32–36)
MCV RBC AUTO: 80 FL (ref 82–98)
MONOCYTES # BLD AUTO: ABNORMAL K/UL (ref 0.3–1)
MONOCYTES NFR BLD: 4 % (ref 4–15)
NEUTROPHILS NFR BLD: 89 % (ref 38–73)
NEUTS BAND NFR BLD MANUAL: 6 %
NRBC BLD-RTO: 0 /100 WBC
O+P STL MICRO: NORMAL
PHOSPHATE SERPL-MCNC: 7 MG/DL (ref 2.7–4.5)
PLATELET # BLD AUTO: 52 K/UL (ref 150–350)
PLATELET BLD QL SMEAR: ABNORMAL
PMV BLD AUTO: 13.3 FL (ref 9.2–12.9)
POCT GLUCOSE: 101 MG/DL (ref 70–110)
POCT GLUCOSE: 102 MG/DL (ref 70–110)
POCT GLUCOSE: 103 MG/DL (ref 70–110)
POCT GLUCOSE: 105 MG/DL (ref 70–110)
POCT GLUCOSE: 106 MG/DL (ref 70–110)
POCT GLUCOSE: 109 MG/DL (ref 70–110)
POCT GLUCOSE: 119 MG/DL (ref 70–110)
POCT GLUCOSE: 124 MG/DL (ref 70–110)
POCT GLUCOSE: 84 MG/DL (ref 70–110)
POCT GLUCOSE: 86 MG/DL (ref 70–110)
POCT GLUCOSE: 87 MG/DL (ref 70–110)
POCT GLUCOSE: 90 MG/DL (ref 70–110)
POIKILOCYTOSIS BLD QL SMEAR: SLIGHT
POTASSIUM SERPL-SCNC: 4.9 MMOL/L (ref 3.5–5.1)
PROT SERPL-MCNC: 5.7 G/DL (ref 6–8.4)
RBC # BLD AUTO: 2.82 M/UL (ref 4.6–6.2)
SODIUM SERPL-SCNC: 142 MMOL/L (ref 136–145)
TARGETS BLD QL SMEAR: ABNORMAL
VANCOMYCIN SERPL-MCNC: 19.9 UG/ML
WBC # BLD AUTO: 29.03 K/UL (ref 3.9–12.7)

## 2019-09-28 PROCEDURE — 80053 COMPREHEN METABOLIC PANEL: CPT | Mod: HCNC

## 2019-09-28 PROCEDURE — 83735 ASSAY OF MAGNESIUM: CPT | Mod: HCNC

## 2019-09-28 PROCEDURE — C9113 INJ PANTOPRAZOLE SODIUM, VIA: HCPCS | Mod: HCNC | Performed by: PHYSICIAN ASSISTANT

## 2019-09-28 PROCEDURE — 80202 ASSAY OF VANCOMYCIN: CPT | Mod: HCNC

## 2019-09-28 PROCEDURE — 93005 ELECTROCARDIOGRAM TRACING: CPT | Mod: HCNC

## 2019-09-28 PROCEDURE — 27000221 HC OXYGEN, UP TO 24 HOURS: Mod: HCNC

## 2019-09-28 PROCEDURE — 94761 N-INVAS EAR/PLS OXIMETRY MLT: CPT | Mod: HCNC

## 2019-09-28 PROCEDURE — A4216 STERILE WATER/SALINE, 10 ML: HCPCS | Mod: HCNC | Performed by: PHYSICIAN ASSISTANT

## 2019-09-28 PROCEDURE — 93010 ELECTROCARDIOGRAM REPORT: CPT | Mod: HCNC,,, | Performed by: INTERNAL MEDICINE

## 2019-09-28 PROCEDURE — 27100092 HC HIGH FLOW DELIVERY CANNULA: Mod: HCNC

## 2019-09-28 PROCEDURE — 99291 CRITICAL CARE FIRST HOUR: CPT | Mod: HCNC,,, | Performed by: INTERNAL MEDICINE

## 2019-09-28 PROCEDURE — 85027 COMPLETE CBC AUTOMATED: CPT | Mod: HCNC

## 2019-09-28 PROCEDURE — 20000000 HC ICU ROOM: Mod: HCNC

## 2019-09-28 PROCEDURE — 99291 PR CRITICAL CARE, E/M 30-74 MINUTES: ICD-10-PCS | Mod: HCNC,,, | Performed by: INTERNAL MEDICINE

## 2019-09-28 PROCEDURE — 63600175 PHARM REV CODE 636 W HCPCS: Mod: HCNC | Performed by: INTERNAL MEDICINE

## 2019-09-28 PROCEDURE — 93010 EKG 12-LEAD: ICD-10-PCS | Mod: HCNC,,, | Performed by: INTERNAL MEDICINE

## 2019-09-28 PROCEDURE — 63600175 PHARM REV CODE 636 W HCPCS: Mod: HCNC | Performed by: STUDENT IN AN ORGANIZED HEALTH CARE EDUCATION/TRAINING PROGRAM

## 2019-09-28 PROCEDURE — 85007 BL SMEAR W/DIFF WBC COUNT: CPT | Mod: HCNC

## 2019-09-28 PROCEDURE — 27100171 HC OXYGEN HIGH FLOW UP TO 24 HOURS: Mod: HCNC

## 2019-09-28 PROCEDURE — 25000003 PHARM REV CODE 250: Mod: HCNC | Performed by: PHYSICIAN ASSISTANT

## 2019-09-28 PROCEDURE — 83605 ASSAY OF LACTIC ACID: CPT | Mod: HCNC

## 2019-09-28 PROCEDURE — 63600175 PHARM REV CODE 636 W HCPCS: Mod: HCNC | Performed by: PHYSICIAN ASSISTANT

## 2019-09-28 PROCEDURE — 84100 ASSAY OF PHOSPHORUS: CPT | Mod: HCNC

## 2019-09-28 RX ADMIN — PIPERACILLIN AND TAZOBACTAM 4.5 G: 4; .5 INJECTION, POWDER, LYOPHILIZED, FOR SOLUTION INTRAVENOUS; PARENTERAL at 08:09

## 2019-09-28 RX ADMIN — LATANOPROST 1 DROP: 50 SOLUTION OPHTHALMIC at 08:09

## 2019-09-28 RX ADMIN — Medication 3 ML: at 10:09

## 2019-09-28 RX ADMIN — SODIUM CHLORIDE 250 ML: 0.9 INJECTION, SOLUTION INTRAVENOUS at 01:09

## 2019-09-28 RX ADMIN — PANTOPRAZOLE SODIUM 40 MG: 40 INJECTION, POWDER, FOR SOLUTION INTRAVENOUS at 08:09

## 2019-09-28 RX ADMIN — Medication 3 ML: at 06:09

## 2019-09-28 RX ADMIN — Medication 3 ML: at 02:09

## 2019-09-28 NOTE — ASSESSMENT & PLAN NOTE
in sitting of DHF and UTI   Cardiorenal VS hypovolemia in sitting of sepsis   minimal urine out put and failed lasix challenge     Will give IVF today and monitor out/put     Continue Abx and trial of IVF    Consider nephrology consalte for dialysis

## 2019-09-28 NOTE — ASSESSMENT & PLAN NOTE
Bacteremia  Urine and blood cultures growing gram -ve rods   Ecoli in the urin     Continue zosyn and d/c vanc   Monitor urin out put and mental status improvement

## 2019-09-28 NOTE — ASSESSMENT & PLAN NOTE
in sitting of DHF and UTI   Cardiorenal VS hypovolemia in sitting of sepsis   minimal urine out put and failed lasix challenge     monitor out/put     Continue Abx and trial of IVF    Family decided to not pursue dialysis and focus on comfort care with no escalation

## 2019-09-28 NOTE — PROGRESS NOTES
Critical care team at bedside. Dr. Reynoso assessing pt. MD notified of pt's VS, decreased UO, and increased work of breathing. Orders received to place pt on BiPAP and hold lasix.

## 2019-09-28 NOTE — ASSESSMENT & PLAN NOTE
Patient with Hx of  HTN, HLD, CAD, HFrEF (EF 45%), AS, CKD3, and DM2 initially admitted to hospital medicine for LGIB in setting of AC. Cardiology consulted due to NSTEMI and acute hypoxic respiratory failure due to pulmonary edema. Troponin 5--> 24> 25>>33, BMP of 3K with pulmonary edema on CXR. EKG with non specific ST changes. 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 No ASA or heparin   - continue atrovastatin 80mg qD  - d/c troponin per cards recs  GI held off on EGD given his unstable condition, cardiology recommended no ACS protocol given his GI bleed.

## 2019-09-28 NOTE — ASSESSMENT & PLAN NOTE
Acute respiratory failure with hypoxia  Aortic stenosis  EF 45% with sever AS and mild to moderate MR on 9/9/19    Chest x-ray consistent with pulm edema   BNP elevated   LE edema and basilar crackles on exam   Not responding will to lasix   - strict I&Os  - titrate oxygen as needed  - minimal urine output with lasix   - will consider dialysis if no improvement

## 2019-09-28 NOTE — PROGRESS NOTES
"[facility]  Critical Care Medicine  Progress Note    Patient Name: Jeniffer Taylor  MRN: [id number]  Admission Date: 9/24/2019  Hospital Length of Stay: 3 days  Code Status: DNR  Attending Provider: Sean Reynoso MD  Primary Care Provider: Shari Chauhan MD   Principal Problem: Acute respiratory failure with hypoxia    Subjective:     HPI:  89 year old male with a PMHx of HTN, HLD, CAD, HFrEF, AS, CKD3,DVT on eliquis and DM2 presenting to the ER from PCP clinic with son and grandson at bedside for "low hemoglobin." Patient reports noticing dark stools at home, but denies BRBPR, bloody BMs, hematuria, and hemoptysis. He denies associated SOB and PRATT. Patient also reports taking iron supplementation at home. He reports chronic fatigue, currently at his baseline. At the time of my exam, patient denies CP, SOB, abdominal pain, N/V/D, fever/chills, recent illness, dysuria/hematuria/frequency, focal weakness, syncope, and palpitations.   Patient was admitted 09/09 due to CHF exacerbation and LE swelling. Venous LE US noted partially occlusive DVT in the upper right femoral vein and patient was initiated on heparin gtt >>> transitioned to renal dose apixaban 09/11.    MICU was consulted after a rapid response today, patient was found short of breath with difficulty breathing, saturating in the 80's and a non re breather was placed, ABG was unremarkable, patient weaned down to 3L, HGB dropped from 9 to 7.7 and trop elevated from 5 to 24, patient transferred to MICU for higher level of care.       Hospital/ICU Course:  Patient admitted for GIB. He was sent from PCP for low hgb 8.8>>7.8. Vitals remaining stable. Started protonix IV. Consulted GI who recommend EGD. However, patient at first adamant that he did not want scope because he was "tired of being poked and prodded". He was wanting to leave AMA, but subsequently decided to stay and pursue scope. Notified GI.Patient began getting SOB and hypoxic " 9/25 night, improved with lasix and breathing treatment. Troponin 5>>25. Consulted cardiology. Patient moved to ICU 9/26 for closer monitoring.9/27 patient didn't responeded appropriately to lasix, kidney function worsening, having increase work of breathing and metabolic acidosis, GI held off on EGD given his unstable condition, cardiology recommended no ACS protocol given his GI bleed, had goals of care discussion with patient daughter, her goal is to keep him comfort and continue current course of treatment with no escalation. 9/26 patient showing some improvement, RR improved and lactic acidosis resolved, had goals of care discussion with the family today.       Interval History: breathing improving, still has minimal urine out put   Review of Systems   Constitutional: Positive for fatigue (chronic). Negative for diaphoresis and fever.   HENT: Positive for voice change. Negative for sore throat and trouble swallowing.    Respiratory: Negative for cough, chest tightness and shortness of breath.    Cardiovascular: Positive for leg swelling (chronic). Negative for chest pain.   Gastrointestinal: Negative for abdominal pain, anal bleeding, blood in stool, diarrhea, nausea and vomiting.   Genitourinary: Negative for flank pain and frequency.   Musculoskeletal: Positive for gait problem (wheelchair at baseline) and myalgias (chronic). Negative for back pain and neck pain.   Skin: Positive for wound (chronic sarcal wound, skin breakdown to L foot).   Neurological: Positive for weakness (chronic). Negative for seizures and speech difficulty.   Psychiatric/Behavioral: Positive for confusion and decreased concentration. Negative for agitation and behavioral problems.        Somnolent      Objective:     Vital Signs (Most Recent):  Temp: 97.6 °F (36.4 °C) (09/28/19 1100)  Pulse: 67 (09/28/19 1100)  Resp: 16 (09/28/19 1100)  BP: (!) 102/53 (09/28/19 1100)  SpO2: 96 % (09/28/19 1100) Vital Signs (24h Range):  Temp:  [97.6 °F  (36.4 °C)-99.2 °F (37.3 °C)] 97.6 °F (36.4 °C)  Pulse:  [63-70] 67  Resp:  [16-35] 16  SpO2:  [94 %-96 %] 96 %  BP: ()/(40-56) 102/53   Weight: 81.6 kg (180 lb)  Body mass index is 27.37 kg/m².      Intake/Output Summary (Last 24 hours) at 9/28/2019 1349  Last data filed at 9/28/2019 1300  Gross per 24 hour   Intake 859.36 ml   Output 44 ml   Net 815.36 ml       Physical Exam   Constitutional: He is oriented to person, place, and time. No distress.   Frail elderly male in NAD   HENT:   Head: Normocephalic and atraumatic.   dysarthria    Eyes: EOM are normal. Right eye exhibits no discharge. Left eye exhibits no discharge.   Neck: Normal range of motion. Neck supple.   Cardiovascular: Normal rate, regular rhythm and intact distal pulses.   Murmur (systolic ) heard.  Pulmonary/Chest: Effort normal. No respiratory distress.   Abdominal: Soft. Bowel sounds are normal. He exhibits no distension. There is no tenderness.   Musculoskeletal: Normal range of motion. He exhibits edema (LE).   Neurological: He is alert and oriented to person, place, and time.   somnolent    Skin: Skin is warm and dry. He is not diaphoretic.   L foot wound dressed C/D/I   Psychiatric: He has a normal mood and affect. His behavior is normal.   Vitals reviewed.      Vents:  Oxygen Concentration (%): 30 (09/27/19 1236)  Lines/Drains/Airways     Drain                 Urethral Catheter 09/26/19 1800 Latex 1 day          Peripheral Intravenous Line                 Peripheral IV - Single Lumen 09/28/19 0854 20 G Proximal;Right Forearm less than 1 day         Peripheral IV - Single Lumen 09/28/19 0854 20 G Right;Distal Forearm less than 1 day              Significant Labs:    CBC/Anemia Profile:  Recent Labs   Lab 09/27/19  0115 09/27/19  0957 09/28/19  0300   WBC 24.25* 23.15* 29.03*   HGB 8.7* 8.4* 7.9*   HCT 24.9* 23.4* 22.6*   PLT 94* 54* 52*   MCV 82 78* 80*   RDW 15.3* 15.6* 15.4*        Chemistries:  Recent Labs   Lab 09/27/19  0115  09/27/19  0957 09/28/19  0300    138 142   K 4.3 4.7 4.9    106 108   CO2 13* 11* 17*   BUN 71* 77* 91*   CREATININE 3.4* 4.0* 4.9*   CALCIUM 8.2* 8.0* 7.9*   ALBUMIN  --  2.6* 2.6*   PROT  --  5.7* 5.7*   BILITOT  --  1.5* 1.5*   ALKPHOS  --  77 99   ALT  --  2,618* 3,788*   AST  --  3,608* 5,146*   MG 2.2 2.4 2.5   PHOS 5.5* 6.4* 7.0*         Significant Imaging: I have reviewed all pertinent imaging results/findings within the past 24 hours.      ABG  Recent Labs   Lab 09/27/19  0849   PH 7.421   PO2 61*   PCO2 19.7*   HCO3 12.8*   BE -12     Assessment/Plan:     Cardiac/Vascular  Acute on chronic combined systolic and diastolic heart failure  Acute respiratory failure with hypoxia  Aortic stenosis  EF 45% with sever AS and mild to moderate MR on 9/9/19    Chest x-ray consistent with pulm edema   BNP elevated   LE edema and basilar crackles on exam   Not responding will to lasix   - strict I&Os  - titrate oxygen as needed  - minimal urine output with lasix   - will consider dialysis if no improvement     NSTEMI (non-ST elevated myocardial infarction)  Patient with Hx of  HTN, HLD, CAD, HFrEF (EF 45%), AS, CKD3, and DM2 initially admitted to hospital medicine for LGIB in setting of AC. Cardiology consulted due to NSTEMI and acute hypoxic respiratory failure due to pulmonary edema. Troponin 5--> 24> 25>>33, BMP of 3K with pulmonary edema on CXR. EKG with non specific ST changes. 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 No ASA or heparin   - continue atrovastatin 80mg qD  - d/c troponin per cards recs  GI held off on EGD given his unstable condition, cardiology recommended no ACS protocol given his GI bleed.        Hypertensive heart disease with heart failure  - BP stable  - Held coreg and hydralazine in setting of melena  - Continue home norvasc  - Continue to monitor, resume meds as needed    CAD (coronary artery disease)  Coronary angiogram  (4/7/14): Prox LAD PCI, 99% LCx (small vessel), 40% Ramus, 95% RCA (multiple lesions, heavily calcified).     On Asprin, coreg, atorvastatin at home    Dyslipidemia  - continue atorvastatin 80mg qD    Renal/  UTI (urinary tract infection)  Bacteremia  Urine and blood cultures growing gram -ve rods   Ecoli in the urin     Continue zosyn and d/c vanc   Monitor urin out put and mental status improvement     Acute kidney injury superimposed on chronic kidney disease  in sitting of DHF and UTI   Cardiorenal VS hypovolemia in sitting of sepsis   minimal urine out put and failed lasix challenge     Will give IVF today and monitor out/put     Continue Abx and trial of IVF    Consider nephrology for dialysis       Hematology  Acute deep vein thrombosis (DVT) of right lower extremity  venous LE US noted partially occlusive DVT in the upper right femoral vein and patient was initiated on heparin gtt >>> transitioned to renal dose apixaban 09/11    - repeated U/S 9/27 showed no DVT        Oncology  Anemia  Chronic, disease and iron deficiency.   Stable.       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 Protonix 80mg IV bolus x 1 then 8mg/hr gtt  2. Intravascular resuscitation/support with IVFs and pRBCs as needed.  3. Serial H/H's transfuse as needed.  4. Discontinue all ASA, NSAIDs and Heparin products  5. Please correct any coagulopathy with platelets and FFP to a goal of platelets >50K and INR <2.5  6. Maintain IV  access with 2 large bore IVs  - GI held off on EGD given his unstable condition.    Other  Pressure injury of sacral region, stage 2  Chronic sacral wound, L foot skin breakdown  - Wound care consulted        Critical Care Daily Checklist:    A: Awake: RASS Goal/Actual Goal:    Actual: Salazar Agitation Sedation Scale (RASS): Moderate sedation   B: Spontaneous Breathing Trial Performed?     C: SAT & SBT Coordinated?  na                      D: Delirium: CAM-ICU Overall CAM-ICU: Negative   E: Early Mobility Performed? No   F: Feeding Goal:    Status:     Current Diet Order   Procedures    Diet clear liquid      AS: Analgesia/Sedation morphine prn    T: Thromboembolic Prophylaxis Gi bleed   H: HOB > 300 Yes   U: Stress Ulcer Prophylaxis (if needed)    G: Glucose Control    B: Bowel Function Stool Occurrence: 0   I: Indwelling Catheter (Lines & Llanos) Necessity llanos   D: De-escalation of Antimicrobials/Pharmacotherapies zosyn    Plan for the day/ETD Monitor urine out put     Code Status:  Family/Goals of Care: DNR         Critical secondary to acute respiratory failure      Critical care was time spent personally by me on the following activities: development of treatment plan with patient or surrogate and bedside caregivers, discussions with consultants, evaluation of patient's response to treatment, examination of patient, ordering and performing treatments and interventions, ordering and review of laboratory studies, ordering and review of radiographic studies, pulse oximetry, re-evaluation of patient's condition. This critical care time did not overlap with that of any other provider or involve time for any procedures.     LONI Huynh  Critical Care Medicine  [facility]

## 2019-09-28 NOTE — PROGRESS NOTES
VSS, NSR, Tmax 99.4.  Patient on 2L NC over night, SATs >95%.  No BMs over night, UO 0-5cc/hr, brown urine. Gtts infusing morphine @ 1ml/hr. Barrier cream applied to sacrum, no new skin breakdown noted.  Patients daughter at bedside, all questions and concerns addressed.

## 2019-09-28 NOTE — SUBJECTIVE & OBJECTIVE
Interval History: breathing improving, still has minimal urine out put   Review of Systems   Constitutional: Positive for fatigue (chronic). Negative for diaphoresis and fever.   HENT: Positive for voice change. Negative for sore throat and trouble swallowing.    Respiratory: Negative for cough, chest tightness and shortness of breath.    Cardiovascular: Positive for leg swelling (chronic). Negative for chest pain.   Gastrointestinal: Negative for abdominal pain, anal bleeding, blood in stool, diarrhea, nausea and vomiting.   Genitourinary: Negative for flank pain and frequency.   Musculoskeletal: Positive for gait problem (wheelchair at baseline) and myalgias (chronic). Negative for back pain and neck pain.   Skin: Positive for wound (chronic sarcal wound, skin breakdown to L foot).   Neurological: Positive for weakness (chronic). Negative for seizures and speech difficulty.   Psychiatric/Behavioral: Positive for confusion and decreased concentration. Negative for agitation and behavioral problems.        Somnolent      Objective:     Vital Signs (Most Recent):  Temp: 97.6 °F (36.4 °C) (09/28/19 1100)  Pulse: 67 (09/28/19 1100)  Resp: 16 (09/28/19 1100)  BP: (!) 102/53 (09/28/19 1100)  SpO2: 96 % (09/28/19 1100) Vital Signs (24h Range):  Temp:  [97.6 °F (36.4 °C)-99.2 °F (37.3 °C)] 97.6 °F (36.4 °C)  Pulse:  [63-70] 67  Resp:  [16-35] 16  SpO2:  [94 %-96 %] 96 %  BP: ()/(40-56) 102/53   Weight: 81.6 kg (180 lb)  Body mass index is 27.37 kg/m².      Intake/Output Summary (Last 24 hours) at 9/28/2019 1349  Last data filed at 9/28/2019 1300  Gross per 24 hour   Intake 859.36 ml   Output 44 ml   Net 815.36 ml       Physical Exam   Constitutional: He is oriented to person, place, and time. No distress.   Frail elderly male in NAD   HENT:   Head: Normocephalic and atraumatic.   dysarthria    Eyes: EOM are normal. Right eye exhibits no discharge. Left eye exhibits no discharge.   Neck: Normal range of motion. Neck  supple.   Cardiovascular: Normal rate, regular rhythm and intact distal pulses.   Murmur (systolic ) heard.  Pulmonary/Chest: Effort normal. No respiratory distress.   Abdominal: Soft. Bowel sounds are normal. He exhibits no distension. There is no tenderness.   Musculoskeletal: Normal range of motion. He exhibits edema (LE).   Neurological: He is alert and oriented to person, place, and time.   somnolent    Skin: Skin is warm and dry. He is not diaphoretic.   L foot wound dressed C/D/I   Psychiatric: He has a normal mood and affect. His behavior is normal.   Vitals reviewed.      Vents:  Oxygen Concentration (%): 30 (09/27/19 1236)  Lines/Drains/Airways     Drain                 Urethral Catheter 09/26/19 1800 Latex 1 day          Peripheral Intravenous Line                 Peripheral IV - Single Lumen 09/28/19 0854 20 G Proximal;Right Forearm less than 1 day         Peripheral IV - Single Lumen 09/28/19 0854 20 G Right;Distal Forearm less than 1 day              Significant Labs:    CBC/Anemia Profile:  Recent Labs   Lab 09/27/19  0115 09/27/19  0957 09/28/19  0300   WBC 24.25* 23.15* 29.03*   HGB 8.7* 8.4* 7.9*   HCT 24.9* 23.4* 22.6*   PLT 94* 54* 52*   MCV 82 78* 80*   RDW 15.3* 15.6* 15.4*        Chemistries:  Recent Labs   Lab 09/27/19  0115 09/27/19  0957 09/28/19  0300    138 142   K 4.3 4.7 4.9    106 108   CO2 13* 11* 17*   BUN 71* 77* 91*   CREATININE 3.4* 4.0* 4.9*   CALCIUM 8.2* 8.0* 7.9*   ALBUMIN  --  2.6* 2.6*   PROT  --  5.7* 5.7*   BILITOT  --  1.5* 1.5*   ALKPHOS  --  77 99   ALT  --  2,618* 3,788*   AST  --  3,608* 5,146*   MG 2.2 2.4 2.5   PHOS 5.5* 6.4* 7.0*         Significant Imaging: I have reviewed all pertinent imaging results/findings within the past 24 hours.

## 2019-09-29 VITALS
HEART RATE: 60 BPM | OXYGEN SATURATION: 99 % | WEIGHT: 147.94 LBS | HEIGHT: 68 IN | SYSTOLIC BLOOD PRESSURE: 138 MMHG | BODY MASS INDEX: 22.42 KG/M2 | DIASTOLIC BLOOD PRESSURE: 72 MMHG

## 2019-09-29 LAB
ALBUMIN SERPL BCP-MCNC: 2.6 G/DL (ref 3.5–5.2)
ALLENS TEST: ABNORMAL
ALP SERPL-CCNC: 129 U/L (ref 55–135)
ALT SERPL W/O P-5'-P-CCNC: 2982 U/L (ref 10–44)
ANION GAP SERPL CALC-SCNC: 23 MMOL/L (ref 8–16)
ANISOCYTOSIS BLD QL SMEAR: SLIGHT
AST SERPL-CCNC: 2479 U/L (ref 10–40)
BACTERIA BLD CULT: ABNORMAL
BACTERIA UR CULT: ABNORMAL
BASOPHILS # BLD AUTO: 0.04 K/UL (ref 0–0.2)
BASOPHILS NFR BLD: 0.1 % (ref 0–1.9)
BILIRUB SERPL-MCNC: 1.4 MG/DL (ref 0.1–1)
BUN SERPL-MCNC: 106 MG/DL (ref 10–30)
BURR CELLS BLD QL SMEAR: ABNORMAL
CALCIUM SERPL-MCNC: 7.6 MG/DL (ref 8.7–10.5)
CHLORIDE SERPL-SCNC: 108 MMOL/L (ref 95–110)
CO2 SERPL-SCNC: 12 MMOL/L (ref 23–29)
CREAT SERPL-MCNC: 5.6 MG/DL (ref 0.5–1.4)
DIFFERENTIAL METHOD: ABNORMAL
DOHLE BOD BLD QL SMEAR: PRESENT
EOSINOPHIL # BLD AUTO: 0 K/UL (ref 0–0.5)
EOSINOPHIL NFR BLD: 0 % (ref 0–8)
ERYTHROCYTE [DISTWIDTH] IN BLOOD BY AUTOMATED COUNT: 16.4 % (ref 11.5–14.5)
EST. GFR  (AFRICAN AMERICAN): 9.4 ML/MIN/1.73 M^2
EST. GFR  (NON AFRICAN AMERICAN): 8.2 ML/MIN/1.73 M^2
GLUCOSE SERPL-MCNC: 122 MG/DL (ref 70–110)
HCO3 UR-SCNC: 13.3 MMOL/L (ref 24–28)
HCT VFR BLD AUTO: 22.7 % (ref 40–54)
HGB BLD-MCNC: 8.1 G/DL (ref 14–18)
IMM GRANULOCYTES # BLD AUTO: 0.07 K/UL (ref 0–0.04)
IMM GRANULOCYTES NFR BLD AUTO: 0.3 % (ref 0–0.5)
LACTATE SERPL-SCNC: 2.5 MMOL/L (ref 0.5–2.2)
LYMPHOCYTES # BLD AUTO: 0.6 K/UL (ref 1–4.8)
LYMPHOCYTES NFR BLD: 2.1 % (ref 18–48)
MAGNESIUM SERPL-MCNC: 2.6 MG/DL (ref 1.6–2.6)
MCH RBC QN AUTO: 28.7 PG (ref 27–31)
MCHC RBC AUTO-ENTMCNC: 35.7 G/DL (ref 32–36)
MCV RBC AUTO: 81 FL (ref 82–98)
MONOCYTES # BLD AUTO: 0.7 K/UL (ref 0.3–1)
MONOCYTES NFR BLD: 2.7 % (ref 4–15)
NEUTROPHILS # BLD AUTO: 25.4 K/UL (ref 1.8–7.7)
NEUTROPHILS NFR BLD: 94.8 % (ref 38–73)
NRBC BLD-RTO: 0 /100 WBC
PCO2 BLDA: 26.4 MMHG (ref 35–45)
PH SMN: 7.31 [PH] (ref 7.35–7.45)
PHOSPHATE SERPL-MCNC: 7.7 MG/DL (ref 2.7–4.5)
PLATELET # BLD AUTO: 33 K/UL (ref 150–350)
PLATELET BLD QL SMEAR: ABNORMAL
PMV BLD AUTO: 12.2 FL (ref 9.2–12.9)
PO2 BLDA: 45 MMHG (ref 40–60)
POC BE: -13 MMOL/L
POC SATURATED O2: 77 % (ref 95–100)
POC TCO2: 14 MMOL/L (ref 24–29)
POCT GLUCOSE: 123 MG/DL (ref 70–110)
POCT GLUCOSE: 126 MG/DL (ref 70–110)
POCT GLUCOSE: 126 MG/DL (ref 70–110)
POCT GLUCOSE: 140 MG/DL (ref 70–110)
POCT GLUCOSE: 145 MG/DL (ref 70–110)
POCT GLUCOSE: 167 MG/DL (ref 70–110)
POIKILOCYTOSIS BLD QL SMEAR: SLIGHT
POTASSIUM SERPL-SCNC: 5.4 MMOL/L (ref 3.5–5.1)
PROT SERPL-MCNC: 5.7 G/DL (ref 6–8.4)
PROVIDER CREDENTIALS: ABNORMAL
RBC # BLD AUTO: 2.82 M/UL (ref 4.6–6.2)
SAMPLE: ABNORMAL
SITE: ABNORMAL
SODIUM SERPL-SCNC: 143 MMOL/L (ref 136–145)
TARGETS BLD QL SMEAR: ABNORMAL
VERBAL RESULT READBACK PERFORMED: YES
WBC # BLD AUTO: 26.77 K/UL (ref 3.9–12.7)
WBC TOXIC VACUOLES BLD QL SMEAR: PRESENT

## 2019-09-29 PROCEDURE — 63600175 PHARM REV CODE 636 W HCPCS: Mod: HCNC | Performed by: PHYSICIAN ASSISTANT

## 2019-09-29 PROCEDURE — 27000221 HC OXYGEN, UP TO 24 HOURS: Mod: HCNC

## 2019-09-29 PROCEDURE — 83735 ASSAY OF MAGNESIUM: CPT | Mod: HCNC

## 2019-09-29 PROCEDURE — C9113 INJ PANTOPRAZOLE SODIUM, VIA: HCPCS | Mod: HCNC | Performed by: PHYSICIAN ASSISTANT

## 2019-09-29 PROCEDURE — 63600175 PHARM REV CODE 636 W HCPCS: Mod: HCNC | Performed by: STUDENT IN AN ORGANIZED HEALTH CARE EDUCATION/TRAINING PROGRAM

## 2019-09-29 PROCEDURE — A4216 STERILE WATER/SALINE, 10 ML: HCPCS | Mod: HCNC | Performed by: PHYSICIAN ASSISTANT

## 2019-09-29 PROCEDURE — 84100 ASSAY OF PHOSPHORUS: CPT | Mod: HCNC

## 2019-09-29 PROCEDURE — 83605 ASSAY OF LACTIC ACID: CPT | Mod: HCNC

## 2019-09-29 PROCEDURE — 85025 COMPLETE CBC W/AUTO DIFF WBC: CPT | Mod: HCNC

## 2019-09-29 PROCEDURE — 80053 COMPREHEN METABOLIC PANEL: CPT | Mod: HCNC

## 2019-09-29 PROCEDURE — 25000003 PHARM REV CODE 250: Mod: HCNC | Performed by: PHYSICIAN ASSISTANT

## 2019-09-29 PROCEDURE — 99900035 HC TECH TIME PER 15 MIN (STAT): Mod: HCNC

## 2019-09-29 PROCEDURE — 99233 PR SUBSEQUENT HOSPITAL CARE,LEVL III: ICD-10-PCS | Mod: HCNC,,, | Performed by: INTERNAL MEDICINE

## 2019-09-29 PROCEDURE — 99233 SBSQ HOSP IP/OBS HIGH 50: CPT | Mod: HCNC,,, | Performed by: INTERNAL MEDICINE

## 2019-09-29 PROCEDURE — 63600175 PHARM REV CODE 636 W HCPCS: Mod: HCNC | Performed by: INTERNAL MEDICINE

## 2019-09-29 PROCEDURE — 11000001 HC ACUTE MED/SURG PRIVATE ROOM: Mod: HCNC

## 2019-09-29 PROCEDURE — 82803 BLOOD GASES ANY COMBINATION: CPT | Mod: HCNC

## 2019-09-29 PROCEDURE — 94761 N-INVAS EAR/PLS OXIMETRY MLT: CPT | Mod: HCNC

## 2019-09-29 RX ORDER — CEFTRIAXONE 1 G/1
1 INJECTION, POWDER, FOR SOLUTION INTRAMUSCULAR; INTRAVENOUS
Status: DISCONTINUED | OUTPATIENT
Start: 2019-09-29 | End: 2019-10-02 | Stop reason: HOSPADM

## 2019-09-29 RX ADMIN — LATANOPROST 1 DROP: 50 SOLUTION OPHTHALMIC at 10:09

## 2019-09-29 RX ADMIN — Medication 3 ML: at 10:09

## 2019-09-29 RX ADMIN — PIPERACILLIN AND TAZOBACTAM 4.5 G: 4; .5 INJECTION, POWDER, LYOPHILIZED, FOR SOLUTION INTRAVENOUS; PARENTERAL at 08:09

## 2019-09-29 RX ADMIN — PANTOPRAZOLE SODIUM 40 MG: 40 INJECTION, POWDER, FOR SOLUTION INTRAVENOUS at 08:09

## 2019-09-29 RX ADMIN — CEFTRIAXONE SODIUM 1 G: 1 INJECTION, POWDER, FOR SOLUTION INTRAMUSCULAR; INTRAVENOUS at 01:09

## 2019-09-29 RX ADMIN — Medication 3 ML: at 04:09

## 2019-09-29 RX ADMIN — PANTOPRAZOLE SODIUM 40 MG: 40 INJECTION, POWDER, FOR SOLUTION INTRAVENOUS at 10:09

## 2019-09-29 NOTE — PROGRESS NOTES
VSS. Pt on RA, SATs >90%. UO 0-10cc/hr, 80cc for shift. Gtts morphine @ 0.5 ml/hr. BS checked [id number] over night. No new skin break down noted. POC reviewed with family.  Will continue to monitor.

## 2019-09-29 NOTE — ASSESSMENT & PLAN NOTE
Acute respiratory failure with hypoxia  Aortic stenosis  EF 45% with sever AS and mild to moderate MR on 9/9/19    Chest x-ray consistent with pulm edema   BNP elevated   LE edema and basilar crackles on exam   Not responding will to lasix   - strict I&Os  - titrate oxygen as needed  - minimal urine output with lasix   - Family decided to not pursue dialysis and focus on comfort care with no escalation

## 2019-09-29 NOTE — PROVIDER TRANSFER
"Hospital Medicine ICU Acceptance Note    Date of Admit: 9/24/2019  Date of Transfer / Stepdown: 9/29/2019  Viviana, LESLIE/J, L, Onc (IV chemo w/in 1 month), Gyn/Onc, or other special case?: no   ICU team stepping patient down: MICU  ICU team member giving verbal handoff: [id number]   Accepting  team: COSTA    Brief History of Present Illness:      89 year old male with a PMHx of HTN, HLD, CAD, HFrEF, AS, CKD3, and DM2 presenting to the ER from PCP clinic with son and grandson at bedside for "low hemoglobin." Patient reports noticing dark stools at home, but denies BRBPR, bloody BMs, hematuria, and hemoptysis. He denies associated SOB and PRATT. Patient also reports taking iron supplementation at home. He reports chronic fatigue, currently at his baseline. At the time of my exam, patient denies CP, SOB, abdominal pain, N/V/D, fever/chills, recent illness, dysuria/hematuria/frequency, focal weakness, syncope, and palpitations.     Hospital/ICU Course:     Patient admitted for GIB. He was sent from PCP for low hgb 8.8>>7.8. Vitals remaining stable. Started protonix IV. Consulted GI who recommend EGD. However, patient at first adamant that he did not want scope because he was "tired of being poked and prodded". He was wanting to leave AMA, but subsequently decided to stay and pursue scope. Notified GI.Patient began getting SOB and hypoxic 9/25 night, improved with lasix and breathing treatment. Troponin 5>>25. Consulted cardiology. Patient moved to ICU 9/26 for closer monitoring.9/27 patient didn't responeded appropriately to lasix, kidney function worsening, having increase work of breathing and metabolic acidosis, GI held off on EGD given his unstable condition, cardiology recommended no ACS protocol given his GI bleed, had goals of care discussion with patient daughter, her goal is to keep him comfort and continue current course of treatment with no escalation. 9/26 patient showing some improvement, RR improved and lactic " acidosis resolved, had goals of care discussion with the family today.      Consultants and Procedures:     Consultants:  Critical Care, GI, Cardiology    Procedures:    CXR, lower extremity ultrasound,    Transfer Information:     Diet:  Ordered    Physical Activity:  OT/PT Ordered    To Do / Pending Studies / Follow ups:  -Monitor respiratory status, monitor for hemodynamic instability  -under kidney function, no plans for HD  -ongoing family discussion about goals of care, focus on comfort measures only  -continue ceftriaxone   -no plans for EGD  -Pt made DNR  -continue morphine drip  For symptom management / comfort care     Patient has been accepted by Hospital Medicine Team R, who will assume care of the patient upon arrival to the floor from the ICU. Please contact ICU team with any concerns prior to arrival. Please contact Hospital Medicine at [phone] or [phone] (please do NOT leave a voicemail) when patient arrives to the floor.    Astrid Santizo MD  9/29/2019 12:01 PM  Department of Hospital medicine

## 2019-09-29 NOTE — RESIDENT HANDOFF
"Handoff     Primary Team: [facility] – [place], [place] CRITICAL CARE MEDICINE Room Number: [id number]     Patient Name: Jeniffer Taylor MRN: [id number]     Date of Birth: [id number] Allergies: Patient has no known allergies.     Age: 89 y.o. Admit Date: 9/24/2019     Sex: male  BMI: Body mass index is 27.37 kg/m².     Code Status: DNR        Illness Level (current clinical status): Watcher - no    Reason for Admission: Acute respiratory failure with hypoxia    Brief HPI:  89 year old male with a PMHx of HTN, HLD, CAD, HFrEF, AS, CKD3,DVT on eliquis and DM2 presenting to the ER from PCP clinic with son and grandson at bedside for "low hemoglobin." Patient reports noticing dark stools at home, but denies BRBPR, bloody BMs, hematuria, and hemoptysis. He denies associated SOB and PRATT. Patient also reports taking iron supplementation at home. He reports chronic fatigue, currently at his baseline. At the time of my exam, patient denies CP, SOB, abdominal pain, N/V/D, fever/chills, recent illness, dysuria/hematuria/frequency, focal weakness, syncope, and palpitations.   Patient was admitted 09/09 due to CHF exacerbation and LE swelling. Venous LE US noted partially occlusive DVT in the upper right femoral vein and patient was initiated on heparin gtt >>> transitioned to renal dose apixaban 09/11.   MICU was consulted after a rapid response today, patient was found short of breath with difficulty breathing, saturating in the 80's and a non re breather was placed, ABG was unremarkable, patient weaned down to 3L, HGB dropped from 9 to 7.7 and trop elevated from 5 to 24, patient transferred to MICU for higher level of care.     Hospital Course :Patient admitted for GIB. He was sent from PCP for low hgb 8.8>>7.8. Vitals remaining stable. Started protonix IV. Consulted GI who recommend EGD. However, patient at first adamant that he did not want scope because he was "tired of being poked and prodded". He was wanting to leave AMA, but subsequently decided to " stay and pursue scope. Notified GI.Patient began getting SOB and hypoxic 9/25 night, improved with lasix and breathing treatment. Troponin 5>>25. Consulted cardiology. Patient moved to ICU 9/26 for closer monitoring.9/27 patient didn't responeded appropriately to lasix, kidney function worsening, having increase work of breathing and metabolic acidosis, GI held off on EGD given his unstable condition, cardiology recommended no ACS protocol given his GI bleed, had goals of care discussion with patient daughter, her goal is to keep him comfort and continue current course of treatment with no escalation. 9/26 patient showing some improvement, RR improved and lactic acidosis resolved, had goals of care discussion with the family and they decided to continue current level of care with no escaluation9/28. 9/29 worsening BERNARDO with low urine output, patient will be stepped down to hospital medicine to continue current treatment with comfort measures.    Tasks & Contingency Plan :  - continue antibiotic treatment for UTI with bacteremia today 4/7   - consider involving palliative care to help with hospice transition if possible.   Gram negative bacteremia  DVT (now resolved on LE US)  GI bleed - no longer active  NSTEMI  Aortic stenosis (severe)  Stool culture grew AEROMONAS HYDROPHILA  Pulmonary edema - clinically improving    Discharge Disposition: TBD    Mentored By: ICU STAFF AND TEAM

## 2019-09-29 NOTE — PROGRESS NOTES
Pt tachycardic, Afib on monitor. Team notified, EKG ordered and done. Team notified of results. No further orders, will continue to monitor.

## 2019-09-29 NOTE — PROGRESS NOTES
Subjective:       Patient ID: Jeniffer Taylor is a 89 y.o. male.    Chief Complaint: Follow-up (labs )    HPI  Discussed with him his recent laboratory result.  He does admit to episodes of dark stool    PAST MEDICAL HISTORY: Hypertension, diabetes with retinopathy and neuropathy ,DVT,  hyperlipidemia, congestive heart failure, prostate cancer, aortic stenosis, anemia, glaucoma, lumbar spinal stenosis, thyroid nodule,  coronary artery disease. He had a colonoscopy February 2011     MEDICATIONS: GlucotrolXL 2.5 mg daily ,Lipitor 80 mg daily,xalatan ,  Norvasc 10 mg daily ,casodex, coreg 6.25 mg b.i.d., Lasix 40 mg b.i.d., Eliquis    ALLERGIES: No known drug allergies.        Review of Systems   Constitutional: Negative for chills, fatigue, fever and unexpected weight change.   Respiratory: Negative for chest tightness and shortness of breath.    Cardiovascular: Negative for chest pain and palpitations.   Gastrointestinal: Negative for abdominal pain and blood in stool.   Neurological: Negative for dizziness, syncope, numbness and headaches.       Objective:      Physical Exam   HENT:   Right Ear: External ear normal.   Left Ear: External ear normal.   Nose: Nose normal.   Mouth/Throat: Oropharynx is clear and moist.   Eyes: Pupils are equal, round, and reactive to light.   Neck: Normal range of motion.   Cardiovascular: Normal rate and regular rhythm.   Murmur heard.  Pulmonary/Chest: Breath sounds normal.   Abdominal: He exhibits no distension. There is no hepatomegaly. There is no tenderness.   Lymphadenopathy:     He has no cervical adenopathy.     He has no axillary adenopathy.   Neurological: He has normal strength and normal reflexes. No cranial nerve deficit or sensory deficit.       Assessment/Plan       Assessment and plan:  Anemia/GI bleed.  Patient transferred to emergency room for further evaluation

## 2019-09-29 NOTE — SUBJECTIVE & OBJECTIVE
Interval History: breathing improving, still has minimal urine out put , back on morphine gtt for comfort   Review of Systems   Constitutional: Positive for fatigue (chronic). Negative for diaphoresis and fever.   HENT: Positive for voice change. Negative for sore throat and trouble swallowing.    Respiratory: Negative for cough, chest tightness and shortness of breath.    Cardiovascular: Positive for leg swelling (chronic). Negative for chest pain.   Gastrointestinal: Negative for abdominal pain, anal bleeding, blood in stool, diarrhea, nausea and vomiting.   Genitourinary: Negative for flank pain and frequency.   Musculoskeletal: Positive for gait problem (wheelchair at baseline) and myalgias (chronic). Negative for back pain and neck pain.   Skin: Positive for wound (chronic sarcal wound, skin breakdown to L foot).   Neurological: Positive for weakness (chronic). Negative for seizures and speech difficulty.   Psychiatric/Behavioral: Positive for confusion and decreased concentration. Negative for agitation and behavioral problems.        Somnolent      Objective:     Vital Signs (Most Recent):  Temp: 97.5 °F (36.4 °C) (09/29/19 0715)  Pulse: 90 (09/29/19 0715)  Resp: (!) 23 (09/29/19 0715)  BP: 103/63 (09/29/19 0715)  SpO2: (!) 94 % (09/29/19 0715) Vital Signs (24h Range):  Temp:  [97.5 °F (36.4 °C)-98.3 °F (36.8 °C)] 97.5 °F (36.4 °C)  Pulse:  [] 90  Resp:  [19-30] 23  SpO2:  [94 %-98 %] 94 %  BP: ()/(50-63) 103/63   Weight: 81.6 kg (180 lb)  Body mass index is 27.37 kg/m².      Intake/Output Summary (Last 24 hours) at 9/29/2019 1308  Last data filed at 9/29/2019 0800  Gross per 24 hour   Intake 816.01 ml   Output 118 ml   Net 698.01 ml       Physical Exam   Constitutional: He is oriented to person, place, and time. No distress.   Frail elderly male in NAD   HENT:   Head: Normocephalic and atraumatic.   dysarthria    Eyes: EOM are normal. Right eye exhibits no discharge. Left eye exhibits no  discharge.   Neck: Normal range of motion. Neck supple.   Cardiovascular: Normal rate, regular rhythm and intact distal pulses.   Murmur (systolic ) heard.  Pulmonary/Chest: Effort normal. No respiratory distress.   Abdominal: Soft. Bowel sounds are normal. He exhibits no distension. There is no tenderness.   Musculoskeletal: Normal range of motion. He exhibits edema (LE).   Neurological: He is alert and oriented to person, place, and time.   somnolent    Skin: Skin is warm and dry. He is not diaphoretic.   L foot wound dressed C/D/I   Psychiatric: He has a normal mood and affect. His behavior is normal.   Vitals reviewed.      Vents:  Oxygen Concentration (%): 30 (09/27/19 1236)  Lines/Drains/Airways     Drain                 Urethral Catheter 09/26/19 1800 Latex 2 days          Peripheral Intravenous Line                 Peripheral IV - Single Lumen 09/28/19 0854 20 G Proximal;Right Forearm 1 day         Peripheral IV - Single Lumen 09/28/19 0854 20 G Right;Distal Forearm 1 day              Significant Labs:    CBC/Anemia Profile:  Recent Labs   Lab 09/28/19  0300 09/29/19  0300   WBC 29.03* 26.77*   HGB 7.9* 8.1*   HCT 22.6* 22.7*   PLT 52* 33*   MCV 80* 81*   RDW 15.4* 16.4*        Chemistries:  Recent Labs   Lab 09/28/19  0300 09/29/19  0300    143   K 4.9 5.4*    108   CO2 17* 12*   BUN 91* 106*   CREATININE 4.9* 5.6*   CALCIUM 7.9* 7.6*   ALBUMIN 2.6* 2.6*   PROT 5.7* 5.7*   BILITOT 1.5* 1.4*   ALKPHOS 99 129   ALT 3,788* 2,982*   AST 5,146* 2,479*   MG 2.5 2.6   PHOS 7.0* 7.7*         Significant Imaging: I have reviewed all pertinent imaging results/findings within the past 24 hours.

## 2019-09-29 NOTE — PROGRESS NOTES
"[facility]  Critical Care Medicine  Progress Note    Patient Name: Jeniffer Taylor  MRN: [id number]  Admission Date: 9/24/2019  Hospital Length of Stay: 4 days  Code Status: DNR  Attending Provider: Sean Reynoso MD  Primary Care Provider: Shari Chauhan MD   Principal Problem: Acute respiratory failure with hypoxia    Subjective:     HPI:  89 year old male with a PMHx of HTN, HLD, CAD, HFrEF, AS, CKD3,DVT on eliquis and DM2 presenting to the ER from PCP clinic with son and grandson at bedside for "low hemoglobin." Patient reports noticing dark stools at home, but denies BRBPR, bloody BMs, hematuria, and hemoptysis. He denies associated SOB and PRATT. Patient also reports taking iron supplementation at home. He reports chronic fatigue, currently at his baseline. At the time of my exam, patient denies CP, SOB, abdominal pain, N/V/D, fever/chills, recent illness, dysuria/hematuria/frequency, focal weakness, syncope, and palpitations.   Patient was admitted 09/09 due to CHF exacerbation and LE swelling. Venous LE US noted partially occlusive DVT in the upper right femoral vein and patient was initiated on heparin gtt >>> transitioned to renal dose apixaban 09/11.    MICU was consulted after a rapid response today, patient was found short of breath with difficulty breathing, saturating in the 80's and a non re breather was placed, ABG was unremarkable, patient weaned down to 3L, HGB dropped from 9 to 7.7 and trop elevated from 5 to 24, patient transferred to MICU for higher level of care.       Hospital/ICU Course:  Patient admitted for GIB. He was sent from PCP for low hgb 8.8>>7.8. Vitals remaining stable. Started protonix IV. Consulted GI who recommend EGD. However, patient at first adamant that he did not want scope because he was "tired of being poked and prodded". He was wanting to leave AMA, but subsequently decided to stay and pursue scope. Notified GI.Patient began getting SOB and hypoxic " 9/25 night, improved with lasix and breathing treatment. Troponin 5>>25. Consulted cardiology. Patient moved to ICU 9/26 for closer monitoring.9/27 patient didn't responeded appropriately to lasix, kidney function worsening, having increase work of breathing and metabolic acidosis, GI held off on EGD given his unstable condition, cardiology recommended no ACS protocol given his GI bleed, had goals of care discussion with patient daughter, her goal is to keep him comfort and continue current course of treatment with no escalation. 9/26 patient showing some improvement, RR improved and lactic acidosis resolved, had goals of care discussion with the family and they decided to continue current level of care with no escaluation9/28. 9/29 worsening BERNARDO with low urine output, patient will be stepped down to hospital medicine to continue current treatment with comfort measures.     Interval History: breathing improving, still has minimal urine out put , back on morphine gtt for comfort   Review of Systems   Constitutional: Positive for fatigue (chronic). Negative for diaphoresis and fever.   HENT: Positive for voice change. Negative for sore throat and trouble swallowing.    Respiratory: Negative for cough, chest tightness and shortness of breath.    Cardiovascular: Positive for leg swelling (chronic). Negative for chest pain.   Gastrointestinal: Negative for abdominal pain, anal bleeding, blood in stool, diarrhea, nausea and vomiting.   Genitourinary: Negative for flank pain and frequency.   Musculoskeletal: Positive for gait problem (wheelchair at baseline) and myalgias (chronic). Negative for back pain and neck pain.   Skin: Positive for wound (chronic sarcal wound, skin breakdown to L foot).   Neurological: Positive for weakness (chronic). Negative for seizures and speech difficulty.   Psychiatric/Behavioral: Positive for confusion and decreased concentration. Negative for agitation and behavioral problems.         Somnolent      Objective:     Vital Signs (Most Recent):  Temp: 97.5 °F (36.4 °C) (09/29/19 0715)  Pulse: 90 (09/29/19 0715)  Resp: (!) 23 (09/29/19 0715)  BP: 103/63 (09/29/19 0715)  SpO2: (!) 94 % (09/29/19 0715) Vital Signs (24h Range):  Temp:  [97.5 °F (36.4 °C)-98.3 °F (36.8 °C)] 97.5 °F (36.4 °C)  Pulse:  [] 90  Resp:  [19-30] 23  SpO2:  [94 %-98 %] 94 %  BP: ()/(50-63) 103/63   Weight: 81.6 kg (180 lb)  Body mass index is 27.37 kg/m².      Intake/Output Summary (Last 24 hours) at 9/29/2019 1308  Last data filed at 9/29/2019 0800  Gross per 24 hour   Intake 816.01 ml   Output 118 ml   Net 698.01 ml       Physical Exam   Constitutional: He is oriented to person, place, and time. No distress.   Frail elderly male in NAD   HENT:   Head: Normocephalic and atraumatic.   dysarthria    Eyes: EOM are normal. Right eye exhibits no discharge. Left eye exhibits no discharge.   Neck: Normal range of motion. Neck supple.   Cardiovascular: Normal rate, regular rhythm and intact distal pulses.   Murmur (systolic ) heard.  Pulmonary/Chest: Effort normal. No respiratory distress.   Abdominal: Soft. Bowel sounds are normal. He exhibits no distension. There is no tenderness.   Musculoskeletal: Normal range of motion. He exhibits edema (LE).   Neurological: He is alert and oriented to person, place, and time.   somnolent    Skin: Skin is warm and dry. He is not diaphoretic.   L foot wound dressed C/D/I   Psychiatric: He has a normal mood and affect. His behavior is normal.   Vitals reviewed.      Vents:  Oxygen Concentration (%): 30 (09/27/19 1236)  Lines/Drains/Airways     Drain                 Urethral Catheter 09/26/19 1800 Latex 2 days          Peripheral Intravenous Line                 Peripheral IV - Single Lumen 09/28/19 0854 20 G Proximal;Right Forearm 1 day         Peripheral IV - Single Lumen 09/28/19 0854 20 G Right;Distal Forearm 1 day              Significant Labs:    CBC/Anemia Profile:  Recent Labs    Lab 09/28/19  0300 09/29/19  0300   WBC 29.03* 26.77*   HGB 7.9* 8.1*   HCT 22.6* 22.7*   PLT 52* 33*   MCV 80* 81*   RDW 15.4* 16.4*        Chemistries:  Recent Labs   Lab 09/28/19  0300 09/29/19  0300    143   K 4.9 5.4*    108   CO2 17* 12*   BUN 91* 106*   CREATININE 4.9* 5.6*   CALCIUM 7.9* 7.6*   ALBUMIN 2.6* 2.6*   PROT 5.7* 5.7*   BILITOT 1.5* 1.4*   ALKPHOS 99 129   ALT 3,788* 2,982*   AST 5,146* 2,479*   MG 2.5 2.6   PHOS 7.0* 7.7*         Significant Imaging: I have reviewed all pertinent imaging results/findings within the past 24 hours.      ABG  Recent Labs   Lab 09/29/19  0824   PH 7.311*   PO2 45   PCO2 26.4*   HCO3 13.3*   BE -13     Assessment/Plan:     Cardiac/Vascular  Acute on chronic combined systolic and diastolic heart failure  Acute respiratory failure with hypoxia  Aortic stenosis  EF 45% with sever AS and mild to moderate MR on 9/9/19    Chest x-ray consistent with pulm edema   BNP elevated   LE edema and basilar crackles on exam   Not responding will to lasix   - strict I&Os  - titrate oxygen as needed  - minimal urine output with lasix   - Family decided to not pursue dialysis and focus on comfort care with no escalation       NSTEMI (non-ST elevated myocardial infarction)  Patient with Hx of  HTN, HLD, CAD, HFrEF (EF 45%), AS, CKD3, and DM2 initially admitted to hospital medicine for LGIB in setting of AC. Cardiology consulted due to NSTEMI and acute hypoxic respiratory failure due to pulmonary edema. Troponin 5--> 24> 25>>33, BMP of 3K with pulmonary edema on CXR. EKG with non specific ST changes. 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 No ASA or heparin   - continue atrovastatin 80mg qD  - d/c troponin per cards recs  GI held off on EGD given his unstable condition, cardiology recommended no ACS protocol given his GI bleed.        Hypertensive heart disease with heart failure  - BP stable  - Held coreg and hydralazine  in setting of melena  - Continue home norvasc  - Continue to monitor, resume meds as needed    CAD (coronary artery disease)  Coronary angiogram (4/7/14): Prox LAD PCI, 99% LCx (small vessel), 40% Ramus, 95% RCA (multiple lesions, heavily calcified).     On Asprin, coreg, atorvastatin at home    Dyslipidemia  - continue atorvastatin 80mg qD    Renal/  UTI (urinary tract infection)  Bacteremia  Urine and blood cultures growing gram -ve rods   Ecoli in the urin     Continue zosyn and d/c vanc   Monitor urin out put and mental status improvement     Acute kidney injury superimposed on chronic kidney disease  in sitting of DHF and UTI   Cardiorenal VS hypovolemia in sitting of sepsis   minimal urine out put and failed lasix challenge     monitor out/put     Continue Abx and trial of IVF    Family decided to not pursue dialysis and focus on comfort care with no escalation         Hematology  Acute deep vein thrombosis (DVT) of right lower extremity  venous LE US noted partially occlusive DVT in the upper right femoral vein and patient was initiated on heparin gtt >>> transitioned to renal dose apixaban 09/11    - repeated U/S 9/27 showed no DVT        Oncology  Anemia  Chronic, disease and iron deficiency.   Stable.       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 Protonix 80mg IV bolus x 1 then 8mg/hr gtt  2. Intravascular resuscitation/support with IVFs and pRBCs as needed.  3. Serial H/H's transfuse as  needed.  4. Discontinue all ASA, NSAIDs and Heparin products  5. Please correct any coagulopathy with platelets and FFP to a goal of platelets >50K and INR <2.5  6. Maintain IV access with 2 large bore IVs  - GI held off on EGD given his unstable condition.    Other  Pressure injury of sacral region, stage 2  Chronic sacral wound, L foot skin breakdown  - Wound care consulted        Critical Care Daily Checklist:    A: Awake: RASS Goal/Actual Goal:    Actual: Salazar Agitation Sedation Scale (RASS): Light sedation   B: Spontaneous Breathing Trial Performed?     C: SAT & SBT Coordinated?  na                      D: Delirium: CAM-ICU Overall CAM-ICU: Negative   E: Early Mobility Performed? No   F: Feeding Goal:    Status:     Current Diet Order   Procedures    Diet clear liquid      AS: Analgesia/Sedation Morphine    T: Thromboembolic Prophylaxis Gi bleed    H: HOB > 300 Yes   U: Stress Ulcer Prophylaxis (if needed) non   G: Glucose Control iss   B: Bowel Function Stool Occurrence: 0   I: Indwelling Catheter (Lines & Lawrence) Necessity yes   D: De-escalation of Antimicrobials/Pharmacotherapies rocephin     Plan for the day/ETD Step down     Code Status:  Family/Goals of Care: DNR         Critical secondary to Patient has a condition that poses threat to life and bodily function: Acute Renal Failure      Critical care was time spent personally by me on the following activities: development of treatment plan with patient or surrogate and bedside caregivers, discussions with consultants, evaluation of patient's response to treatment, examination of patient, ordering and performing treatments and interventions, ordering and review of laboratory studies, ordering and review of radiographic studies, pulse oximetry, re-evaluation of patient's condition. This critical care time did not overlap with that of any other provider or involve time for any procedures.     OLNI Huynh  Critical Care Medicine  [facility]  [place]

## 2019-09-30 ENCOUNTER — TELEPHONE (OUTPATIENT)
Dept: INTERNAL MEDICINE | Facility: CLINIC | Age: 84
End: 2019-09-30

## 2019-09-30 PROBLEM — Z51.5 PALLIATIVE CARE ENCOUNTER: Status: ACTIVE | Noted: 2019-09-30

## 2019-09-30 LAB
ALBUMIN SERPL BCP-MCNC: 2.8 G/DL (ref 3.5–5.2)
ALP SERPL-CCNC: 173 U/L (ref 55–135)
ALT SERPL W/O P-5'-P-CCNC: 2351 U/L (ref 10–44)
ANION GAP SERPL CALC-SCNC: 25 MMOL/L (ref 8–16)
AST SERPL-CCNC: 1301 U/L (ref 10–40)
BACTERIA STL CULT: ABNORMAL
BACTERIA STL CULT: ABNORMAL
BASOPHILS # BLD AUTO: 0.06 K/UL (ref 0–0.2)
BASOPHILS NFR BLD: 0.2 % (ref 0–1.9)
BILIRUB SERPL-MCNC: 1.1 MG/DL (ref 0.1–1)
BUN SERPL-MCNC: 119 MG/DL (ref 10–30)
CALCIUM SERPL-MCNC: 7.6 MG/DL (ref 8.7–10.5)
CHLORIDE SERPL-SCNC: 108 MMOL/L (ref 95–110)
CO2 SERPL-SCNC: 13 MMOL/L (ref 23–29)
CREAT SERPL-MCNC: 6.7 MG/DL (ref 0.5–1.4)
DIFFERENTIAL METHOD: ABNORMAL
EOSINOPHIL # BLD AUTO: 0 K/UL (ref 0–0.5)
EOSINOPHIL NFR BLD: 0.2 % (ref 0–8)
ERYTHROCYTE [DISTWIDTH] IN BLOOD BY AUTOMATED COUNT: 16.3 % (ref 11.5–14.5)
EST. GFR  (AFRICAN AMERICAN): 7.6 ML/MIN/1.73 M^2
EST. GFR  (NON AFRICAN AMERICAN): 6.6 ML/MIN/1.73 M^2
FAT STL SUDAN IV STN: NORMAL
GLUCOSE SERPL-MCNC: 183 MG/DL (ref 70–110)
H PYLORI AG STL QL IA: NOT DETECTED
HCT VFR BLD AUTO: 23.8 % (ref 40–54)
HGB BLD-MCNC: 8.1 G/DL (ref 14–18)
IMM GRANULOCYTES # BLD AUTO: 0.55 K/UL (ref 0–0.04)
IMM GRANULOCYTES NFR BLD AUTO: 2.1 % (ref 0–0.5)
LYMPHOCYTES # BLD AUTO: 0.7 K/UL (ref 1–4.8)
LYMPHOCYTES NFR BLD: 2.5 % (ref 18–48)
MAGNESIUM SERPL-MCNC: 2.8 MG/DL (ref 1.6–2.6)
MCH RBC QN AUTO: 27.7 PG (ref 27–31)
MCHC RBC AUTO-ENTMCNC: 34 G/DL (ref 32–36)
MCV RBC AUTO: 82 FL (ref 82–98)
MONOCYTES # BLD AUTO: 1.2 K/UL (ref 0.3–1)
MONOCYTES NFR BLD: 4.7 % (ref 4–15)
NEUTROPHILS # BLD AUTO: 24 K/UL (ref 1.8–7.7)
NEUTROPHILS NFR BLD: 90.3 % (ref 38–73)
NRBC BLD-RTO: 1 /100 WBC
PHOSPHATE SERPL-MCNC: 8.5 MG/DL (ref 2.7–4.5)
PLATELET # BLD AUTO: 29 K/UL (ref 150–350)
PMV BLD AUTO: ABNORMAL FL (ref 9.2–12.9)
POCT GLUCOSE: 189 MG/DL (ref 70–110)
POCT GLUCOSE: 239 MG/DL (ref 70–110)
POTASSIUM SERPL-SCNC: 5.5 MMOL/L (ref 3.5–5.1)
PROT SERPL-MCNC: 6.3 G/DL (ref 6–8.4)
RBC # BLD AUTO: 2.92 M/UL (ref 4.6–6.2)
SODIUM SERPL-SCNC: 146 MMOL/L (ref 136–145)
WBC # BLD AUTO: 26.49 K/UL (ref 3.9–12.7)

## 2019-09-30 PROCEDURE — 80053 COMPREHEN METABOLIC PANEL: CPT | Mod: HCNC

## 2019-09-30 PROCEDURE — 25000003 PHARM REV CODE 250: Mod: HCNC | Performed by: PHYSICIAN ASSISTANT

## 2019-09-30 PROCEDURE — A4216 STERILE WATER/SALINE, 10 ML: HCPCS | Mod: HCNC | Performed by: PHYSICIAN ASSISTANT

## 2019-09-30 PROCEDURE — 99233 SBSQ HOSP IP/OBS HIGH 50: CPT | Mod: HCNC,,, | Performed by: HOSPITALIST

## 2019-09-30 PROCEDURE — 84100 ASSAY OF PHOSPHORUS: CPT | Mod: HCNC

## 2019-09-30 PROCEDURE — 97802 MEDICAL NUTRITION INDIV IN: CPT | Mod: HCNC

## 2019-09-30 PROCEDURE — 83735 ASSAY OF MAGNESIUM: CPT | Mod: HCNC

## 2019-09-30 PROCEDURE — 99233 PR SUBSEQUENT HOSPITAL CARE,LEVL III: ICD-10-PCS | Mod: HCNC,,, | Performed by: HOSPITALIST

## 2019-09-30 PROCEDURE — 63600175 PHARM REV CODE 636 W HCPCS: Mod: HCNC | Performed by: STUDENT IN AN ORGANIZED HEALTH CARE EDUCATION/TRAINING PROGRAM

## 2019-09-30 PROCEDURE — 99223 PR INITIAL HOSPITAL CARE,LEVL III: ICD-10-PCS | Mod: HCNC,,, | Performed by: CLINICAL NURSE SPECIALIST

## 2019-09-30 PROCEDURE — 99223 1ST HOSP IP/OBS HIGH 75: CPT | Mod: HCNC,,, | Performed by: CLINICAL NURSE SPECIALIST

## 2019-09-30 PROCEDURE — 85025 COMPLETE CBC W/AUTO DIFF WBC: CPT | Mod: HCNC

## 2019-09-30 PROCEDURE — C9113 INJ PANTOPRAZOLE SODIUM, VIA: HCPCS | Mod: HCNC | Performed by: PHYSICIAN ASSISTANT

## 2019-09-30 PROCEDURE — 63600175 PHARM REV CODE 636 W HCPCS: Mod: HCNC | Performed by: PHYSICIAN ASSISTANT

## 2019-09-30 PROCEDURE — 36415 COLL VENOUS BLD VENIPUNCTURE: CPT | Mod: HCNC

## 2019-09-30 PROCEDURE — 20600001 HC STEP DOWN PRIVATE ROOM: Mod: HCNC

## 2019-09-30 RX ORDER — LORAZEPAM 2 MG/ML
1 INJECTION INTRAMUSCULAR
Status: DISCONTINUED | OUTPATIENT
Start: 2019-09-30 | End: 2019-10-02 | Stop reason: HOSPADM

## 2019-09-30 RX ORDER — LORAZEPAM 2 MG/ML
1 INJECTION INTRAMUSCULAR
Status: DISCONTINUED | OUTPATIENT
Start: 2019-09-30 | End: 2019-09-30

## 2019-09-30 RX ORDER — GLYCOPYRROLATE 0.2 MG/ML
0.1 INJECTION INTRAMUSCULAR; INTRAVENOUS EVERY 4 HOURS PRN
Status: DISCONTINUED | OUTPATIENT
Start: 2019-09-30 | End: 2019-10-02 | Stop reason: HOSPADM

## 2019-09-30 RX ORDER — MORPHINE SULFATE 2 MG/ML
2 INJECTION, SOLUTION INTRAMUSCULAR; INTRAVENOUS
Status: DISCONTINUED | OUTPATIENT
Start: 2019-09-30 | End: 2019-10-02 | Stop reason: HOSPADM

## 2019-09-30 RX ADMIN — PANTOPRAZOLE SODIUM 40 MG: 40 INJECTION, POWDER, FOR SOLUTION INTRAVENOUS at 10:09

## 2019-09-30 RX ADMIN — PANTOPRAZOLE SODIUM 40 MG: 40 INJECTION, POWDER, FOR SOLUTION INTRAVENOUS at 09:09

## 2019-09-30 RX ADMIN — Medication 3 ML: at 09:09

## 2019-09-30 RX ADMIN — CEFTRIAXONE SODIUM 1 G: 1 INJECTION, POWDER, FOR SOLUTION INTRAMUSCULAR; INTRAVENOUS at 03:09

## 2019-09-30 RX ADMIN — Medication 3 ML: at 05:09

## 2019-09-30 RX ADMIN — LATANOPROST 1 DROP: 50 SOLUTION OPHTHALMIC at 09:09

## 2019-09-30 RX ADMIN — Medication 3 ML: at 06:09

## 2019-09-30 NOTE — PROGRESS NOTES
"Hospital Medicine   Progress note      Team: [facility] HOSP MED G Astrid Santizo MD   Admit Date: 9/24/2019   Hospital Day: 5  LUCIA: 10/1/2019   Code status: DNR (Do Not Resuscitate)   Principal Problem: Acute respiratory failure with hypoxia     HPI: 89 year old male with a PMHx of HTN, HLD, CAD, HFrEF, AS, CKD3, and DM2 presenting to the ER from PCP clinic with son and grandson at bedside for "low hemoglobin." Patient reports noticing dark stools at home, but denies BRBPR, bloody BMs, hematuria, and hemoptysis. He denies associated SOB and PRATT. Patient also reports taking iron supplementation at home. He reports chronic fatigue, currently at his baseline. At the time of my exam, patient denies CP, SOB, abdominal pain, N/V/D, fever/chills, recent illness, dysuria/hematuria/frequency, focal weakness, syncope, and palpitations.     ICU Course: Patient admitted for GIB. He was sent from PCP for low hgb 8.8>>7.8. Vitals remaining stable. Started protonix IV. Consulted GI who recommend EGD. However, patient at first adamant that he did not want scope because he was "tired of being poked and prodded". He was wanting to leave AMA, but subsequently decided to stay and pursue scope. Notified GI.Patient began getting SOB and hypoxic 9/25 night, improved with lasix and breathing treatment. Troponin 5>>25. Consulted cardiology. Patient moved to ICU 9/26 for closer monitoring.9/27 patient didn't responeded appropriately to lasix, kidney function worsening, having increase work of breathing and metabolic acidosis, GI held off on EGD given his unstable condition, cardiology recommended no ACS protocol given his GI bleed, had goals of care discussion with patient daughter, her goal is to keep him comfort and continue current course of treatment with no escalation. 9/26 patient showing some improvement, RR improved and lactic acidosis resolved, had goals of care discussion with the family today.      Interval hx:   Pt was seen and " examined at bedside.  Patient is currently on a morphine drip, arousable but not alert or conversant.  Does not appear to be in any acute distress. Patient is hemodynamically stable and afebrile.  Lawrence in place, making very little urine.  Long discussion with daughter at bedside about goals of care.  Daughter would like to pursue comfort measures only.  No escalation of care.  Code status DNR.  She does want to continue his IV antibiotics and to draw daily labs at this time.  Palliative Care consulted for symptom management.  [facility] consulted to evaluate for inpatient hospice versus home hospice.    ROS (Positive in Bold, otherwise negative)  Unable to obtain as patient is encephalopathic    PEx   Temp:  [96.8 °F (36 °C)-97.7 °F (36.5 °C)]   Pulse:  []   Resp:  [18-20]   BP: (100-112)/(57-62)   SpO2:  [91 %-96 %]      I & O (Last 24H):     Intake/Output Summary (Last 24 hours) at 9/30/2019 1448  Last data filed at 9/30/2019 0910  Gross per 24 hour   Intake 11.95 ml   Output 152 ml   Net -140.05 ml       General:  male  in no acute distress. Nontoxic. Resting in bed.  Drowsy, easily arousable but unable to hold a conversation or follow commands.  HEENT: NCAT. PERRL. Sclera Anicteric.  CVS: RRR. Normal S1 S2. No murmurs  Pulm: CTAB. Normal respiratory effort. No wheezes, rhonchi, or crackles.  Abdomen: Soft. Non-distended. No tenderness to palpation. No rebound or guarding. +BS.  Extremities: No edema. No cyanosis. Full ROM.  Neuro: Alert, oriented x 0, Spont mvt of all extremities with no focal deficits noted. Easily arousable but unable to follow commands    Recent Results (from the past 24 hour(s))   POCT glucose    Collection Time: 09/29/19  4:30 PM   Result Value Ref Range    POCT Glucose 167 (H) 70 - 110 mg/dL   POCT glucose    Collection Time: 09/30/19  4:26 AM   Result Value Ref Range    POCT Glucose 239 (H) 70 - 110 mg/dL   Magnesium    Collection Time: 09/30/19  7:33 AM    Result Value Ref Range    Magnesium 2.8 (H) 1.6 - 2.6 mg/dL   Phosphorus    Collection Time: 09/30/19  7:33 AM   Result Value Ref Range    Phosphorus 8.5 (H) 2.7 - 4.5 mg/dL   Comprehensive metabolic panel    Collection Time: 09/30/19  7:33 AM   Result Value Ref Range    Sodium 146 (H) 136 - 145 mmol/L    Potassium 5.5 (H) 3.5 - 5.1 mmol/L    Chloride 108 95 - 110 mmol/L    CO2 13 (L) 23 - 29 mmol/L    Glucose 183 (H) 70 - 110 mg/dL    BUN, Bld 119 (H) 10 - 30 mg/dL    Creatinine 6.7 (H) 0.5 - 1.4 mg/dL    Calcium 7.6 (L) 8.7 - 10.5 mg/dL    Total Protein 6.3 6.0 - 8.4 g/dL    Albumin 2.8 (L) 3.5 - 5.2 g/dL    Total Bilirubin 1.1 (H) 0.1 - 1.0 mg/dL    Alkaline Phosphatase 173 (H) 55 - 135 U/L    AST 1,301 (H) 10 - 40 U/L    ALT 2,351 (H) 10 - 44 U/L    Anion Gap 25 (H) 8 - 16 mmol/L    eGFR if African American 7.6 (A) >60 mL/min/1.73 m^2    eGFR if non African American 6.6 (A) >60 mL/min/1.73 m^2   CBC auto differential    Collection Time: 09/30/19  7:33 AM   Result Value Ref Range    WBC 26.49 (H) 3.90 - 12.70 K/uL    RBC 2.92 (L) 4.60 - 6.20 M/uL    Hemoglobin 8.1 (L) 14.0 - 18.0 g/dL    Hematocrit 23.8 (L) 40.0 - 54.0 %    Mean Corpuscular Volume 82 82 - 98 fL    Mean Corpuscular Hemoglobin 27.7 27.0 - 31.0 pg    Mean Corpuscular Hemoglobin Conc 34.0 32.0 - 36.0 g/dL    RDW 16.3 (H) 11.5 - 14.5 %    Platelets 29 (LL) 150 - 350 K/uL    MPV SEE COMMENT 9.2 - 12.9 fL    Immature Granulocytes 2.1 (H) 0.0 - 0.5 %    Gran # (ANC) 24.0 (H) 1.8 - 7.7 K/uL    Immature Grans (Abs) 0.55 (H) 0.00 - 0.04 K/uL    Lymph # 0.7 (L) 1.0 - 4.8 K/uL    Mono # 1.2 (H) 0.3 - 1.0 K/uL    Eos # 0.0 0.0 - 0.5 K/uL    Baso # 0.06 0.00 - 0.20 K/uL    nRBC 1 (A) 0 /100 WBC    Gran% 90.3 (H) 38.0 - 73.0 %    Lymph% 2.5 (L) 18.0 - 48.0 %    Mono% 4.7 4.0 - 15.0 %    Eosinophil% 0.2 0.0 - 8.0 %    Basophil% 0.2 0.0 - 1.9 %    Differential Method Automated    POCT glucose    Collection Time: 09/30/19  8:16 AM   Result Value Ref Range    POCT  Glucose 189 (H) 70 - 110 mg/dL       Recent Labs   Lab 09/29/19  0407 09/29/19  0607 09/29/19  0802 09/29/19  1630 09/30/19  0426 09/30/19  0816   POCTGLUCOSE 123* 140* 145* 167* 239* 189*       Hemoglobin A1C   Date Value Ref Range Status   09/25/2019 6.1 (H) 4.0 - 5.6 % Final     Comment:     ADA Screening Guidelines:  5.7-6.4%  Consistent with prediabetes  >or=6.5%  Consistent with diabetes  High levels of fetal hemoglobin interfere with the HbA1C  assay. Heterozygous hemoglobin variants (HbS, HgC, etc)do  not significantly interfere with this assay.   However, presence of multiple variants may affect accuracy.     06/20/2019 6.0 (H) 4.0 - 5.6 % Final     Comment:     ADA Screening Guidelines:  5.7-6.4%  Consistent with prediabetes  >or=6.5%  Consistent with diabetes  High levels of fetal hemoglobin interfere with the HbA1C  assay. Heterozygous hemoglobin variants (HbS, HgC, etc)do  not significantly interfere with this assay.   However, presence of multiple variants may affect accuracy.     01/13/2019 6.1 (H) 4.0 - 5.6 % Final     Comment:     ADA Screening Guidelines:  5.7-6.4%  Consistent with prediabetes  >or=6.5%  Consistent with diabetes  High levels of fetal hemoglobin interfere with the HbA1C  assay. Heterozygous hemoglobin variants (HbS, HgC, etc)do  not significantly interfere with this assay.   However, presence of multiple variants may affect accuracy.          Active Hospital Problems    Diagnosis  POA    *Acute respiratory failure with hypoxia [J96.01]  Unknown    Palliative care encounter [Z51.5]  Not Applicable    Bacteremia [R78.81]  Unknown    UTI (urinary tract infection) [N39.0]  Unknown    Transaminitis [R74.0]  Unknown    GI bleed [K92.2]  Yes    Pressure injury of left heel, stage 2 [L89.622]  Yes    Melena [K92.1]  Yes    Pressure injury, stage 3 [L89.93]  Yes    Acute deep vein thrombosis (DVT) of right lower extremity [I82.401]  Yes    Pressure injury of sacral region, stage 2  [L89.152]  Yes    Acute on chronic combined systolic and diastolic heart failure [I50.43]  Yes    Controlled type 2 diabetes mellitus with stage 3 chronic kidney disease, without long-term current use of insulin [E11.22, N18.3]  Yes    Acute kidney injury superimposed on chronic kidney disease [N17.9, N18.9]  Yes    Anemia [D64.9]  Yes    NSTEMI (non-ST elevated myocardial infarction) [I21.4]  No     Chronic    Aortic stenosis [I35.0]  Yes     Mild in 3/2014      Hypertensive heart disease with heart failure [I11.0]  Yes    Prostate CA [C61]  Yes    Dyslipidemia [E78.5]  Yes    CAD (coronary artery disease) [I25.10]  Yes     Last PCI - prox LAD, April 2014  Small LCx known to have subtotal occlusion        Resolved Hospital Problems   No resolved problems to display.          Assessment and Plan for problems addressed today:      cefTRIAXone (ROCEPHIN) IVPB  1 g Intravenous Q24H    latanoprost  1 drop Both Eyes QHS    lorazepam  1 mg Intravenous Q2H    pantoprazole  40 mg Intravenous BID    sodium chloride 0.9%  3 mL Intravenous Q8H     sodium chloride, acetaminophen, albuterol-ipratropium, Dextrose 10% Bolus, Dextrose 10% Bolus, furosemide, glucagon (human recombinant), glucose, glucose, glycopyrrolate, insulin aspart U-100, morphine, ondansetron, ondansetron, sodium chloride 0.9%    Goals of care/adult failure to thrive:  Encounter for comfort care:  -patient admitted for acute GI bleed, NSTEMI, acute renal failure, DVT, severe aortic stenosis, UTI, E coli bacteremia.  Due to multiple comorbidities, advanced age, adult failure to thrive, grave debility, muscle weakness had long discussion with daughter about goals of care.  Daughter is now interested in transitioning to comfort measures only  -no escalation of care no invasive procedures, no dialysis  -morphine, Ativan available p.r.n. frequently for any signs of distress  -glycopyrrolate available p.r.n. for increased secretions  -palliative Care  consulted, appreciate recs  -hospice consulted to see if patient is a candidate for inpatient hospice bed versus discharge home with hospice  -code status DNR    GI bleed / melena:  -continue Protonix for now  -continue to monitor CBC per daughter's wishes  -no plans for EGD    Acute on chronic combined systolic and diastolic heart failure  Acute respiratory failure with hypoxia  Aortic stenosis  -EF 45% with sever AS and mild to moderate MR on 9/9/19  -Chest x-ray consistent with pulm edema   -BNP elevated   -LE edema and basilar crackles on exam   -Not responding will to lasix   -minimal urine output with lasix   -Family decided to not pursue dialysis and focus on comfort care with no escalation        NSTEMI (non-ST elevated myocardial infarction)  -Patient with Hx of  HTN, HLD, CAD, HFrEF (EF 45%), AS, CKD3, and DM2 initially admitted to hospital medicine for LGIB in setting of AC. Cardiology consulted due to NSTEMI and acute hypoxic respiratory failure due to pulmonary edema. Troponin 5--> 24> 25>>33, BMP of 3K with pulmonary edema on CXR. EKG with non specific ST changes. 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 No ASA or heparin   -d/c troponin per cards recs     Hypertensive heart disease with heart failure  -BP stable  -Held amlodipine, coreg and hydralazine in setting of melena     CAD (coronary artery disease)  -Coronary angiogram (4/7/14): Prox LAD PCI, 99% LCx (small vessel), 40% Ramus, 95% RCA (multiple lesions, heavily calcified).   -discontinue aspirin, statin     Dyslipidemia  -stop atorvastatin 80mg qD     UTI (urinary tract infection)  Bacteremia  -Urine and blood cultures growing gram -ve rods   -Ecoli in the urine  -continue ceftriaxone for now per family's wishes    Acute kidney injury superimposed on chronic kidney disease  Hyperkalemia/metabolic acidosis/uremia:  -in sitting of DHF and UTI   -Cardiorenal VS hypovolemia in sitting of sepsis    -minimal urine out put and failed lasix challenge   -Family decided to not pursue dialysis and focus on comfort care with no escalation        Acute deep vein thrombosis (DVT) of right lower extremity  -venous LE US noted partially occlusive DVT in the upper right femoral vein and patient was initiated on heparin gtt >>> transitioned to renal dose apixaban 09/11  -repeated U/S 9/27 showed no DVT  -anticoagulation now stopped     Controlled type 2 diabetes mellitus with stage 3 chronic kidney disease, without long-term current use of insulin  -stop Accu-Cheks  -p.o. intake as tolerated     Transaminitis:  Improved  -Likely shock liver from hypotension   -continue trending LFTs    Discharge plan and follow up:  Inpatient hospice versus home hospice    Astrid Santizo MD  Hospital Medicine Staff  [phone] pager

## 2019-09-30 NOTE — TELEPHONE ENCOUNTER
----- Message from Carie Arce sent at 9/30/2019  9:33 AM CDT -----  Contact: [facility] [place]/[phone]  Patient is returning a phone call.  Who left a message for the patient: Dr Chauhan' office  Does patient know what this is regarding:   Insurance and id #  Comments:

## 2019-09-30 NOTE — CONSULTS
Bedside shift change report given to [facility] (oncoming nurse) by Aneta Simon RN (offgoing nurse). Report included the following information SBAR, Kardex, ED Summary, [facility] and Recent Results. [facility]  Palliative Medicine  Consult Note    Patient Name: Jeniffer Taylor  MRN: [id number]  Admission Date: 9/24/2019  Hospital Length of Stay: 5 days  Code Status: DNR   Attending Provider: Astrid Santizo MD  Consulting Provider: JOSE LUIS David  Primary Care Physician: Shari Chauhan MD  Principal Problem:Acute respiratory failure with hypoxia    Patient information was obtained from relative(s) and ER records.      Inpatient consult to Palliative Care  Consult performed by: JOSE LUIS Patel  Consult ordered by: Astrid Santizo MD        Assessment/Plan:     Palliative care encounter  Impression:  Pt is a 89 y/o male admitted for acute respiratory failure, GI bleed. NSTEMI, renal failure shock liver. Pt transferred from ICU on Morphine drip with plan to keep comfortable and continue IV antibiotics for e coli bacteremia.  Pt is a DNR. Pt has renal failure. Family does not want dialysis.  Pt is very lethargic. Pt not oriented to person, place, time, or situation.     Reason for consult- goals of care- hospice, symptom management.     Goals of care: Met with pt's daughter, Chrissie, along with Estrella Packer LCSW.  Per Chrissie, her goal is comfort, quality of life. Per daughter, she was told there is an inpt hospice bed at hospital.  Explained to daughter that pt has to meet criteria for hospice bed-GIP. Spoke to daughter about hospice care- inpt hospice and home hospice. Per daughter, she feels pt may be too much to manage at home with hospice. Daughter to speak to pt's son about inpt vs home hospice. Per daughter, she would like [facility] to evaluate pt for GIP bed and inpt hospice.     Symptom management:     Per Dr. Santizo, she discontinued Morphine drip per daughter's request.   Pt has Morphine 2 mg IVP q 2 hrs prn.   Would have Morphine 2 mg IVP q 1 hr prn pain, dyspnea available.     Agitation: Would have available, Ativan 1 mg IVP q 4 hrs prn agitation.     Secretions:  "Would have available, Glycopyrrolate 0.2 mg IVP tid prn secretions.     Spoke to Dr. Santizo about above conversation and recommendations.     Plan:  1) Consult [facility] or G[facility] bed per daughter's wishes.   2)See above for symptom recs.   3) Support given to pt's daughter at bedside.           Thank you for your consult. I will follow-up with patient. Please contact us if you have any additional questions.    Subjective:     HPI:   Pt is a 89 year old male with a PMHx of HTN, HLD, CAD, HFrEF, AS, CKD3,DVT on eliquis and DM2 presenting to the ER from PCP clinic with son and grandson at bedside for "low hemoglobin." Per chart review, patient  reported noticing dark stools at home, but denied BRBPR, bloody BMs, hematuria, and hemoptysis. Pt denied associated SOB and PRATT. Patient also reported taking iron supplementation at home. He reported chronic fatigue, currently at his baseline. At the time of my exam, patient denied CP, SOB, abdominal pain, N/V/D, fever/chills, recent illness, dysuria/hematuria/frequency, focal weakness, syncope, and palpitations.   Patient was admitted 09/09 due to CHF exacerbation and LE swelling. Venous LE US noted partially occlusive DVT in the upper right femoral vein and patient was initiated on heparin gtt >>> transitioned to renal dose apixaban 09/11.     MICU was consulted after a rapid response today, patient was found short of breath with difficulty breathing, saturating in the 80's and a non re breather was placed, ABG was unremarkable, patient weaned down to 3L, HGB dropped from 9 to 7.7 and trop elevated from 5 to 24, patient transferred to MICU for higher level of care    Hospital Course:  No notes on file    Interval History: DNR, Pt on comfort care.    Past Medical History:   Diagnosis Date    Anemia     Arthritis     Cancer     Prostate    Cataract     CHF (congestive heart failure)     Chronic kidney disease     Coronary artery disease     Diabetes mellitus     Diabetic " retinopathy     Disorder of kidney and ureter     ckd 2    Encounter for blood transfusion     Glaucoma     High cholesterol     Hypertension     Myocardial infarction     Prostate cancer     PVD (peripheral vascular disease)     Spinal stenosis     Status post cataract extraction and insertion of intraocular lens of left eye 11/13/2015       Past Surgical History:   Procedure Laterality Date    CARDIAC CATHETERIZATION      15 to 20 years ago x1 stent and 4/7/2014 x1 stent    CATARACT EXTRACTION Bilateral     Dr. Ferguson    ESOPHAGOGASTRODUODENOSCOPY N/A 9/27/2019    Procedure: EGD (ESOPHAGOGASTRODUODENOSCOPY);  Surgeon: Pepe Goodwin MD;  Location: [address]);  Service: Endoscopy;  Laterality: N/A;    EYE SURGERY      HERNIA REPAIR         Review of patient's allergies indicates:  No Known Allergies    Medications:  Continuous Infusions:  Scheduled Meds:   cefTRIAXone (ROCEPHIN) IVPB  1 g Intravenous Q24H    latanoprost  1 drop Both Eyes QHS    pantoprazole  40 mg Intravenous BID    sodium chloride 0.9%  3 mL Intravenous Q8H     PRN Meds:sodium chloride, acetaminophen, albuterol-ipratropium, Dextrose 10% Bolus, Dextrose 10% Bolus, furosemide, glucagon (human recombinant), glucose, glucose, insulin aspart U-100, morphine, ondansetron, ondansetron, sodium chloride 0.9%    Family History     Problem Relation (Age of Onset)    Breast cancer Sister    Cancer Father    Diabetes Sister, Brother    Hypertension Sister    No Known Problems Mother, Son, Brother, Maternal Aunt, Maternal Uncle, Paternal Aunt, Paternal Uncle, Maternal Grandmother, Maternal Grandfather, Paternal Grandmother, Paternal Grandfather, Daughter        Tobacco Use    Smoking status: Never Smoker    Smokeless tobacco: Former User   Substance and Sexual Activity    Alcohol use: No    Drug use: No    Sexual activity: Never       Review of Systems   Unable to perform ROS: Mental status change     Objective:     Vital  Signs (Most Recent):  Temp: 97.5 °F (36.4 °C) (09/30/19 1226)  Pulse: 102 (09/30/19 1226)  Resp: 18 (09/30/19 1226)  BP: 112/61 (09/30/19 1226)  SpO2: 95 % (09/30/19 1226) Vital Signs (24h Range):  Temp:  [96.8 °F (36 °C)-97.7 °F (36.5 °C)] 97.5 °F (36.4 °C)  Pulse:  [] 102  Resp:  [18-20] 18  SpO2:  [91 %-96 %] 95 %  BP: (100-112)/(57-62) 112/61     Weight: 81.6 kg (180 lb)  Body mass index is 27.37 kg/m².    Review of Symptoms  Symptom Assessment (ESAS 0-10 scale)   ESAS 0 1 2 3 4 5 6 7 8 9 10   Pain              Dyspnea              Anxiety              Nausea              Depression               Anorexia              Fatigue              Insomnia              Restlessness               Agitation              CAM / Delirium __ --  ___+   Constipation     __ --  ___+   Diarrhea           __ --  ___+  Bowel Management Plan (BMP): No    Comments: Unable to do ROS due to pt's mental status.     Performance Status: 10    ECOG Performance Status Grade: 4 - Completely disabled    Physical Exam   Constitutional: He appears lethargic. He has a sickly appearance.   Cardiovascular: Normal rate and regular rhythm.   Murmur heard.  Pulmonary/Chest: Breath sounds normal. No respiratory distress.   Abdominal: Normal appearance and bowel sounds are normal.   Neurological: He appears lethargic.   Pt not oriented to person, place, time, situation.    Skin: Skin is warm and dry.       Significant Labs: All pertinent labs within the past 24 hours have been reviewed.  CBC:   Recent Labs   Lab 09/30/19  0733   WBC 26.49*   HGB 8.1*   HCT 23.8*   MCV 82   PLT 29*     BMP:  Recent Labs   Lab 09/30/19  0733   *   *   K 5.5*      CO2 13*   *   CREATININE 6.7*   CALCIUM 7.6*   MG 2.8*     LFT:  Lab Results   Component Value Date    AST 1,301 (H) 09/30/2019    ALKPHOS 173 (H) 09/30/2019    BILITOT 1.1 (H) 09/30/2019     Albumin:   Albumin   Date Value Ref Range Status   09/30/2019 2.8 (L) 3.5 - 5.2 g/dL  Final     Protein:   Total Protein   Date Value Ref Range Status   09/30/2019 6.3 6.0 - 8.4 g/dL Final     Lactic acid:   Lab Results   Component Value Date    LACTATE 2.5 (H) 09/29/2019    LACTATE 1.9 09/28/2019       Significant Imaging: I have reviewed all pertinent imaging results/findings within the past 24 hours.    Advance Care Planning   Advanced Directives::  Living Will: No  LaPOST: No  Do Not Resuscitate Status: Yes  Medical Power of : Pt's daughter and son are next of kin.     Decision-Making Capacity: Family answered questions       Living Arrangements: Lives with family, son    Psychosocial/Cultural: Pt has a daughter and son. Pt was living with son prior to admit.     Spiritual: yes    F- Sabrina and Belief: Roxy    I - Importance:   .  C - Community:     A - Address in Care: will have  visit.       > 50% of 70 min visit spent in chart review, face to face discussion of goals of care,  symptom assessment, coordination of care and emotional support.    Nuzhat Yanez, CNS  Palliative Medicine  [facility]

## 2019-09-30 NOTE — PROGRESS NOTES
"[facility]  Adult Nutrition  Progress Note    SUMMARY       Recommendations    Recommendation/Intervention: 1.) ADAT to renal, diabetic; texture per SLP. 2.) If nutrition support warranted, consult RD for recommendations.   Goals: 1.) Pt to return to nutritionally adequate diet. 2.) Pt to consume/tolerate >50% of meals by follow up.   Nutrition Goal Status: new  Communication of RD Recs: (POC)    Reason for Assessment    Reason For Assessment: NPO/clear liquids x 5 days  Diagnosis: other (see comments)(acute respiratory failure with hypoxia)  Relevant Medical History: DM2, HTN, CHF, MI, CKD2, PVD, arthritis, high cholesterol, prostate cancer  Interdisciplinary Rounds: did not attend  General Information Comments: Pt currently on IV morphine drip with comfort measures in place. Per chart, family wishing for no escalation of care refusing dialysis, EGD, and other medical interventions. Due to comfort measures being placed, no nutritional intervention at this time. Pt consuming <25% of clear liquids with no plans to advance diet. NFPE not indicated due to pt with end-of-life process  Nutrition Discharge Planning: Unable to determine at this time    Nutrition Risk Screen    Nutrition Risk Screen: no indicators present    Nutrition/Diet History    Spiritual, Cultural Beliefs, Zoroastrian Practices, Values that Affect Care: no    Anthropometrics    Temp: 97.7 °F (36.5 °C)  Height Method: Stated  Height: 5' 8" (172.7 cm)  Height (inches): 68 in  Weight Method: Bed Scale  Weight: 81.6 kg (180 lb)  Weight (lb): 180 lb  Ideal Body Weight (IBW), Male: 154 lb  % Ideal Body Weight, Male (lb): 116.88 lb  BMI (Calculated): 27.4       Lab/Procedures/Meds    Pertinent Labs Reviewed: reviewed  Pertinent Labs Comments: K 5.2, , Cr 5.6, GFR 9.4, Glu 122, Ca 7.6, Phos 7.7, AST 2479, ALT 2982  Pertinent Medications Reviewed: reviewed  Pertinent Medications Comments: statin, rocephin, protonix, " morphine      Estimated/Assessed Needs    Weight Used For Calorie Calculations: 81.6 kg (179 lb 14.3 oz)  Energy Calorie Requirements (kcal): 9797-7005  Energy Need Method: Kcal/kg(30-35 kcal/kg)  Protein Requirements: 49-74(g/day)  Weight Used For Protein Calculations: 81.6 kg (179 lb 14.3 oz)(0.6-0.8 g/kg)     Estimated Fluid Requirement Method: RDA Method(or per MD)  RDA Method (mL): 2448  CHO Requirement: 50% of total kcals      Nutrition Prescription Ordered    Current Diet Order: clear liquids    Evaluation of Received Nutrient/Fluid Intake    I/O: +0.9L since admit  Comments: LBM 9/27  % Intake of Estimated Energy Needs: 0 - 25 %  % Meal Intake: 0 - 25 %    Nutrition Risk    Level of Risk/Frequency of Follow-up: comfort care     Assessment and Plan  Nutrition Problem  Inadequate protein-energy intake    Related to (etiology):   Current diet    Signs and Symptoms (as evidenced by):   <75% of nutritional needs being met >5 days    Interventions(treatment strategy):  Collaboration of nutrition care with other providers  Modified diet-renal, DM    Nutrition Diagnosis Status:   New         Monitor and Evaluation    Food and Nutrient Intake: energy intake, food and beverage intake  Food and Nutrient Adminstration: diet order  Anthropometric Measurements: weight, weight change, body mass index  Biochemical Data, Medical Tests and Procedures: electrolyte and renal panel, gastrointestinal profile, glucose/endocrine profile, lipid profile  Nutrition-Focused Physical Findings: overall appearance       Nutrition Follow-Up    RD Follow-up?: Yes

## 2019-09-30 NOTE — HPI
"Pt is a 89 year old male with a PMHx of HTN, HLD, CAD, HFrEF, AS, CKD3,DVT on eliquis and DM2 presenting to the ER from PCP clinic with son and grandson at bedside for "low hemoglobin." Per chart review, patient  reported noticing dark stools at home, but denied BRBPR, bloody BMs, hematuria, and hemoptysis. Pt denied associated SOB and PRATT. Patient also reported taking iron supplementation at home. He reported chronic fatigue, currently at his baseline. At the time of my exam, patient denied CP, SOB, abdominal pain, N/V/D, fever/chills, recent illness, dysuria/hematuria/frequency, focal weakness, syncope, and palpitations.   Patient was admitted 09/09 due to CHF exacerbation and LE swelling. Venous LE US noted partially occlusive DVT in the upper right femoral vein and patient was initiated on heparin gtt >>> transitioned to renal dose apixaban 09/11.     MICU was consulted after a rapid response today, patient was found short of breath with difficulty breathing, saturating in the 80's and a non re breather was placed, ABG was unremarkable, patient weaned down to 3L, HGB dropped from 9 to 7.7 and trop elevated from 5 to 24, patient transferred to MICU for higher level of care  "

## 2019-09-30 NOTE — ASSESSMENT & PLAN NOTE
Impression:  Pt is a 89 y/o male admitted for acute respiratory failure, GI bleed. NSTEMI, renal failure shock liver. Pt transferred from ICU on Morphine drip with plan to keep comfortable and continue IV antibiotics for e coli bacteremia.  Pt is a DNR. Pt has renal failure. Family does not want dialysis.  Pt is very lethargic. Pt not oriented to person, place, time, or situation.     Reason for consult- goals of care- hospice, symptom management.     Goals of care: Met with pt's daughter, Chrissie, along with Estrella Packer LCSW.  Per Chrissie, her goal is comfort, quality of life. Per daughter, she was told there is an inpt hospice bed at hospital.  Explained to daughter that pt has to meet criteria for hospice bed-GIP. Spoke to daughter about hospice care- inpt hospice and home hospice. Per daughter, she feels pt may be too much to manage at home with hospice. Daughter to speak to pt's son about inpt vs home hospice. Per daughter, she would like [facility] to evaluate pt for GIP bed and inpt hospice.     Symptom management:     Per Dr. Santizo, she discontinued Morphine drip per daughter's request.   Pt has Morphine 2 mg IVP q 2 hrs prn.   Would have Morphine 2 mg IVP q 1 hr prn pain, dyspnea available.     Agitation: Would have available, Ativan 1 mg IVP q 4 hrs prn agitation.     Secretions: Would have available, Glycopyrrolate 0.2 mg IVP tid prn secretions.     Spoke to Dr. Santizo about above conversation and recommendations.     Plan:  1) Consult [facility] or GIP bed per daughter's wishes.   2)See above for symptom recs.   3) Support given to pt's daughter at bedside.

## 2019-09-30 NOTE — ASSESSMENT & PLAN NOTE
Impression:  Pt is a 89 y/o male admitted for acute respiratory failure, GI bleed. NSTEMI, renal failure shock liver. Pt transferred from ICU on Morphine drip with plan to keep comfortable and continue IV antibiotics for e coli bacteremia.  Pt is a DNR. Pt has renal failure. Family does not want dialysis.  Pt is very lethargic. Pt not oriented to person, place, time, or situation.     Reason for consult- goals of care- hospice, symptom management.     Goals of care/Advance care planning: Met with pt's daughter, Chrissie, neighbor, and grand-daughter.  Per Chrissie, her goal is comfort, quality of life. Per daughter, she was told there is an inpt hospice bed at hospital.  Explained to daughter that pt has to meet criteria for hospice bed-GIP. Spoke to family about hospice care- inpt hospice and home hospice. Per family, she feels pt may be too much to manage at home with hospice. Daughter to speak to pt's son about inpt vs home hospice. Per daughter, she would like [facility] to evaluate pt for GIP bed and inpt hospice.     Symptom management:     Pt comfortable on current measures.     Per Dr. Santizo, she discontinued Morphine drip per daughter's request.   Pt on  Morphine 2 mg IVP q 1 hr prn pain, dyspnea available.     Agitation: Would have available, Ativan 1 mg IVP q 4 hrs prn agitation.     Secretions: Would have available, Glycopyrrolate 0.2 mg IVP tid prn secretions.         Plan:  1) Consult [facility] or GIP bed per daughter's wishes.   2)Pt inpt hospice appropriate.   3) Support given to pt's daughter at bedside.

## 2019-09-30 NOTE — PLAN OF CARE
A&Ox1 person only. Sleeping between care.  Family updated on POC.  Remains free from falls and significant events.  Moans frequently, but when asked if he is having pain, he denies.  Will continue to monitor.

## 2019-09-30 NOTE — SUBJECTIVE & OBJECTIVE
Interval History: DNR, Pt on comfort care.    Past Medical History:   Diagnosis Date    Anemia     Arthritis     Cancer     Prostate    Cataract     CHF (congestive heart failure)     Chronic kidney disease     Coronary artery disease     Diabetes mellitus     Diabetic retinopathy     Disorder of kidney and ureter     ckd 2    Encounter for blood transfusion     Glaucoma     High cholesterol     Hypertension     Myocardial infarction     Prostate cancer     PVD (peripheral vascular disease)     Spinal stenosis     Status post cataract extraction and insertion of intraocular lens of left eye 11/13/2015       Past Surgical History:   Procedure Laterality Date    CARDIAC CATHETERIZATION      15 to 20 years ago x1 stent and 4/7/2014 x1 stent    CATARACT EXTRACTION Bilateral     Dr. Ferguson    ESOPHAGOGASTRODUODENOSCOPY N/A 9/27/2019    Procedure: EGD (ESOPHAGOGASTRODUODENOSCOPY);  Surgeon: Pepe Goodwin MD;  Location: [facility] ([address] [place] [address]);  Service: Endoscopy;  Laterality: N/A;    EYE SURGERY      HERNIA REPAIR         Review of patient's allergies indicates:  No Known Allergies    Medications:  Continuous Infusions:  Scheduled Meds:   cefTRIAXone (ROCEPHIN) IVPB  1 g Intravenous Q24H    latanoprost  1 drop Both Eyes QHS    pantoprazole  40 mg Intravenous BID    sodium chloride 0.9%  3 mL Intravenous Q8H     PRN Meds:sodium chloride, acetaminophen, albuterol-ipratropium, Dextrose 10% Bolus, Dextrose 10% Bolus, furosemide, glucagon (human recombinant), glucose, glucose, insulin aspart U-100, morphine, ondansetron, ondansetron, sodium chloride 0.9%    Family History     Problem Relation (Age of Onset)    Breast cancer Sister    Cancer Father    Diabetes Sister, Brother    Hypertension Sister    No Known Problems Mother, Son, Brother, Maternal Aunt, Maternal Uncle, Paternal Aunt, Paternal Uncle, Maternal Grandmother, Maternal Grandfather, Paternal Grandmother, Paternal Grandfather, Daughter         Tobacco Use    Smoking status: Never Smoker    Smokeless tobacco: Former User   Substance and Sexual Activity    Alcohol use: No    Drug use: No    Sexual activity: Never       Review of Systems   Unable to perform ROS: Mental status change     Objective:     Vital Signs (Most Recent):  Temp: 97.5 °F (36.4 °C) (09/30/19 1226)  Pulse: 102 (09/30/19 1226)  Resp: 18 (09/30/19 1226)  BP: 112/61 (09/30/19 1226)  SpO2: 95 % (09/30/19 1226) Vital Signs (24h Range):  Temp:  [96.8 °F (36 °C)-97.7 °F (36.5 °C)] 97.5 °F (36.4 °C)  Pulse:  [] 102  Resp:  [18-20] 18  SpO2:  [91 %-96 %] 95 %  BP: (100-112)/(57-62) 112/61     Weight: 81.6 kg (180 lb)  Body mass index is 27.37 kg/m².    Review of Symptoms  Symptom Assessment (ESAS 0-10 scale)   ESAS 0 1 2 3 4 5 6 7 8 9 10   Pain              Dyspnea              Anxiety              Nausea              Depression               Anorexia              Fatigue              Insomnia              Restlessness               Agitation              CAM / Delirium __ --  ___+   Constipation     __ --  ___+   Diarrhea           __ --  ___+  Bowel Management Plan (BMP): No    Comments: Unable to do ROS due to pt's mental status.     Performance Status: 10    ECOG Performance Status Grade: 4 - Completely disabled    Physical Exam   Constitutional: He appears lethargic. He has a sickly appearance.   Cardiovascular: Normal rate and regular rhythm.   Murmur heard.  Pulmonary/Chest: Breath sounds normal. No respiratory distress.   Abdominal: Normal appearance and bowel sounds are normal.   Neurological: He appears lethargic.   Pt not oriented to person, place, time, situation.    Skin: Skin is warm and dry.       Significant Labs: All pertinent labs within the past 24 hours have been reviewed.  CBC:   Recent Labs   Lab 09/30/19  0733   WBC 26.49*   HGB 8.1*   HCT 23.8*   MCV 82   PLT 29*     BMP:  Recent Labs   Lab 09/30/19  0733   *   *   K 5.5*      CO2 13*    *   CREATININE 6.7*   CALCIUM 7.6*   MG 2.8*     LFT:  Lab Results   Component Value Date    AST 1,301 (H) 09/30/2019    ALKPHOS 173 (H) 09/30/2019    BILITOT 1.1 (H) 09/30/2019     Albumin:   Albumin   Date Value Ref Range Status   09/30/2019 2.8 (L) 3.5 - 5.2 g/dL Final     Protein:   Total Protein   Date Value Ref Range Status   09/30/2019 6.3 6.0 - 8.4 g/dL Final     Lactic acid:   Lab Results   Component Value Date    LACTATE 2.5 (H) 09/29/2019    LACTATE 1.9 09/28/2019       Significant Imaging: I have reviewed all pertinent imaging results/findings within the past 24 hours.    Advance Care Planning   Advanced Directives::  Living Will: No  LaPOST: No  Do Not Resuscitate Status: Yes  Medical Power of : Pt's daughter and son are next of kin.     Decision-Making Capacity: Family answered questions       Living Arrangements: Lives with family, son    Psychosocial/Cultural: Pt has a daughter and son. Pt was living with son prior to admit.     Spiritual: yes    F- Sabrina and Belief: Roxy    I - Importance:   .  C - Community:     A - Address in Care: will have  visit.

## 2019-09-30 NOTE — PHYSICIAN QUERY
PT Name: Jeniffer Taylor  MR #: [id number]    Physician Query Form -Present on Admission (POA) Diagnosis Clarification     CDS: Joe BARROW,RN        Contact information:[phone]    This form is a permanent document in the medical record.     Query Date: September 30, 2019    By submitting this query, we are merely seeking further clarification of documentation. Please utilize your independent clinical judgment when addressing the question(s) below.       The Medical record contains the following:    Diagnosis      Supporting Clinical Information   Location in Medical Record                                             UTI     Encounter for comfort care:  -patient admitted for acute GI bleed, NSTEMI, acute renal failure, DVT, severe aortic stenosis, UTI, E coli bacteremia    UTI (urinary tract infection)  Bacteremia  -Urine and blood cultures growing gram -ve rods   -Ecoli in the urine  -continue ceftriaxone for now per family's wishes    9/26/2019 19:41  Specimen UA: Urine, Catheterized  Color, UA: Yellow  Appearance, UA: Cloudy (A)  Specific Gravity, UA: 1.010  pH, UA: 5.0  Protein, UA: 2+ (A)  Glucose, UA: Negative  Ketones, UA: Negative  Occult Blood UA: 2+ (A)  NITRITE UA: Negative  Bilirubin (UA): Negative  Leukocytes, UA: 2+ (A)  RBC, UA: 0  WBC, UA: >100 (H)  Bacteria, UA: Many (A)  WBC Clumps, UA: Many (A)  Hyaline Casts, UA: 0    Abnormal    ESCHERICHIA COLI   >100,000 cfu/ml    Ceftriaxone injection 1 g Intravenous Every 24 hours        9/30/19 [facility] Medicine Progress Notes                              9/26/19 Urinalysis                                9/26/19 Urine Culture        9/29/19 MAR         Doctor, Please specify Present On Admission (POA) status of ___UTI_____________________.    [x  ] Present on Admission    [  ] Not Present on Admission   [  ] Clinically Undetermined

## 2019-09-30 NOTE — PHYSICIAN QUERY
PT Name: Jeniffer Taylor  MR #: [id number]    Physician Query Form -Present on Admission (POA) Diagnosis Clarification     CDS: Joe BARROW,RN        Contact information:[phone]    This form is a permanent document in the medical record.     Query Date: September 30, 2019    By submitting this query, we are merely seeking further clarification of documentation. Please utilize your independent clinical judgment when addressing the question(s) below.       The Medical record contains the following:    Diagnosis      Supporting Clinical Information   Location in Medical Record                  BERNARDO       Lasix 40mg IV given with near immediate output of 250cc urine.    UO 0-5cc/hr, brown urine    BUN=56---->58------>71--->91-->106-->119  Cr=    2.2--->2.2---->3.4--->4.9--->5.6-->6.7  GFR=29.2-->29.2-->17.2-->11.1->9.4-->7.6    BERNARDO continues to worsen.        9/27 patient didn't responeded appropriately to lasix, kidney function worsening  9/29 worsening BERNARDO with low urine output    Sodium Chloride 0.9% bolus 250 ml Intravenous Once 1 dose  9/26/19 Hospital Medicine Plan of Care    9/28/19 Intensive Care Progress Notes      9/24------->9/30 Labs       9/27/19 Pharmacy Progress Notes      9/29/19 Critical Care Medicine Resident Handoff       9/28/19 MAR         Doctor, Please specify Present On Admission (POA) status of __AKI______________________.    [ x ] Present on Admission    [  ] Not Present on Admission   [  ] Clinically Undetermined

## 2019-09-30 NOTE — TELEPHONE ENCOUNTER
Do not see a encounter or left message for this patient in chart from the staff. His last appointment was 9/24 and has a future appt on 10/10.    Please advise if message was left and not documented

## 2019-09-30 NOTE — PLAN OF CARE
[facility]  Palliative Care   Psychosocial Assessment    Patient Name: Jeniffer Taylor  MRN: [id number]  Admission Date: 9/24/2019  Hospital Length of Stay: 5 days  Code Status: DNR   Attending Provider: Astrid Santzio MD  Palliative Care Provider: DULCE Blake, APRN, AGCNS  Primary Care Physician: Shari Chauhan MD  Principal Problem:Acute respiratory failure with hypoxia    Reason for Referral: assistance with clarification of goals of care  Consult Order Date:  9/30/19  Primary CM/SW: Francisca Todd LMSW    Present during Interview: patient and dtr Grzegorz Antonio Care ApRN and this SW.      Primary Language:English   Needed: no      Past Medical Situation:   PMH:   Past Medical History:   Diagnosis Date    Anemia     Arthritis     Cancer     Prostate    Cataract     CHF (congestive heart failure)     Chronic kidney disease     Coronary artery disease     Diabetes mellitus     Diabetic retinopathy     Disorder of kidney and ureter     ckd 2    Encounter for blood transfusion     Glaucoma     High cholesterol     Hypertension     Myocardial infarction     Prostate cancer     PVD (peripheral vascular disease)     Spinal stenosis     Status post cataract extraction and insertion of intraocular lens of left eye 11/13/2015     Mental Health/Substance Use History: n/a  Non-traditional Health practices:     Understanding of diagnosis and prognosis: Will benefit from continued support regarding px.    Experience/Comfort level with health care system:    Patients Mental Status: not oriented    Socio-Economic Factors/Resources:  Address: [address] [place] [address]  Phone Number: [phone] (home)     Marital Status:   Household Composition: Lives in own home. Pt's children rotate care.  Children: 8 children. 6 living  Relationships with Family:  Large supportive family. Pt's children make decisions on patient as a whole. Per Dtr  Marisela, one of her brothers is with pt at night, another comes at 6 am to bathe him. She goes for day time, lunch, medication and whatever is needed during the day and another brother at night. Marisela stated that pt has 6 children who are all supportive and active in pt's care.    Pt has multiple grandchildren and great grandchildren.    Emergency Contacts:   Dtr: Marisela Carbajal: [phone]  Son: Angel Taylor: [phone]    Activities of Daily Living: Total assistance with care  Support Systems-Family & Community (Home Health, HME etc): [facility] wheelchair, bedside commode, straight cane, glucometer.    Transportation:  relies on family    Work/Education History: Pt is retired   History: no    Financial Resources:[employer] Medicare      Advanced Care Planning & Legal Concerns:   Advanced Directives/Living Will: no   Planning:  no    Power of : no  Surrogate Decision Maker: Children make decisions as a whole      Spirituality, Culture & Coping Mechanisms:  F- Sabrina and Belief: Episcopal     I - Importance: strong sabrina    C - Community/Culture Values:     A - Address in Care: Spiritual Care to follow      Strengths/Coping Strategies: Strong Sabrina. Family Supportive  Self-Care Activities/Hobbies: enjoyed family    Goals/Hopes/Expectations: want pt to be placed in [facility] bed  Fears/Anxiety/Concerns: want him to be comfortable but awake.        Preferences about EOL Environment: (own bed, family nearby, pets, music, etc)  With family near    Complicated Bereavement Risk Assessment Tool (CBRAT)  Reference:  [facility] Pall[facility] Clinical Practice Group (May 2016). Bereavement Risk Screening and Management Guidelines.  Retrieved from: http://www.[facility].com.au/wp-content/uploads//KJEKW-Wpcevqudmjn-Pthfwwldd-and-Management-Guideline-2016.pdf      Bereaved Client Characteristics   ? Under 18      no  ? Was a Twin   no  ? Young Spouse   no  ? Elderly Spouse     "no  ? Isolated    no  ? Lacks Meaningful Social Support   no  ? Dissatisfied with help available during illness   no  ? New to Financial [place] no  ? New to Decision-Making   no    Illness  ? Inherited Disorder   no  ? Stigmatized Disease in the family/community   no  ? Lengthy/Burdensome   yes     Bereaved Client's History of Loss   ? Cumulative Multiple Losses   no  ? Previous Mental Health Illnesses   no  ? Current Mental Health Illness   no  ? Other Significant Health Issues   no   ? Migrant/Refugee   no Death  ? Sudden or Unexpected   no  ? Traumatic Circumstances Associated with Death   no  ? Significant Cultural/Social Burdens as a result of Death   no   Relationship with   ? Profound Lifelong Partner   no  ? Highly Dependent    no  ? Antagonistic   no  ? Ambivalent   no  ? Deeply Connected   no  ? Culturally Defined   no   Risk Factors Scores  0-2  Low  3-5  Moderate  5+  High  All persons scoring moderate to high presume to be at risk**    (** It is acknowledged that protective factors and resilience may outweigh apparent risk factors.      Total Risk Factors Score:   Mild to Moderate    Will benefit from continued follow up and bereavement support      Discharge Planning Needs/Plan of Care:     Visit to bedside with [facility] LORENZO Yanez. Pt's daughter Marisela present. Pt in bed, moaning at times. Dtr stated that she asked for morphine to be stopped as pt was "more with it" prior to putting it on.     Spoke with dtr about wishes for comfort and hospice. Dtr stated that MD told her about hospice in this hospital. Explained pt would need to be evaluated for [facility] and that pt may meet criteria for inpatient hospice unit instead. Provided brochures for both agencies and encouraged visiting.    Dtr open to begin evaluation for [facility] hospice bed with [facility]. Dtr aware pt may meet criteria for down the street but not the [facility] bed. Dtr to visit [facility] this afternoon and will " speak with her family regarding options for inpatient vs home hospice. Spoke with dtr about what home hospice services were.    Left voicemail for Francisca Ellis LMSW, regarding wishes for inpatient hospice GIP eval with [facility]. Will continue to follow.     Estrella Packer, MARQUEZ, ACHP-SW

## 2019-09-30 NOTE — PLAN OF CARE
Recommendations     Recommendation/Intervention: 1.) ADAT to renal, diabetic; texture per SLP. 2.) If nutrition support warranted, consult RD for recommendations.   Goals: 1.) Pt to return to nutritionally adequate diet. 2.) Pt to consume/tolerate >50% of meals by follow up.   Nutrition Goal Status: new  Communication of RD Recs: (POC)

## 2019-09-30 NOTE — PHYSICIAN QUERY
PT Name: Jeniffer Taylor  MR #: [id number]    Physician Query Form -Present on Admission (POA) Diagnosis Clarification     CDS: Joe BARROW,RN        Contact information:[phone]    This form is a permanent document in the medical record.     Query Date: September 30, 2019    By submitting this query, we are merely seeking further clarification of documentation. Please utilize your independent clinical judgment when addressing the question(s) below.       The Medical record contains the following:    Diagnosis      Supporting Clinical Information   Location in Medical Record                        Sepsis     Jeniffer Taylor is a 89 y.o. male initiated on antimicrobial therapy with IV vancomycin for treatment of suspected sepsis     WBC=9.72-->8.92-->19.82-->4.93->29.03-->26.49     Procalcitonin= 3.21  Lactate,venous=2.1-->4.2-->5.8-->5.2-->7.5-->2.5     Bld. Cx=   ESCHERICHIA COLI Abnormal   Urine Cx= ESCHERICHIA COLI >100,000 cfu/ml    Stool Cx=AEROMONAS HYDROPHILA Abnormal     Vital Signs (24h Range):  Temp: 97.1F---->100.2F  Pulse:  67------->101  Resp:  15------->49  SBP:    102----->145  DBP:   56------>76    Patient with severe sepsis with multiorgan dysfunction     Ceftriaxone injection 1g Intravenous Every 24 hours  Vancomycin 1.5g  in dextrose 5% 250 ml IVPB Intravenous Once 1 dose  Vancomycin 750 mg in dextrose 5% 250 ml IVPB Intravenous Once  1 dose  Piperacillin-tazobactam 4.5g in sodium chloride 0.9% 100 ml IVPB Intravenous  Every 12 hours  Piperacillin-tazobactam 4.5g in sodium chloride 0.9% 100 ml IVPB Intravenous Every 8 hours 9/26/19 Pharmacy Progress Notes       9/24------>9/30 Labs       9/26---->9/29 Labs     9/26/19 Microbiology Labs         9/26/19 Critical Care Medicine H&P             9/27/19 Cardiology Plan of Care     9/29/19 MAR  9/26/19 MAR    9/27/19 MAR    9/27---->9/29 MAR    9/26--->9/27 MAR         Doctor, Please specify Present On Admission (POA) status of  ___Sepsis_____________________.    [x  ] Present on Admission    [  ] Not Present on Admission   [  ] Clinically Undetermined

## 2019-10-01 LAB
ANION GAP SERPL CALC-SCNC: 26 MMOL/L (ref 8–16)
ANISOCYTOSIS BLD QL SMEAR: SLIGHT
BASOPHILS NFR BLD: 0 % (ref 0–1.9)
BUN SERPL-MCNC: 137 MG/DL (ref 10–30)
BURR CELLS BLD QL SMEAR: ABNORMAL
CALCIUM SERPL-MCNC: 7.6 MG/DL (ref 8.7–10.5)
CHLORIDE SERPL-SCNC: 110 MMOL/L (ref 95–110)
CO2 SERPL-SCNC: 12 MMOL/L (ref 23–29)
CREAT SERPL-MCNC: 6.9 MG/DL (ref 0.5–1.4)
DIFFERENTIAL METHOD: ABNORMAL
EOSINOPHIL NFR BLD: 0 % (ref 0–8)
ERYTHROCYTE [DISTWIDTH] IN BLOOD BY AUTOMATED COUNT: 16.7 % (ref 11.5–14.5)
EST. GFR  (AFRICAN AMERICAN): 7.3 ML/MIN/1.73 M^2
EST. GFR  (NON AFRICAN AMERICAN): 6.3 ML/MIN/1.73 M^2
GLUCOSE SERPL-MCNC: 221 MG/DL (ref 70–110)
HCT VFR BLD AUTO: 23.4 % (ref 40–54)
HGB BLD-MCNC: 8 G/DL (ref 14–18)
HYPOCHROMIA BLD QL SMEAR: ABNORMAL
IMM GRANULOCYTES # BLD AUTO: ABNORMAL K/UL (ref 0–0.04)
IMM GRANULOCYTES NFR BLD AUTO: ABNORMAL % (ref 0–0.5)
LYMPHOCYTES NFR BLD: 2 % (ref 18–48)
MCH RBC QN AUTO: 28 PG (ref 27–31)
MCHC RBC AUTO-ENTMCNC: 34.2 G/DL (ref 32–36)
MCV RBC AUTO: 82 FL (ref 82–98)
MONOCYTES NFR BLD: 1 % (ref 4–15)
MYELOCYTES NFR BLD MANUAL: 1 %
NEUTROPHILS NFR BLD: 95 % (ref 38–73)
NEUTS BAND NFR BLD MANUAL: 1 %
NRBC BLD-RTO: 3 /100 WBC
OVALOCYTES BLD QL SMEAR: ABNORMAL
PLATELET # BLD AUTO: 36 K/UL (ref 150–350)
PLATELET BLD QL SMEAR: ABNORMAL
PMV BLD AUTO: ABNORMAL FL (ref 9.2–12.9)
POIKILOCYTOSIS BLD QL SMEAR: SLIGHT
POLYCHROMASIA BLD QL SMEAR: ABNORMAL
POTASSIUM SERPL-SCNC: 5.6 MMOL/L (ref 3.5–5.1)
RBC # BLD AUTO: 2.86 M/UL (ref 4.6–6.2)
SODIUM SERPL-SCNC: 148 MMOL/L (ref 136–145)
TARGETS BLD QL SMEAR: ABNORMAL
WBC # BLD AUTO: 23.24 K/UL (ref 3.9–12.7)

## 2019-10-01 PROCEDURE — 36415 COLL VENOUS BLD VENIPUNCTURE: CPT | Mod: HCNC

## 2019-10-01 PROCEDURE — 85007 BL SMEAR W/DIFF WBC COUNT: CPT | Mod: HCNC

## 2019-10-01 PROCEDURE — 63600175 PHARM REV CODE 636 W HCPCS: Mod: HCNC | Performed by: STUDENT IN AN ORGANIZED HEALTH CARE EDUCATION/TRAINING PROGRAM

## 2019-10-01 PROCEDURE — 99232 PR SUBSEQUENT HOSPITAL CARE,LEVL II: ICD-10-PCS | Mod: HCNC,,, | Performed by: CLINICAL NURSE SPECIALIST

## 2019-10-01 PROCEDURE — 99497 ADVNCD CARE PLAN 30 MIN: CPT | Mod: HCNC,,, | Performed by: CLINICAL NURSE SPECIALIST

## 2019-10-01 PROCEDURE — 20600001 HC STEP DOWN PRIVATE ROOM: Mod: HCNC

## 2019-10-01 PROCEDURE — 63600175 PHARM REV CODE 636 W HCPCS: Mod: HCNC | Performed by: HOSPITALIST

## 2019-10-01 PROCEDURE — 99232 PR SUBSEQUENT HOSPITAL CARE,LEVL II: ICD-10-PCS | Mod: HCNC,,, | Performed by: INTERNAL MEDICINE

## 2019-10-01 PROCEDURE — 99497 PR ADVNCD CARE PLAN 30 MIN: ICD-10-PCS | Mod: HCNC,,, | Performed by: CLINICAL NURSE SPECIALIST

## 2019-10-01 PROCEDURE — 99232 SBSQ HOSP IP/OBS MODERATE 35: CPT | Mod: HCNC,,, | Performed by: CLINICAL NURSE SPECIALIST

## 2019-10-01 PROCEDURE — 99232 SBSQ HOSP IP/OBS MODERATE 35: CPT | Mod: HCNC,,, | Performed by: INTERNAL MEDICINE

## 2019-10-01 PROCEDURE — 85027 COMPLETE CBC AUTOMATED: CPT | Mod: HCNC

## 2019-10-01 PROCEDURE — A4216 STERILE WATER/SALINE, 10 ML: HCPCS | Mod: HCNC | Performed by: PHYSICIAN ASSISTANT

## 2019-10-01 PROCEDURE — 25000003 PHARM REV CODE 250: Mod: HCNC | Performed by: PHYSICIAN ASSISTANT

## 2019-10-01 PROCEDURE — 80048 BASIC METABOLIC PNL TOTAL CA: CPT | Mod: HCNC

## 2019-10-01 PROCEDURE — C9113 INJ PANTOPRAZOLE SODIUM, VIA: HCPCS | Mod: HCNC | Performed by: PHYSICIAN ASSISTANT

## 2019-10-01 PROCEDURE — 63600175 PHARM REV CODE 636 W HCPCS: Mod: HCNC | Performed by: PHYSICIAN ASSISTANT

## 2019-10-01 RX ADMIN — Medication 3 ML: at 02:10

## 2019-10-01 RX ADMIN — MORPHINE SULFATE 2 MG: 2 INJECTION, SOLUTION INTRAMUSCULAR; INTRAVENOUS at 03:10

## 2019-10-01 RX ADMIN — Medication 3 ML: at 10:10

## 2019-10-01 RX ADMIN — CEFTRIAXONE SODIUM 1 G: 1 INJECTION, POWDER, FOR SOLUTION INTRAMUSCULAR; INTRAVENOUS at 02:10

## 2019-10-01 RX ADMIN — PANTOPRAZOLE SODIUM 40 MG: 40 INJECTION, POWDER, FOR SOLUTION INTRAVENOUS at 09:10

## 2019-10-01 RX ADMIN — MORPHINE SULFATE 2 MG: 2 INJECTION, SOLUTION INTRAMUSCULAR; INTRAVENOUS at 04:10

## 2019-10-01 NOTE — SUBJECTIVE & OBJECTIVE
Interval History: DNR, Pt on comfort care.    Past Medical History:   Diagnosis Date    Anemia     Arthritis     Cancer     Prostate    Cataract     CHF (congestive heart failure)     Chronic kidney disease     Coronary artery disease     Diabetes mellitus     Diabetic retinopathy     Disorder of kidney and ureter     ckd 2    Encounter for blood transfusion     Glaucoma     High cholesterol     Hypertension     Myocardial infarction     Prostate cancer     PVD (peripheral vascular disease)     Spinal stenosis     Status post cataract extraction and insertion of intraocular lens of left eye 11/13/2015       Past Surgical History:   Procedure Laterality Date    CARDIAC CATHETERIZATION      15 to 20 years ago x1 stent and 4/7/2014 x1 stent    CATARACT EXTRACTION Bilateral     Dr. Ferguson    ESOPHAGOGASTRODUODENOSCOPY N/A 9/27/2019    Procedure: EGD (ESOPHAGOGASTRODUODENOSCOPY);  Surgeon: Pepe Goodwin MD;  Location: [facility] ([address] [place] [address]);  Service: Endoscopy;  Laterality: N/A;    EYE SURGERY      HERNIA REPAIR         Review of patient's allergies indicates:  No Known Allergies    Medications:  Continuous Infusions:  Scheduled Meds:   cefTRIAXone (ROCEPHIN) IVPB  1 g Intravenous Q24H    latanoprost  1 drop Both Eyes QHS    pantoprazole  40 mg Intravenous BID    sodium chloride 0.9%  3 mL Intravenous Q8H     PRN Meds:acetaminophen, albuterol-ipratropium, glycopyrrolate, lorazepam, morphine, ondansetron, ondansetron, sodium chloride 0.9%    Family History     Problem Relation (Age of Onset)    Breast cancer Sister    Cancer Father    Diabetes Sister, Brother    Hypertension Sister    No Known Problems Mother, Son, Brother, Maternal Aunt, Maternal Uncle, Paternal Aunt, Paternal Uncle, Maternal Grandmother, Maternal Grandfather, Paternal Grandmother, Paternal Grandfather, Daughter        Tobacco Use    Smoking status: Never Smoker    Smokeless tobacco: Former User   Substance and Sexual  Activity    Alcohol use: No    Drug use: No    Sexual activity: Never       Review of Systems   Unable to perform ROS: Mental status change     Objective:     Vital Signs (Most Recent):  Temp: 97.7 °F (36.5 °C) (10/01/19 0917)  Pulse: 96 (10/01/19 0917)  Resp: (!) 28 (10/01/19 0917)  BP: 121/65 (10/01/19 0917)  SpO2: 96 % (10/01/19 0917) Vital Signs (24h Range):  Temp:  [97.4 °F (36.3 °C)-97.7 °F (36.5 °C)] 97.7 °F (36.5 °C)  Pulse:  [] 96  Resp:  [16-28] 28  SpO2:  [94 %-97 %] 96 %  BP: (105-121)/(59-67) 121/65     Weight: 81.6 kg (180 lb)  Body mass index is 27.37 kg/m².    Review of Symptoms  Symptom Assessment (ESAS 0-10 scale)   ESAS 0 1 2 3 4 5 6 7 8 9 10   Pain              Dyspnea              Anxiety              Nausea              Depression               Anorexia              Fatigue              Insomnia              Restlessness               Agitation              CAM / Delirium __ --  ___+   Constipation     __ --  ___+   Diarrhea           __ --  ___+  Bowel Management Plan (BMP): No    Comments: Unable to do ROS due to pt's mental status.     Performance Status: 10    ECOG Performance Status Grade: 4 - Completely disabled    Physical Exam   Constitutional: He appears lethargic. He has a sickly appearance.   Cardiovascular: Normal rate and regular rhythm.   Murmur heard.  Pulmonary/Chest: Breath sounds normal. No respiratory distress.   Abdominal: Normal appearance and bowel sounds are normal.   Neurological: He appears lethargic.   Pt not oriented to person, place, time, situation.    Skin: Skin is warm and dry.       Significant Labs: All pertinent labs within the past 24 hours have been reviewed.  CBC:   Recent Labs   Lab 10/01/19  0556   WBC 23.24*   HGB 8.0*   HCT 23.4*   MCV 82   PLT 36*     BMP:  Recent Labs   Lab 10/01/19  0556   *   *   K 5.6*      CO2 12*   *   CREATININE 6.9*   CALCIUM 7.6*     LFT:  Lab Results   Component Value Date    AST 1,301 (H)  09/30/2019    ALKPHOS 173 (H) 09/30/2019    BILITOT 1.1 (H) 09/30/2019     Albumin:   Albumin   Date Value Ref Range Status   09/30/2019 2.8 (L) 3.5 - 5.2 g/dL Final     Protein:   Total Protein   Date Value Ref Range Status   09/30/2019 6.3 6.0 - 8.4 g/dL Final     Lactic acid:   Lab Results   Component Value Date    LACTATE 2.5 (H) 09/29/2019    LACTATE 1.9 09/28/2019       Significant Imaging: I have reviewed all pertinent imaging results/findings within the past 24 hours.    Advance Care Planning   Advanced Directives::  Living Will: No  LaPOST: No  Do Not Resuscitate Status: Yes  Medical Power of : Pt's daughter and son are next of kin.     Decision-Making Capacity: Family answered questions       Living Arrangements: Lives with family, son    Psychosocial/Cultural: Pt has a daughter and son. Pt was living with son prior to admit.     Spiritual: yes    F- Sabrina and Belief: Roxy    I - Importance:   .  C - Community:     A - Address in Care: will have  visit.

## 2019-10-01 NOTE — PLAN OF CARE
No significant changes since pt arrived on floor last night, resting comfortably and responds to voice/eyes do not open spontaneously at this time, prn med given for increased respirations/dyspnea x1 with moderate effectiveness noted, family remained at bedside overnight for pt's comfort, repositioned every 2-3h to promote rest and maintain skin integrity, wound care provided to L heel per order, call light in reach, will continue to monitor

## 2019-10-01 NOTE — ASSESSMENT & PLAN NOTE
Impression:  Pt is a 89 y/o male admitted for acute respiratory failure, GI bleed. NSTEMI, renal failure shock liver. Pt transferred from ICU on Morphine drip with plan to keep comfortable and continue IV antibiotics for e coli bacteremia.  Pt is a DNR. Pt has renal failure. Family does not want dialysis.  Pt is very lethargic. Pt not oriented to person, place, time, or situation.     Reason for consult- goals of care- hospice, symptom management.     Goals of care/Advance care planning: Met with pt's daughter, Chrissie and family friend. Per Chrissie, her goal is comfort, quality of life. Daughter to meet with hospice rep today.     Symptom management:     Pt comfortable on current measures.     Pt on  Morphine 2 mg IVP q 1 hr prn pain, dyspnea available.     Agitation: Would have available, Ativan 1 mg IVP q 4 hrs prn agitation.     Secretions: Would have available, Glycopyrrolate 0.1 mg IVP tid prn secretions.         Plan:  1) [facility] rep to meet with family.   2)Pt inpt hospice appropriate.   3) Support given to pt's daughter at bedside.

## 2019-10-01 NOTE — NURSING
Pt transferred to  [id number] by PCT and Charge nurse via bed accompanied by daughter, O2 via NC ongoing at 2Lpm, pt's belongings and chart sent with pt. All precautions maintained.

## 2019-10-01 NOTE — PLAN OF CARE
[facility]  Palliative Care   Follow Up Note:    Patient Name: Jeniffer Taylor  MRN: [id number]  Admission Date: 9/24/2019  Hospital Length of Stay: 6 days  Code Status: DNR   Attending Provider: Celestino Liu DO  Palliative Care Provider: DULCE Blake, APRN, AGCNS  Primary Care Physician: Shari Chauhan MD  Principal Problem:Acute respiratory failure with hypoxia    Pal Care SW was informed by Pal Care LORENZO Yanez that family open to transfer to [facility].     Sw spoke with KAY Loco and informed her of family choice.     Pal care to continue to follow as appropriate.       Estrella Packer LCSW, ACHP-SW

## 2019-10-01 NOTE — PROGRESS NOTES
[facility]  Palliative Medicine  Progress Note    Patient Name: Jeniffer Taylor  MRN: [id number]  Admission Date: 9/24/2019  Hospital Length of Stay: 6 days  Code Status: DNR   Attending Provider: Celestino Liu DO  Consulting Provider: JOSE LUIS David  Primary Care Physician: Shari Chauhan MD  Principal Problem:Acute respiratory failure with hypoxia    Patient information was obtained from patient and ER records.      Assessment/Plan:     Palliative care encounter  Impression:  Pt is a 89 y/o male admitted for acute respiratory failure, GI bleed. NSTEMI, renal failure shock liver. Pt transferred from ICU on Morphine drip with plan to keep comfortable and continue IV antibiotics for e coli bacteremia.  Pt is a DNR. Pt has renal failure. Family does not want dialysis.  Pt is very lethargic. Pt not oriented to person, place, time, or situation.     Reason for consult- goals of care- hospice, symptom management.     Goals of care/Advance care planning: Met with pt's daughter, Chrissie, neighbor, and grand-daughter.  Per Chrissie, her goal is comfort, quality of life. Per daughter, she was told there is an inpt hospice bed at hospital.  Explained to daughter that pt has to meet criteria for hospice bed-GIP. Spoke to family about hospice care- inpt hospice and home hospice. Per family, she feels pt may be too much to manage at home with hospice. Daughter to speak to pt's son about inpt vs home hospice. Per daughter, she would like [facility] to evaluate pt for GIP bed and inpt hospice.     Symptom management:     Pt comfortable on current measures.     Per Dr. Santizo, she discontinued Morphine drip per daughter's request.   Pt on  Morphine 2 mg IVP q 1 hr prn pain, dyspnea available.     Agitation: Would have available, Ativan 1 mg IVP q 4 hrs prn agitation.     Secretions: Would have available, Glycopyrrolate 0.2 mg IVP tid prn secretions.         Plan:  1) Consult [facility] or G[facility] bed per  "daughter's wishes.   2)Pt inpt hospice appropriate.   3) Support given to pt's daughter at bedside.           I will follow-up with patient. Please contact us if you have any additional questions.    Subjective:     Chief Complaint:   Chief Complaint   Patient presents with    Multiple Complaints     low blood count, immobile, dark stool       HPI:   Pt is a 89 year old male with a PMHx of HTN, HLD, CAD, HFrEF, AS, CKD3,DVT on eliquis and DM2 presenting to the ER from PCP clinic with son and grandson at bedside for "low hemoglobin." Per chart review, patient  reported noticing dark stools at home, but denied BRBPR, bloody BMs, hematuria, and hemoptysis. Pt denied associated SOB and PRATT. Patient also reported taking iron supplementation at home. He reported chronic fatigue, currently at his baseline. At the time of my exam, patient denied CP, SOB, abdominal pain, N/V/D, fever/chills, recent illness, dysuria/hematuria/frequency, focal weakness, syncope, and palpitations.   Patient was admitted 09/09 due to CHF exacerbation and LE swelling. Venous LE US noted partially occlusive DVT in the upper right femoral vein and patient was initiated on heparin gtt >>> transitioned to renal dose apixaban 09/11.     MICU was consulted after a rapid response today, patient was found short of breath with difficulty breathing, saturating in the 80's and a non re breather was placed, ABG was unremarkable, patient weaned down to 3L, HGB dropped from 9 to 7.7 and trop elevated from 5 to 24, patient transferred to MICU for higher level of care    Hospital Course:  No notes on file    Interval History: DNR, Pt on comfort care.    Past Medical History:   Diagnosis Date    Anemia     Arthritis     Cancer     Prostate    Cataract     CHF (congestive heart failure)     Chronic kidney disease     Coronary artery disease     Diabetes mellitus     Diabetic retinopathy     Disorder of kidney and ureter     ckd 2    Encounter for blood " transfusion     Glaucoma     High cholesterol     Hypertension     Myocardial infarction     Prostate cancer     PVD (peripheral vascular disease)     Spinal stenosis     Status post cataract extraction and insertion of intraocular lens of left eye 11/13/2015       Past Surgical History:   Procedure Laterality Date    CARDIAC CATHETERIZATION      15 to 20 years ago x1 stent and 4/7/2014 x1 stent    CATARACT EXTRACTION Bilateral     Dr. Ferguson    ESOPHAGOGASTRODUODENOSCOPY N/A 9/27/2019    Procedure: EGD (ESOPHAGOGASTRODUODENOSCOPY);  Surgeon: Pepe Goodwin MD;  Location: [address]);  Service: Endoscopy;  Laterality: N/A;    EYE SURGERY      HERNIA REPAIR         Review of patient's allergies indicates:  No Known Allergies    Medications:  Continuous Infusions:  Scheduled Meds:   cefTRIAXone (ROCEPHIN) IVPB  1 g Intravenous Q24H    latanoprost  1 drop Both Eyes QHS    pantoprazole  40 mg Intravenous BID    sodium chloride 0.9%  3 mL Intravenous Q8H     PRN Meds:acetaminophen, albuterol-ipratropium, glycopyrrolate, lorazepam, morphine, ondansetron, ondansetron, sodium chloride 0.9%    Family History     Problem Relation (Age of Onset)    Breast cancer Sister    Cancer Father    Diabetes Sister, Brother    Hypertension Sister    No Known Problems Mother, Son, Brother, Maternal Aunt, Maternal Uncle, Paternal Aunt, Paternal Uncle, Maternal Grandmother, Maternal Grandfather, Paternal Grandmother, Paternal Grandfather, Daughter        Tobacco Use    Smoking status: Never Smoker    Smokeless tobacco: Former User   Substance and Sexual Activity    Alcohol use: No    Drug use: No    Sexual activity: Never       Review of Systems   Unable to perform ROS: Mental status change     Objective:     Vital Signs (Most Recent):  Temp: 97.7 °F (36.5 °C) (10/01/19 0917)  Pulse: 96 (10/01/19 0917)  Resp: (!) 28 (10/01/19 0917)  BP: 121/65 (10/01/19 0917)  SpO2: 96 % (10/01/19 0917) Vital Signs (24h  Range):  Temp:  [97.4 °F (36.3 °C)-97.7 °F (36.5 °C)] 97.7 °F (36.5 °C)  Pulse:  [] 96  Resp:  [16-28] 28  SpO2:  [94 %-97 %] 96 %  BP: (105-121)/(59-67) 121/65     Weight: 81.6 kg (180 lb)  Body mass index is 27.37 kg/m².    Review of Symptoms  Symptom Assessment (ESAS 0-10 scale)   ESAS 0 1 2 3 4 5 6 7 8 9 10   Pain              Dyspnea              Anxiety              Nausea              Depression               Anorexia              Fatigue              Insomnia              Restlessness               Agitation              CAM / Delirium __ --  ___+   Constipation     __ --  ___+   Diarrhea           __ --  ___+  Bowel Management Plan (BMP): No    Comments: Unable to do ROS due to pt's mental status.     Performance Status: 10    ECOG Performance Status Grade: 4 - Completely disabled    Physical Exam   Constitutional: He appears lethargic. He has a sickly appearance.   Cardiovascular: Normal rate and regular rhythm.   Murmur heard.  Pulmonary/Chest: Breath sounds normal. No respiratory distress.   Abdominal: Normal appearance and bowel sounds are normal.   Neurological: He appears lethargic.   Pt not oriented to person, place, time, situation.    Skin: Skin is warm and dry.       Significant Labs: All pertinent labs within the past 24 hours have been reviewed.  CBC:   Recent Labs   Lab 10/01/19  0556   WBC 23.24*   HGB 8.0*   HCT 23.4*   MCV 82   PLT 36*     BMP:  Recent Labs   Lab 10/01/19  0556   *   *   K 5.6*      CO2 12*   *   CREATININE 6.9*   CALCIUM 7.6*     LFT:  Lab Results   Component Value Date    AST 1,301 (H) 09/30/2019    ALKPHOS 173 (H) 09/30/2019    BILITOT 1.1 (H) 09/30/2019     Albumin:   Albumin   Date Value Ref Range Status   09/30/2019 2.8 (L) 3.5 - 5.2 g/dL Final     Protein:   Total Protein   Date Value Ref Range Status   09/30/2019 6.3 6.0 - 8.4 g/dL Final     Lactic acid:   Lab Results   Component Value Date    LACTATE 2.5 (H) 09/29/2019    LACTATE 1.9  09/28/2019       Significant Imaging: I have reviewed all pertinent imaging results/findings within the past 24 hours.    Advance Care Planning   Advanced Directives::  Living Will: No  LaPOST: No  Do Not Resuscitate Status: Yes  Medical Power of : Pt's daughter and son are next of kin.     Decision-Making Capacity: Family answered questions       Living Arrangements: Lives with family, son    Psychosocial/Cultural: Pt has a daughter and son. Pt was living with son prior to admit.     Spiritual: yes    F- Sabrina and Belief: Roxy    I - Importance:   .  C - Community:     A - Address in Care: will have  visit.     25 minutes spent in advance care planning concerning hospice care.    Nuzhat Yanez, CNS  Palliative Medicine  [facility]

## 2019-10-01 NOTE — PLAN OF CARE
Pt family at bedside, POC reviewed, pending DC to hospice facility in am.  Pt lethargic, minimally responsive to voice and touch, IVMS given x1 for increased RR and facial grimace, pt family requesting minimal use of narcotics at this time. Q2 turns maintained, llanos catheter to gravity.

## 2019-10-01 NOTE — PHYSICIAN QUERY
PT Name: Jeniffer Taylor  MR #: [id number]    Physician Query Form - Consultant Diagnosis Clarification     CDS: Joe BARROW,RN        Contact information:[phone]  This form is a permanent document in the medical record.     Query Date: September 30, 2019      By submitting this query, we are merely seeking further clarification of documentation.  Please utilize your independent clinical judgment when addressing the question(s) below.      The Medical record contains the following:   Diagnosis Supporting Clinical Information Location in Medical Record                       Stage 3 Sacral Spine Pressure Injury Pressure Injury 09/09/19 1100 Left lower Sacral spine Stage 3  Date First Assessed/Time First Assessed: 09/09/19 1100.Pressure Injury Present on Admission: yes Side: Left  Orientation: lower  Location: Sacral spine is this injury device related?: No  Staging: Stage 3                Staging Stage 3  Drainage Amount None  Drainage Characteristics/Odor No odor  Appearance Moist;Pink  Tissue loss description Full thickness  Periwound Area Dry  Wound Edges Open  Wound Length (cm) 1 cm  Wound Width (cm) 1 cm  Wound Depth (cm) 0.3 cm  Wound Volume (cm^3) 0.3 cm^3  Wound Surface Area (cm^2) 1 cm^2                    9/25/19 Wound Care Progress Notes            Do you agree with the Consultants diagnosis of _Stage 3 Sacral Spine Pressure Injury__________________________________?    [x  ] Yes   [  ] No   [  ] Other/Clarification of findings:   [  ] Clinically undetermined

## 2019-10-01 NOTE — PHYSICIAN QUERY
PT Name: Jeniffer Taylor  MR #: [id number]    Physician Query Form - Consultant Diagnosis Clarification     CDS: Joe BARROW,RN        Contact information:[phone]  This form is a permanent document in the medical record.     Query Date: September 30, 2019      By submitting this query, we are merely seeking further clarification of documentation.  Please utilize your independent clinical judgment when addressing the question(s) below.      The Medical record contains the following:   Diagnosis Supporting Clinical Information Location in Medical Record                 Stage 2 Left Heel Pressure Injury Pressure Injury 09/25/19 1134 Left Heel Stage 2  Date First Assessed/Time First Assessed: 09/25/19 1134  Pressure Injury Present on Admission: yes   Side: Left   Location: Heel   Staging: Stage 2       Staging Stage 2  Drainage Amount Scant  Drainage Characteristics/Odor   Serosanguineous; No odor  Appearance Moist; Red  Tissue loss description Partial thickness  Periwound Area Dry; Intact; Edematous  (unroofed blister edge)  Wound Edges Open  Wound Length (cm) 3.5 cm  Wound Width (cm)   4 cm  Wound Depth (cm)   0.1 cm  Wound Volume (cm^3) 1.4 cm^3  Wound Surface Area (cm^2) 14 cm^2                  9/25/19 Wound Care Progress Notes         Do you agree with the Consultants diagnosis of __Stage 2 Left Heel Pressure Injury_________________________________?    [  x] Yes   [  ] No   [  ] Other/Clarification of findings:   [  ] Clinically undetermined

## 2019-10-02 VITALS
RESPIRATION RATE: 14 BRPM | BODY MASS INDEX: 27.28 KG/M2 | DIASTOLIC BLOOD PRESSURE: 56 MMHG | TEMPERATURE: 98 F | HEART RATE: 76 BPM | HEIGHT: 68 IN | WEIGHT: 180 LBS | SYSTOLIC BLOOD PRESSURE: 117 MMHG | OXYGEN SATURATION: 92 %

## 2019-10-02 LAB
BACTERIA BLD CULT: NORMAL
BACTERIA BLD CULT: NORMAL
ELASTASE 1, FECAL: >500 MCG/G

## 2019-10-02 PROCEDURE — 99233 PR SUBSEQUENT HOSPITAL CARE,LEVL III: ICD-10-PCS | Mod: HCNC,,, | Performed by: CLINICAL NURSE SPECIALIST

## 2019-10-02 PROCEDURE — 99239 HOSP IP/OBS DSCHRG MGMT >30: CPT | Mod: HCNC,,, | Performed by: INTERNAL MEDICINE

## 2019-10-02 PROCEDURE — 99239 PR HOSPITAL DISCHARGE DAY,>30 MIN: ICD-10-PCS | Mod: HCNC,,, | Performed by: INTERNAL MEDICINE

## 2019-10-02 PROCEDURE — 99233 SBSQ HOSP IP/OBS HIGH 50: CPT | Mod: HCNC,,, | Performed by: CLINICAL NURSE SPECIALIST

## 2019-10-02 PROCEDURE — 63600175 PHARM REV CODE 636 W HCPCS: Mod: HCNC | Performed by: STUDENT IN AN ORGANIZED HEALTH CARE EDUCATION/TRAINING PROGRAM

## 2019-10-02 RX ADMIN — CEFTRIAXONE SODIUM 1 G: 1 INJECTION, POWDER, FOR SOLUTION INTRAMUSCULAR; INTRAVENOUS at 03:10

## 2019-10-02 NOTE — PROGRESS NOTES
Patient seen for wound care followup. Patient transitioning to inpatient hospice, scheduled to transfer today. Patient previously with stage 2 and stage 3 pressure injury to sacrum, unable to fully assess sacrum to barrier cream. Appears to be additional skin loss due to pressure in and shearing. Stage 2 pressure injury noted to left heel. Purple discolored tissue noted to patient right heel, right medial foot, left lateral foot and left lateral ankle/leg, appears pressure related and potentially Skin Changes at Life's End, Scale.   Recommend continuing barrier cream BID/prn for sacral skin care, turn every 2hrs.   Skin breakdown to bilateral heels and feet, recommend dressing with foam dressings, change twice a week. Keep elevated with heel boots.  Patient tolerated care well. No questions noted from patient or family members.  Updated Dr. Liu.  Nursing to continue care.    Sacrum 5cm x 8cm x 0.3cm scatter pink tissue noted    Left heel stage 2 pressure injury- moist pink, partial thickness skin loss 3cm x 3cm x0.1cm    Left lateral foot- dark purple discolored tissue 0.5cm to 2cm in diameter    Left lateral leg- 2.5cm x 0.5cm- dark purple discoloration    Right medial foot- 0.5cm x 2cm dark purple discolored tissue    Right heel- 3cm x 3cm, dark purple discoloration

## 2019-10-02 NOTE — PLAN OF CARE
10/02/19 1311   Post-Acute Status   Post-Acute Authorization Home Health/Hospice  ([facility])   Post-Acute Placement Status Referrals Sent   Home Health/Hospice Status Set-up Complete   Discharge Delays None known at this time

## 2019-10-02 NOTE — PLAN OF CARE
[facility]  Department of Hospital Medicine  [address] [place] [address]  [phone] after hours  [phone] fax    HOSPICE  ORDERS    10/02/2019    Admit to Hospice:  Inpatient Service    Diagnoses:   Active Hospital Problems    Diagnosis  POA    *Acute respiratory failure with hypoxia [J96.01]  Unknown    Advance care planning [Z71.89]  Not Applicable    Palliative care encounter [Z51.5]  Not Applicable    Dyspnea [R06.00]  Unknown    Goals of care, counseling/discussion [Z71.89]  Not Applicable    Pain [R52]  Unknown    Bacteremia [R78.81]  Unknown    UTI (urinary tract infection) [N39.0]  Unknown    Transaminitis [R74.0]  Unknown    GI bleed [K92.2]  Yes    Pressure injury of left heel, stage 2 [L89.622]  Yes    Melena [K92.1]  Yes    Pressure injury, stage 3 [L89.93]  Yes    Acute deep vein thrombosis (DVT) of right lower extremity [I82.401]  Yes    Pressure injury of sacral region, stage 2 [L89.152]  Yes    Acute on chronic combined systolic and diastolic heart failure [I50.43]  Yes    Controlled type 2 diabetes mellitus with stage 3 chronic kidney disease, without long-term current use of insulin [E11.22, N18.3]  Yes    Acute kidney injury superimposed on chronic kidney disease [N17.9, N18.9]  Yes    Anemia [D64.9]  Yes    NSTEMI (non-ST elevated myocardial infarction) [I21.4]  No     Chronic    Aortic stenosis [I35.0]  Yes     Mild in 3/2014      Hypertensive heart disease with heart failure [I11.0]  Yes    Prostate CA [C61]  Yes    Dyslipidemia [E78.5]  Yes    CAD (coronary artery disease) [I25.10]  Yes     Last PCI - prox LAD, April 2014  Small LCx known to have subtotal occlusion        Resolved Hospital Problems   No resolved problems to display.       Hospice Qualifying Diagnoses:        Patient has a life expectancy < 6 months due to:  1) Primary Hospice Diagnosis: multi-organ failure in the setting of GI bleed and  NSTEMI  2) Comorbid Conditions Contributing to Decline: DM, CAD, HFrEF    Vital Signs: Routine per Hospice Protocol.    Code Status: DNR    Allergies: Review of patient's allergies indicates:  No Known Allergies    Diet: pleasure feeds    Activities: As tolerated    Nursing: Per Hospice Routine.        Lawrence Care: Empty Lawrence bag Q shift and PRN.  Change Lawrence every month.    Routine Skin for Bedridden Patients: Apply moisture barrier cream to all skin folds and   wet areas in perineal area daily and after baths and all bowel movements    Wound care:  Pressure injuries to sacrum: BID/prn after incontinent episodes, clean with bathing cloths, apply zinc barrier cream.  Left heel pressure injury- twice a week, clean with sterile normal saline, dress with foam dressing.  Waffle overlay for bed- turn every 2hrs.  Wheel chair cushion for when out of bed, reposition in chair [id number], limit time up in chair to 2hrs at a time.  Elevate heels with heel boots or pillows.    Oxygen: 3 L NC. Titrate as needed    Other Miscellaneous Care:     Please allow patient to wear heel boots      Medications:    There are no discharge medications for this patient.           Future Orders:  Hospice Medical Director may dictate new orders for comfortable care measures & sign death certificate.        _________________________________  Celestino Liu DO  10/02/2019

## 2019-10-02 NOTE — SUBJECTIVE & OBJECTIVE
Interval History: DNR, Pt on comfort care.    Past Medical History:   Diagnosis Date    Anemia     Arthritis     Cancer     Prostate    Cataract     CHF (congestive heart failure)     Chronic kidney disease     Coronary artery disease     Diabetes mellitus     Diabetic retinopathy     Disorder of kidney and ureter     ckd 2    Encounter for blood transfusion     Glaucoma     High cholesterol     Hypertension     Myocardial infarction     Prostate cancer     PVD (peripheral vascular disease)     Spinal stenosis     Status post cataract extraction and insertion of intraocular lens of left eye 11/13/2015       Past Surgical History:   Procedure Laterality Date    CARDIAC CATHETERIZATION      15 to 20 years ago x1 stent and 4/7/2014 x1 stent    CATARACT EXTRACTION Bilateral     Dr. Ferguson    ESOPHAGOGASTRODUODENOSCOPY N/A 9/27/2019    Procedure: EGD (ESOPHAGOGASTRODUODENOSCOPY);  Surgeon: Pepe Goodwin MD;  Location: [facility] ([address] [place] [address]);  Service: Endoscopy;  Laterality: N/A;    EYE SURGERY      HERNIA REPAIR         Review of patient's allergies indicates:  No Known Allergies    Medications:  Continuous Infusions:  Scheduled Meds:   cefTRIAXone (ROCEPHIN) IVPB  1 g Intravenous Q24H    latanoprost  1 drop Both Eyes QHS    sodium chloride 0.9%  3 mL Intravenous Q8H     PRN Meds:acetaminophen, albuterol-ipratropium, glycopyrrolate, lorazepam, morphine, ondansetron, ondansetron, sodium chloride 0.9%    Family History     Problem Relation (Age of Onset)    Breast cancer Sister    Cancer Father    Diabetes Sister, Brother    Hypertension Sister    No Known Problems Mother, Son, Brother, Maternal Aunt, Maternal Uncle, Paternal Aunt, Paternal Uncle, Maternal Grandmother, Maternal Grandfather, Paternal Grandmother, Paternal Grandfather, Daughter        Tobacco Use    Smoking status: Never Smoker    Smokeless tobacco: Former User   Substance and Sexual Activity    Alcohol use: No    Drug use:  No    Sexual activity: Never       Review of Systems   Unable to perform ROS: Mental status change     Objective:     Vital Signs (Most Recent):  Temp: 97.6 °F (36.4 °C) (10/02/19 0835)  Pulse: 88 (10/02/19 0835)  Resp: (!) 22 (10/02/19 0835)  BP: 134/65 (10/02/19 0835)  SpO2: (!) 93 % (10/02/19 0835) Vital Signs (24h Range):  Temp:  [97.6 °F (36.4 °C)-98.5 °F (36.9 °C)] 97.6 °F (36.4 °C)  Pulse:  [] 88  Resp:  [16-22] 22  SpO2:  [93 %-96 %] 93 %  BP: (104-134)/(65-82) 134/65     Weight: 81.6 kg (180 lb)  Body mass index is 27.37 kg/m².    Review of Symptoms  Symptom Assessment (ESAS 0-10 scale)   ESAS 0 1 2 3 4 5 6 7 8 9 10   Pain              Dyspnea              Anxiety              Nausea              Depression               Anorexia              Fatigue              Insomnia              Restlessness               Agitation              CAM / Delirium __ --  ___+   Constipation     __ --  ___+   Diarrhea           __ --  ___+  Bowel Management Plan (BMP): No    Comments: Unable to do ROS due to pt's mental status.     Performance Status: 10    ECOG Performance Status Grade: 4 - Completely disabled    Physical Exam   Constitutional: He appears lethargic. He has a sickly appearance.   Cardiovascular: Normal rate and regular rhythm.   Murmur heard.  Pulmonary/Chest: Breath sounds normal. No respiratory distress.   Abdominal: Normal appearance and bowel sounds are normal.   Neurological: He appears lethargic.   Pt not oriented to person, place, time, situation.    Skin: Skin is warm and dry.       Significant Labs: All pertinent labs within the past 24 hours have been reviewed.  CBC:   Recent Labs   Lab 10/01/19  0556   WBC 23.24*   HGB 8.0*   HCT 23.4*   MCV 82   PLT 36*     BMP:  No results for input(s): GLU, NA, K, CL, CO2, BUN, CREATININE, CALCIUM, MG in the last 24 hours.  LFT:  Lab Results   Component Value Date    AST 1,301 (H) 09/30/2019    ALKPHOS 173 (H) 09/30/2019    BILITOT 1.1 (H) 09/30/2019      Albumin:   Albumin   Date Value Ref Range Status   09/30/2019 2.8 (L) 3.5 - 5.2 g/dL Final     Protein:   Total Protein   Date Value Ref Range Status   09/30/2019 6.3 6.0 - 8.4 g/dL Final     Lactic acid:   Lab Results   Component Value Date    LACTATE 2.5 (H) 09/29/2019    LACTATE 1.9 09/28/2019       Significant Imaging: I have reviewed all pertinent imaging results/findings within the past 24 hours.    Advance Care Planning   Advanced Directives::  Living Will: No  LaPOST: No  Do Not Resuscitate Status: Yes  Medical Power of : Pt's daughter and son are next of kin.     Decision-Making Capacity: Family answered questions       Living Arrangements: Lives with family, son    Psychosocial/Cultural: Pt has a daughter and son. Pt was living with son prior to admit.     Spiritual: yes    F- Sabrina and Belief: Roxy    I - Importance:   .  C - Community:     A - Address in Care: will have  visit.

## 2019-10-02 NOTE — PLAN OF CARE
Purposeful rounding completed. Patient resting in bed in NAD. No new or adverse findings noted by this nurse this shift. No deviation from shift established baseline noted. No needs verbalized at this time. Bed in low laying position with bed locks intact. Side rails up x3. 3Ps addressed. Call bell within reach. Safety maintained.

## 2019-10-02 NOTE — PLAN OF CARE
Pt accepted to [facility] as per Jesus in admissions.  [facility] RN should call report at 230pm.  Transportation order placed in pt chart for 3pm and all questions regarding transfer should be directed to extension [phone], option 5.  Transport packet printed and sent to nurses's station desk on 8th floor tower.              Erika Todd LMSW  Ext [phone]

## 2019-10-02 NOTE — PLAN OF CARE
Plan is to discharge to [facility] inpatient hospice.       10/01/19 1919   Discharge Reassessment   Assessment Type Discharge Planning Reassessment   Do you have any problems affording any of your prescribed medications? No   Discharge Plan A Inpatient Hospice   Discharge Plan B Hospice/home   DME Needed Upon Discharge  none   Anticipated Discharge Disposition HospiceMedic

## 2019-10-02 NOTE — PROGRESS NOTES
"Hospital Medicine   Progress note      Team: [facility] HOSP MED COSTA Liu, DO   Admit Date: 9/24/2019   Hospital Day: 6  LUCIA: 10/1/2019   Code status: DNR (Do Not Resuscitate)   Principal Problem: Acute respiratory failure with hypoxia     HPI: 89 year old male with a PMHx of HTN, HLD, CAD, HFrEF, AS, CKD3, and DM2 presenting to the ER from PCP clinic with son and grandson at bedside for "low hemoglobin." Patient reports noticing dark stools at home, but denies BRBPR, bloody BMs, hematuria, and hemoptysis. He denies associated SOB and PRATT. Patient also reports taking iron supplementation at home. He reports chronic fatigue, currently at his baseline. At the time of my exam, patient denies CP, SOB, abdominal pain, N/V/D, fever/chills, recent illness, dysuria/hematuria/frequency, focal weakness, syncope, and palpitations.     ICU Course: Patient admitted for GIB. He was sent from PCP for low hgb 8.8>>7.8. Vitals remaining stable. Started protonix IV. Consulted GI who recommend EGD. However, patient at first adamant that he did not want scope because he was "tired of being poked and prodded". He was wanting to leave AMA, but subsequently decided to stay and pursue scope. Notified GI.Patient began getting SOB and hypoxic 9/25 night, improved with lasix and breathing treatment. Troponin 5>>25. Consulted cardiology. Patient moved to ICU 9/26 for closer monitoring.9/27 patient didn't responeded appropriately to lasix, kidney function worsening, having increase work of breathing and metabolic acidosis, GI held off on EGD given his unstable condition, cardiology recommended no ACS protocol given his GI bleed, had goals of care discussion with patient daughter, her goal is to keep him comfort and continue current course of treatment with no escalation. 9/26 patient showing some improvement, RR improved and lactic acidosis resolved, had goals of care discussion with the family today.      Interval hx:   No acute events " overnight. Patient slightly tachypneic this morning. Open his eyes to voice but very lethergic. Not following commands. Spoke with daughter about poor prognosis. She is interesting in inpatient hospice here or [facility]. Urine output slightly improved but renal function continues to decline     ROS (Positive in Bold, otherwise negative)  Unable to obtain as patient is encephalopathic    PEx   Temp:  [97.4 °F (36.3 °C)-97.7 °F (36.5 °C)]   Pulse:  [91-98]   Resp:  [16-28]   BP: (105-121)/(59-65)   SpO2:  [96 %-97 %]      I & O (Last 24H):     Intake/Output Summary (Last 24 hours) at 10/1/2019 1904  Last data filed at 10/1/2019 0600  Gross per 24 hour   Intake 0 ml   Output 350 ml   Net -350 ml       General:  male  in no acute distress. Nontoxic. Resting in bed.  Drowsy, easily arousable but unable to hold a conversation or follow commands.  HEENT: NCAT. PERRL. Sclera Anicteric.  CVS: RRR. Normal S1 S2. No murmurs  Pulm Tachypneic but no distress.. No wheezes, rhonchi. Some bibasilar crackles.  Abdomen: Soft. Non-distended. No tenderness to palpation. No rebound or guarding. +BS.  Extremities: No edema. No cyanosis. Full ROM.  Neuro: Alert, oriented x 0, Spont mvt of all extremities with no focal deficits noted. Easily arousable but unable to follow commands    Recent Results (from the past 24 hour(s))   CBC auto differential    Collection Time: 10/01/19  5:56 AM   Result Value Ref Range    WBC 23.24 (H) 3.90 - 12.70 K/uL    RBC 2.86 (L) 4.60 - 6.20 M/uL    Hemoglobin 8.0 (L) 14.0 - 18.0 g/dL    Hematocrit 23.4 (L) 40.0 - 54.0 %    Mean Corpuscular Volume 82 82 - 98 fL    Mean Corpuscular Hemoglobin 28.0 27.0 - 31.0 pg    Mean Corpuscular Hemoglobin Conc 34.2 32.0 - 36.0 g/dL    RDW 16.7 (H) 11.5 - 14.5 %    Platelets 36 (LL) 150 - 350 K/uL    MPV SEE COMMENT 9.2 - 12.9 fL    Immature Granulocytes CANCELED 0.0 - 0.5 %    Immature Grans (Abs) CANCELED 0.00 - 0.04 K/uL    nRBC 3 (A) 0 /100 WBC    Gran%  95.0 (H) 38.0 - 73.0 %    Lymph% 2.0 (L) 18.0 - 48.0 %    Mono% 1.0 (L) 4.0 - 15.0 %    Eosinophil% 0.0 0.0 - 8.0 %    Basophil% 0.0 0.0 - 1.9 %    Bands 1.0 %    Myelocytes 1.0 %    Platelet Estimate Decreased (A)     Aniso Slight     Poik Slight     Poly Occasional     Hypo Occasional     Ovalocytes Occasional     Target Cells Occasional     Dayton Cells Occasional     Differential Method Manual    Basic metabolic panel    Collection Time: 10/01/19  5:56 AM   Result Value Ref Range    Sodium 148 (H) 136 - 145 mmol/L    Potassium 5.6 (H) 3.5 - 5.1 mmol/L    Chloride 110 95 - 110 mmol/L    CO2 12 (L) 23 - 29 mmol/L    Glucose 221 (H) 70 - 110 mg/dL    BUN, Bld 137 (H) 10 - 30 mg/dL    Creatinine 6.9 (H) 0.5 - 1.4 mg/dL    Calcium 7.6 (L) 8.7 - 10.5 mg/dL    Anion Gap 26 (H) 8 - 16 mmol/L    eGFR if African American 7.3 (A) >60 mL/min/1.73 m^2    eGFR if non  6.3 (A) >60 mL/min/1.73 m^2       Recent Labs   Lab 09/29/19  0407 09/29/19  0607 09/29/19  0802 09/29/19  1630 09/30/19  0426 09/30/19  0816   POCTGLUCOSE 123* 140* 145* 167* 239* 189*       Hemoglobin A1C   Date Value Ref Range Status   09/25/2019 6.1 (H) 4.0 - 5.6 % Final     Comment:     ADA Screening Guidelines:  5.7-6.4%  Consistent with prediabetes  >or=6.5%  Consistent with diabetes  High levels of fetal hemoglobin interfere with the HbA1C  assay. Heterozygous hemoglobin variants (HbS, HgC, etc)do  not significantly interfere with this assay.   However, presence of multiple variants may affect accuracy.     06/20/2019 6.0 (H) 4.0 - 5.6 % Final     Comment:     ADA Screening Guidelines:  5.7-6.4%  Consistent with prediabetes  >or=6.5%  Consistent with diabetes  High levels of fetal hemoglobin interfere with the HbA1C  assay. Heterozygous hemoglobin variants (HbS, HgC, etc)do  not significantly interfere with this assay.   However, presence of multiple variants may affect accuracy.     01/13/2019 6.1 (H) 4.0 - 5.6 % Final     Comment:     ADA  Screening Guidelines:  5.7-6.4%  Consistent with prediabetes  >or=6.5%  Consistent with diabetes  High levels of fetal hemoglobin interfere with the HbA1C  assay. Heterozygous hemoglobin variants (HbS, HgC, etc)do  not significantly interfere with this assay.   However, presence of multiple variants may affect accuracy.          Active Hospital Problems    Diagnosis  POA    *Acute respiratory failure with hypoxia [J96.01]  Unknown    Advance care planning [Z71.89]  Not Applicable    Palliative care encounter [Z51.5]  Not Applicable    Dyspnea [R06.00]  Unknown    Goals of care, counseling/discussion [Z71.89]  Not Applicable    Pain [R52]  Unknown    Bacteremia [R78.81]  Unknown    UTI (urinary tract infection) [N39.0]  Unknown    Transaminitis [R74.0]  Unknown    GI bleed [K92.2]  Yes    Pressure injury of left heel, stage 2 [L89.622]  Yes    Melena [K92.1]  Yes    Pressure injury, stage 3 [L89.93]  Yes    Acute deep vein thrombosis (DVT) of right lower extremity [I82.401]  Yes    Pressure injury of sacral region, stage 2 [L89.152]  Yes    Acute on chronic combined systolic and diastolic heart failure [I50.43]  Yes    Controlled type 2 diabetes mellitus with stage 3 chronic kidney disease, without long-term current use of insulin [E11.22, N18.3]  Yes    Acute kidney injury superimposed on chronic kidney disease [N17.9, N18.9]  Yes    Anemia [D64.9]  Yes    NSTEMI (non-ST elevated myocardial infarction) [I21.4]  No     Chronic    Aortic stenosis [I35.0]  Yes     Mild in 3/2014      Hypertensive heart disease with heart failure [I11.0]  Yes    Prostate CA [C61]  Yes    Dyslipidemia [E78.5]  Yes    CAD (coronary artery disease) [I25.10]  Yes     Last PCI - prox LAD, April 2014  Small LCx known to have subtotal occlusion        Resolved Hospital Problems   No resolved problems to display.          Assessment and Plan for problems addressed today:      cefTRIAXone (ROCEPHIN) IVPB  1 g  Intravenous Q24H    latanoprost  1 drop Both Eyes QHS    sodium chloride 0.9%  3 mL Intravenous Q8H     acetaminophen, albuterol-ipratropium, glycopyrrolate, lorazepam, morphine, ondansetron, ondansetron, sodium chloride 0.9%    Goals of care/adult failure to thrive:  Encounter for comfort care:  -patient admitted for acute GI bleed, NSTEMI, acute renal failure, DVT, severe aortic stenosis, UTI, E coli bacteremia.  Due to multiple comorbidities, advanced age, adult failure to thrive, grave debility, muscle weakness had long discussion with daughter about goals of care.  Daughter is now interested in transitioning to comfort measures only  -no escalation of care no invasive procedures, no dialysis  -morphine, Ativan available p.r.n. frequently for any signs of distress  -glycopyrrolate available p.r.n. for increased secretions  -palliative Care consulted, appreciate recs  -hospice consulted to see if patient is a candidate for inpatient hospice bed versus discharge home with hospice  -code status DNR    GI bleed / melena:  - Stop IV protonix  - Will stop checks labs today     Acute on chronic combined systolic and diastolic heart failure  Acute respiratory failure with hypoxia  Aortic stenosis  -EF 45% with sever AS and mild to moderate MR on 9/9/19  -Chest x-ray on admit consistent with pulm edema   -BNP elevated , LE edema and basilar crackles on exam   -minimal urine output with lasix   -Family decided to not pursue dialysis and focus on comfort care with no escalation        NSTEMI (non-ST elevated myocardial infarction)  -Patient with Hx of  HTN, HLD, CAD, HFrEF (EF 45%), AS, CKD3, and DM2 initially admitted to hospital medicine for LGIB in setting of AC. Cardiology consulted due to NSTEMI and acute hypoxic respiratory failure due to pulmonary edema. Troponin 5--> 24> 25>>33, BMP of 3K with pulmonary edema on CXR. EKG with non specific ST changes. 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 No ASA or heparin   -d/c troponin per cards recs     Hypertensive heart disease with heart failure  -All BP meds stopped     CAD (coronary artery disease)  -Coronary angiogram (4/7/14): Prox LAD PCI, 99% LCx (small vessel), 40% Ramus, 95% RCA (multiple lesions, heavily calcified).   -discontinued aspirin, statin     Dyslipidemia  -stop atorvastatin 80mg qD     UTI (urinary tract infection)  Bacteremia  -Urine and blood cultures growing gram -ve rods   -Ecoli in the urine  -continue ceftriaxone for now per family's wishes    Acute kidney injury superimposed on chronic kidney disease  Hyperkalemia/metabolic acidosis/uremia:  -in sitting of DHF and UTI   -Cardiorenal VS hypovolemia in sitting of sepsis   -minimal urine out put and failed lasix challenge   -Family decided to not pursue dialysis and focus on comfort care with no escalation        Acute deep vein thrombosis (DVT) of right lower extremity  -venous LE US noted partially occlusive DVT in the upper right femoral vein and patient was initiated on heparin gtt >>> transitioned to renal dose apixaban 09/11  -repeated U/S 9/27 showed no DVT  -anticoagulation now stopped     Controlled type 2 diabetes mellitus with stage 3 chronic kidney disease, without long-term current use of insulin  -stop Accu-Cheks  -p.o. intake as tolerated     Transaminitis:  Improved  -Likely shock liver from hypotension   -continue trending LFTs    Discharge plan and follow up:  Inpatient hospice versus home hospice tomorrow    Celestino Liu, [facility]   [phone] pager

## 2019-10-02 NOTE — PROGRESS NOTES
[facility]  Palliative Medicine  Progress Note    Patient Name: Jeniffer Taylor  MRN: [id number]  Admission Date: 9/24/2019  Hospital Length of Stay: 7 days  Code Status: DNR   Attending Provider: Celestino Liu DO  Consulting Provider: JOSE LUIS David  Primary Care Physician: Shari Chauhan MD  Principal Problem:Acute respiratory failure with hypoxia    Patient information was obtained from relative(s) and ER records.      Assessment/Plan:     Palliative care encounter  Impression:  Pt is a 89 y/o male admitted for acute respiratory failure, GI bleed. NSTEMI, renal failure shock liver. Pt transferred from ICU on Morphine drip with plan to keep comfortable and continue IV antibiotics for e coli bacteremia.  Pt is a DNR. Pt has renal failure. Family does not want dialysis.  Pt is very lethargic. Pt not oriented to person, place, time, or situation.     Reason for consult- goals of care- hospice, symptom management.     Goals of care/Advance care planning: Met with pt's daughter, Chrissie and family friend. Per Chrissie, her goal is comfort, quality of life. Daughter to meet with hospice rep today.     Symptom management:     Pt comfortable on current measures.     Pt on  Morphine 2 mg IVP q 1 hr prn pain, dyspnea available.     Agitation: Would have available, Ativan 1 mg IVP q 4 hrs prn agitation.     Secretions: Would have available, Glycopyrrolate 0.1 mg IVP tid prn secretions.         Plan:  1) [facility] rep to meet with family.   2)Pt inpt hospice appropriate.   3) Support given to pt's daughter at bedside.           I will follow-up with patient. Please contact us if you have any additional questions.    Subjective:     Chief Complaint:   Chief Complaint   Patient presents with    Multiple Complaints     low blood count, immobile, dark stool       HPI:   Pt is a 89 year old male with a PMHx of HTN, HLD, CAD, HFrEF, AS, CKD3,DVT on eliquis and DM2 presenting to the ER from PCP clinic  "with son and grandson at bedside for "low hemoglobin." Per chart review, patient  reported noticing dark stools at home, but denied BRBPR, bloody BMs, hematuria, and hemoptysis. Pt denied associated SOB and PRATT. Patient also reported taking iron supplementation at home. He reported chronic fatigue, currently at his baseline. At the time of my exam, patient denied CP, SOB, abdominal pain, N/V/D, fever/chills, recent illness, dysuria/hematuria/frequency, focal weakness, syncope, and palpitations.   Patient was admitted 09/09 due to CHF exacerbation and LE swelling. Venous LE US noted partially occlusive DVT in the upper right femoral vein and patient was initiated on heparin gtt >>> transitioned to renal dose apixaban 09/11.     MICU was consulted after a rapid response today, patient was found short of breath with difficulty breathing, saturating in the 80's and a non re breather was placed, ABG was unremarkable, patient weaned down to 3L, HGB dropped from 9 to 7.7 and trop elevated from 5 to 24, patient transferred to MICU for higher level of care    Hospital Course:  No notes on file    Interval History: DNR, Pt on comfort care.    Past Medical History:   Diagnosis Date    Anemia     Arthritis     Cancer     Prostate    Cataract     CHF (congestive heart failure)     Chronic kidney disease     Coronary artery disease     Diabetes mellitus     Diabetic retinopathy     Disorder of kidney and ureter     ckd 2    Encounter for blood transfusion     Glaucoma     High cholesterol     Hypertension     Myocardial infarction     Prostate cancer     PVD (peripheral vascular disease)     Spinal stenosis     Status post cataract extraction and insertion of intraocular lens of left eye 11/13/2015       Past Surgical History:   Procedure Laterality Date    CARDIAC CATHETERIZATION      15 to 20 years ago x1 stent and 4/7/2014 x1 stent    CATARACT EXTRACTION Bilateral     Dr. Ferguson    " ESOPHAGOGASTRODUODENOSCOPY N/A 9/27/2019    Procedure: EGD (ESOPHAGOGASTRODUODENOSCOPY);  Surgeon: Pepe Goodwin MD;  Location: [facility] ([address] [place] [address]);  Service: Endoscopy;  Laterality: N/A;    EYE SURGERY      HERNIA REPAIR         Review of patient's allergies indicates:  No Known Allergies    Medications:  Continuous Infusions:  Scheduled Meds:   cefTRIAXone (ROCEPHIN) IVPB  1 g Intravenous Q24H    latanoprost  1 drop Both Eyes QHS    sodium chloride 0.9%  3 mL Intravenous Q8H     PRN Meds:acetaminophen, albuterol-ipratropium, glycopyrrolate, lorazepam, morphine, ondansetron, ondansetron, sodium chloride 0.9%    Family History     Problem Relation (Age of Onset)    Breast cancer Sister    Cancer Father    Diabetes Sister, Brother    Hypertension Sister    No Known Problems Mother, Son, Brother, Maternal Aunt, Maternal Uncle, Paternal Aunt, Paternal Uncle, Maternal Grandmother, Maternal Grandfather, Paternal Grandmother, Paternal Grandfather, Daughter        Tobacco Use    Smoking status: Never Smoker    Smokeless tobacco: Former User   Substance and Sexual Activity    Alcohol use: No    Drug use: No    Sexual activity: Never       Review of Systems   Unable to perform ROS: Mental status change     Objective:     Vital Signs (Most Recent):  Temp: 97.6 °F (36.4 °C) (10/02/19 0835)  Pulse: 88 (10/02/19 0835)  Resp: (!) 22 (10/02/19 0835)  BP: 134/65 (10/02/19 0835)  SpO2: (!) 93 % (10/02/19 0835) Vital Signs (24h Range):  Temp:  [97.6 °F (36.4 °C)-98.5 °F (36.9 °C)] 97.6 °F (36.4 °C)  Pulse:  [] 88  Resp:  [16-22] 22  SpO2:  [93 %-96 %] 93 %  BP: (104-134)/(65-82) 134/65     Weight: 81.6 kg (180 lb)  Body mass index is 27.37 kg/m².    Review of Symptoms  Symptom Assessment (ESAS 0-10 scale)   ESAS 0 1 2 3 4 5 6 7 8 9 10   Pain              Dyspnea              Anxiety              Nausea              Depression               Anorexia              Fatigue              Insomnia               Restlessness               Agitation              CAM / Delirium __ --  ___+   Constipation     __ --  ___+   Diarrhea           __ --  ___+  Bowel Management Plan (BMP): No    Comments: Unable to do ROS due to pt's mental status.     Performance Status: 10    ECOG Performance Status Grade: 4 - Completely disabled    Physical Exam   Constitutional: He appears lethargic. He has a sickly appearance.   Cardiovascular: Normal rate and regular rhythm.   Murmur heard.  Pulmonary/Chest: Breath sounds normal. No respiratory distress.   Abdominal: Normal appearance and bowel sounds are normal.   Neurological: He appears lethargic.   Pt not oriented to person, place, time, situation.    Skin: Skin is warm and dry.       Significant Labs: All pertinent labs within the past 24 hours have been reviewed.  CBC:   Recent Labs   Lab 10/01/19  0556   WBC 23.24*   HGB 8.0*   HCT 23.4*   MCV 82   PLT 36*     BMP:  No results for input(s): GLU, NA, K, CL, CO2, BUN, CREATININE, CALCIUM, MG in the last 24 hours.  LFT:  Lab Results   Component Value Date    AST 1,301 (H) 09/30/2019    ALKPHOS 173 (H) 09/30/2019    BILITOT 1.1 (H) 09/30/2019     Albumin:   Albumin   Date Value Ref Range Status   09/30/2019 2.8 (L) 3.5 - 5.2 g/dL Final     Protein:   Total Protein   Date Value Ref Range Status   09/30/2019 6.3 6.0 - 8.4 g/dL Final     Lactic acid:   Lab Results   Component Value Date    LACTATE 2.5 (H) 09/29/2019    LACTATE 1.9 09/28/2019       Significant Imaging: I have reviewed all pertinent imaging results/findings within the past 24 hours.    Advance Care Planning   Advanced Directives::  Living Will: No  LaPOST: No  Do Not Resuscitate Status: Yes  Medical Power of : Pt's daughter and son are next of kin.     Decision-Making Capacity: Family answered questions       Living Arrangements: Lives with family, son    Psychosocial/Cultural: Pt has a daughter and son. Pt was living with son prior to admit.     Spiritual: yes    F-  Sabrina and Belief: Roxy    I - Importance:   .  C - Community:     A - Address in Care: will have  visit.       > 50% of 35 min visit spent in chart review, face to face discussion of goals of care,  symptom assessment, coordination of care and emotional support.    Nuzhat Yanez, CNS  Palliative Medicine  [facility]

## 2019-10-02 NOTE — NURSING
Report called to Renea John RN. VSS. 1L of O2 stats kept above 90%.  Pt unresponsive at baseline. Family at bedside. Pt to be d/c via ambulance to [facility]. D/c instructions given to Pt's family. All questions answered. PIV removed. Folly still in place per MD order. Safety checks preformed. Will continue to monitor.

## 2019-10-02 NOTE — PLAN OF CARE
CARA met with pt at 430 on 10/1 and explained the hospice process and what should be expected.  Chrissie, daughter, felt that pt could possibly DC to home with hospice and CARA suggested that she wait to speak with Dr. Liu in order to make a solid decision.  CARA spoke with Chrissie again at 10am on 10/2 and was told that she understood from Dr. Liu that pt would be best served in an impatient facility.  CARA confirmed with Dr. Liu that orders were in and forwarded them onto [facility] with Alvaro, who came to meet with family immediately.  Jesus stated that there was a vacant bed and that pt could transfer today.  CTI completed and placed in [facility], forwarded to Alvaro[place]        [place]  [id number]

## 2019-10-03 LAB — CALPROTECTIN STL-MCNT: 185.9 MCG/G

## 2019-10-03 NOTE — PLAN OF CARE
Patient discharged to [facility] inpatient hosp[facility] via stretcher transport.    Future Appointments   Date Time Provider Department Center   10/7/2019  1:45 PM ECHO, MAIN CAMPUS [facility] ECHOL[facility]   10/7/2019  3:00 PM INTERVENTIONAL, VALVE CLINIC [facility] CARDVAL [facility]   10/10/2019  9:00 AM Shari Chauhan MD [facility] IM [facility] PCW   10/30/2019  1:40 PM Sonya Meier MD [facility] PHYSMED [facility]   11/5/2019  9:15 AM Michele Ahmadi DPM [facility] POD [facility]   12/4/2019  8:30 [facility]        10/03/19 0813   Final Note   Assessment Type Final Discharge Note   Anticipated Discharge Disposition HospiceMedic   Right Care Referral Info   Post Acute Recommendation Other   Referral Type Inpatient Hospice   Facility Name [facility].

## 2019-10-04 ENCOUNTER — EXTERNAL HOME HEALTH (OUTPATIENT)
Dept: HOME HEALTH SERVICES | Facility: HOSPITAL | Age: 84
End: 2019-10-04
Payer: MEDICARE

## 2019-10-04 ENCOUNTER — TELEPHONE (OUTPATIENT)
Dept: CARDIOLOGY | Facility: CLINIC | Age: 84
End: 2019-10-04

## 2019-10-04 NOTE — TELEPHONE ENCOUNTER
Called to confirm pt appt for 10/7 daughter(Marisela Carbajal)stated her father passed away this morning 10/4.

## 2019-10-08 ENCOUNTER — TELEPHONE (OUTPATIENT)
Dept: INTERNAL MEDICINE | Facility: CLINIC | Age: 84
End: 2019-10-08

## 2019-10-08 NOTE — TELEPHONE ENCOUNTER
----- Message from Cyrus Pantoja sent at 10/8/2019  8:14 AM CDT -----  Contact: Daughter Marisela Carbajal [phone]  Update   Daughter calling making sure the PCP is aware patient  on Friday 10/04/19, Marisela Carbajal [phone]    Please call an advise  Thank you

## 2019-10-08 NOTE — DISCHARGE SUMMARY
DISCHARGE SUMMARY  Hospital Medicine    Team: [facility]    Patient Name: Jeniffer Taylor  YOB: 1928    Admit Date: 9/24/2019    Discharge Date: 10/2/2019  Discharge Attending Physician: Celestino Liu DO     Chief Complaint: rectal bleeding     Princilpal Diagnoses:  Active Hospital Problems    Diagnosis  POA    *Acute respiratory failure with hypoxia [J96.01]  Unknown    Advance care planning [Z71.89]  Not Applicable    Palliative care encounter [Z51.5]  Not Applicable    Dyspnea [R06.00]  Unknown    Goals of care, counseling/discussion [Z71.89]  Not Applicable    Pain [R52]  Unknown    Bacteremia [R78.81]  Unknown    UTI (urinary tract infection) [N39.0]  Unknown    Transaminitis [R74.0]  Unknown    GI bleed [K92.2]  Yes    Pressure injury of left heel, stage 2 [L89.622]  Yes    Melena [K92.1]  Yes    Pressure injury, stage 3 [L89.93]  Yes    Acute deep vein thrombosis (DVT) of right lower extremity [I82.401]  Yes    Pressure injury of sacral region, stage 2 [L89.152]  Yes    Acute on chronic combined systolic and diastolic heart failure [I50.43]  Yes    Controlled type 2 diabetes mellitus with stage 3 chronic kidney disease, without long-term current use of insulin [E11.22, N18.3]  Yes    Acute kidney injury superimposed on chronic kidney disease [N17.9, N18.9]  Yes    Anemia [D64.9]  Yes    NSTEMI (non-ST elevated myocardial infarction) [I21.4]  No     Chronic    Aortic stenosis [I35.0]  Yes     Mild in 3/2014      Hypertensive heart disease with heart failure [I11.0]  Yes    Prostate CA [C61]  Yes    Dyslipidemia [E78.5]  Yes    CAD (coronary artery disease) [I25.10]  Yes     Last PCI - prox LAD, April 2014  Small LCx known to have subtotal occlusion        Resolved Hospital Problems   No resolved problems to display.       Discharged Condition: Admit problems have resolved but patient's overall clinical course worsened    HOSPITAL COURSE:      Initial  "Presentation:    89 year old male with a PMHx of HTN, HLD, CAD, HFrEF, AS, CKD3, and DM2 presenting to the ER from PCP clinic with son and grandson at bedside for "low hemoglobin." Patient reports noticing dark stools at home, but denies BRBPR, bloody BMs, hematuria, and hemoptysis. He denies associated SOB and PRATT. Patient also reports taking iron supplementation at home. He reports chronic fatigue, currently at his baseline. At the time of my exam, patient denies CP, SOB, abdominal pain, N/V/D, fever/chills, recent illness, dysuria/hematuria/frequency, focal weakness, syncope, and palpitations.     Course of Principle Problem for Admission:    Patient admitted for GIB. He was sent from PCP for low hgb 8.8>>7.8. Vitals remaining stable. Started protonix IV. Consulted GI who recommend EGD. However, patient at first adamant that he did not want scope because he was "tired of being poked and prodded". He was wanting to leave AMA, but subsequently decided to stay and pursue scope. Notified GI.Patient began getting SOB and hypoxic 9/25 night, improved with lasix and breathing treatment. Troponin 5>>25. Consulted cardiology. Patient moved to ICU 9/26 for closer monitoring.9/27 patient didn't responeded appropriately to lasix, kidney function worsening, having increase work of breathing and metabolic acidosis, GI held off on EGD given his unstable condition, cardiology recommended no ACS protocol given his GI bleed, had goals of care discussion with patient daughter, her goal is to keep him comfort and continue current course of treatment with no escalation. Patient eventually developed liver and renal failure secondary to MI. Comfort measure were initially and patient was discharged to inpatient hospice.    Physical Exam 10/2  General:  male  in no acute distress. Nontoxic. Resting in bed.  Drowsy, easily arousable but unable to hold a conversation or follow commands.  HEENT: NCAT. PERRL. Sclera " Anicteric.  CVS: RRR. Normal S1 S2. No murmurs  Pulm Tachypneic but no distress. No wheezes, rhonchi. Some bibasilar crackles.  Abdomen: Soft. Non-distended. No tenderness to palpation. No rebound or guarding. +BS.  Extremities: No edema. No cyanosis. Full ROM.  Neuro: Alert, oriented x 0, Spont mvt of all extremities with no focal deficits noted. Easily arousable but unable to follow commands       Other Medical Problems Addressed in the Hospital:    Goals of care/adult failure to thrive:  Encounter for comfort care:  -patient admitted for acute GI bleed, NSTEMI, acute renal failure, DVT, severe aortic stenosis, UTI, E coli bacteremia.  Due to multiple comorbidities, advanced age, adult failure to thrive, grave debility, muscle weakness had long discussion with daughter about goals of care.  Daughter is now interested in transitioning to comfort measures only  -no escalation of care no invasive procedures, no dialysis  -morphine, Ativan available p.r.n. frequently for any signs of distress  -glycopyrrolate available p.r.n. for increased secretions  -palliative Care consulted, appreciate recs  -hospice consulted to see if patient is a candidate for inpatient hospice bed versus discharge home with hospice  -code status DNR     GI bleed / melena:  - Stop IV protonix  - Will stop checks labs today      Acute on chronic combined systolic and diastolic heart failure  Acute respiratory failure with hypoxia  Aortic stenosis  -EF 45% with sever AS and mild to moderate MR on 9/9/19  -Chest x-ray on admit consistent with pulm edema   -BNP elevated , LE edema and basilar crackles on exam   -minimal urine output with lasix   -Family decided to not pursue dialysis and focus on comfort care with no escalation        NSTEMI (non-ST elevated myocardial infarction)  -Patient with Hx of  HTN, HLD, CAD, HFrEF (EF 45%), AS, CKD3, and DM2 initially admitted to hospital medicine for LGIB in setting of AC. Cardiology consulted due to  NSTEMI and acute hypoxic respiratory failure due to pulmonary edema. Troponin 5--> 24> 25>>33, BMP of 3K with pulmonary edema on CXR. EKG with non specific ST changes. 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 No ASA or heparin   -d/c troponin per cards recs     Hypertensive heart disease with heart failure  -All BP meds stopped     CAD (coronary artery disease)  -Coronary angiogram (4/7/14): Prox LAD PCI, 99% LCx (small vessel), 40% Ramus, 95% RCA (multiple lesions, heavily calcified).   -discontinued aspirin, statin     Dyslipidemia  -stop atorvastatin 80mg qD     UTI (urinary tract infection)  Bacteremia  -Urine and blood cultures growing gram -ve rods   -Ecoli in the urine  -continue ceftriaxone for now per family's wishes     Acute kidney injury superimposed on chronic kidney disease  Hyperkalemia/metabolic acidosis/uremia:  -in sitting of DHF and UTI   -Cardiorenal VS hypovolemia in sitting of sepsis   -minimal urine out put and failed lasix challenge   -Family decided to not pursue dialysis and focus on comfort care with no escalation        Acute deep vein thrombosis (DVT) of right lower extremity  -venous LE US noted partially occlusive DVT in the upper right femoral vein and patient was initiated on heparin gtt >>> transitioned to renal dose apixaban 09/11  -repeated U/S 9/27 showed no DVT  -anticoagulation now stopped     Controlled type 2 diabetes mellitus with stage 3 chronic kidney disease, without long-term current use of insulin  -stop Accu-Cheks  -p.o. intake as tolerated     Transaminitis:  Improved  -Likely shock liver from hypotension   -continue trending LFTs    CONSULTS: Cardiology, GI, Critical Care    Last CBC/BMP/HgbA1c (if applicable):  Recent Results (from the past 336 hour(s))   CBC auto differential    Collection Time: 10/01/19  5:56 AM   Result Value Ref Range    WBC 23.24 (H) 3.90 - 12.70 K/uL    Hemoglobin 8.0 (L) 14.0 - 18.0 g/dL     Hematocrit 23.4 (L) 40.0 - 54.0 %    Platelets 36 (LL) 150 - 350 K/uL   CBC auto differential    Collection Time: 09/30/19  7:33 AM   Result Value Ref Range    WBC 26.49 (H) 3.90 - 12.70 K/uL    Hemoglobin 8.1 (L) 14.0 - 18.0 g/dL    Hematocrit 23.8 (L) 40.0 - 54.0 %    Platelets 29 (LL) 150 - 350 K/uL   CBC auto differential    Collection Time: 09/29/19  3:00 AM   Result Value Ref Range    WBC 26.77 (H) 3.90 - 12.70 K/uL    Hemoglobin 8.1 (L) 14.0 - 18.0 g/dL    Hematocrit 22.7 (L) 40.0 - 54.0 %    Platelets 33 (LL) 150 - 350 K/uL     Recent Results (from the past 336 hour(s))   Basic metabolic panel    Collection Time: 10/01/19  5:56 AM   Result Value Ref Range    Sodium 148 (H) 136 - 145 mmol/L    Potassium 5.6 (H) 3.5 - 5.1 mmol/L    Chloride 110 95 - 110 mmol/L    CO2 12 (L) 23 - 29 mmol/L    BUN, Bld 137 (H) 10 - 30 mg/dL    Creatinine 6.9 (H) 0.5 - 1.4 mg/dL    Calcium 7.6 (L) 8.7 - 10.5 mg/dL    Anion Gap 26 (H) 8 - 16 mmol/L   Basic metabolic panel    Collection Time: 09/27/19  1:15 AM   Result Value Ref Range    Sodium 141 136 - 145 mmol/L    Potassium 4.3 3.5 - 5.1 mmol/L    Chloride 107 95 - 110 mmol/L    CO2 13 (L) 23 - 29 mmol/L    BUN, Bld 71 (H) 10 - 30 mg/dL    Creatinine 3.4 (H) 0.5 - 1.4 mg/dL    Calcium 8.2 (L) 8.7 - 10.5 mg/dL    Anion Gap 21 (H) 8 - 16 mmol/L   Basic metabolic panel    Collection Time: 09/25/19  6:52 AM   Result Value Ref Range    Sodium 143 136 - 145 mmol/L    Potassium 3.4 (L) 3.5 - 5.1 mmol/L    Chloride 110 95 - 110 mmol/L    CO2 22 (L) 23 - 29 mmol/L    BUN, Bld 55 (H) 10 - 30 mg/dL    Creatinine 2.0 (H) 0.5 - 1.4 mg/dL    Calcium 8.5 (L) 8.7 - 10.5 mg/dL    Anion Gap 11 8 - 16 mmol/L     Lab Results   Component Value Date    HGBA1C 6.1 (H) 09/25/2019           Disposition:  Hospice      Future Scheduled Appointments:  No future appointments.    Follow-up Plans from This Hospitalization:      Discharge Medication List:      There are no discharge medications for this  patient.       Patient Instructions:  No discharge procedures on file.      Time spent on the discharge of patient: 35 minutes      [facility]  Pager: [phone]

## 2019-10-08 NOTE — TELEPHONE ENCOUNTER
Spoke with Mr. Swift's daughter Marisela and she just wanted to inform you that he passed away on Friday of a heart attack. And that we all are invited to his  and she will send you the information, and she thank's you for taking care of him.

## 2019-10-09 NOTE — PHYSICIAN QUERY
PT Name: Jeniffer Taylor  MR #: [id number]    Physician Query Form - Cause and Effect Relationship Clarification      CDS: Joe BARROW,RN        Contact information:[phone]    This form is a permanent document in the medical record.     Query Date: October 9, 2019    By submitting this query, we are merely seeking further clarification of documentation. Please utilize your independent clinical judgment when addressing the question(s) below.    The Medical record contains the following:  Supporting Clinical Findings   Location in record   Cardiorenal VS hypovolemia in sitting of sepsis                                                                                                                                                                             9/28/19 Critical Care Medicine Assessment & Plan Note        Blood Culture, Routine Gram stain aer bottle: Gram negative rods     Blood Culture, Routine Positive results previously called 09/27/2019  04:21    Blood Culture, Routine ESCHERICHIA COLI Abnormal                                                                                              Ceftriaxone injection 1g Intravenous Every 24 hours  Vancomycin 1.5g  in dextrose 5% 250 ml IVPB Intravenous Once 1 dose  Vancomycin 750 mg in dextrose 5% 250 ml IVPB Intravenous Once  1 dose  Piperacillin-tazobactam 4.5g in sodium chloride 0.9% 100 ml IVPB Intravenous  Every 12 hours  Piperacillin-tazobactam 4.5g in sodium chloride 0.9% 100 ml IVPB Intravenous Every 8 hours                                                                                                      9/26/19 Blood Culture               9/29/19 MAR      9/26/19 MAR     9/27/19 MAR     9/27---->9/29 MAR       9/26--->9/27 MAR         Provider, please clarify if there is any correlation between _Sepsis________________________ and ____E. Coli______________.           Are the conditions:      [x  ] Due to or associated with each other   [   ] Unrelated to each other   [  ] Other (Please Specify): _________________________   [  ] Clinically Undetermined

## 2019-10-09 NOTE — PHYSICIAN QUERY
PT Name: Jeniffer Taylor  MR #: [id number]    Physician Query Form - Nutrition Clarification     CDS: Joe BARROW,RN        Contact information:[phone]  This form is a permanent document in the medical record.     Query Date: October 9, 2019    By submitting this query, we are merely seeking further clarification of documentation.. Please utilize your independent clinical judgment when addressing the question(s) below.    The Medical record contains the following:   Indicators  Supporting Clinical Findings Location in Medical Record   x % of Estimated Energy Intake over a time frame from p.o., TF, or TPN <75% of nutritional needs being met >5 days    9/30/19 Adult Nutrition Progress Note    Weight Status over a time frame      Subcutaneous Fat and/or Muscle Loss      Fluid Accumulation or Edema      Reduced  Strength     x Wt / BMI / Usual Body Weight WT= 180 lb  BMI=27.4 9/30/19 Adult Nutrition Progress Note   x Delayed Wound Healing / Failure to Thrive adult failure to thrive  Pressure injury  Stage 2 to sacral region, Pressure injury of left heel, stage 2      Pressure injury to sacral spine stage 3 9/30/19 Hospital Medicine Progress Notes        10/1/19 Physician  Query Response                 x Acute or Chronic Illness  melena, GI bleed,acute respiratory failure with hypoxia, Bacteremia, UTI 10/2/19 Hospital Medicine Plan of Care    Medication     x Treatment  NPO/clear liquids x 5 days  ADAT to renal, diabetic; texture per SLP  Modified diet-renal, DM 9/30/19 Adult Nutrition Progress Note   x Other Palliative care encounter  Grave debility     Pt consuming <25% of clear liquids with no plans to advance diet  9/30/19 Hospital Medicine Progress Notes     9/30/19 Adult Nutrition Progress Note     AND / [facility] Clinical Characteristics (October 2011)  A minimum of two characteristics is recommended for diagnosing either moderate or severe malnutrition   Mild Malnutrition Moderate Malnutrition  Severe Malnutrition   Energy Intake from p.o., TF or TPN. < 75% intake of estimated energy needs for less than 7 days < 75% intake of estimated energy needs for greater than 7 days < 50% intake of estimated energy needs for > 5 days   Weight Loss 1-2% in 1 month  5% in 3 months  7.5% in 6 months  10% in 1 year 1-2 % in 1 week  5% in 1 month  7.5% in 3 months  10% in 6 months  20% in 1 year > 2% in 1 week  > 5% in 1 month  > 7.5% in 3 months  > 10% in 6 months  > 20% in 1 year   Physical Findings     None *Mild subcutaneous fat and/or muscle loss  *Mild fluid accumulation  *Stage II decubitus  *Surgical wound or non-healing wound *Mod/severe subcutaneous fat and/or muscle loss  *Mod/severe fluid accumulation  *Stage III or IV decubitus  *Non-healing surgical wound     Provider, please specify diagnosis or diagnoses associated with above clinical findings.    [x  ] Mild Protein-Calorie Malnutrition   [  ] Moderate Protein-Calorie Malnutrition   [  ] Other Nutritional Diagnosis (please specify):    [  ] Other:    [  ] Clinically Undetermined       Please document in your progress notes daily for the duration of treatment until resolved and include in your discharge summary.

## 2022-07-06 NOTE — TELEPHONE ENCOUNTER
Received consult re: patient needing assistance with co-pays for medical appts. Referring patient to [facility]' , Mandy Cannon, for assistance with payment arrangements, screening for Medicaid as a secondary insurance, or [facility] financial assistance application.      
Returned call to patient. Patient informed that we need to schedule him before December as he missed his appointment in 6/21/17. Patient states he has too many appointments and he cannot afford the $50 co-pay. November would be the earliest that he could make it. It was reinforced that he need to come in earlier than November. Patient was informed that we will reach out to social work for possible payment assistance.  
abnormal lab result
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
